# Patient Record
Sex: MALE | Race: BLACK OR AFRICAN AMERICAN | Employment: OTHER | URBAN - NONMETROPOLITAN AREA
[De-identification: names, ages, dates, MRNs, and addresses within clinical notes are randomized per-mention and may not be internally consistent; named-entity substitution may affect disease eponyms.]

---

## 2017-01-10 ENCOUNTER — NURSE ONLY (OUTPATIENT)
Dept: UROLOGY | Age: 47
End: 2017-01-10

## 2017-01-10 ENCOUNTER — OFFICE VISIT (OUTPATIENT)
Dept: OTHER | Age: 47
End: 2017-01-10

## 2017-01-10 VITALS — BODY MASS INDEX: 29.79 KG/M2 | WEIGHT: 232.13 LBS

## 2017-01-10 DIAGNOSIS — R39.15 URGENCY OF URINATION: Primary | ICD-10-CM

## 2017-01-10 DIAGNOSIS — E10.8 TYPE 1 DIABETES MELLITUS WITH COMPLICATION (HCC): Primary | ICD-10-CM

## 2017-01-10 PROCEDURE — 99211 OFF/OP EST MAY X REQ PHY/QHP: CPT | Performed by: REGISTERED NURSE

## 2017-01-10 PROCEDURE — 64566 NEUROELTRD STIM POST TIBIAL: CPT | Performed by: UROLOGY

## 2017-02-07 ENCOUNTER — NURSE ONLY (OUTPATIENT)
Dept: UROLOGY | Age: 47
End: 2017-02-07

## 2017-02-07 DIAGNOSIS — N32.81 OAB (OVERACTIVE BLADDER): Primary | ICD-10-CM

## 2017-02-07 PROCEDURE — 64566 NEUROELTRD STIM POST TIBIAL: CPT | Performed by: UROLOGY

## 2017-02-07 PROCEDURE — 99999 PR OFFICE/OUTPT VISIT,PROCEDURE ONLY: CPT | Performed by: UROLOGY

## 2017-02-14 ENCOUNTER — OFFICE VISIT (OUTPATIENT)
Dept: UROLOGY | Age: 47
End: 2017-02-14

## 2017-02-14 VITALS
DIASTOLIC BLOOD PRESSURE: 70 MMHG | HEIGHT: 74 IN | BODY MASS INDEX: 30.8 KG/M2 | SYSTOLIC BLOOD PRESSURE: 122 MMHG | WEIGHT: 240 LBS

## 2017-02-14 DIAGNOSIS — N32.81 OAB (OVERACTIVE BLADDER): Primary | ICD-10-CM

## 2017-02-14 PROCEDURE — 99212 OFFICE O/P EST SF 10 MIN: CPT | Performed by: UROLOGY

## 2017-02-14 PROCEDURE — 64566 NEUROELTRD STIM POST TIBIAL: CPT | Performed by: UROLOGY

## 2017-05-08 ENCOUNTER — TELEPHONE (OUTPATIENT)
Dept: OTHER | Age: 47
End: 2017-05-08

## 2017-05-08 DIAGNOSIS — E10.8 TYPE 1 DIABETES MELLITUS WITH COMPLICATION (HCC): Primary | ICD-10-CM

## 2017-06-09 ENCOUNTER — PROCEDURE VISIT (OUTPATIENT)
Dept: PHYSICAL MEDICINE AND REHAB | Age: 47
End: 2017-06-09

## 2017-06-09 DIAGNOSIS — G62.9 NEUROPATHY: Primary | ICD-10-CM

## 2017-06-09 DIAGNOSIS — R20.0 BILATERAL HAND NUMBNESS: ICD-10-CM

## 2017-06-09 PROCEDURE — 95911 NRV CNDJ TEST 9-10 STUDIES: CPT | Performed by: PSYCHIATRY & NEUROLOGY

## 2017-06-09 PROCEDURE — 95886 MUSC TEST DONE W/N TEST COMP: CPT | Performed by: PSYCHIATRY & NEUROLOGY

## 2017-07-06 ENCOUNTER — OFFICE VISIT (OUTPATIENT)
Dept: OTHER | Age: 47
End: 2017-07-06

## 2017-07-06 ENCOUNTER — TELEPHONE (OUTPATIENT)
Dept: OTHER | Age: 47
End: 2017-07-06

## 2017-07-06 VITALS — WEIGHT: 251 LBS | BODY MASS INDEX: 32.23 KG/M2

## 2017-07-06 DIAGNOSIS — E10.8 TYPE 1 DIABETES MELLITUS WITH COMPLICATION (HCC): Primary | ICD-10-CM

## 2017-07-21 ENCOUNTER — HOSPITAL ENCOUNTER (OUTPATIENT)
Dept: MRI IMAGING | Age: 47
Discharge: HOME | End: 2017-07-21
Payer: COMMERCIAL

## 2017-07-21 DIAGNOSIS — M54.12 CERVICAL SPONDYLITIS WITH RADICULITIS (HCC): ICD-10-CM

## 2017-07-21 DIAGNOSIS — M46.92 CERVICAL SPONDYLITIS WITH RADICULITIS (HCC): ICD-10-CM

## 2017-07-21 PROCEDURE — 72141 MRI NECK SPINE W/O DYE: CPT

## 2017-09-05 ENCOUNTER — HOSPITAL ENCOUNTER (OUTPATIENT)
Dept: PREADMISSION TESTING | Age: 47
Discharge: HOME | End: 2017-09-05
Payer: COMMERCIAL

## 2017-09-06 ENCOUNTER — HOSPITAL ENCOUNTER (OUTPATIENT)
Dept: PREADMISSION TESTING | Age: 47
Discharge: HOME | End: 2017-09-06

## 2017-09-08 ENCOUNTER — HOSPITAL ENCOUNTER (OUTPATIENT)
Age: 47
Discharge: HOME | End: 2017-09-08
Payer: COMMERCIAL

## 2017-09-08 ENCOUNTER — HOSPITAL ENCOUNTER (OUTPATIENT)
Dept: GENERAL RADIOLOGY | Age: 47
Discharge: HOME | End: 2017-09-08
Payer: COMMERCIAL

## 2017-09-08 DIAGNOSIS — E13.8 OTHER SPECIFIED DIABETES MELLITUS WITH UNSPECIFIED COMPLICATIONS (HCC): ICD-10-CM

## 2017-09-08 DIAGNOSIS — M50.323 OTHER CERVICAL DISC DEGENERATION AT C6-C7 LEVEL: ICD-10-CM

## 2017-09-08 DIAGNOSIS — Z01.811 PRE-OP CHEST EXAM: ICD-10-CM

## 2017-09-08 LAB
ALBUMIN SERPL-MCNC: 4.3 G/DL (ref 3.5–5.1)
ANION GAP SERPL CALCULATED.3IONS-SCNC: 11 MEQ/L (ref 8–16)
AVERAGE GLUCOSE: 168 MG/DL (ref 70–126)
BASOPHILS # BLD: 0.5 %
BASOPHILS ABSOLUTE: 0 THOU/MM3 (ref 0–0.1)
BUN BLDV-MCNC: 12 MG/DL (ref 7–22)
CALCIUM SERPL-MCNC: 9.1 MG/DL (ref 8.5–10.5)
CHLORIDE BLD-SCNC: 100 MEQ/L (ref 98–111)
CO2: 25 MEQ/L (ref 23–33)
CREAT SERPL-MCNC: 1 MG/DL (ref 0.4–1.2)
EOSINOPHIL # BLD: 2 %
EOSINOPHILS ABSOLUTE: 0.1 THOU/MM3 (ref 0–0.4)
GFR SERPL CREATININE-BSD FRML MDRD: > 90 ML/MIN/1.73M2
GLUCOSE BLD-MCNC: 163 MG/DL (ref 70–108)
HBA1C MFR BLD: 7.6 % (ref 4.4–6.4)
HCT VFR BLD CALC: 40.8 % (ref 42–52)
HEMOGLOBIN: 13.9 GM/DL (ref 14–18)
LYMPHOCYTES # BLD: 45 %
LYMPHOCYTES ABSOLUTE: 2.3 THOU/MM3 (ref 1–4.8)
MCH RBC QN AUTO: 28.5 PG (ref 27–31)
MCHC RBC AUTO-ENTMCNC: 34 GM/DL (ref 33–37)
MCV RBC AUTO: 83.8 FL (ref 80–94)
MONOCYTES # BLD: 9.4 %
MONOCYTES ABSOLUTE: 0.5 THOU/MM3 (ref 0.4–1.3)
NUCLEATED RED BLOOD CELLS: 0 /100 WBC
PDW BLD-RTO: 14.2 % (ref 11.5–14.5)
PLATELET # BLD: 221 THOU/MM3 (ref 130–400)
PMV BLD AUTO: 8 MCM (ref 7.4–10.4)
POTASSIUM SERPL-SCNC: 4.4 MEQ/L (ref 3.5–5.2)
PREALBUMIN: 27.5 MG/DL (ref 20–40)
RBC # BLD: 4.88 MILL/MM3 (ref 4.7–6.1)
RBC # BLD: NORMAL 10*6/UL
SEG NEUTROPHILS: 43.1 %
SEGMENTED NEUTROPHILS ABSOLUTE COUNT: 2.2 THOU/MM3 (ref 1.8–7.7)
SODIUM BLD-SCNC: 136 MEQ/L (ref 135–145)
WBC # BLD: 5.1 THOU/MM3 (ref 4.8–10.8)

## 2017-09-08 PROCEDURE — 36415 COLL VENOUS BLD VENIPUNCTURE: CPT

## 2017-09-08 PROCEDURE — 87184 SC STD DISK METHOD PER PLATE: CPT

## 2017-09-08 PROCEDURE — 84134 ASSAY OF PREALBUMIN: CPT

## 2017-09-08 PROCEDURE — 87147 CULTURE TYPE IMMUNOLOGIC: CPT

## 2017-09-08 PROCEDURE — 87081 CULTURE SCREEN ONLY: CPT

## 2017-09-08 PROCEDURE — 82040 ASSAY OF SERUM ALBUMIN: CPT

## 2017-09-08 PROCEDURE — 93005 ELECTROCARDIOGRAM TRACING: CPT

## 2017-09-08 PROCEDURE — 71020 XR CHEST STANDARD TWO VW: CPT

## 2017-09-08 PROCEDURE — 85025 COMPLETE CBC W/AUTO DIFF WBC: CPT

## 2017-09-08 PROCEDURE — 83036 HEMOGLOBIN GLYCOSYLATED A1C: CPT

## 2017-09-08 PROCEDURE — 80048 BASIC METABOLIC PNL TOTAL CA: CPT

## 2017-09-09 LAB — MRSA SCREEN: NORMAL

## 2017-09-10 LAB
EKG ATRIAL RATE: 68 BPM
EKG P AXIS: 67 DEGREES
EKG P-R INTERVAL: 158 MS
EKG Q-T INTERVAL: 396 MS
EKG QRS DURATION: 84 MS
EKG QTC CALCULATION (BAZETT): 421 MS
EKG R AXIS: 66 DEGREES
EKG T AXIS: 35 DEGREES
EKG VENTRICULAR RATE: 68 BPM

## 2017-09-12 LAB — MRSA SCREEN: NORMAL

## 2017-09-27 ENCOUNTER — HOSPITAL ENCOUNTER (OUTPATIENT)
Age: 47
Discharge: HOME | End: 2017-09-29
Attending: ORTHOPAEDIC SURGERY | Admitting: ORTHOPAEDIC SURGERY
Payer: COMMERCIAL

## 2017-09-27 ENCOUNTER — ANESTHESIA EVENT (OUTPATIENT)
Dept: OPERATING ROOM | Age: 47
End: 2017-09-27
Payer: COMMERCIAL

## 2017-09-27 ENCOUNTER — ANESTHESIA (OUTPATIENT)
Dept: OPERATING ROOM | Age: 47
End: 2017-09-27
Payer: COMMERCIAL

## 2017-09-27 ENCOUNTER — APPOINTMENT (OUTPATIENT)
Dept: GENERAL RADIOLOGY | Age: 47
End: 2017-09-27
Attending: ORTHOPAEDIC SURGERY
Payer: COMMERCIAL

## 2017-09-27 VITALS — TEMPERATURE: 97 F | DIASTOLIC BLOOD PRESSURE: 77 MMHG | OXYGEN SATURATION: 99 % | SYSTOLIC BLOOD PRESSURE: 97 MMHG

## 2017-09-27 PROBLEM — M50.10 HERNIATION OF CERVICAL INTERVERTEBRAL DISC WITH RADICULOPATHY: Status: ACTIVE | Noted: 2017-09-27

## 2017-09-27 LAB
GLUCOSE BLD-MCNC: 122 MG/DL (ref 70–108)
GLUCOSE BLD-MCNC: 168 MG/DL (ref 70–108)
GLUCOSE BLD-MCNC: 172 MG/DL (ref 70–108)
GLUCOSE BLD-MCNC: 276 MG/DL (ref 70–108)

## 2017-09-27 PROCEDURE — 6370000000 HC RX 637 (ALT 250 FOR IP): Performed by: ORTHOPAEDIC SURGERY

## 2017-09-27 PROCEDURE — 96365 THER/PROPH/DIAG IV INF INIT: CPT

## 2017-09-27 PROCEDURE — 7100000000 HC PACU RECOVERY - FIRST 15 MIN: Performed by: ORTHOPAEDIC SURGERY

## 2017-09-27 PROCEDURE — 82948 REAGENT STRIP/BLOOD GLUCOSE: CPT

## 2017-09-27 PROCEDURE — 94640 AIRWAY INHALATION TREATMENT: CPT

## 2017-09-27 PROCEDURE — 2720000010 HC SURG SUPPLY STERILE: Performed by: ORTHOPAEDIC SURGERY

## 2017-09-27 PROCEDURE — 95940 IONM IN OPERATNG ROOM 15 MIN: CPT | Performed by: ORTHOPAEDIC SURGERY

## 2017-09-27 PROCEDURE — 3700000000 HC ANESTHESIA ATTENDED CARE: Performed by: ORTHOPAEDIC SURGERY

## 2017-09-27 PROCEDURE — 2580000003 HC RX 258: Performed by: ORTHOPAEDIC SURGERY

## 2017-09-27 PROCEDURE — 7100000001 HC PACU RECOVERY - ADDTL 15 MIN: Performed by: ORTHOPAEDIC SURGERY

## 2017-09-27 PROCEDURE — 6360000002 HC RX W HCPCS: Performed by: ANESTHESIOLOGY

## 2017-09-27 PROCEDURE — C1713 ANCHOR/SCREW BN/BN,TIS/BN: HCPCS | Performed by: ORTHOPAEDIC SURGERY

## 2017-09-27 PROCEDURE — 2580000003 HC RX 258: Performed by: ANESTHESIOLOGY

## 2017-09-27 PROCEDURE — 6360000002 HC RX W HCPCS: Performed by: ORTHOPAEDIC SURGERY

## 2017-09-27 PROCEDURE — A6258 TRANSPARENT FILM >16<=48 IN: HCPCS | Performed by: ORTHOPAEDIC SURGERY

## 2017-09-27 PROCEDURE — L0120 CERV FLEX N/ADJ FOAM PRE OTS: HCPCS | Performed by: ORTHOPAEDIC SURGERY

## 2017-09-27 PROCEDURE — 3700000001 HC ADD 15 MINUTES (ANESTHESIA): Performed by: ORTHOPAEDIC SURGERY

## 2017-09-27 PROCEDURE — 72020 X-RAY EXAM OF SPINE 1 VIEW: CPT

## 2017-09-27 PROCEDURE — 96361 HYDRATE IV INFUSION ADD-ON: CPT

## 2017-09-27 PROCEDURE — 3600000005 HC SURGERY LEVEL 5 BASE: Performed by: ORTHOPAEDIC SURGERY

## 2017-09-27 PROCEDURE — 96360 HYDRATION IV INFUSION INIT: CPT

## 2017-09-27 PROCEDURE — C9359 IMPLNT,BON VOID FILLER-PUTTY: HCPCS | Performed by: ORTHOPAEDIC SURGERY

## 2017-09-27 PROCEDURE — 3600000015 HC SURGERY LEVEL 5 ADDTL 15MIN: Performed by: ORTHOPAEDIC SURGERY

## 2017-09-27 PROCEDURE — 2500000003 HC RX 250 WO HCPCS: Performed by: ORTHOPAEDIC SURGERY

## 2017-09-27 PROCEDURE — 2500000003 HC RX 250 WO HCPCS: Performed by: ANESTHESIOLOGY

## 2017-09-27 DEVICE — SCREW 3120515 4.0 X 15 SELF DRILL VAR
Type: IMPLANTABLE DEVICE | Site: NECK | Status: FUNCTIONAL
Brand: ATLANTIS® ANTERIOR CERVICAL PLATE SYSTEM

## 2017-09-27 DEVICE — GRAFT BNE SUB W11XH8XL14MM CORT INTBDY FUS FRZ DRY BLK SPCR: Type: IMPLANTABLE DEVICE | Site: NECK | Status: FUNCTIONAL

## 2017-09-27 DEVICE — DBM 005001 PROGENIX DBM 1CC SRVC FEE
Type: IMPLANTABLE DEVICE | Site: NECK | Status: FUNCTIONAL
Brand: PROGENIX® PUTTY AND PROGENIX® PLUS

## 2017-09-27 RX ORDER — FENTANYL CITRATE 50 UG/ML
50 INJECTION, SOLUTION INTRAMUSCULAR; INTRAVENOUS EVERY 30 MIN PRN
Status: DISCONTINUED | OUTPATIENT
Start: 2017-09-27 | End: 2017-09-29 | Stop reason: HOSPADM

## 2017-09-27 RX ORDER — LABETALOL HYDROCHLORIDE 5 MG/ML
5 INJECTION, SOLUTION INTRAVENOUS EVERY 10 MIN PRN
Status: DISCONTINUED | OUTPATIENT
Start: 2017-09-27 | End: 2017-09-27 | Stop reason: HOSPADM

## 2017-09-27 RX ORDER — PANTOPRAZOLE SODIUM 40 MG/1
40 TABLET, DELAYED RELEASE ORAL
Status: DISCONTINUED | OUTPATIENT
Start: 2017-09-28 | End: 2017-09-29 | Stop reason: HOSPADM

## 2017-09-27 RX ORDER — DEXTROSE MONOHYDRATE 50 MG/ML
100 INJECTION, SOLUTION INTRAVENOUS PRN
Status: DISCONTINUED | OUTPATIENT
Start: 2017-09-27 | End: 2017-09-29 | Stop reason: HOSPADM

## 2017-09-27 RX ORDER — ALBUTEROL SULFATE 90 UG/1
2 AEROSOL, METERED RESPIRATORY (INHALATION) EVERY 4 HOURS PRN
Status: DISCONTINUED | OUTPATIENT
Start: 2017-09-27 | End: 2017-09-29 | Stop reason: HOSPADM

## 2017-09-27 RX ORDER — FLUOXETINE HYDROCHLORIDE 20 MG/1
40 CAPSULE ORAL DAILY
Status: DISCONTINUED | OUTPATIENT
Start: 2017-09-27 | End: 2017-09-29 | Stop reason: HOSPADM

## 2017-09-27 RX ORDER — GUANFACINE 1 MG/1
1 TABLET, EXTENDED RELEASE ORAL NIGHTLY
Status: DISCONTINUED | OUTPATIENT
Start: 2017-09-27 | End: 2017-09-28

## 2017-09-27 RX ORDER — SODIUM CHLORIDE 9 MG/ML
INJECTION, SOLUTION INTRAVENOUS CONTINUOUS PRN
Status: DISCONTINUED | OUTPATIENT
Start: 2017-09-27 | End: 2017-09-27 | Stop reason: SDUPTHER

## 2017-09-27 RX ORDER — ACETAMINOPHEN 650 MG/1
650 SUPPOSITORY RECTAL EVERY 4 HOURS PRN
Status: DISCONTINUED | OUTPATIENT
Start: 2017-09-27 | End: 2017-09-29 | Stop reason: HOSPADM

## 2017-09-27 RX ORDER — FENTANYL CITRATE 50 UG/ML
50 INJECTION, SOLUTION INTRAMUSCULAR; INTRAVENOUS EVERY 5 MIN PRN
Status: DISCONTINUED | OUTPATIENT
Start: 2017-09-27 | End: 2017-09-27 | Stop reason: HOSPADM

## 2017-09-27 RX ORDER — SODIUM CHLORIDE 0.9 % (FLUSH) 0.9 %
10 SYRINGE (ML) INJECTION PRN
Status: DISCONTINUED | OUTPATIENT
Start: 2017-09-27 | End: 2017-09-29 | Stop reason: HOSPADM

## 2017-09-27 RX ORDER — LORATADINE 10 MG/1
10 TABLET ORAL DAILY
Status: DISCONTINUED | OUTPATIENT
Start: 2017-09-27 | End: 2017-09-27 | Stop reason: CLARIF

## 2017-09-27 RX ORDER — TAMSULOSIN HYDROCHLORIDE 0.4 MG/1
0.4 CAPSULE ORAL DAILY
Status: DISCONTINUED | OUTPATIENT
Start: 2017-09-27 | End: 2017-09-29 | Stop reason: HOSPADM

## 2017-09-27 RX ORDER — DEXAMETHASONE SODIUM PHOSPHATE 4 MG/ML
INJECTION, SOLUTION INTRA-ARTICULAR; INTRALESIONAL; INTRAMUSCULAR; INTRAVENOUS; SOFT TISSUE PRN
Status: DISCONTINUED | OUTPATIENT
Start: 2017-09-27 | End: 2017-09-27 | Stop reason: SDUPTHER

## 2017-09-27 RX ORDER — SODIUM CHLORIDE 0.9 % (FLUSH) 0.9 %
10 SYRINGE (ML) INJECTION EVERY 12 HOURS SCHEDULED
Status: DISCONTINUED | OUTPATIENT
Start: 2017-09-27 | End: 2017-09-29 | Stop reason: HOSPADM

## 2017-09-27 RX ORDER — ONDANSETRON 2 MG/ML
INJECTION INTRAMUSCULAR; INTRAVENOUS PRN
Status: DISCONTINUED | OUTPATIENT
Start: 2017-09-27 | End: 2017-09-27 | Stop reason: SDUPTHER

## 2017-09-27 RX ORDER — HYDROMORPHONE HCL 110MG/55ML
PATIENT CONTROLLED ANALGESIA SYRINGE INTRAVENOUS PRN
Status: DISCONTINUED | OUTPATIENT
Start: 2017-09-27 | End: 2017-09-27 | Stop reason: SDUPTHER

## 2017-09-27 RX ORDER — FENTANYL CITRATE 50 UG/ML
INJECTION, SOLUTION INTRAMUSCULAR; INTRAVENOUS PRN
Status: DISCONTINUED | OUTPATIENT
Start: 2017-09-27 | End: 2017-09-27 | Stop reason: SDUPTHER

## 2017-09-27 RX ORDER — LOSARTAN POTASSIUM 100 MG/1
100 TABLET ORAL DAILY
Status: DISCONTINUED | OUTPATIENT
Start: 2017-09-28 | End: 2017-09-29 | Stop reason: HOSPADM

## 2017-09-27 RX ORDER — POLYETHYLENE GLYCOL 3350 17 G/17G
17 POWDER, FOR SOLUTION ORAL DAILY
Status: DISCONTINUED | OUTPATIENT
Start: 2017-09-27 | End: 2017-09-29 | Stop reason: HOSPADM

## 2017-09-27 RX ORDER — SODIUM CHLORIDE 9 MG/ML
INJECTION, SOLUTION INTRAVENOUS CONTINUOUS
Status: DISCONTINUED | OUTPATIENT
Start: 2017-09-27 | End: 2017-09-29 | Stop reason: HOSPADM

## 2017-09-27 RX ORDER — PROMETHAZINE HYDROCHLORIDE 25 MG/ML
12.5 INJECTION, SOLUTION INTRAMUSCULAR; INTRAVENOUS
Status: DISCONTINUED | OUTPATIENT
Start: 2017-09-27 | End: 2017-09-27 | Stop reason: HOSPADM

## 2017-09-27 RX ORDER — FENTANYL CITRATE 50 UG/ML
25 INJECTION, SOLUTION INTRAMUSCULAR; INTRAVENOUS EVERY 30 MIN PRN
Status: DISCONTINUED | OUTPATIENT
Start: 2017-09-27 | End: 2017-09-29 | Stop reason: HOSPADM

## 2017-09-27 RX ORDER — MULTIVITAMIN WITH FOLIC ACID 400 MCG
1 TABLET ORAL DAILY
Status: DISCONTINUED | OUTPATIENT
Start: 2017-09-27 | End: 2017-09-29 | Stop reason: HOSPADM

## 2017-09-27 RX ORDER — LIDOCAINE HYDROCHLORIDE AND EPINEPHRINE 10; 10 MG/ML; UG/ML
INJECTION, SOLUTION INFILTRATION; PERINEURAL PRN
Status: DISCONTINUED | OUTPATIENT
Start: 2017-09-27 | End: 2017-09-27 | Stop reason: HOSPADM

## 2017-09-27 RX ORDER — CYCLOBENZAPRINE HCL 10 MG
10 TABLET ORAL 3 TIMES DAILY PRN
Status: DISCONTINUED | OUTPATIENT
Start: 2017-09-27 | End: 2017-09-29 | Stop reason: HOSPADM

## 2017-09-27 RX ORDER — FENTANYL CITRATE 50 UG/ML
25 INJECTION, SOLUTION INTRAMUSCULAR; INTRAVENOUS EVERY 5 MIN PRN
Status: DISCONTINUED | OUTPATIENT
Start: 2017-09-27 | End: 2017-09-27 | Stop reason: HOSPADM

## 2017-09-27 RX ORDER — DOCUSATE SODIUM 100 MG/1
100 CAPSULE, LIQUID FILLED ORAL 2 TIMES DAILY
Status: DISCONTINUED | OUTPATIENT
Start: 2017-09-27 | End: 2017-09-29 | Stop reason: HOSPADM

## 2017-09-27 RX ORDER — SUCCINYLCHOLINE CHLORIDE 20 MG/ML
INJECTION INTRAMUSCULAR; INTRAVENOUS PRN
Status: DISCONTINUED | OUTPATIENT
Start: 2017-09-27 | End: 2017-09-27 | Stop reason: SDUPTHER

## 2017-09-27 RX ORDER — AMLODIPINE BESYLATE 5 MG/1
5 TABLET ORAL DAILY
Status: DISCONTINUED | OUTPATIENT
Start: 2017-09-28 | End: 2017-09-29 | Stop reason: HOSPADM

## 2017-09-27 RX ORDER — OXYCODONE HYDROCHLORIDE AND ACETAMINOPHEN 5; 325 MG/1; MG/1
2 TABLET ORAL EVERY 4 HOURS PRN
Status: DISCONTINUED | OUTPATIENT
Start: 2017-09-27 | End: 2017-09-29 | Stop reason: HOSPADM

## 2017-09-27 RX ORDER — ATORVASTATIN CALCIUM 20 MG/1
20 TABLET, FILM COATED ORAL DAILY
Status: DISCONTINUED | OUTPATIENT
Start: 2017-09-27 | End: 2017-09-29 | Stop reason: HOSPADM

## 2017-09-27 RX ORDER — MEPERIDINE HYDROCHLORIDE 25 MG/ML
25 INJECTION INTRAMUSCULAR; INTRAVENOUS; SUBCUTANEOUS
Status: DISCONTINUED | OUTPATIENT
Start: 2017-09-27 | End: 2017-09-27 | Stop reason: HOSPADM

## 2017-09-27 RX ORDER — NITROGLYCERIN 0.4 MG/1
0.4 TABLET SUBLINGUAL EVERY 5 MIN PRN
Status: DISCONTINUED | OUTPATIENT
Start: 2017-09-27 | End: 2017-09-29 | Stop reason: HOSPADM

## 2017-09-27 RX ORDER — CETIRIZINE HYDROCHLORIDE 10 MG/1
10 TABLET ORAL DAILY
Status: DISCONTINUED | OUTPATIENT
Start: 2017-09-27 | End: 2017-09-29 | Stop reason: HOSPADM

## 2017-09-27 RX ORDER — ACETAMINOPHEN 325 MG/1
650 TABLET ORAL EVERY 4 HOURS PRN
Status: DISCONTINUED | OUTPATIENT
Start: 2017-09-27 | End: 2017-09-29 | Stop reason: HOSPADM

## 2017-09-27 RX ORDER — OXCARBAZEPINE 300 MG/1
900 TABLET, FILM COATED ORAL NIGHTLY
COMMUNITY
End: 2018-06-12 | Stop reason: SDUPTHER

## 2017-09-27 RX ORDER — FLUOXETINE HYDROCHLORIDE 20 MG/1
20 CAPSULE ORAL DAILY
Status: DISCONTINUED | OUTPATIENT
Start: 2017-09-27 | End: 2017-09-29 | Stop reason: HOSPADM

## 2017-09-27 RX ORDER — DIPHENHYDRAMINE HYDROCHLORIDE 50 MG/ML
12.5 INJECTION INTRAMUSCULAR; INTRAVENOUS
Status: DISCONTINUED | OUTPATIENT
Start: 2017-09-27 | End: 2017-09-27 | Stop reason: HOSPADM

## 2017-09-27 RX ORDER — FLUOXETINE HYDROCHLORIDE 40 MG/1
40 CAPSULE ORAL DAILY
Status: ON HOLD | COMMUNITY
End: 2019-12-30 | Stop reason: HOSPADM

## 2017-09-27 RX ORDER — GLYCOPYRROLATE 0.2 MG/ML
INJECTION INTRAMUSCULAR; INTRAVENOUS PRN
Status: DISCONTINUED | OUTPATIENT
Start: 2017-09-27 | End: 2017-09-27 | Stop reason: SDUPTHER

## 2017-09-27 RX ORDER — BUDESONIDE AND FORMOTEROL FUMARATE DIHYDRATE 160; 4.5 UG/1; UG/1
2 AEROSOL RESPIRATORY (INHALATION) 2 TIMES DAILY
Status: DISCONTINUED | OUTPATIENT
Start: 2017-09-27 | End: 2017-09-27 | Stop reason: CLARIF

## 2017-09-27 RX ORDER — SODIUM CHLORIDE 9 MG/ML
INJECTION, SOLUTION INTRAVENOUS CONTINUOUS
Status: DISCONTINUED | OUTPATIENT
Start: 2017-09-27 | End: 2017-09-27

## 2017-09-27 RX ORDER — NEOSTIGMINE METHYLSULFATE 1 MG/ML
INJECTION, SOLUTION INTRAVENOUS PRN
Status: DISCONTINUED | OUTPATIENT
Start: 2017-09-27 | End: 2017-09-27 | Stop reason: SDUPTHER

## 2017-09-27 RX ORDER — MIDAZOLAM HYDROCHLORIDE 1 MG/ML
INJECTION INTRAMUSCULAR; INTRAVENOUS PRN
Status: DISCONTINUED | OUTPATIENT
Start: 2017-09-27 | End: 2017-09-27 | Stop reason: SDUPTHER

## 2017-09-27 RX ORDER — OXCARBAZEPINE 300 MG/1
900 TABLET, FILM COATED ORAL NIGHTLY
Status: DISCONTINUED | OUTPATIENT
Start: 2017-09-27 | End: 2017-09-29 | Stop reason: HOSPADM

## 2017-09-27 RX ORDER — PROPOFOL 10 MG/ML
INJECTION, EMULSION INTRAVENOUS PRN
Status: DISCONTINUED | OUTPATIENT
Start: 2017-09-27 | End: 2017-09-27 | Stop reason: SDUPTHER

## 2017-09-27 RX ORDER — FLUOXETINE HYDROCHLORIDE 20 MG/1
20 CAPSULE ORAL DAILY
COMMUNITY
End: 2018-06-12

## 2017-09-27 RX ORDER — OXYCODONE HYDROCHLORIDE AND ACETAMINOPHEN 5; 325 MG/1; MG/1
1 TABLET ORAL EVERY 4 HOURS PRN
Status: DISCONTINUED | OUTPATIENT
Start: 2017-09-27 | End: 2017-09-29 | Stop reason: HOSPADM

## 2017-09-27 RX ORDER — ROCURONIUM BROMIDE 10 MG/ML
INJECTION, SOLUTION INTRAVENOUS PRN
Status: DISCONTINUED | OUTPATIENT
Start: 2017-09-27 | End: 2017-09-27 | Stop reason: SDUPTHER

## 2017-09-27 RX ORDER — METOPROLOL SUCCINATE 50 MG/1
50 TABLET, EXTENDED RELEASE ORAL DAILY
Status: DISCONTINUED | OUTPATIENT
Start: 2017-09-28 | End: 2017-09-29 | Stop reason: HOSPADM

## 2017-09-27 RX ORDER — DEXTROSE MONOHYDRATE 25 G/50ML
12.5 INJECTION, SOLUTION INTRAVENOUS PRN
Status: DISCONTINUED | OUTPATIENT
Start: 2017-09-27 | End: 2017-09-29 | Stop reason: HOSPADM

## 2017-09-27 RX ORDER — ALPRAZOLAM 0.5 MG/1
1 TABLET ORAL 3 TIMES DAILY PRN
Status: DISCONTINUED | OUTPATIENT
Start: 2017-09-27 | End: 2017-09-29 | Stop reason: HOSPADM

## 2017-09-27 RX ORDER — LIDOCAINE HYDROCHLORIDE 20 MG/ML
INJECTION, SOLUTION EPIDURAL; INFILTRATION; INTRACAUDAL; PERINEURAL PRN
Status: DISCONTINUED | OUTPATIENT
Start: 2017-09-27 | End: 2017-09-27 | Stop reason: SDUPTHER

## 2017-09-27 RX ORDER — NICOTINE POLACRILEX 4 MG
15 LOZENGE BUCCAL PRN
Status: DISCONTINUED | OUTPATIENT
Start: 2017-09-27 | End: 2017-09-29 | Stop reason: HOSPADM

## 2017-09-27 RX ORDER — OXCARBAZEPINE 300 MG/1
600 TABLET, FILM COATED ORAL DAILY
Status: DISCONTINUED | OUTPATIENT
Start: 2017-09-28 | End: 2017-09-29 | Stop reason: HOSPADM

## 2017-09-27 RX ORDER — TRAZODONE HYDROCHLORIDE 150 MG/1
300 TABLET ORAL NIGHTLY
Status: ON HOLD | COMMUNITY
End: 2019-12-30 | Stop reason: HOSPADM

## 2017-09-27 RX ORDER — ONDANSETRON 2 MG/ML
4 INJECTION INTRAMUSCULAR; INTRAVENOUS EVERY 6 HOURS PRN
Status: DISCONTINUED | OUTPATIENT
Start: 2017-09-27 | End: 2017-09-29 | Stop reason: HOSPADM

## 2017-09-27 RX ORDER — ZONISAMIDE 100 MG/1
100 CAPSULE ORAL DAILY
Status: DISCONTINUED | OUTPATIENT
Start: 2017-09-28 | End: 2017-09-29 | Stop reason: HOSPADM

## 2017-09-27 RX ORDER — ASPIRIN 81 MG/1
81 TABLET ORAL DAILY
Status: DISCONTINUED | OUTPATIENT
Start: 2017-09-27 | End: 2017-09-29 | Stop reason: HOSPADM

## 2017-09-27 RX ORDER — TRAZODONE HYDROCHLORIDE 100 MG/1
300 TABLET ORAL NIGHTLY
Status: DISCONTINUED | OUTPATIENT
Start: 2017-09-27 | End: 2017-09-29 | Stop reason: HOSPADM

## 2017-09-27 RX ADMIN — FENTANYL CITRATE 100 MCG: 50 INJECTION, SOLUTION INTRAMUSCULAR; INTRAVENOUS at 09:55

## 2017-09-27 RX ADMIN — TAMSULOSIN HYDROCHLORIDE 0.4 MG: 0.4 CAPSULE ORAL at 16:08

## 2017-09-27 RX ADMIN — THERA TABS 1 TABLET: TAB at 16:08

## 2017-09-27 RX ADMIN — SODIUM CHLORIDE: 9 INJECTION, SOLUTION INTRAVENOUS at 14:47

## 2017-09-27 RX ADMIN — PROPOFOL 200 MG: 10 INJECTION, EMULSION INTRAVENOUS at 09:55

## 2017-09-27 RX ADMIN — SODIUM CHLORIDE: 9 INJECTION, SOLUTION INTRAVENOUS at 09:55

## 2017-09-27 RX ADMIN — OXYCODONE HYDROCHLORIDE AND ACETAMINOPHEN 1 TABLET: 5; 325 TABLET ORAL at 15:53

## 2017-09-27 RX ADMIN — SODIUM CHLORIDE: 9 INJECTION, SOLUTION INTRAVENOUS at 08:22

## 2017-09-27 RX ADMIN — INSULIN LISPRO 1 UNITS: 100 INJECTION, SOLUTION INTRAVENOUS; SUBCUTANEOUS at 21:05

## 2017-09-27 RX ADMIN — FLUOXETINE 20 MG: 20 CAPSULE ORAL at 16:09

## 2017-09-27 RX ADMIN — HYDROMORPHONE HYDROCHLORIDE 0.5 MG: 1 INJECTION, SOLUTION INTRAMUSCULAR; INTRAVENOUS; SUBCUTANEOUS at 12:37

## 2017-09-27 RX ADMIN — HYDROMORPHONE HYDROCHLORIDE 1 MG: 2 INJECTION INTRAMUSCULAR; INTRAVENOUS; SUBCUTANEOUS at 10:15

## 2017-09-27 RX ADMIN — GLYCOPYRROLATE 0.4 MG: 0.2 INJECTION, SOLUTION INTRAMUSCULAR; INTRAVENOUS at 12:00

## 2017-09-27 RX ADMIN — FENTANYL CITRATE 50 MCG: 50 INJECTION, SOLUTION INTRAMUSCULAR; INTRAVENOUS at 11:35

## 2017-09-27 RX ADMIN — Medication 6 UNITS: at 18:16

## 2017-09-27 RX ADMIN — FLUOXETINE 40 MG: 20 CAPSULE ORAL at 16:09

## 2017-09-27 RX ADMIN — Medication 2 PUFF: at 20:40

## 2017-09-27 RX ADMIN — DOCUSATE SODIUM 100 MG: 100 CAPSULE ORAL at 21:09

## 2017-09-27 RX ADMIN — POLYETHYLENE GLYCOL 3350 17 G: 17 POWDER, FOR SOLUTION ORAL at 16:08

## 2017-09-27 RX ADMIN — FENTANYL CITRATE 50 MCG: 50 INJECTION INTRAMUSCULAR; INTRAVENOUS at 12:42

## 2017-09-27 RX ADMIN — ONDANSETRON 4 MG: 2 INJECTION INTRAMUSCULAR; INTRAVENOUS at 12:00

## 2017-09-27 RX ADMIN — CEFAZOLIN SODIUM 2 G: 2 SOLUTION INTRAVENOUS at 10:20

## 2017-09-27 RX ADMIN — CYCLOBENZAPRINE HYDROCHLORIDE 10 MG: 10 TABLET, FILM COATED ORAL at 21:09

## 2017-09-27 RX ADMIN — TRAZODONE HYDROCHLORIDE 100 MG: 100 TABLET ORAL at 23:24

## 2017-09-27 RX ADMIN — SUCCINYLCHOLINE CHLORIDE 160 MG: 20 INJECTION, SOLUTION INTRAMUSCULAR; INTRAVENOUS at 09:55

## 2017-09-27 RX ADMIN — CETIRIZINE HYDROCHLORIDE 10 MG: 10 TABLET ORAL at 16:09

## 2017-09-27 RX ADMIN — LIDOCAINE HYDROCHLORIDE 100 MG: 20 INJECTION, SOLUTION EPIDURAL; INFILTRATION; INTRACAUDAL; PERINEURAL at 09:55

## 2017-09-27 RX ADMIN — ASPIRIN 81 MG: 81 TABLET ORAL at 16:09

## 2017-09-27 RX ADMIN — MIDAZOLAM 2 MG: 1 INJECTION INTRAMUSCULAR; INTRAVENOUS at 09:50

## 2017-09-27 RX ADMIN — DEXAMETHASONE SODIUM PHOSPHATE 10 MG: 4 INJECTION, SOLUTION INTRAMUSCULAR; INTRAVENOUS at 10:15

## 2017-09-27 RX ADMIN — HYDROMORPHONE HYDROCHLORIDE 1 MG: 2 INJECTION INTRAMUSCULAR; INTRAVENOUS; SUBCUTANEOUS at 09:55

## 2017-09-27 RX ADMIN — ATORVASTATIN CALCIUM 20 MG: 20 TABLET, FILM COATED ORAL at 21:09

## 2017-09-27 RX ADMIN — CEFAZOLIN SODIUM 2 G: 2 SOLUTION INTRAVENOUS at 18:15

## 2017-09-27 RX ADMIN — NEOSTIGMINE METHYLSULFATE 4 MG: 1 INJECTION, SOLUTION INTRAVENOUS at 12:00

## 2017-09-27 RX ADMIN — GLYCOPYRROLATE 0.2 MG: 0.2 INJECTION, SOLUTION INTRAMUSCULAR; INTRAVENOUS at 09:50

## 2017-09-27 RX ADMIN — ROCURONIUM BROMIDE 30 MG: 10 INJECTION INTRAVENOUS at 10:15

## 2017-09-27 RX ADMIN — OXYCODONE HYDROCHLORIDE AND ACETAMINOPHEN 2 TABLET: 5; 325 TABLET ORAL at 23:25

## 2017-09-27 RX ADMIN — OXYCODONE HYDROCHLORIDE AND ACETAMINOPHEN 1 TABLET: 5; 325 TABLET ORAL at 16:58

## 2017-09-27 RX ADMIN — FENTANYL CITRATE 50 MCG: 50 INJECTION, SOLUTION INTRAMUSCULAR; INTRAVENOUS at 10:15

## 2017-09-27 RX ADMIN — GUANFACINE 1 MG: 1 TABLET, EXTENDED RELEASE ORAL at 22:42

## 2017-09-27 RX ADMIN — OXCARBAZEPINE 900 MG: 300 TABLET ORAL at 21:12

## 2017-09-27 RX ADMIN — FENTANYL CITRATE 50 MCG: 50 INJECTION, SOLUTION INTRAMUSCULAR; INTRAVENOUS at 10:10

## 2017-09-27 ASSESSMENT — PAIN SCALES - GENERAL
PAINLEVEL_OUTOF10: 6
PAINLEVEL_OUTOF10: 6
PAINLEVEL_OUTOF10: 7
PAINLEVEL_OUTOF10: 8
PAINLEVEL_OUTOF10: 8
PAINLEVEL_OUTOF10: 7
PAINLEVEL_OUTOF10: 7
PAINLEVEL_OUTOF10: 8
PAINLEVEL_OUTOF10: 0

## 2017-09-27 ASSESSMENT — PULMONARY FUNCTION TESTS
PIF_VALUE: 23
PIF_VALUE: 22
PIF_VALUE: 23
PIF_VALUE: 23
PIF_VALUE: 22
PIF_VALUE: 23
PIF_VALUE: 22
PIF_VALUE: 23
PIF_VALUE: 23
PIF_VALUE: 32
PIF_VALUE: 24
PIF_VALUE: 23
PIF_VALUE: 18
PIF_VALUE: 22
PIF_VALUE: 24
PIF_VALUE: 23
PIF_VALUE: 23
PIF_VALUE: 22
PIF_VALUE: 23
PIF_VALUE: 22
PIF_VALUE: 22
PIF_VALUE: 23
PIF_VALUE: 24
PIF_VALUE: 23
PIF_VALUE: 22
PIF_VALUE: 23
PIF_VALUE: 22
PIF_VALUE: 23
PIF_VALUE: 22
PIF_VALUE: 23
PIF_VALUE: 23
PIF_VALUE: 22
PIF_VALUE: 22
PIF_VALUE: 23
PIF_VALUE: 22
PIF_VALUE: 1
PIF_VALUE: 23
PIF_VALUE: 22
PIF_VALUE: 23
PIF_VALUE: 23
PIF_VALUE: 22
PIF_VALUE: 24
PIF_VALUE: 22
PIF_VALUE: 23
PIF_VALUE: 22
PIF_VALUE: 23
PIF_VALUE: 23
PIF_VALUE: 22
PIF_VALUE: 23
PIF_VALUE: 22
PIF_VALUE: 8
PIF_VALUE: 12
PIF_VALUE: 23
PIF_VALUE: 1
PIF_VALUE: 23
PIF_VALUE: 23
PIF_VALUE: 22
PIF_VALUE: 27
PIF_VALUE: 23
PIF_VALUE: 23
PIF_VALUE: 2
PIF_VALUE: 24
PIF_VALUE: 22
PIF_VALUE: 23
PIF_VALUE: 18
PIF_VALUE: 23
PIF_VALUE: 23
PIF_VALUE: 1
PIF_VALUE: 4
PIF_VALUE: 22
PIF_VALUE: 15
PIF_VALUE: 37
PIF_VALUE: 22
PIF_VALUE: 23
PIF_VALUE: 22
PIF_VALUE: 34
PIF_VALUE: 23
PIF_VALUE: 23
PIF_VALUE: 24
PIF_VALUE: 23
PIF_VALUE: 22
PIF_VALUE: 1
PIF_VALUE: 22
PIF_VALUE: 17
PIF_VALUE: 0
PIF_VALUE: 23
PIF_VALUE: 23
PIF_VALUE: 3
PIF_VALUE: 23
PIF_VALUE: 23
PIF_VALUE: 0
PIF_VALUE: 1
PIF_VALUE: 22
PIF_VALUE: 23
PIF_VALUE: 23
PIF_VALUE: 2
PIF_VALUE: 27
PIF_VALUE: 20
PIF_VALUE: 23
PIF_VALUE: 23
PIF_VALUE: 22
PIF_VALUE: 29
PIF_VALUE: 6
PIF_VALUE: 24
PIF_VALUE: 27
PIF_VALUE: 23
PIF_VALUE: 22
PIF_VALUE: 0
PIF_VALUE: 22
PIF_VALUE: 23
PIF_VALUE: 24
PIF_VALUE: 24
PIF_VALUE: 23
PIF_VALUE: 23
PIF_VALUE: 22
PIF_VALUE: 23
PIF_VALUE: 23
PIF_VALUE: 1
PIF_VALUE: 23
PIF_VALUE: 22
PIF_VALUE: 22

## 2017-09-27 ASSESSMENT — PAIN DESCRIPTION - ORIENTATION: ORIENTATION: ANTERIOR

## 2017-09-27 ASSESSMENT — PAIN DESCRIPTION - PAIN TYPE
TYPE: SURGICAL PAIN

## 2017-09-27 ASSESSMENT — PAIN DESCRIPTION - LOCATION
LOCATION: NECK

## 2017-09-27 ASSESSMENT — PAIN DESCRIPTION - FREQUENCY: FREQUENCY: CONTINUOUS

## 2017-09-27 ASSESSMENT — PAIN DESCRIPTION - DESCRIPTORS
DESCRIPTORS: ACHING;SHARP
DESCRIPTORS: ACHING
DESCRIPTORS: ACHING;SHARP
DESCRIPTORS: ACHING;SHARP
DESCRIPTORS: ACHING

## 2017-09-27 ASSESSMENT — PAIN DESCRIPTION - ONSET: ONSET: ON-GOING

## 2017-09-27 NOTE — IP AVS SNAPSHOT
ALPRAZolam 1 MG tablet   Commonly known as:  XANAX   take 1 tablet by mouth twice a day                  09/30/17                          amLODIPine 5 MG tablet   Commonly known as:  NORVASC   take 1 tablet by mouth once daily                5 mg on 9/29/2017  8:20 AM     9/30/17                          aspirin EC 81 MG EC tablet   Take 1 tablet by mouth daily                81 mg on 9/29/2017  8:20 AM     9/30/17                          atorvastatin 20 MG tablet   Commonly known as:  LIPITOR   Take 1 tablet by mouth daily                20 mg on 9/28/2017  9:41 PM     9/29/17                          bisacodyl 5 MG EC tablet   Commonly known as:  DULCOLAX   See Prep Instructions                  AS NEEDED                       budesonide-formoterol 160-4.5 MCG/ACT Aero   Commonly known as:  SYMBICORT   Inhale 2 puffs into the lungs 2 times daily                  9/29/17                          FLUoxetine 40 MG capsule   Commonly known as:  PROZAC   Take 40 mg by mouth daily                60 mg on 9/29/2017  8:20 AM     9/30/17                          FLUoxetine 20 MG capsule   Commonly known as:  PROZAC   Take 20 mg by mouth daily                60 mg on 9/29/2017  8:20 AM     9/30/17                          insulin aspart 100 UNIT/ML injection pen   Commonly known as:  NOVOLOG FLEXPEN   Use per sliding scale, up to 50 units daily. Please dispense 5 pens at a time.                   9/29/17                          INTUNIV 1 MG Tb24 extended release tablet   Generic drug:  guanFACINE   Take 4 mg by mouth nightly                4 mg on 9/28/2017  9:39 PM     9/29/17                          loratadine 10 MG tablet   Commonly known as:  CLARITIN   Take 1 tablet by mouth daily                  9/30/17                          losartan 100 MG tablet   Commonly known as:  COZAAR   take 1 tablet by mouth once daily                100 mg on 9/29/2017  8:20 AM     9/30/17 metoprolol succinate 50 MG extended release tablet   Commonly known as:  TOPROL XL   take 2 tablets by mouth once daily                50 mg on 9/29/2017  8:20 AM     9/30/17                          multivitamin tablet   take 1 tablet by mouth once daily                1 tablet on 9/29/2017  8:20 AM     9/30/17                          nitroGLYCERIN 0.4 MG SL tablet   Commonly known as:  NITROSTAT   Place 1 tablet under the tongue every 5 minutes as needed for Chest pain                  AS NEEDED                       pantoprazole 40 MG tablet   Commonly known as:  PROTONIX   Take 1 tablet by mouth every morning (before breakfast)                40 mg on 9/29/2017  5:01 AM     9/30/17                          polyethylene glycol powder   Commonly known as:  GLYCOLAX   Colonoscopy Prep Dispense 255 Gram Bottle.   Use as Directed                  AS NEEDED                       tamsulosin 0.4 MG capsule   Commonly known as:  FLOMAX   take 2 capsules by mouth once daily                0.4 mg on 9/29/2017  8:20 AM     9/30/17                          tiotropium 2.5 MCG/ACT Aers inhaler   Commonly known as:  SPIRIVA RESPIMAT   Inhale 2 puffs into the lungs every morning (before breakfast)                  9/30/17                          traZODone 150 MG tablet   Commonly known as:  DESYREL   Take 300 mg by mouth nightly                100 mg on 9/28/2017  9:40 PM     9/29/17                          TRESIBA FLEXTOUCH SC   Inject 32 Units into the skin every morning                32 Units on 9/29/2017  9:08 AM     9/30/17                          TRILEPTAL 300 MG tablet   Generic drug:  OXcarbazepine   Take 600 mg by mouth daily                600 mg on 9/29/2017  8:30 AM     9/30/17                          OXcarbazepine 300 MG tablet   Commonly known as:  TRILEPTAL   Take 900 mg by mouth nightly                600 mg on 9/29/2017  8:30 AM     9/29/17                          zonisamide 100 MG capsule You may receive a survey regarding the care you received during your stay. Your input is valuable to us. We encourage you to complete and return your survey in the envelope provided. We hope you will choose us in the future for your healthcare needs. Patient Information     Patient Name MELA Melendez 1970      Care Provided at:     Name Address Phone       [address] [phone]            Your Visit    Here you will find information about your visit, including the reason for your visit. Please take this sheet with you when you visit your doctor or other health care provider in the future. It will help determine the best possible medical care for you at that time. If you have any questions once you leave the hospital, please call the department phone number listed below. Why you were here     Your primary diagnosis was:  Herniation Of Cervical Intervertebral Disc With Radiculopathy      Visit Information     Date & Time Provider Department Dept. Phone    2017 Travis Ariza MD [facility] [phone]       Follow-up Appointments    Below is a list of your follow-up and future appointments. This may not be a complete list as you may have made appointments directly with providers that we are not aware of or your providers may have made some for you. Please call your providers to confirm appointments. It is important to keep your appointments. Please bring your current insurance card, photo ID, co-pay, and all medication bottles to your appointment. If self-pay, payment is expected at the time of service. Follow-up Information     Follow up with Nicky Gracia MD On 10/9/2017. Specialty:  Family Medicine    Why:  at 2:45 for follow up    Contact information:    [address] [facility] [address]  [phone]          Follow up with Travis Ariza MD On 10/9/2017. Specialty:  Orthopedic Surgery    Why:  at 1:20    Contact information:    [address] [place] [address] [address] [place] [address]  [phone]        Future Appointments     10/10/2017 3:00 PM     Appointment with Olivia Parr RN at [facility] Medication Management ([phone])   21 [phone] [facility]  [address]  [address] [address]         Preventive Care        Date Due    HIV screening is recommended for all people regardless of risk factors  aged 15-65 years at least once (lifetime) who have never been HIV tested. 3/23/1986    Tetanus Combination Vaccine (1 - Tdap) 3/23/1990    Pneumococcal Vaccine - Pneumovax for adults aged 19-64 years with: chronic heart disease, chronic lung disease, diabetes mellitus, alcoholism, chronic liver disease, or cigarette smoking. (1 of 1 - PPSV23) 3/23/1990    Urine Check For Kidney Problems 8/6/2015    Eye Exam By An Eye Doctor 6/15/2016    Cholesterol Screening 8/6/2016    Diabetic Foot Exam 5/31/2017    Hemoglobin A1C (Test For Long-Term Glucose Control) 9/8/2018                 Care Plan Once You Return Home    This section includes instructions you will need to follow once you leave the hospital.  Your care team will discuss these with you, so you and those caring for you know how to best care for your health needs at home. This section may also include educational information about certain health topics that may be of help to you. Discharge Instructions       ACDF- Anterior cervical discectomy and fusion    Activity    No lifting, pushing, or pulling  Up as tolerated at least 3-4 times per day. Up and moving helps to prevent blood clots.    Wear александр hose as directed per your physician   No driving for two weeks or as directed per physician     Collar    If collar is present, wear cervical collar at all times until seen at your follow up visit   May remove for dressing or bathing  Keep collar clean    Incision care Apply clean dry dressing to incision once a day or as needed  You may shower when your incision is not draining/or as directed per your physician     Diet    Increase Fluid/Water intake, eat foods high in fiber, fruits and vegetables to help to prevent constipation  Examples of foods high in fiber    Vegetables      All vegetables, especially asparagus, bean sprouts, broccoli, [place]  sprouts, cabbage, carrots, cauliflower, celery, corn, greens, green beans,  green pepper, onions, peas, potatoes (with skin), snow peas, spinach,  squash, sweet potatoes, tomatoes, zucchini    For maximum fiber intake, eat the peels of fruits and vegetables just be sure  to wash them well first.     Fruits    All fruits, especially apples, berries, grapefruits, mangoes, nectarines,  oranges, peaches, pears, dried fruits (figs, dates, prunes, raisins)    Choose raw fruits and vegetables over juice, cooked, or canned raw fruit  has more fiber. Dried fruit is also a good source of fiber. Some of the common symptoms of a surgical site infection are:    Symptoms in the area where the surgery took place:   Redness   Drainage   Pus   Pain   Swelling   Bad smell     Prevention of surgical site infection:    Make sure that your healthcare providers clean their hands before examining you - either with soap and water or an alcohol-based hand rubs. Family and friends who visit you should not touch the surgical wound or dressings. Family and friends should clean their hands with soap and water or an alcohol-based hand rub before and after visiting you. If you do not see them clean their hands, ask them to clean their hands. Wash hands frequently    If you are taking medications, follow these general guidelines:      Take your medication as directed. Do not change the amount or the schedule. Do not stop taking them without talking to your doctor. Do not share them. protect my health information. A complete copy of the After Visit Summary has been given to me, the patient and/or responsible adult. Patient Signature/Responsible Adult:____________________    Clinician Signature:_____________________    Date:_____________________    Time:_____________________        More Information           Cervical Spinal Fusion: What to Expect at Home  Your Recovery    After surgery, you can expect your neck to feel stiff and sore. This should improve in the weeks after surgery. You may have trouble sitting or standing in one position for very long and may need pain medicine in the weeks after your surgery. You may need to wear a neck brace for a while. It may take 4 to 6 weeks to get back to your usual activities, but it may depend on what kind of surgery you had. Your doctor may advise you to work with a physical therapist to strengthen the muscles around your neck and back. This care sheet gives you a general idea about how long it will take for you to recover. But each person recovers at a different pace. Follow the steps below to get better as quickly as possible. How can you care for yourself at home? Activity  · Rest when you feel tired. Getting enough sleep will help you recover. · Try to walk each day. Start by walking a little more than you did the day before. Bit by bit, increase the amount you walk. Walking boosts blood flow and helps prevent pneumonia and constipation. Walking may also decrease your muscle soreness after surgery. · Follow your doctor's directions about not lifting anything that would strain your neck and back. This may include a child, heavy grocery bags and milk containers, a heavy briefcase or backpack, cat litter or dog food bags, or a vacuum . · Avoid strenuous activities, such as bicycle riding, jogging, weightlifting, or aerobic exercise, until your doctor says it is okay. · Do not drive for 2 to 4 weeks after your surgery or until your doctor says it is okay. · Avoid taking long car trips for 2 to 4 weeks after surgery. Your neck may become tired and painful from sitting too long in one position. · You will probably need to take 4 to 6 weeks off from work. It depends on the type of work you do and how you feel. · You may have sex as soon as you feel able, but avoid positions that put stress on your neck or cause pain. Diet  · You can eat your normal diet. If your stomach is upset, try bland, low-fat foods like plain rice, broiled chicken, toast, and yogurt. · Drink plenty of fluids. If you have kidney, heart, or liver disease and have to limit fluids, talk with your doctor before you increase the amount of fluids you drink. · You may notice that your bowel movements are not regular right after your surgery. This is common. Try to avoid constipation and straining with bowel movements. You may want to take a fiber supplement every day. If you have not had a bowel movement after a couple of days, ask your doctor about taking a mild laxative. Medicines  · Your doctor will tell you if and when you can restart your medicines. He or she will also give you instructions about taking any new medicines. · If you take blood thinners, such as warfarin (Coumadin), clopidogrel (Plavix), or aspirin, be sure to talk to your doctor. He or she will tell you if and when to start taking those medicines again. Make sure that you understand exactly what your doctor wants you to do. · Be safe with medicines. Take pain medicines exactly as directed. ¨ If the doctor gave you a prescription medicine for pain, take it as prescribed. ¨ If you are not taking a prescription pain medicine, ask your doctor if you can take an over-the-counter medicine. · If your doctor prescribed antibiotics, take them as directed. Do not stop taking them just because you feel better.  You need to take the full course of antibiotics. · If you think your pain pill is making you sick to your stomach:  ¨ Take your pills after meals (unless your doctor has told you not to). ¨ Ask your doctor for a different pain pill. Incision care  · You will be given specific instructions about how to care for the cut (incision) the doctor made. The instructions will depend on the type of materials used to close the cut. Exercise  · Do exercises as instructed by your doctor or physical therapist to improve your strength and flexibility. Other instructions  · To reduce stiffness and help sore muscles, use a warm water bottle, a heating pad set on low, or a warm cloth on your neck. Do not put heat right over the incision. Do not go to sleep with a heating pad on your skin. Follow-up care is a key part of your treatment and safety. Be sure to make and go to all appointments, and call your doctor if you are having problems. It's also a good idea to know your test results and keep a list of the medicines you take. When should you call for help? Call 911 anytime you think you may need emergency care. For example, call if:  · You passed out (lost consciousness). · You have sudden chest pain and shortness of breath, or you cough up blood. · You cannot swallow. · You have severe pain in your neck or back. · You are unable to move an arm or a leg at all. Call your doctor now or seek immediate medical care if:  · You have pain that does not get better after you take pain pills. · You have signs of infection, such as:  ¨ Increased pain, swelling, warmth, or redness. ¨ Red streaks leading from the site. ¨ Pus draining from the site. ¨ A fever. · You have new or worse symptoms in your arms, legs, chest, belly, or buttocks. Symptoms may include:  ¨ Numbness or tingling. ¨ Weakness. ¨ Pain. · You lose bladder or bowel control. · You have loose stitches, or your incision comes open.

## 2017-09-27 NOTE — CARE COORDINATION
IVPB  2 g Intravenous Q8H    insulin lispro  0-12 Units Subcutaneous TID WC    insulin lispro  0-6 Units Subcutaneous Nightly     Continuous Infusions:   sodium chloride      dextrose        Pertinent Info/Orders/Treatment Plan:  Cervical collar. N/V checks. PT/OT. Pain management. Monitor wound drain output. IV fluids. IV antibiotics. Diabetic management. Diet: DIET CARB CONTROL;   DVT Prophylaxis: SCD's ordered and on  Smoking status:  reports that he has never smoked. He has never used smokeless tobacco.   Influenza Vaccination Screening Completed: n/a  Pneumonia Vaccination Screening Completed: yes  Core measures: vte  PCP: Misti Sanches MD  Readmission:   no  Risk Score: 11.5     Discharge Planning  Current Residence:  Private Residence  Living Arrangements:  Alone   Support Systems:  Spouse/Significant Other  Current Services PTA:   no  Potential Assistance Needed:  N/A  Potential Assistance Purchasing Medications:  No  Does patient want to participate in local refill/ meds to beds program?  No  Type of Home Care Services:     Patient expects to be discharged to:  Home  Expected Discharge date:  09/28/17  Follow Up Appointment: Best Day/ Time: Wednesday PM    Discharge Plan: Estephania Craig said that he plans to go home at discharge. Friends and family to assist as needed. Will follow post-op progress and assess for any discharge needs.       Evaluation: no

## 2017-09-27 NOTE — H&P
[address] [place] [address]                      PREOPERATIVE HISTORY AND PHYSICAL    PATIENT NAME: Rivas Rangel                     :             1970  MED REC NO:   [id number]                            ROOM:  ACCOUNT NO:   [de-identified]                            ADMISSION DATE:  2017  PROVIDER:     Moe De La Paz M.D. CORRECTED:  17 EDB    SURGERY DATE:  2017    CHIEF COMPLAINT:  Neck and left arm pain. HISTORY OF PRESENT ILLNESS:  The patient presents with having symptoms  of neck and left arm pain, but it has been progressing with left arm  weakness. His symptoms began on 2016 having sustained a seizure  with falling down the steps. At that time, he also sustained  transverse process fractures of levels of L2, L3, and L4. He now has  symptoms in the neck and left arm pain of a sharp stabbing quality,  which remained significant with inability to determine specific  dermatomes of involvement, but his left arm is weak and this is  significantly different compared to the right hand side, which is not  weak. MEDICATIONS:  Includes Asmanex Twisthaler inhaler, aspirin, which has  been stopped, Cialis, Claritin, Cozaar, Diflucan, Flomax, Lexapro,  Lipitor, metoprolol, multivitamin, Nitrostat, Norvasc, NovoLog,  ProAir, Prozac, Spiriva, Tresiba FlexTouch insulin pen, Trileptal,  Xanax, and Xanogen. DRUG ALLERGIES:  Include MORPHINE, FLAGYL, VIIBRYD, VILAZODONE. PAST MEDICAL HISTORY:  Includes asthma, seizure history, diabetes  insulin dependent, hypertension, depression, mental illness, anxiety,  sleep apnea, need for CPAP machine. REVIEW OF SYSTEMS:  Negative for abdominal pain, chest pain, or  significant breathing dysfunction. There is left arm radiculopathy  with weakness _____ _____ involved. There is no lower extremity  involvement of numbness or tingling.   There has been no loss

## 2017-09-27 NOTE — BRIEF OP NOTE
Brief Postoperative Note  ______________________________________________________________    Patient: Mary Cooley  YOB: 1970  MRN: [id number]  Date of Procedure: 9/27/2017    Pre-Op Diagnosis: OTHER CERVICAL DISC DEGENERATION AT C6-C7 LEVEL    Post-Op Diagnosis: Same       Procedure(s):  ACDF C6-7 W/ATLANTIS CORNERSTONE    Anesthesia: General    Surgeon(s):  Triny Menendez MD     First assist [facility]    Staff:  Scrub Person First: Colonel Cramer  Scrub Person Second: Hany Sweet     Estimated Blood Loss: 20 (units unknown)    Complications: None    Specimens:   * No specimens in log *    Implants:    Implant Name Type Inv.  Item Serial No.  Lot No. LRB No. Used   CORRINE-PUTTY PROGENIX DBM 1CC Bone/Graft/Tissue CORRINE-PUTTY PROGENIX DBM 1CC  [employer] [facility] [id number] N/A 1   PLATE RAJIV ELITE 23PC Spine PLATE RAJIV ELITE 72YI  [employer] [employer]  N/A 1   SCREW 4X15 SELFDRILL NITO ANGL Spine SCREW 4X15 SELFDRILL NITO ANGL   [employer] [employer]   N/A 4         Drains:   NG/OG Tube Orogastric 16 fr Right mouth (Active)           Findings: same    Triny Menendez MD  Date: 9/27/2017  Time: 12:03 PM

## 2017-09-27 NOTE — PROGRESS NOTES
Patient received to room 7K18 per bed from PACU. Patient is awake and alert. O2 on at 3 liters per nasal canulla. Anterior neck dressing clean, dry and intact. Hard collar on and aligned. Hand grasps and pedal pushes and pulls equal and moderate. Patient denies any numbness or tingling. Bilateral knee high teds and scd's continued. IV continued per right hand of . 9NS infusing at 100ml per hour

## 2017-09-27 NOTE — PROGRESS NOTES
1218- Pt to PACU, pt hooked up to monitor, VSS, pt breathing deeply, NPT to right nare, pt starting to wake up. Pt has dressing to neck dry clean and intact, cervical collar to neck, FIDELINA drain off to right side. 1220- NPT removed. Dr Rivka Castleman at bedside. 1231- Pt strong hand grasps bilateral, denies numbness and tingling. 1237- Pt medicated with 0.5 mg of dilaudid pt rates pain 8, pt repositioned for comfort. 1240- Pt rolled to his side. 1242- Pt medicated with 50 mcg of fentanyl for pain,  1244- Pt reminded to take deep breaths. Sat 90%. 1250- Pt states pain easing up.   1256- Pt denies nausea given some ice chips. 1257- Report called to [facility] Arian Sun RN called back for report. 1318- Pt meets discharge criteria. Dressing dry clean and intact, FIDELINA drain intact, assessment uncharged, pain tolerable. Pt rests easily sleeping off and on, tolerating ice chips.    1322- Pt transported to floor via transport team and myself, family in room on arrival

## 2017-09-28 ENCOUNTER — APPOINTMENT (OUTPATIENT)
Dept: CT IMAGING | Age: 47
End: 2017-09-28
Attending: ORTHOPAEDIC SURGERY
Payer: COMMERCIAL

## 2017-09-28 LAB
ANION GAP SERPL CALCULATED.3IONS-SCNC: 13 MEQ/L (ref 8–16)
BASOPHILS # BLD: 0.2 %
BASOPHILS ABSOLUTE: 0 THOU/MM3 (ref 0–0.1)
BUN BLDV-MCNC: 12 MG/DL (ref 7–22)
CALCIUM SERPL-MCNC: 8.4 MG/DL (ref 8.5–10.5)
CHLORIDE BLD-SCNC: 101 MEQ/L (ref 98–111)
CO2: 20 MEQ/L (ref 23–33)
CREAT SERPL-MCNC: 0.9 MG/DL (ref 0.4–1.2)
EOSINOPHIL # BLD: 0.1 %
EOSINOPHILS ABSOLUTE: 0 THOU/MM3 (ref 0–0.4)
GFR SERPL CREATININE-BSD FRML MDRD: > 90 ML/MIN/1.73M2
GLUCOSE BLD-MCNC: 121 MG/DL (ref 70–108)
GLUCOSE BLD-MCNC: 236 MG/DL (ref 70–108)
GLUCOSE BLD-MCNC: 278 MG/DL (ref 70–108)
GLUCOSE BLD-MCNC: 286 MG/DL (ref 70–108)
GLUCOSE BLD-MCNC: 292 MG/DL (ref 70–108)
GLUCOSE BLD-MCNC: 314 MG/DL (ref 70–108)
HCT VFR BLD CALC: 38.6 % (ref 42–52)
HEMOGLOBIN: 12.9 GM/DL (ref 14–18)
LYMPHOCYTES # BLD: 25 %
LYMPHOCYTES ABSOLUTE: 2.7 THOU/MM3 (ref 1–4.8)
MCH RBC QN AUTO: 29 PG (ref 27–31)
MCHC RBC AUTO-ENTMCNC: 33.4 GM/DL (ref 33–37)
MCV RBC AUTO: 86.9 FL (ref 80–94)
MONOCYTES # BLD: 7.6 %
MONOCYTES ABSOLUTE: 0.8 THOU/MM3 (ref 0.4–1.3)
NUCLEATED RED BLOOD CELLS: 0 /100 WBC
PDW BLD-RTO: 14.3 % (ref 11.5–14.5)
PLATELET # BLD: 165 THOU/MM3 (ref 130–400)
PMV BLD AUTO: 9 MCM (ref 7.4–10.4)
POTASSIUM SERPL-SCNC: 4.3 MEQ/L (ref 3.5–5.2)
RBC # BLD: 4.44 MILL/MM3 (ref 4.7–6.1)
RBC # BLD: NORMAL 10*6/UL
SEG NEUTROPHILS: 67.1 %
SEGMENTED NEUTROPHILS ABSOLUTE COUNT: 7.2 THOU/MM3 (ref 1.8–7.7)
SODIUM BLD-SCNC: 134 MEQ/L (ref 135–145)
WBC # BLD: 10.7 THOU/MM3 (ref 4.8–10.8)

## 2017-09-28 PROCEDURE — 6360000002 HC RX W HCPCS: Performed by: ORTHOPAEDIC SURGERY

## 2017-09-28 PROCEDURE — 6370000000 HC RX 637 (ALT 250 FOR IP): Performed by: ORTHOPAEDIC SURGERY

## 2017-09-28 PROCEDURE — 97161 PT EVAL LOW COMPLEX 20 MIN: CPT

## 2017-09-28 PROCEDURE — 72125 CT NECK SPINE W/O DYE: CPT

## 2017-09-28 PROCEDURE — 36415 COLL VENOUS BLD VENIPUNCTURE: CPT

## 2017-09-28 PROCEDURE — 96361 HYDRATE IV INFUSION ADD-ON: CPT

## 2017-09-28 PROCEDURE — 85025 COMPLETE CBC W/AUTO DIFF WBC: CPT

## 2017-09-28 PROCEDURE — 94640 AIRWAY INHALATION TREATMENT: CPT

## 2017-09-28 PROCEDURE — 97165 OT EVAL LOW COMPLEX 30 MIN: CPT

## 2017-09-28 PROCEDURE — G8979 MOBILITY GOAL STATUS: HCPCS

## 2017-09-28 PROCEDURE — 80048 BASIC METABOLIC PNL TOTAL CA: CPT

## 2017-09-28 PROCEDURE — 2580000003 HC RX 258: Performed by: ORTHOPAEDIC SURGERY

## 2017-09-28 PROCEDURE — 97110 THERAPEUTIC EXERCISES: CPT

## 2017-09-28 PROCEDURE — G8987 SELF CARE CURRENT STATUS: HCPCS

## 2017-09-28 PROCEDURE — G8978 MOBILITY CURRENT STATUS: HCPCS

## 2017-09-28 PROCEDURE — G8988 SELF CARE GOAL STATUS: HCPCS

## 2017-09-28 PROCEDURE — G8989 SELF CARE D/C STATUS: HCPCS

## 2017-09-28 PROCEDURE — 82948 REAGENT STRIP/BLOOD GLUCOSE: CPT

## 2017-09-28 PROCEDURE — G8980 MOBILITY D/C STATUS: HCPCS

## 2017-09-28 RX ORDER — GUANFACINE 1 MG/1
4 TABLET, EXTENDED RELEASE ORAL NIGHTLY
Status: DISCONTINUED | OUTPATIENT
Start: 2017-09-28 | End: 2017-09-29 | Stop reason: HOSPADM

## 2017-09-28 RX ORDER — OXYCODONE HYDROCHLORIDE AND ACETAMINOPHEN 5; 325 MG/1; MG/1
1 TABLET ORAL EVERY 4 HOURS PRN
Qty: 50 TABLET | Refills: 0 | Status: SHIPPED | OUTPATIENT
Start: 2017-09-28 | End: 2017-10-05

## 2017-09-28 RX ADMIN — TAMSULOSIN HYDROCHLORIDE 0.4 MG: 0.4 CAPSULE ORAL at 09:13

## 2017-09-28 RX ADMIN — DOCUSATE SODIUM 100 MG: 100 CAPSULE ORAL at 09:13

## 2017-09-28 RX ADMIN — DOCUSATE SODIUM 100 MG: 100 CAPSULE ORAL at 21:41

## 2017-09-28 RX ADMIN — TIOTROPIUM BROMIDE 18 MCG: 18 CAPSULE ORAL; RESPIRATORY (INHALATION) at 09:43

## 2017-09-28 RX ADMIN — INSULIN HUMAN 4 UNITS: 100 INJECTION, SOLUTION PARENTERAL at 18:04

## 2017-09-28 RX ADMIN — ATORVASTATIN CALCIUM 20 MG: 20 TABLET, FILM COATED ORAL at 21:41

## 2017-09-28 RX ADMIN — OXYCODONE HYDROCHLORIDE AND ACETAMINOPHEN 2 TABLET: 5; 325 TABLET ORAL at 10:14

## 2017-09-28 RX ADMIN — OXYCODONE HYDROCHLORIDE AND ACETAMINOPHEN 2 TABLET: 5; 325 TABLET ORAL at 04:51

## 2017-09-28 RX ADMIN — SODIUM CHLORIDE: 9 INJECTION, SOLUTION INTRAVENOUS at 02:25

## 2017-09-28 RX ADMIN — OXYCODONE HYDROCHLORIDE AND ACETAMINOPHEN 2 TABLET: 5; 325 TABLET ORAL at 15:06

## 2017-09-28 RX ADMIN — INSULIN LISPRO 4 UNITS: 100 INJECTION, SOLUTION INTRAVENOUS; SUBCUTANEOUS at 21:19

## 2017-09-28 RX ADMIN — OXYCODONE HYDROCHLORIDE AND ACETAMINOPHEN 2 TABLET: 5; 325 TABLET ORAL at 19:28

## 2017-09-28 RX ADMIN — FLUOXETINE 40 MG: 20 CAPSULE ORAL at 09:13

## 2017-09-28 RX ADMIN — PANTOPRAZOLE SODIUM 40 MG: 40 TABLET, DELAYED RELEASE ORAL at 06:22

## 2017-09-28 RX ADMIN — METOPROLOL SUCCINATE 50 MG: 50 TABLET, FILM COATED, EXTENDED RELEASE ORAL at 09:13

## 2017-09-28 RX ADMIN — OXYCODONE HYDROCHLORIDE AND ACETAMINOPHEN 1 TABLET: 5; 325 TABLET ORAL at 23:48

## 2017-09-28 RX ADMIN — ASPIRIN 81 MG: 81 TABLET ORAL at 09:13

## 2017-09-28 RX ADMIN — GUANFACINE 4 MG: 1 TABLET, EXTENDED RELEASE ORAL at 21:39

## 2017-09-28 RX ADMIN — THERA TABS 1 TABLET: TAB at 09:13

## 2017-09-28 RX ADMIN — INSULIN HUMAN 5 UNITS: 100 INJECTION, SOLUTION PARENTERAL at 13:08

## 2017-09-28 RX ADMIN — CYCLOBENZAPRINE HYDROCHLORIDE 10 MG: 10 TABLET, FILM COATED ORAL at 10:13

## 2017-09-28 RX ADMIN — CYCLOBENZAPRINE HYDROCHLORIDE 10 MG: 10 TABLET, FILM COATED ORAL at 21:41

## 2017-09-28 RX ADMIN — Medication 2 PUFF: at 22:14

## 2017-09-28 RX ADMIN — FLUOXETINE 20 MG: 20 CAPSULE ORAL at 09:14

## 2017-09-28 RX ADMIN — INSULIN LISPRO 5 UNITS: 100 INJECTION, SOLUTION INTRAVENOUS; SUBCUTANEOUS at 13:05

## 2017-09-28 RX ADMIN — Medication 10 ML: at 21:43

## 2017-09-28 RX ADMIN — OXCARBAZEPINE 900 MG: 300 TABLET ORAL at 21:41

## 2017-09-28 RX ADMIN — Medication 2 PUFF: at 09:44

## 2017-09-28 RX ADMIN — TRAZODONE HYDROCHLORIDE 100 MG: 100 TABLET ORAL at 21:40

## 2017-09-28 RX ADMIN — LOSARTAN POTASSIUM 100 MG: 100 TABLET, FILM COATED ORAL at 09:13

## 2017-09-28 RX ADMIN — CETIRIZINE HYDROCHLORIDE 10 MG: 10 TABLET ORAL at 09:13

## 2017-09-28 RX ADMIN — ZONISAMIDE 100 MG: 100 CAPSULE ORAL at 09:13

## 2017-09-28 RX ADMIN — AMLODIPINE BESYLATE 5 MG: 5 TABLET ORAL at 09:13

## 2017-09-28 RX ADMIN — CEFAZOLIN SODIUM 2 G: 2 SOLUTION INTRAVENOUS at 02:23

## 2017-09-28 RX ADMIN — OXCARBAZEPINE 600 MG: 300 TABLET ORAL at 09:13

## 2017-09-28 ASSESSMENT — PAIN SCALES - GENERAL
PAINLEVEL_OUTOF10: 7
PAINLEVEL_OUTOF10: 7
PAINLEVEL_OUTOF10: 6
PAINLEVEL_OUTOF10: 7

## 2017-09-28 ASSESSMENT — PAIN DESCRIPTION - FREQUENCY
FREQUENCY: CONTINUOUS

## 2017-09-28 ASSESSMENT — PAIN DESCRIPTION - LOCATION
LOCATION: NECK

## 2017-09-28 ASSESSMENT — PAIN DESCRIPTION - ONSET
ONSET: ON-GOING

## 2017-09-28 ASSESSMENT — PAIN DESCRIPTION - ORIENTATION
ORIENTATION: ANTERIOR

## 2017-09-28 ASSESSMENT — PAIN DESCRIPTION - DESCRIPTORS
DESCRIPTORS: ACHING

## 2017-09-28 ASSESSMENT — PAIN DESCRIPTION - PAIN TYPE
TYPE: SURGICAL PAIN

## 2017-09-28 NOTE — H&P
[address] [place] [address]                      PREOPERATIVE HISTORY AND PHYSICAL    PATIENT NAME: Marc Zamarripa                     :             1970  MED REC NO:   [id number]                            ROOM:            0018  ACCOUNT NO:   [de-identified]                            ADMISSION DATE:  2017  PROVIDER:     Faye Euceda M.D. CORRECTED:  17 EDB    SURGERY DATE:  2017    CHIEF COMPLAINT:  Neck and left arm pain. HISTORY OF PRESENT ILLNESS:  The patient presents with having symptoms  of neck and left arm pain, but it has been progressing with left arm  weakness. His symptoms began on 2016 having sustained a seizure  with falling down the steps. At that time, he also sustained  transverse process fractures of levels of L2, L3, and L4. He now has  symptoms in the neck and left arm pain of a sharp stabbing quality,  which remained significant with inability to determine specific  dermatomes of involvement, but his left arm is weak and this is  significantly different compared to the right hand side, which is not  weak. MEDICATIONS:  Includes Asmanex Twisthaler inhaler, aspirin, which has  been stopped, Cialis, Claritin, Cozaar, Diflucan, Flomax, Lexapro,  Lipitor, metoprolol, multivitamin, Nitrostat, Norvasc, NovoLog,  ProAir, Prozac, Spiriva, Tresiba FlexTouch insulin pen, Trileptal,  Xanax, and Xanogen. DRUG ALLERGIES:  Include MORPHINE, FLAGYL, VIIBRYD, VILAZODONE. PAST MEDICAL HISTORY:  Includes asthma, seizure history, diabetes  insulin dependent, hypertension, depression, mental illness, anxiety,  sleep apnea, need for CPAP machine. REVIEW OF SYSTEMS:  Negative for abdominal pain, chest pain, or  significant breathing dysfunction. There is left arm radiculopathy  with weakness  involved. There is no lower extremity  involvement of numbness or tingling.   There has been no loss

## 2017-09-28 NOTE — PROGRESS NOTES
Martha Us 61  INPATIENT OCCUPATIONAL THERAPY  [facility] ORTHOPEDICS 7K  EVALUATION    Time:  Time In:   Time Out: 1430  Timed Code Treatment Minutes: 0 Minutes  Minutes: 14          Date: 2017  Patient Name: Dotty Michele,   Gender: male      MRN: [id number]  : 1970  (56 y.o.)  Referring Practitioner: Dr. Gume Foley  Diagnosis: other cervical disc degeneration on C6-C7  Diagnosis: There were no encounter diagnoses. Additional Pertinent Hx: ACDF C6-7 W/ATLANTIS CORNERSTONE on     Restrictions/Precautions:  Restrictions/Precautions: General Precautions                 Required Braces or Orthoses  Cervical: c-collar    Dotty Michele  has a past medical history of ADHD (attention deficit hyperactivity disorder); Anxiety; Arthritis; Asthma; Bladder dysfunction; Depression; Diabetes mellitus ([address] Fatigue; GERD (gastroesophageal reflux disease); Headache; Hyperlipidemia; Hypertension; MDRO (multiple drug resistant organisms) resistance; OAB (overactive bladder); Seizures ([address] Sleep apnea; and Type II or unspecified type diabetes mellitus without mention of complication, not stated as uncontrolled. Dotty Michele  has a past surgical history that includes Eye surgery ( [address]); Cataract removal (); Foot surgery (Bilateral, ); Foot surgery (); other surgical history (11/4/15); eye surgery (Right, 92,96); Elbow surgery (Right); Upper gastrointestinal endoscopy (); and cervical fusion (N/A, 2017). Subjective  Patient assessed for rehabilitation services?: Yes    Subjective: Rn lorri session.  Pt walking around in room on own, agreeable to work with OT    General:  Overall Orientation Status: Within Normal Limits    Vision: Within Functional Limits    Hearing: Within functional limits         Pain:  Pain Assessment  Patient Currently in Pain: Denies       Social/Functional:  Lives With: Alone  Type of Home: Apartment  Home Layout: One level  Home Access: Level entry  Home Equipment:  (None)     Bathroom Shower/Tub: Tub/Shower unit  Bathroom Equipment: Grab bars in shower       ADL Assistance: [address]: Independent       Ambulation Assistance: Independent  Transfer Assistance: Independent    Active : No  Additional Comments: Pt states he amb without AD, does not drive due to h/o seizures. Pt has a girlfriend that is able to check in daily    Objective        Overall Cognitive Status: WNL                                     LUE AROM (degrees)  LUE AROM : WFL          RUE AROM (degrees)  RUE AROM : WFL       LUE Strength  Gross LUE Strength:  (not formally tested d/t cervical precautions)                                      RUE Tone: Normotonic  LUE Tone: Normotonic                    ADL  LE Dressing: Supervision (to reach sock)          Transfers  Sit to stand: Modified independent (from EOB)  Stand to sit: Modified independent (to EOB)    Balance  Sitting Balance: Modified independent   Standing Balance: Modified independent      Time: 8 minutes  Activity: talking with thearpist     Functional Mobility  Functional - Mobility Device: No device  Activity: Other  Assist Level: Modified independent   Functional Mobility Comments: in room             Activity Tolerance:  Activity Tolerance: Patient Tolerated treatment well    Treatment Initiated:  OT eval completed, see above for more details. Assessment:  Assessment: Pt currently functioning at baseline for ADLs and mobility at this time. Pt reports no further questions or concerns for discharge and reports having assist at home if needed. Will discharge pt from OT services at this time.    Discharge Recommendations: Home with assist PRN    Clinical Decision Making: Clinical Decision making was of Low Complexity as the result of analysis of data from a problem focused assessment, a consideration of a limited number of treatment options, no significant comorbidities affecting the plan of care and no modification or assistance required to complete the evaluation. Patient Education:  Patient Education: OT POC, anjana ott and Salvador Merritt, pt reports interest in forrester but reports will get himself after being discharged. Equipment Recommendations:  Equipment Needed: No    Safety:  Safety Devices in place: Yes  Type of devices: All fall risk precautions in place    Plan:  Times per week: n/a    Goals:       Short term goals  Time Frame for Short term goals: no acute OT goals identified     Evaluation Complexity: Based on the findings of patient history, examination, clinical presentation, and decision making during this evaluation, this patient is of low complexity. OT G-codes  Functional Assessment Tool Used: Professsional Judgement  Functional Limitation: Self care  Self Care Current Status (): At least 1 percent but less than 20 percent impaired, limited or restricted  Self Care Goal Status (): At least 1 percent but less than 20 percent impaired, limited or restricted  Self Care Discharge Status ():  At least 1 percent but less than 20 percent impaired, limited or restricted

## 2017-09-28 NOTE — OP NOTE
[address] [place] [address]                               OPERATIVE REPORT    PATIENT NAME: Cr Harrison                     :             1970  MED REC NO:   [id number]                            ROOM:            0018  ACCOUNT NO:   [de-identified]                            ADMISSION DATE:  2017  PROVIDER:     Zheng Echeverria M.D.    Daphene Booze:  2017    PREOPERATIVE DIAGNOSES:  1. Cervical disk herniation, level of C6-C7, left side. 2.  Left upper extremity arm radiculopathy. POSTOPERATIVE DIAGNOSES:  1. Cervical disk herniation, level of C6-C7, left side. 2.  Left upper extremity arm radiculopathy. OPERATIONS PERFORMED:  1. Anterior cervical diskectomy of level of C6-C7 with complete uncus      decompression and clearance of canal across level of C6-C7.  2.  Anterior cervical interbody fusion of level of C6-C7. 3.  Use of tricortical and structural bone graft, Allograft Medtronic      Cornerstone for use  of this interbody fusion measuring 8 x 14 x 11 mm      in size, filled with the Progenix  DBM bone graft putty. 4.  Plating of anterior cervical spine level of C6-C7 with use of a      23-mm  Medtronic Atlantis Elite plate attachment with a total of 4      screws each measuring 15 mm in  length. SURGEON:  Zheng Echeverria M.D.    ASSISTANT:  Angelique Petty PA-C. ANESTHESIA:  General.    BLOOD LOSS:  20 mL. DRAIN:  Mikel-Regan. COMPLICATIONS:  Zero. INDICATION:  The patient, 56years of age, who has been experiencing   symptoms of neck and left arm pain that are radicular, coming from a  left hand side C6-C7  cervical disk herniation. He has failed to  improve despite conservative management, now wishing  to pursue  definitive surgical treatment, which I have recommended he consider.     In preparation for this operation, his family physician Dr. Sarah Ren  has seen him as  well for preoperative testing and clearance. I have  discussed with him the  risks and benefits of this operation that  include but are not limited to infection,  anesthesia, nerve injury  including paralysis, dysphasia, postop neck  discomfort, swallowing  discomfort, change of voice, need for postop brace wear, and  postop  imaging. In preparation for this, he has been cleared. We discussed   this in the office before prior to proceeding and today's date, which  he is agreeable to  proceed. DESCRIPTION:  We brought the patient to the operating room and upon   entrance, time-out was observed. Anesthetic was delivered. Airway   secured. Morgan catheter was not placed. He would be positioned  supine on general operative table with gentle extension of the head  and neck area as we then  prepped and draped his anterior neck with  use of a soap scrub solution, sterile  towel, and ChloraPrep solution  as well. Once in the appropriately padded,  positioned, and draped  out, we marked the skin over the anterior neck above the  manubrium  sterni in a leftward and curvilinear to set a venice taken from midline  toward the left side of  the neck measuring 2 inch length of incision  where I injected 2 mL of 1% lidocaine  with epinephrine. He had  received 2 g of IV Ancef to completion. Skin was  incised. Hemostasis maintained and the neck would be approached by dividing   the skin and platysmal layer. As we approached, the hemostasis was  maintained  and I identified the omohyoid muscle. The inferior and  caudal portion of the stomach muscle was  reflected in the lateral and  leftward direction as we retracted this with  the sternocleidomastoid  muscle and carotid sheath. Taken to the rightward direction was the  esophagus and trachea retracting soft tissues which exposed levels of  C5-C6 and C6-C7.   Because of the girth of the neck and the breadth of  the shoulders, I placed the marked needle at level of C5-C6, which was  visible with x-ray. The skin was then removed and we knew we would  move one level down to level of C6-C7 at the appropriate level of  operation. We began by placing [place] pin distraction at the level of  C6 and C7 while the second assistant also held an appendiceal  Pedersen retractor with manual retraction exposing the disk space of  level C6-C7. We just brought the microscope in and proceeded to  perform a complete anterior cervical diskectomy working anterior to  posterior removing disk and cartilage of the endplate and working to  the posterior aspect of the disk space where the end plates were  present, we took down the PLL as well as the posterior uncus and disk  herniation at level of C6-C7, which was neurocompressive at the  central canal and left hand side C6-C7 foramen. Once cleared and  bilaterally decompressed, the ball-tipped probe was then passed well  to both of the opening foramina of levels of C6-C7 completing and  confirming a decompression which was very thoroughly performed at  level C6-C7. This interbody surface was also well decorticated to  accept an 8 x 14 x 11 mm tricortical bone graft and [employer]  Cornerstone bone, which had also been filled with the Pro[employer]ix  DBM  bone graft putty. We relaxed the distraction pins upon the graft to  fit very well and there was no motion with manipulation. We placed  wax in to fit and the bone distraction pins placed at levels of C6 and  C7 and trimmed the anterior osteophytes at level C6-C7. With  retractor set with an implanter plate 23 mm in length, the Medtronic  Atlantis Elite plate using 2 screws to the plate in the C6, 2 screws  to the plate in the C7 which engaged the plate very securely. Each  locking mechanism was also engaged. This fit very well and we then  irrigated very thoroughly.   We brought a Mikel-Regan drain out  through a separate stab incision and closed in layers with use of a #3  undyed Vicryl suture for the

## 2017-09-28 NOTE — PROGRESS NOTES
[address] [address]  INPATIENT PHYSICAL THERAPY  EVALUATION  [facility] ORTHOPEDICS 7K    Time In: 3344  Time Out: 7146  Timed Code Treatment Minutes: 8 Minutes  Minutes: 23        Date: 2017  Patient Name: Reyna Elliott,  Gender:  male        MRN: [id number]  : 1970  (56 y.o.)      Referring Practitioner: Vaughn Ritter MD  Diagnosis: Other cervical disc degeneration at C6-C7 level  Additional Pertinent Hx: Pt is a 56 yr old male admitted  s/p ACDF C6-7 W/ATLANTIS CORNERSTONE. Past Medical History:   Diagnosis Date    ADHD (attention deficit hyperactivity disorder)     Anxiety     Arthritis     Asthma     Bladder dysfunction     Depression     Diabetes mellitus (HCC)     Fatigue     GERD (gastroesophageal reflux disease)     Headache     migraines    Hyperlipidemia     Hypertension     MDRO (multiple drug resistant organisms) resistance     OAB (overactive bladder) 10/27/2015    Seizures ([address]     Sleep apnea     wears cpap    Type II or unspecified type diabetes mellitus without mention of complication, not stated as uncontrolled      Past Surgical History:   Procedure Laterality Date    CATARACT REMOVAL      bilateral    CERVICAL FUSION N/A 2017    ACDF C6-7 W/ATLANTIS CORNERSTONE performed by Beatriz Brown MD at [address] [place] [address] Dr Right    [address] 92,96    FOOT SURGERY Bilateral     hammer toes    FOOT SURGERY      removal of piece of glass    OTHER SURGICAL HISTORY  11/4/15    Excision of 3 cm lipoma LUQ abdominal wall Eliza    UPPER GASTROINTESTINAL ENDOSCOPY         Restrictions/Precautions:  Restrictions/Precautions: General Precautions  Required Braces or Orthoses  Cervical: c-collar    Subjective:  Chart Reviewed: Yes  Patient assessed for rehabilitation services?: Yes  Family / Caregiver Present: No  Subjective: RN approved session, pt is pleasant and agreeable.     General:  Overall Orientation Status: Within Functional Limits  Vision: Within Functional Limits  Hearing: Within functional limits     Pain:   . Pre Treatment Pain Screening  Pain at present: 5  Scale Used: Numeric Score  Intervention List: Patient able to continue with treatment    Social/Functional History:    Lives With: Alone  Type of Home: Apartment  Home Layout: One level  Home Access: Level entry  Home Equipment:  (None)   Ambulation Assistance: Independent  Transfer Assistance: Independent  Active : No  Additional Comments: Pt states he amb without AD, does not drive due to h/o seizures. Pt has a girlfriend that is able to check in daily    Objective:  RLE PROM: WFL     LLE PROM: WFL    B LE's 4/5     RLE Tone: Normotonic  LLE Tone: Normotonic     Balance  Sitting - Static: Good  Sitting - Dynamic: Good  Standing - Static: Good  Standing - Dynamic: Good    Supine to Sit: Supervision    Transfers  Sit to Stand: Supervision  Stand to sit: Supervision     Ambulation 1  Surface: level tile  Device: No Device  Assistance: Stand by assistance  Quality of Gait: Pt amb with good oscar and stride length, steady without LOB. Distance: 300 feet    Exercises:  Comments: Pt performs B LE AROM while seated EOB x 10 reps to increase strength for improved gait and balance. Activity Tolerance:  Activity Tolerance: Patient Tolerated treatment well    Treatment Initiated: See above exercises. Assessment: Body structures, Functions, Activity limitations: Decreased endurance, Decreased strength  Assessment: Pt admitted s/p cervical surgery. Pt tolerates session well, amb without AD SBA. No further PT services warranted at this time, safe to return home. Prognosis: Excellent    Clinical Presentation: Low - Stable and Uncomplicated: Pt admitted s/p cervical surgery. Pt tolerates session well, amb without AD SBA. No further PT services warranted at this time, safe to return home.     Decision Making: High Complexitybased on

## 2017-09-28 NOTE — PLAN OF CARE
Problem: Impaired respiratory status  Goal: Clear lung sounds  Clear lung sounds   Outcome: Met This Shift  Spiriva and Dulera to maintain clear breath sounds.

## 2017-09-28 NOTE — PLAN OF CARE
Problem: Pain:  Goal: Pain level will decrease  Pain level will decrease   Outcome: Ongoing  Patient states pain ranging from 7-8 on scale to neck. Patient's pain managed with percocet and flexeril. Patient pain goal 3 on scale. Patient declines ice. Goal: Control of acute pain  Control of acute pain   Outcome: Ongoing  Patient states pain ranging from 7-8 on scale. Patient's pain managed with percocet and flexeril. Patient able to sleep through evening. Goal: Control of chronic pain  Control of chronic pain   Outcome: Ongoing  Patient denies chronic pain. Problem: Pain Control  Goal: Maintain pain level at or below patients acceptable level (or 5 if patient is unable to determine acceptable level)  Outcome: Ongoing  Patient states pain ranging from 7-8 on scale to neck. Patient's pain managed with percocet and flexeril. Patient able to sleep through evening. Problem: Neurological  Goal: Maximum potential motor/sensory/cognitive function  Outcome: Ongoing  Patient denies numbness or tingling. Patient oriented x4. Hard collar on. Problem: Respiratory  Goal: No pulmonary complications  Outcome: Ongoing  Lung sounds clear throughout. Patient encouraged to cough and deep breathe. Patient encouraged to work on incentive spirometer. Goal: O2 Sat > 90%  Outcome: Ongoing  Patient sating above 92% on room air. Goal: Supplemental O2 requirements decreased  Outcome: Ongoing  Patient sating above 92% on room air. Goal: Pneumonia vaccine at discharge as needed  Outcome: Ongoing  Patient educated on pneumonia vaccine. Problem: Skin Integrity/Risk  Goal: No skin breakdown during hospitalization  Outcome: Ongoing  Surgical incision to anterior neck. Dressing clean, dry, and intact, no drainage noted. FIDELINA drain in place. Hard collar on. Patient repositions himself in the bed as needed. Goal: Wound healing  Outcome: Ongoing  Surgical incision to anterior neck.  Dressing clean, dry, and intact, no drainage noted. FIDELINA drain in place and draining sanguinous drainage. Problem: Musculor/Skeletal Functional Status  Goal: Highest potential functional level  Outcome: Ongoing  Patient has moderate hand  and pedal push and pull bilaterally. Patient up with stand-by assist.   Goal: Absence of falls  Outcome: Ongoing  Patient absent of falls this shift. Bed in lowest position, side rails up 2/4, call light within reach, non-skid socks on. Comments:   Care plan reviewed with patient. Patient verbalize understanding of the plan of care and contribute to goal setting.

## 2017-09-29 VITALS
HEIGHT: 74 IN | WEIGHT: 251.4 LBS | DIASTOLIC BLOOD PRESSURE: 75 MMHG | OXYGEN SATURATION: 94 % | SYSTOLIC BLOOD PRESSURE: 134 MMHG | TEMPERATURE: 98.4 F | BODY MASS INDEX: 32.26 KG/M2 | RESPIRATION RATE: 18 BRPM | HEART RATE: 82 BPM

## 2017-09-29 LAB
ANION GAP SERPL CALCULATED.3IONS-SCNC: 12 MEQ/L (ref 8–16)
BASOPHILS # BLD: 0.4 %
BASOPHILS ABSOLUTE: 0 THOU/MM3 (ref 0–0.1)
BUN BLDV-MCNC: 14 MG/DL (ref 7–22)
CALCIUM SERPL-MCNC: 8.6 MG/DL (ref 8.5–10.5)
CHLORIDE BLD-SCNC: 99 MEQ/L (ref 98–111)
CO2: 26 MEQ/L (ref 23–33)
CREAT SERPL-MCNC: 0.9 MG/DL (ref 0.4–1.2)
EOSINOPHIL # BLD: 0.6 %
EOSINOPHILS ABSOLUTE: 0 THOU/MM3 (ref 0–0.4)
GFR SERPL CREATININE-BSD FRML MDRD: > 90 ML/MIN/1.73M2
GLUCOSE BLD-MCNC: 252 MG/DL (ref 70–108)
GLUCOSE BLD-MCNC: 262 MG/DL (ref 70–108)
GLUCOSE BLD-MCNC: 264 MG/DL (ref 70–108)
HCT VFR BLD CALC: 37.3 % (ref 42–52)
HEMOGLOBIN: 12.6 GM/DL (ref 14–18)
LYMPHOCYTES # BLD: 36.2 %
LYMPHOCYTES ABSOLUTE: 2.7 THOU/MM3 (ref 1–4.8)
MCH RBC QN AUTO: 29.1 PG (ref 27–31)
MCHC RBC AUTO-ENTMCNC: 33.8 GM/DL (ref 33–37)
MCV RBC AUTO: 86.2 FL (ref 80–94)
MONOCYTES # BLD: 7.4 %
MONOCYTES ABSOLUTE: 0.5 THOU/MM3 (ref 0.4–1.3)
NUCLEATED RED BLOOD CELLS: 0 /100 WBC
PDW BLD-RTO: 14.3 % (ref 11.5–14.5)
PLATELET # BLD: 161 THOU/MM3 (ref 130–400)
PMV BLD AUTO: 8.3 MCM (ref 7.4–10.4)
POTASSIUM SERPL-SCNC: 4.3 MEQ/L (ref 3.5–5.2)
RBC # BLD: 4.33 MILL/MM3 (ref 4.7–6.1)
RBC # BLD: NORMAL 10*6/UL
SEG NEUTROPHILS: 55.4 %
SEGMENTED NEUTROPHILS ABSOLUTE COUNT: 4.1 THOU/MM3 (ref 1.8–7.7)
SODIUM BLD-SCNC: 137 MEQ/L (ref 135–145)
WBC # BLD: 7.4 THOU/MM3 (ref 4.8–10.8)

## 2017-09-29 PROCEDURE — 80048 BASIC METABOLIC PNL TOTAL CA: CPT

## 2017-09-29 PROCEDURE — 6370000000 HC RX 637 (ALT 250 FOR IP): Performed by: ORTHOPAEDIC SURGERY

## 2017-09-29 PROCEDURE — 82948 REAGENT STRIP/BLOOD GLUCOSE: CPT

## 2017-09-29 PROCEDURE — 2580000003 HC RX 258: Performed by: ORTHOPAEDIC SURGERY

## 2017-09-29 PROCEDURE — 85025 COMPLETE CBC W/AUTO DIFF WBC: CPT

## 2017-09-29 PROCEDURE — 36415 COLL VENOUS BLD VENIPUNCTURE: CPT

## 2017-09-29 PROCEDURE — 94640 AIRWAY INHALATION TREATMENT: CPT

## 2017-09-29 RX ADMIN — OXCARBAZEPINE 600 MG: 300 TABLET ORAL at 08:30

## 2017-09-29 RX ADMIN — Medication 10 ML: at 08:20

## 2017-09-29 RX ADMIN — OXYCODONE HYDROCHLORIDE AND ACETAMINOPHEN 1 TABLET: 5; 325 TABLET ORAL at 05:02

## 2017-09-29 RX ADMIN — ZONISAMIDE 100 MG: 100 CAPSULE ORAL at 08:30

## 2017-09-29 RX ADMIN — METOPROLOL SUCCINATE 50 MG: 50 TABLET, FILM COATED, EXTENDED RELEASE ORAL at 08:20

## 2017-09-29 RX ADMIN — INSULIN HUMAN 5 UNITS: 100 INJECTION, SOLUTION PARENTERAL at 11:47

## 2017-09-29 RX ADMIN — INSULIN LISPRO 5 UNITS: 100 INJECTION, SOLUTION INTRAVENOUS; SUBCUTANEOUS at 11:39

## 2017-09-29 RX ADMIN — PANTOPRAZOLE SODIUM 40 MG: 40 TABLET, DELAYED RELEASE ORAL at 05:01

## 2017-09-29 RX ADMIN — POLYETHYLENE GLYCOL 3350 17 G: 17 POWDER, FOR SOLUTION ORAL at 08:20

## 2017-09-29 RX ADMIN — DOCUSATE SODIUM 100 MG: 100 CAPSULE ORAL at 08:20

## 2017-09-29 RX ADMIN — Medication 2 PUFF: at 07:59

## 2017-09-29 RX ADMIN — CETIRIZINE HYDROCHLORIDE 10 MG: 10 TABLET ORAL at 08:20

## 2017-09-29 RX ADMIN — INSULIN HUMAN 5 UNITS: 100 INJECTION, SOLUTION PARENTERAL at 09:08

## 2017-09-29 RX ADMIN — THERA TABS 1 TABLET: TAB at 08:20

## 2017-09-29 RX ADMIN — OXYCODONE HYDROCHLORIDE AND ACETAMINOPHEN 2 TABLET: 5; 325 TABLET ORAL at 10:18

## 2017-09-29 RX ADMIN — FLUOXETINE 40 MG: 20 CAPSULE ORAL at 08:20

## 2017-09-29 RX ADMIN — LOSARTAN POTASSIUM 100 MG: 100 TABLET, FILM COATED ORAL at 08:20

## 2017-09-29 RX ADMIN — INSULIN LISPRO 5 UNITS: 100 INJECTION, SOLUTION INTRAVENOUS; SUBCUTANEOUS at 09:07

## 2017-09-29 RX ADMIN — ASPIRIN 81 MG: 81 TABLET ORAL at 08:20

## 2017-09-29 RX ADMIN — FLUOXETINE 20 MG: 20 CAPSULE ORAL at 08:20

## 2017-09-29 RX ADMIN — AMLODIPINE BESYLATE 5 MG: 5 TABLET ORAL at 08:20

## 2017-09-29 RX ADMIN — TAMSULOSIN HYDROCHLORIDE 0.4 MG: 0.4 CAPSULE ORAL at 08:20

## 2017-09-29 RX ADMIN — TIOTROPIUM BROMIDE 18 MCG: 18 CAPSULE ORAL; RESPIRATORY (INHALATION) at 07:58

## 2017-09-29 ASSESSMENT — PAIN DESCRIPTION - ONSET
ONSET: ON-GOING
ONSET: ON-GOING

## 2017-09-29 ASSESSMENT — PAIN DESCRIPTION - PROGRESSION: CLINICAL_PROGRESSION: NOT CHANGED

## 2017-09-29 ASSESSMENT — PAIN DESCRIPTION - LOCATION
LOCATION: NECK
LOCATION: NECK

## 2017-09-29 ASSESSMENT — PAIN SCALES - GENERAL
PAINLEVEL_OUTOF10: 6
PAINLEVEL_OUTOF10: 7

## 2017-09-29 ASSESSMENT — PAIN DESCRIPTION - FREQUENCY
FREQUENCY: CONTINUOUS
FREQUENCY: CONTINUOUS

## 2017-09-29 ASSESSMENT — PAIN DESCRIPTION - ORIENTATION
ORIENTATION: ANTERIOR
ORIENTATION: ANTERIOR

## 2017-09-29 ASSESSMENT — PAIN DESCRIPTION - PAIN TYPE
TYPE: ACUTE PAIN;SURGICAL PAIN
TYPE: SURGICAL PAIN

## 2017-09-29 ASSESSMENT — PAIN DESCRIPTION - DESCRIPTORS
DESCRIPTORS: ACHING
DESCRIPTORS: ACHING

## 2017-09-29 NOTE — PLAN OF CARE
Problem: Impaired respiratory status  Goal: Clear lung sounds  Clear lung sounds   Outcome: Met This Shift  Breath sounds are clear

## 2017-09-29 NOTE — PLAN OF CARE
Functional Status  Goal: Highest potential functional level  Outcome: Ongoing  Patient has strong hand  and pedal push and pull bilaterally. Patient steady on feet and up ad eulalio. Goal: Absence of falls  Outcome: Ongoing  Patient has strong hand  and pedal push and pull bilaterally. Patient steady on feet and up ad eulalio. Problem: Discharge Planning:  Goal: Patients continuum of care needs are met  Patients continuum of care needs are met   Outcome: Ongoing  Patient plans on discharge to home possibly tomorrow. Comments:   Care plan reviewed with patient. Patient verbalize understanding of the plan of care and contribute to goal setting.

## 2017-09-29 NOTE — PROGRESS NOTES
Discharge instructions given to patient and family at this time. All questions answered. All belongings packed and sent with patient. Script for percocet sent. Dressing change supplies sent. Transport called to assist to private vehicle via wheelchair.

## 2017-09-29 NOTE — PLAN OF CARE
Problem: Impaired respiratory status  Goal: Clear lung sounds  Clear lung sounds   Outcome: Met This Shift

## 2017-09-29 NOTE — DISCHARGE INSTR - DIET

## 2017-10-03 ENCOUNTER — TELEPHONE (OUTPATIENT)
Dept: PHARMACY | Age: 47
End: 2017-10-03

## 2017-10-03 NOTE — TELEPHONE ENCOUNTER
CLINICAL PHARMACY CONSULTATION: Transition of Care ([facility])  -----------------------------------------------------------------------------------------------  Ned Delgado is a 56 y.o. male  who was discharged from [address] [place] [address] on 9/29/17 with a diagnosis of cervical disc degeneration. Attempt made to contact pt. Left msg on home/[phone] TAD requesting call back at [phone]. Will continue to attempt to contact pt as appropriate.     Zeynep Cai, PharmD  10/3/2017 1:00 PM

## 2017-10-04 RX ORDER — METOPROLOL SUCCINATE 50 MG/1
50 TABLET, EXTENDED RELEASE ORAL DAILY
COMMUNITY

## 2017-10-04 RX ORDER — TAMSULOSIN HYDROCHLORIDE 0.4 MG/1
0.4 CAPSULE ORAL DAILY
COMMUNITY
End: 2019-02-07

## 2017-10-31 ENCOUNTER — HOSPITAL ENCOUNTER (OUTPATIENT)
Dept: OCCUPATIONAL THERAPY | Age: 47
Setting detail: THERAPIES SERIES
Discharge: HOME | End: 2017-10-31
Payer: COMMERCIAL

## 2017-10-31 PROCEDURE — 97110 THERAPEUTIC EXERCISES: CPT

## 2017-10-31 PROCEDURE — 97165 OT EVAL LOW COMPLEX 30 MIN: CPT

## 2017-10-31 ASSESSMENT — PAIN DESCRIPTION - LOCATION: LOCATION: WRIST;OTHER (COMMENT)

## 2017-10-31 ASSESSMENT — PAIN DESCRIPTION - ORIENTATION: ORIENTATION: RIGHT

## 2017-10-31 ASSESSMENT — PAIN SCALES - GENERAL: PAINLEVEL_OUTOF10: 3

## 2017-10-31 ASSESSMENT — PAIN DESCRIPTION - PAIN TYPE: TYPE: ACUTE PAIN

## 2017-11-02 ENCOUNTER — HOSPITAL ENCOUNTER (OUTPATIENT)
Dept: OCCUPATIONAL THERAPY | Age: 47
Setting detail: THERAPIES SERIES
Discharge: HOME | End: 2017-11-02
Payer: COMMERCIAL

## 2017-11-07 ENCOUNTER — HOSPITAL ENCOUNTER (OUTPATIENT)
Dept: OCCUPATIONAL THERAPY | Age: 47
Setting detail: THERAPIES SERIES
Discharge: HOME | End: 2017-11-07
Payer: COMMERCIAL

## 2017-11-07 PROCEDURE — 97018 PARAFFIN BATH THERAPY: CPT

## 2017-11-07 PROCEDURE — 97110 THERAPEUTIC EXERCISES: CPT

## 2017-11-07 NOTE — PROGRESS NOTES
[address] [address]  OUTPATIENT OCCUPATIONAL THERAPY  Daily Note  [address]    Time In: 1300  Time Out: 2292  Minutes: 45  Timed Code Treatment Minutes: 30 Minutes     Date: 2017  Patient Name: Brett Beach        CSN: [id number]   : 1970  (56 y.o.)  Gender: male   Referring Practitioner: Dr. Stanley Moran  Diagnosis: Primary OA of right [facility] joint          General:  OT Visit Information  Onset Date: 10/31/17  OT Insurance Information: [employer] - allowed 30 visits OT per calendar year, no hot/cold packs  Total # of Visits Approved: 30  Total # of Visits to Date: 2  Certification Period Expiration Date: 17  Progress Note Counter: 2/10  Comments: Script for ROM right thumb to full with progressive strength; Pt. does go back to see Dr. Stephanie Merritt but unsure of date. Restrictions/Precautions:  Restrictions/Precautions: General Precautions              Subjective:  Subjective: Patient reports he tolerated evaluation well, no questions regarding home exercise program. Continues to report pain in [facility] joint of R thumb following having a seizure a few weeks ago. Pain:  Patient Currently in Pain: No (No pain at rest, just stiffness. )       Objective:     Upper Extremity Function  UE AROM: Full fist x10, hook fist x10, wrist flexion/extension x10 reps each with digits straight and flexed, UD/RD x10 reps with tightness noted in UD, thumb opposition to all 4 digits x10 reps. UE PROM: PROM to R wrist all motions with digits straight and flexed, PROM to R digits individual and composite joints with prolonged hold. UE Strengthing: Squeezing/manipulating red theraputty x2 minutes, rolling and pinching each digits x10 reps. Paraffin  Number Minutes Paraffin: x15 minutes to R hand and wrist with MHP over. Paraffin Location: Right, Hand, Wrist     Activity Tolerance: Additional Comments: Patient tolerated treatment well.      Assessment:  Assessment: Patient is progressing towards goals. Patient feels that he has swelling in thumb [facility] joint since having the seizure, plans to call the doctor regarding this swelling and increased pain with movement. Patient Education:  Patient Education: No changes to HEP. Plan:  Plan Comment: Continue per established POC.         NISHANT [employer] ELIZABETH/L #[id number]

## 2017-11-09 ENCOUNTER — HOSPITAL ENCOUNTER (OUTPATIENT)
Dept: OCCUPATIONAL THERAPY | Age: 47
Setting detail: THERAPIES SERIES
Discharge: HOME | End: 2017-11-09
Payer: COMMERCIAL

## 2017-11-09 PROCEDURE — 97018 PARAFFIN BATH THERAPY: CPT

## 2017-11-09 PROCEDURE — 97110 THERAPEUTIC EXERCISES: CPT

## 2017-11-09 NOTE — PROGRESS NOTES
[address] [address]  OUTPATIENT OCCUPATIONAL THERAPY  Daily Note  [address]    Time In: 8869  Time Out: 1423  Minutes: 44  Timed Code Treatment Minutes: 29 Minutes     Date: 2017  Patient Name: Charlene Hewitt        CSN: [id number]   : 1970  (56 y.o.)  Gender: male   Referring Practitioner: Dr. Malorie Lovell  Diagnosis: Primary OA of right [facility] joint          General:  OT Visit Information  OT Insurance Information: [employer] - allowed 30 visits OT per calendar year, no hot/cold packs  Total # of Visits to Date: 3  Progress Note Counter: 3/10       Restrictions/Precautions:                 Subjective:  Subjective: Patient reports right hand and wrist xrays were completed yesterday at [facility] . \"There is no fracture. \"          Pain:          Objective:     Upper Extremity Function  UE AROM: \" Heat helps. \" Supination /pronation completed \"Supination pulls. \"Thumb radial abduction adduction,circles CW CCW ,opposition to R 5 to PIP join with min difficiulty due to stiffness,Wrist flexion extension UD RD ,UD stiff with pain 1/10 10 reps each  . Full fist x10, hook fist x10, x10 reps each with digits straight and flexed, UD/RD x10 reps with tightness noted in UD, thumb opposition to all 4 digits x10 reps. UE PROM: PROM to R wrist all motions with digits straight and flexed, PROM to R thumb,digits individual and composite joints with prolonged hold. UE Stretching: Massage to [facility] scar to decrease scar sensitivity and flattening . UE Strengthing: Squeezing/manipulating yellow theraputty x1 minute 23 seconds c/o tired , rolling and pinching each digits x10 reps. Paraffin  Number Minutes Paraffin: x15 minutes to R hand and wrist with MHP over. Activity Tolerance: Additional Comments: Patient tolerated treatment well.  Scar was sensitive     Assessment:  Assessment: Patient is progressing towards goals    Patient Education:   No new education             Plan:  Times per week: 2x  Plan weeks: 6 weeks  Plan Comment: Continue per established POC.                        Sameer ELIZABETH/CASSIDY Colby

## 2017-11-14 ENCOUNTER — APPOINTMENT (OUTPATIENT)
Dept: OCCUPATIONAL THERAPY | Age: 47
End: 2017-11-14
Payer: COMMERCIAL

## 2017-11-16 ENCOUNTER — HOSPITAL ENCOUNTER (OUTPATIENT)
Dept: OCCUPATIONAL THERAPY | Age: 47
Setting detail: THERAPIES SERIES
Discharge: HOME | End: 2017-11-16
Payer: COMMERCIAL

## 2017-11-16 PROCEDURE — 97110 THERAPEUTIC EXERCISES: CPT

## 2017-11-16 PROCEDURE — 97018 PARAFFIN BATH THERAPY: CPT

## 2017-11-16 NOTE — PROGRESS NOTES
[address] [address]  OUTPATIENT OCCUPATIONAL THERAPY  Daily Note  [address]    Time In: 1300  Time Out: 1340  Minutes: 40  Timed Code Treatment Minutes: 25 Minutes     Date: 2017  Patient Name: Rohan Kern        CSN: [id number]   : 1970  (56 y.o.)  Gender: male   Referring Practitioner: Dr. Melba Kumari  Diagnosis: Primary OA of right [facility] joint          General:  OT Visit Information  Onset Date: 10/31/17  OT Insurance Information: [employer] - allowed 30 visits OT per calendar year, no hot/cold packs  Total # of Visits Approved: 30  Total # of Visits to Date: 4  Certification Period Expiration Date: 17  Progress Note Counter: 4/10       Restrictions/Precautions:  Restrictions/Precautions: General Precautions       Subjective:  Subjective: Patient reports that he has a lot on his mind, reports that he lost a friend a few weeks ago. Provided handout for counseling for spiritual care. Pain:  Patient Currently in Pain: No     Objective:     Upper Extremity Function  UE AROM: thumb flexion and extension 10 reps; opposition of thumb to each digit 10 reps each; touching thumb to the tip of the 5th digit and sliding down to the base of the 5th digit; wrist flexion and extension 10 reps; supination and pronation 10 reps each; wrist UD/RD 10 reps each  UE PROM: PROM to R wrist all motions with digits straight and flexed, PROM to R thumb, digits individual and composite joints with prolonged hold. UE Stretching: Massage to [facility] scar to decrease scar sensitivity and flattening - good mobility of the scar noted  UE Strengthing: Squeezing/manipulating yellow theraputty x2 minutes; yellow putty rolling and pinching each digits x10 reps; wrist PRE's 2# flexion, extension and UD/RD 2x10 reps each; ergo gripper 2 green, 1 blue and 1 red x10 reps. Paraffin  Number Minutes Paraffin: x15 minutes to R hand and wrist with MHP over.    Paraffin Location:

## 2017-11-20 ENCOUNTER — HOSPITAL ENCOUNTER (OUTPATIENT)
Dept: OCCUPATIONAL THERAPY | Age: 47
Setting detail: THERAPIES SERIES
Discharge: HOME | End: 2017-11-20
Payer: COMMERCIAL

## 2017-11-21 ENCOUNTER — APPOINTMENT (OUTPATIENT)
Dept: OCCUPATIONAL THERAPY | Age: 47
End: 2017-11-21
Payer: COMMERCIAL

## 2017-11-28 ENCOUNTER — HOSPITAL ENCOUNTER (OUTPATIENT)
Dept: OCCUPATIONAL THERAPY | Age: 47
Setting detail: THERAPIES SERIES
End: 2017-11-28
Payer: COMMERCIAL

## 2017-11-29 ENCOUNTER — HOSPITAL ENCOUNTER (OUTPATIENT)
Dept: PHARMACY | Age: 47
Setting detail: THERAPIES SERIES
Discharge: HOME | End: 2017-11-29
Payer: COMMERCIAL

## 2017-11-29 VITALS — WEIGHT: 242.25 LBS | BODY MASS INDEX: 31.1 KG/M2

## 2017-11-29 PROCEDURE — G0108 DIAB MANAGE TRN  PER INDIV: HCPCS | Performed by: REGISTERED NURSE

## 2017-11-29 NOTE — PROGRESS NOTES
finishing with some of his medical problems that needed addresses. Still has OT to finish and an oral surgery. No infection. Has been metering 4-6times per day and even kept some logs. BS much better controlled, except for area of hyperglycemia at fbs. Unsure if this is due to Maverick's fear of nighttime lows that he is overeating or not bolusing efficiently. Takes tresiba in the am and is still having much less lows on this basal insulin. Having some difficulty with appetite, hoping that resolves soon. Options given for complex snack or meal replacement if not able to finish meal.  Encouraged him to make sure he is storing insulin, injecting, and timing insulin appropriately. Exercise unscheduled. Is aware he needs to schedule eye exams. Agreeable to plan for DSME. DSME PLAN:   Discussed general issues about diabetes pathophysiology and management. Counseling at today's visit: focused on the need for regular aerobic exercise, focused on the need to adhere to the prescribed ADA diet, reminded to check sugars regularly and to bring readings in at the time of the next visit, discussed sick day management and discussed management of hypoglycemic episodes. Meter download, medications, PMH and nursing assessment reviewed. Mary Cooley states He is willing to participate in this plan of care and verbalized understanding of all instructions provided. Teach back used to verify comprehension. Total time involved in direct patient education: 60 minutes. 1.  Get eye and foot exam scheduled. 2.  Make sure insulin is not stored in kitchen where temp could be too hot. 3.  Take tresiba and novolog on different sides of the abdomen. 4.  Keep doing your logs. 5.  For now, may need to add an extra unit or 2 for am blood sugar reading. 6.  Take tresiba, everyday no matter what the blood sugar is (last 42hrs in the body)  7. Keep checking your blood sugar frequently.   8.  Take novolog BEFORE the meal

## 2017-11-30 ENCOUNTER — HOSPITAL ENCOUNTER (OUTPATIENT)
Dept: OCCUPATIONAL THERAPY | Age: 47
Setting detail: THERAPIES SERIES
Discharge: HOME | End: 2017-11-30
Payer: COMMERCIAL

## 2017-11-30 PROCEDURE — 97110 THERAPEUTIC EXERCISES: CPT

## 2017-11-30 PROCEDURE — 97018 PARAFFIN BATH THERAPY: CPT

## 2017-11-30 NOTE — PROGRESS NOTES
[facility]  OUTPATIENT OCCUPATIONAL THERAPY  Daily Note  [address]    Time In: 0593  Time Out: [address]  Minutes: 43  Timed Code Treatment Minutes: 43 Minutes     Date: 2017  Patient Name: Mary Cooley        CSN: [id number]   : 1970  (56 y.o.)  Gender: male   Referring Practitioner: Dr. Rajendra Carlos  Diagnosis: Primary OA of right [facility] joint  Treatment Diagnosis: Primary OA right [facility]        General:  OT Visit Information  OT Insurance Information: [employer] - allowed 30 visits OT per calendar year, no hot/cold packs  Total # of Visits Approved: 30  Total # of Visits to Date: 5  Certification Period Expiration Date: 17  Progress Note Counter: 5/10  Comments: Script for ROM right thumb to full with progressive strength; Pt. does go back to see Dr. Mela Irizarry but unsure of date. Restrictions/Precautions:  Restrictions/Precautions: General Precautions              Subjective:  Subjective: Pt. states his right hand is a little achy today due to helping his brother move furniture. Pain:  Patient Currently in Pain:  (reports mild aching in right hand)       Objective:     Upper Extremity Function  UE AROM: right wrist AROM all direction x10, thumb cross palm add/abduction x10, thumb circumduction x10 each direction   UE PROM: PROM to R wrist all motions, PROM to R thumb individual and composite joints with prolonged hold. UE Strengthing: Squeezing/manipulating warmed red theraputty x2 minutes; red putty rolling and pinching each digits x2 minutes; wrist PRE's 2# flexion, extension and UD/RD 2x10 reps each                                     Paraffin  Number Minutes Paraffin: x15 minutes to R hand and wrist with MHP over. Paraffin Location: Right, Hand, Wrist          Activity Tolerance: Additional Comments: Patient tolerated treatment well.   No reports of pain during exercises    Assessment:  Performance deficits / Impairments: Decreased ADL status, Decreased high-level IADLs, Decreased ROM, Decreased strength  Assessment: Patient is progressing towards goals. Prognosis: Good    Patient Education:  Patient Education: no new education today            Plan:  Plan Comment: Continue per established POC.                        Shantel Cherry, OTR/L [id number]

## 2017-12-05 ENCOUNTER — HOSPITAL ENCOUNTER (OUTPATIENT)
Dept: OCCUPATIONAL THERAPY | Age: 47
Setting detail: THERAPIES SERIES
Discharge: HOME | End: 2017-12-05
Payer: COMMERCIAL

## 2017-12-05 PROCEDURE — 97018 PARAFFIN BATH THERAPY: CPT

## 2017-12-05 PROCEDURE — 97110 THERAPEUTIC EXERCISES: CPT

## 2017-12-05 ASSESSMENT — PAIN DESCRIPTION - ORIENTATION: ORIENTATION: RIGHT

## 2017-12-05 ASSESSMENT — PAIN SCALES - GENERAL: PAINLEVEL_OUTOF10: 4

## 2017-12-05 NOTE — PROGRESS NOTES
thumb is getting better since starting therapy    Assessment:  Performance deficits / Impairments: Decreased ADL status, Decreased high-level IADLs, Decreased ROM, Decreased strength  Assessment: Patient is progressing towards goals. Prognosis: Good    Patient Education:  Patient Education: no new education today            Plan:  Plan Comment: Continue per established POC.                        Garth uGzman, OTR/L [id number]

## 2017-12-07 ENCOUNTER — HOSPITAL ENCOUNTER (OUTPATIENT)
Dept: OCCUPATIONAL THERAPY | Age: 47
Setting detail: THERAPIES SERIES
Discharge: HOME | End: 2017-12-07
Payer: COMMERCIAL

## 2017-12-07 NOTE — PROGRESS NOTES
[address]  OCCUPATIONAL THERAPY MISSED TREATMENT NOTE  [facility] OCCUPATIONAL THERAPY        Date: 2017  Patient Name: Dotty Michele        CSN: [id number]   : 1970  (56 y.o.)  Gender: male                REASON FOR MISSED TREATMENT:  Cancelled due to illness. Pt. Called and left voicemail and cancelled today's appointment which was his last scheduled appointment, but then stated he would see therapist on Tuesday. Therapist called pt. And left message to make him aware that he has no more appointments scheduled.      Garth Guzman, OTR/L 0109

## 2017-12-12 ENCOUNTER — HOSPITAL ENCOUNTER (OUTPATIENT)
Dept: OCCUPATIONAL THERAPY | Age: 47
Setting detail: THERAPIES SERIES
Discharge: HOME | End: 2017-12-12
Payer: COMMERCIAL

## 2017-12-12 PROCEDURE — 97018 PARAFFIN BATH THERAPY: CPT

## 2017-12-12 PROCEDURE — 97110 THERAPEUTIC EXERCISES: CPT

## 2017-12-12 NOTE — PROGRESS NOTES
I certify that I have examined the patient below and determined that Physical Medicine and Rehabilitation service is necessary; that the secondary diagnosis for the provision of rehabilitation services is consistent with identified needs; that service will be furnished on an outpatient basis while the patient is in my care; that I approve the above plan of care for up to 90 days or as specifically noted above and will review it within that time frame or more often if the patients condition requires. Attestation, signature or co-signature of physician indicates approval of certification requirements.    ________________________ ____________ __________  Physician Signature   Date   Time    [facility] THERAPY  Progress Note  [address]    Time In: 1300  Time Out: 2461  Minutes: 45  Timed Code Treatment Minutes: 45 Minutes     Date: 2017  Patient Name: Karime Fung        CSN: [id number]   : 1970  (56 y.o.)  Gender: male   Referring Practitioner: Dr. Trevor Freeman  Diagnosis: Primary OA of right [facility] joint          General:  OT Visit Information  Onset Date: 10/31/17  OT Insurance Information: [employer] - allowed 30 visits OT per calendar year, no hot/cold packs  Total # of Visits Approved: 30  Total # of Visits to Date: 7  Certification Period Expiration Date: 17  Progress Note Counter: PN completed 2017  Comments: No follow up with Dr. Roseline Ty unless problems arise. Restrictions/Precautions:  Restrictions/Precautions: General Precautions              Subjective:  Subjective: Patient reports he would like to continue with therapy, his goal is to bring his thumb to base of 5th digit which he is not able to do yet. Pain:  Patient Currently in Pain: No (No pain at rest. )       Objective:     Upper Extremity Function  UE AROM: Completed goal assessment for progress note.  All active motions wrist and thumb for

## 2017-12-15 ENCOUNTER — HOSPITAL ENCOUNTER (OUTPATIENT)
Dept: OCCUPATIONAL THERAPY | Age: 47
Setting detail: THERAPIES SERIES
End: 2017-12-15
Payer: COMMERCIAL

## 2017-12-19 ENCOUNTER — HOSPITAL ENCOUNTER (OUTPATIENT)
Dept: OCCUPATIONAL THERAPY | Age: 47
Setting detail: THERAPIES SERIES
End: 2017-12-19
Payer: COMMERCIAL

## 2017-12-20 ENCOUNTER — HOSPITAL ENCOUNTER (OUTPATIENT)
Dept: OCCUPATIONAL THERAPY | Age: 47
Setting detail: THERAPIES SERIES
Discharge: HOME | End: 2017-12-20
Payer: COMMERCIAL

## 2017-12-20 PROCEDURE — 97018 PARAFFIN BATH THERAPY: CPT

## 2017-12-20 PROCEDURE — 97110 THERAPEUTIC EXERCISES: CPT

## 2017-12-20 NOTE — PROGRESS NOTES
Comments: Patient tolerated treatment well. Assessment:  Assessment: Patient is progressing toward goals. Able to touch thumb tip to the 5th digit this date and slide thumb to the base of the 5th digit. Patient Education: continue with HEP, prayer stretch           Plan:  Current Treatment Recommendations: Strengthening, Modalities (comment), ROM, Manual Therapy:  STM  Plan Comment: Continue 2x a week for 2 additional weeks until 12/26/2017.   Specific instructions for Next Treatment: start with paraffin followed by ROm and stretching right thumb and wrist, strengthening            [facility], AKUA, OTR/L 5214

## 2017-12-21 ENCOUNTER — APPOINTMENT (OUTPATIENT)
Dept: OCCUPATIONAL THERAPY | Age: 47
End: 2017-12-21
Payer: COMMERCIAL

## 2017-12-22 ENCOUNTER — HOSPITAL ENCOUNTER (OUTPATIENT)
Dept: OCCUPATIONAL THERAPY | Age: 47
Setting detail: THERAPIES SERIES
Discharge: HOME | End: 2017-12-22
Payer: COMMERCIAL

## 2017-12-22 NOTE — PROGRESS NOTES
[address]    Date: 2017  Patient Name: Gael Lackey        CSN: [id number]   : 1970  (56 y.o.)  Gender: male           Patient is discharged from Occupational Therapy services at this time. See last note for details related to results of therapy and goal achievement. Reason for discharge:  Patient cancelled last scheduled appointment stating \"something came up. \" Therapist called patient and reviewed home exercise program, discharge scheduled for today prior to patient cancelling. Patient in agreement for discharge at this time.       NISHANT Botello [facility] ELIZABETH/L #[id number]

## 2017-12-26 ENCOUNTER — APPOINTMENT (OUTPATIENT)
Dept: OCCUPATIONAL THERAPY | Age: 47
End: 2017-12-26
Payer: COMMERCIAL

## 2018-01-12 ENCOUNTER — APPOINTMENT (OUTPATIENT)
Dept: GENERAL RADIOLOGY | Age: 48
End: 2018-01-12
Payer: COMMERCIAL

## 2018-01-12 ENCOUNTER — HOSPITAL ENCOUNTER (EMERGENCY)
Age: 48
Discharge: HOME | End: 2018-01-12
Payer: COMMERCIAL

## 2018-01-12 VITALS
RESPIRATION RATE: 18 BRPM | TEMPERATURE: 97.7 F | HEART RATE: 84 BPM | WEIGHT: 256 LBS | SYSTOLIC BLOOD PRESSURE: 179 MMHG | HEIGHT: 74 IN | BODY MASS INDEX: 32.85 KG/M2 | DIASTOLIC BLOOD PRESSURE: 102 MMHG | OXYGEN SATURATION: 96 %

## 2018-01-12 DIAGNOSIS — J06.9 VIRAL URI WITH COUGH: Primary | ICD-10-CM

## 2018-01-12 DIAGNOSIS — I10 ESSENTIAL HYPERTENSION: ICD-10-CM

## 2018-01-12 LAB
FLU A ANTIGEN: NEGATIVE
FLU B ANTIGEN: NEGATIVE

## 2018-01-12 PROCEDURE — 71046 X-RAY EXAM CHEST 2 VIEWS: CPT

## 2018-01-12 PROCEDURE — 6370000000 HC RX 637 (ALT 250 FOR IP): Performed by: STUDENT IN AN ORGANIZED HEALTH CARE EDUCATION/TRAINING PROGRAM

## 2018-01-12 PROCEDURE — 87804 INFLUENZA ASSAY W/OPTIC: CPT

## 2018-01-12 PROCEDURE — 99284 EMERGENCY DEPT VISIT MOD MDM: CPT

## 2018-01-12 RX ORDER — BENZONATATE 100 MG/1
100 CAPSULE ORAL 3 TIMES DAILY PRN
Qty: 30 CAPSULE | Refills: 0 | Status: SHIPPED | OUTPATIENT
Start: 2018-01-12 | End: 2018-01-19

## 2018-01-12 RX ORDER — ACETAMINOPHEN 325 MG/1
650 TABLET ORAL ONCE
Status: COMPLETED | OUTPATIENT
Start: 2018-01-12 | End: 2018-01-12

## 2018-01-12 RX ORDER — BENZONATATE 100 MG/1
100 CAPSULE ORAL ONCE
Status: COMPLETED | OUTPATIENT
Start: 2018-01-12 | End: 2018-01-12

## 2018-01-12 RX ADMIN — ACETAMINOPHEN 650 MG: 325 TABLET ORAL at 14:26

## 2018-01-12 RX ADMIN — BENZONATATE 100 MG: 100 CAPSULE ORAL at 14:26

## 2018-01-12 ASSESSMENT — ENCOUNTER SYMPTOMS
NAUSEA: 1
ABDOMINAL PAIN: 0
RHINORRHEA: 0
COUGH: 1
SORE THROAT: 0
VOMITING: 1
EYE DISCHARGE: 0
BACK PAIN: 0
EYE REDNESS: 0
SHORTNESS OF BREATH: 1
DIARRHEA: 0
WHEEZING: 0

## 2018-01-12 ASSESSMENT — PAIN SCALES - GENERAL: PAINLEVEL_OUTOF10: 4

## 2018-01-12 NOTE — ED PROVIDER NOTES
[facility]  eMERGENCY dEPARTMENT eNCOUnter          [address] [facility]       Chief Complaint   Patient presents with    Cough    Emesis    Generalized Body Aches       Nurses Notes reviewed and I agree except as noted in the HPI. HISTORY OF PRESENT ILLNESS    Chelita Rodriguez is a 56 y.o. male who presents to the Emergency Department for the evaluation of cough onset 4 days ago. He reports associated headache, shortness of breath, fatigue, nausea, post-tussive vomiting, chest pain and loss of appetite. He denies fever, chills, abdominal pain, vision disturbance, sore throat or body aches. He denies sickly contacts. He states he drinks about 32 ounces of water a day. The patient has a history asthma, hypertension and diabetes. He denies taking any medication at home. Patient has an inhaler which he uses 3-4 times a day. Patient states he had his influenza vaccine. Patient states he did not take his blood pressure medication today. Location/Symptom: cough  Timing/Onset: 4 days ago  Context/Setting: history of asthma, diabetes and hypertension   Quality: none  Duration: constant   Modifying Factors: none  Severity: moderate     REVIEW OF SYSTEMS     Review of Systems   Constitutional: Positive for appetite change (loss) and fatigue. Negative for chills and fever. HENT: Negative for congestion, ear pain, rhinorrhea and sore throat. Eyes: Negative for discharge, redness and visual disturbance. Respiratory: Positive for cough and shortness of breath. Negative for wheezing. Cardiovascular: Positive for chest pain. Negative for palpitations and leg swelling. Gastrointestinal: Positive for nausea and vomiting (post-tussive). Negative for abdominal pain and diarrhea. Genitourinary: Negative for decreased urine volume, difficulty urinating and dysuria. Musculoskeletal: Negative for arthralgias, back pain, joint swelling and neck pain. Skin: Negative for pallor and rash. Allergic/Immunologic: Negative for environmental allergies. Neurological: Positive for headaches. Negative for dizziness, syncope, weakness and light-headedness. Hematological: Negative for adenopathy. Psychiatric/Behavioral: Negative for agitation, confusion, dysphoric mood and suicidal ideas. The patient is not nervous/anxious. PAST MEDICAL HISTORY    has a past medical history of ADHD (attention deficit hyperactivity disorder); Anxiety; Arthritis; Asthma; Bladder dysfunction; Depression; Diabetes mellitus ([address] Fatigue; GERD (gastroesophageal reflux disease); Headache; Hyperlipidemia; Hypertension; MDRO (multiple drug resistant organisms) resistance; OAB (overactive bladder); Seizures ([address] Sleep apnea; and Type II or unspecified type diabetes mellitus without mention of complication, not stated as uncontrolled. SURGICAL HISTORY      has a past surgical history that includes Eye surgery ([address]); Cataract removal (2010); Foot surgery (Bilateral, 2006); Foot surgery (1996); other surgical history (11/4/15); eye surgery (Right, 92,96); Elbow surgery (Right); Upper gastrointestinal endoscopy (2017); and cervical fusion (N/A, 9/27/2017). CURRENT MEDICATIONS       Discharge Medication List as of 1/12/2018  2:39 PM      CONTINUE these medications which have NOT CHANGED    Details   insulin aspart (NOVOLOG FLEXPEN) 100 UNIT/ML injection pen Inject into the skin 3 times daily (before meals) Up to 50 units dailyHistorical Med      metoprolol succinate (TOPROL XL) 50 MG extended release tablet Take 50 mg by mouth dailyHistorical Med      tamsulosin (FLOMAX) 0.4 MG capsule Take 0.4 mg by mouth dailyHistorical Med      !! FLUoxetine (PROZAC) 40 MG capsule Take 40 mg by mouth dailyHistorical Med      !!  FLUoxetine (PROZAC) 20 MG capsule Take 20 mg by mouth dailyHistorical Med      !! OXcarbazepine (TRILEPTAL) 300 MG tablet Take 900 mg by mouth nightly Historical Med      traZODone (DESYREL) 150 MG duplicate medications found. Please discuss with provider. ALLERGIES     is allergic to morphine; vilazodone hcl; fish-derived products; and flagyl [metronidazole]. FAMILY HISTORY     indicated that his mother is alive. He indicated that his father is alive. He indicated that the status of his sister is unknown. He indicated that the status of his brother is unknown. He indicated that the status of his paternal grandmother is unknown. He indicated that the status of his paternal aunt is unknown. He indicated that the status of his neg hx is unknown.    family history includes Dementia in his mother; Diabetes in his paternal aunt and paternal grandmother; High Blood Pressure in his brother, father, and sister; Hypertension in his father; Other in his father. SOCIAL HISTORY      reports that he has never smoked. He has never used smokeless tobacco. He reports that he drinks about 2.4 oz of alcohol per week . He reports that he does not use drugs. PHYSICAL EXAM     INITIAL VITALS:  height is 6' 2\" (1.88 m) and weight is 256 lb (116.1 kg). His oral temperature is 97.7 °F (36.5 °C). His blood pressure is 179/102 (abnormal) and his pulse is 84. His respiration is 18 and oxygen saturation is 96%. Physical Exam   Constitutional: He is oriented to person, place, and time. He appears well-developed and well-nourished. HENT:   Head: Normocephalic and atraumatic. Right Ear: External ear normal.   Left Ear: External ear normal.   Eyes: Conjunctivae are normal. Right eye exhibits no discharge. Left eye exhibits no discharge. No scleral icterus. Neck: Normal range of motion. Neck supple. No JVD present. Cardiovascular: Normal rate, regular rhythm and normal heart sounds. Exam reveals no gallop and no friction rub. No murmur heard. Pulmonary/Chest: Effort normal. No respiratory distress. He has decreased breath sounds (but are clear). He has no wheezes. He has no rhonchi. He has no rales. Abdominal: Soft. He exhibits no distension. There is no tenderness. There is no rebound and no guarding. Musculoskeletal: Normal range of motion. He exhibits no edema. Lymphadenopathy:   Tonsillar lymph nodes are swollen. Neurological: He is alert and oriented to person, place, and time. He exhibits normal muscle tone. He displays no seizure activity. GCS eye subscore is 4. GCS verbal subscore is 5. GCS motor subscore is 6. Skin: Skin is warm and dry. No rash noted. He is not diaphoretic. Psychiatric: He has a normal mood and affect. His behavior is normal. Thought content normal.       DIFFERENTIAL DIAGNOSIS:   Influenza, rhino sinusitis, viral URI, asthma exacerbation   DIAGNOSTIC RESULTS     EKG: All EKG's are interpreted by the Emergency Department Physician who either signs or Co-signs this chart in the absence of a cardiologist.    None    RADIOLOGY: non-plain film images(s) such as CT, Ultrasound and MRI are read by the radiologist.    XR CHEST STANDARD (2 VW)   Final Result   No acute disease no significant change            **This report has been created using voice recognition software. It may contain minor errors which are inherent in voice recognition technology. **      Final report electronically signed by Dr. Naya Garces on 1/12/2018 2:26 PM          LABS:   Labs Reviewed   RAPID INFLUENZA A/B ANTIGENS       EMERGENCY DEPARTMENT COURSE:   Vitals:    Vitals:    01/12/18 1353   BP: (!) 188/113   Pulse: 84   Resp: 18   Temp: 97.7 °F (36.5 °C)   TempSrc: Oral   SpO2: 96%   Weight: 256 lb (116.1 kg)   Height: 6' 2\" (1.88 m)     Patient was seen history physical exam was performed. See disposition below    MDM:    The patient was seen and evaluated within the ED today following cough. Within the department, I observed the patient's vital signs to be within acceptable range, with the exception of hypertension. On exam, I appreciated decreased breath sounds and tonsillar lymph nodes are swollen. was a Mid-Level Provider and was in agreement with being seen independently by myself. Scribe:  Oxana Velarde 12/23/16 10:31 AM Scribing for and in the presence of Zay Li PA-C.     Signed by: Diego Baron, 01/12/18 8:59 PM    Provider:  I personally performed the services described in the documentation, reviewed and edited the documentation which was dictated to the scribe in my presence, and it accurately records my words and actions.     Zay Li PA-C 1/12/18 8:59 PM    Dianna Briscoe PA-C  01/12/18 [id number]

## 2018-01-12 NOTE — ED TRIAGE NOTES
Pt to er. Pt c/o cough , emesis and body aches. States it started Tuesday. Unsure if he has had fevers.

## 2018-02-23 ENCOUNTER — TELEPHONE (OUTPATIENT)
Dept: INTERNAL MEDICINE CLINIC | Age: 48
End: 2018-02-23

## 2018-02-26 ENCOUNTER — ANESTHESIA (OUTPATIENT)
Dept: ENDOSCOPY | Age: 48
End: 2018-02-26
Payer: COMMERCIAL

## 2018-02-26 ENCOUNTER — ANESTHESIA EVENT (OUTPATIENT)
Dept: ENDOSCOPY | Age: 48
End: 2018-02-26
Payer: COMMERCIAL

## 2018-02-26 ENCOUNTER — HOSPITAL ENCOUNTER (OUTPATIENT)
Age: 48
Setting detail: OUTPATIENT SURGERY
Discharge: HOME | End: 2018-02-26
Attending: INTERNAL MEDICINE | Admitting: INTERNAL MEDICINE
Payer: COMMERCIAL

## 2018-02-26 VITALS
OXYGEN SATURATION: 98 % | BODY MASS INDEX: 31.34 KG/M2 | HEART RATE: 74 BPM | RESPIRATION RATE: 18 BRPM | TEMPERATURE: 97 F | SYSTOLIC BLOOD PRESSURE: 162 MMHG | DIASTOLIC BLOOD PRESSURE: 100 MMHG | WEIGHT: 244.2 LBS | HEIGHT: 74 IN

## 2018-02-26 VITALS — OXYGEN SATURATION: 98 % | DIASTOLIC BLOOD PRESSURE: 109 MMHG | SYSTOLIC BLOOD PRESSURE: 209 MMHG

## 2018-02-26 LAB — GLUCOSE BLD-MCNC: 133 MG/DL (ref 70–108)

## 2018-02-26 PROCEDURE — 88305 TISSUE EXAM BY PATHOLOGIST: CPT

## 2018-02-26 PROCEDURE — 3700000001 HC ADD 15 MINUTES (ANESTHESIA): Performed by: INTERNAL MEDICINE

## 2018-02-26 PROCEDURE — 3700000000 HC ANESTHESIA ATTENDED CARE: Performed by: INTERNAL MEDICINE

## 2018-02-26 PROCEDURE — 82948 REAGENT STRIP/BLOOD GLUCOSE: CPT

## 2018-02-26 PROCEDURE — 2580000003 HC RX 258: Performed by: INTERNAL MEDICINE

## 2018-02-26 PROCEDURE — 6360000002 HC RX W HCPCS: Performed by: REGISTERED NURSE

## 2018-02-26 PROCEDURE — 7100000001 HC PACU RECOVERY - ADDTL 15 MIN: Performed by: INTERNAL MEDICINE

## 2018-02-26 PROCEDURE — 2500000003 HC RX 250 WO HCPCS: Performed by: REGISTERED NURSE

## 2018-02-26 PROCEDURE — 7100000000 HC PACU RECOVERY - FIRST 15 MIN: Performed by: INTERNAL MEDICINE

## 2018-02-26 PROCEDURE — 3609010400 HC COLONOSCOPY POLYPECTOMY HOT BIOPSY: Performed by: INTERNAL MEDICINE

## 2018-02-26 RX ORDER — KETAMINE HYDROCHLORIDE 50 MG/ML
INJECTION, SOLUTION, CONCENTRATE INTRAMUSCULAR; INTRAVENOUS PRN
Status: DISCONTINUED | OUTPATIENT
Start: 2018-02-26 | End: 2018-02-26 | Stop reason: SDUPTHER

## 2018-02-26 RX ORDER — SODIUM CHLORIDE 450 MG/100ML
INJECTION, SOLUTION INTRAVENOUS CONTINUOUS
Status: DISCONTINUED | OUTPATIENT
Start: 2018-02-26 | End: 2018-02-26 | Stop reason: HOSPADM

## 2018-02-26 RX ORDER — LIDOCAINE HYDROCHLORIDE 10 MG/ML
INJECTION, SOLUTION EPIDURAL; INFILTRATION; INTRACAUDAL; PERINEURAL PRN
Status: DISCONTINUED | OUTPATIENT
Start: 2018-02-26 | End: 2018-02-26 | Stop reason: SDUPTHER

## 2018-02-26 RX ORDER — PROPOFOL 10 MG/ML
INJECTION, EMULSION INTRAVENOUS PRN
Status: DISCONTINUED | OUTPATIENT
Start: 2018-02-26 | End: 2018-02-26 | Stop reason: SDUPTHER

## 2018-02-26 RX ORDER — FENTANYL CITRATE 50 UG/ML
INJECTION, SOLUTION INTRAMUSCULAR; INTRAVENOUS PRN
Status: DISCONTINUED | OUTPATIENT
Start: 2018-02-26 | End: 2018-02-26 | Stop reason: SDUPTHER

## 2018-02-26 RX ADMIN — PROPOFOL 50 MG: 10 INJECTION, EMULSION INTRAVENOUS at 08:20

## 2018-02-26 RX ADMIN — SODIUM CHLORIDE: 4.5 INJECTION, SOLUTION INTRAVENOUS at 07:48

## 2018-02-26 RX ADMIN — PROPOFOL 50 MG: 10 INJECTION, EMULSION INTRAVENOUS at 08:15

## 2018-02-26 RX ADMIN — KETAMINE HYDROCHLORIDE 25 MG: 50 INJECTION, SOLUTION INTRAMUSCULAR; INTRAVENOUS at 08:13

## 2018-02-26 RX ADMIN — PROPOFOL 50 MG: 10 INJECTION, EMULSION INTRAVENOUS at 08:17

## 2018-02-26 RX ADMIN — PROPOFOL 50 MG: 10 INJECTION, EMULSION INTRAVENOUS at 08:13

## 2018-02-26 RX ADMIN — FENTANYL CITRATE 100 MCG: 50 INJECTION INTRAMUSCULAR; INTRAVENOUS at 08:13

## 2018-02-26 RX ADMIN — LIDOCAINE HYDROCHLORIDE 30 MG: 10 INJECTION, SOLUTION EPIDURAL; INFILTRATION; INTRACAUDAL; PERINEURAL at 08:13

## 2018-02-26 ASSESSMENT — PAIN - FUNCTIONAL ASSESSMENT: PAIN_FUNCTIONAL_ASSESSMENT: 0-10

## 2018-02-26 ASSESSMENT — PAIN SCALES - GENERAL
PAINLEVEL_OUTOF10: 0
PAINLEVEL_OUTOF10: 0

## 2018-02-26 NOTE — ANESTHESIA PRE PROCEDURE
Department of Anesthesiology  Preprocedure Note       Name:  Raleigh Atkins   Age:  56 y.o.  :  1970                                          MRN:  [id number]         Date:  2018      Surgeon: Lula Barcenas):  Kathy Zambrano MD    Procedure: Procedure(s):  COLONOSCOPY WITH OR WITHOUT BIOPSY    Medications prior to admission:   Prior to Admission medications    Medication Sig Start Date End Date Taking?  Authorizing Provider   insulin aspart (NOVOLOG FLEXPEN) 100 UNIT/ML injection pen Inject into the skin 3 times daily (before meals) Up to 50 units daily   Yes Historical Provider, MD   metoprolol succinate (TOPROL XL) 50 MG extended release tablet Take 50 mg by mouth daily   Yes Historical Provider, MD   tamsulosin (FLOMAX) 0.4 MG capsule Take 0.4 mg by mouth daily   Yes Historical Provider, MD   FLUoxetine (PROZAC) 40 MG capsule Take 40 mg by mouth daily   Yes Historical Provider, MD   FLUoxetine (PROZAC) 20 MG capsule Take 20 mg by mouth daily   Yes Historical Provider, MD   OXcarbazepine (TRILEPTAL) 300 MG tablet Take 900 mg by mouth nightly    Yes Historical Provider, MD   traZODone (DESYREL) 150 MG tablet Take 300 mg by mouth nightly   Yes Historical Provider, MD   losartan (COZAAR) 100 MG tablet take 1 tablet by mouth once daily 16  Yes Alma Ochoa MD   tiotropium (SPIRIVA RESPIMAT) 2.5 MCG/ACT AERS inhaler Inhale 2 puffs into the lungs every morning (before breakfast) 16  Yes Alma Ochoa MD   budesonide-formoterol [facility]) 160-4.5 MCG/ACT AERO Inhale 2 puffs into the lungs 2 times daily 16  Yes Alma Ochoa MD   amLODIPine (NORVASC) 5 MG tablet take 1 tablet by mouth once daily 10/31/16  Yes Alma Ochoa MD   Insulin Degludec (TRESIBA FLEXTOUCH SC) Inject 32 Units into the skin every morning    Yes Historical Provider, MD   nitroGLYCERIN (NITROSTAT) 0.4 MG SL tablet Place 1 tablet under the tongue every 5 minutes as needed  Benign prostatic hyperplasia N40.0    Depression F32.9    Hypertensive urgency I16.0    Head ache R51    Asthma J45.909    Excessive sleepiness G47.10    Seizures (HCC) R56.9    Fatigue R53.83    Snoring R06.83    ADHD (attention deficit hyperactivity disorder) F90.9    HTN (hypertension) I10    Obstructive sleep apnea on CPAP G47.33, Z99.89    Weight gain R63.5    Obesity (BMI 30.0-34. 9) E66.9    Type 1 diabetes mellitus (HCC) E10.9    Allergic rhinitis J30.9    Type 1 diabetes mellitus (HCC) E10.9    Acute suppurative OM H66.009    OE (otitis externa) H60.90    Hearing loss H91.90    Cerumen impaction H61.20    Dizziness R42    ETD (eustachian tube dysfunction) H69.80    Chest pain syndrome R07.9    Lipoma of skin and subcutaneous tissue (excluding face) D17.30    OAB (overactive bladder) N32.81    Chest pain syndrome R07.9    Foot callus L84    Herniation of cervical intervertebral disc with radiculopathy M50.10       Past Medical History:        Diagnosis Date    ADHD (attention deficit hyperactivity disorder)     Anxiety     Arthritis     Asthma     Bladder dysfunction     Depression     Diabetes mellitus (HCC)     Fatigue     GERD (gastroesophageal reflux disease)     Headache     migraines    Hyperlipidemia     Hypertension     MDRO (multiple drug resistant organisms) resistance     OAB (overactive bladder) 10/27/2015    Seizures ([address]     Sleep apnea     wears cpap    Type II or unspecified type diabetes mellitus without mention of complication, not stated as uncontrolled        Past Surgical History:        Procedure Laterality Date    CATARACT REMOVAL  2010    bilateral    CERVICAL FUSION N/A 9/27/2017    ACDF C6-7 W/ATLANTIS CORNERSTONE performed by Deana Perez MD at [address] [place] [address] Dr Theodore    [address] Right 92,96    FOOT SURGERY Bilateral 2006    hammer toes    FOOT SURGERY  1996    removal of piece of glass

## 2018-02-26 NOTE — H&P
Pro Op History & Physical    Patient: Christiano Miller : 1970  [facility] Rec#: [id number] Acc#: [id number]   Provider Performing Procedure: James Munoz  Primary Care Physician: Yandy Kirkpatrick MD    PRE-PROCEDURE   Full CODE [x]Yes  DNR-CCA/DNR-CC []Yes   Brief History/Pre-Procedure Diagnosis:change bowel habit, abdominal pain           MEDICAL HISTORY    []Additional information:       has a past medical history of ADHD (attention deficit hyperactivity disorder); Anxiety; Arthritis; Asthma; Bladder dysfunction; Depression; Diabetes mellitus ([facility] [address] Fatigue; GERD (gastroesophageal reflux disease); Headache; Hyperlipidemia; Hypertension; MDRO (multiple drug resistant organisms) resistance; OAB (overactive bladder); Seizures ([facility] Sleep apnea; and Type II or unspecified type diabetes mellitus without mention of complication, not stated as uncontrolled. SURGICAL HISTORY   has a past surgical history that includes Eye surgery ([address] [place] [address]); Cataract removal (); Foot surgery (Bilateral, ); Foot surgery (); other surgical history (11/4/15); eye surgery (Right, 92,96); Elbow surgery (Right); Upper gastrointestinal endoscopy (); cervical fusion (N/A, 2017); and Hand surgery (Right, 2017).   Additional information:       ALLERGIES   Allergies as of 2017 - Review Complete 2017   Allergen Reaction Noted    Morphine Itching 10/06/2011    Vilazodone hcl  2012    Fish-derived products Nausea And Vomiting 10/18/2011    Flagyl [metronidazole] Itching and Rash 2012     Additional information:       MEDICATIONS   Coumadin Use Last 7 Days [x]No []Yes  Antiplatelet drug therapy use last 7 days  [x]No []Yes  Other anticoagulant use last 7 days  [x]No []Yes    Current Facility-Administered Medications:     0.45 % sodium chloride infusion, , Intravenous, Continuous, James Munoz MD, Last Rate: 75 mL/hr at 18 0748  Prior to Admission medications    Medication Sig

## 2018-02-27 NOTE — PROCEDURES
[address] [place] [address]                                  PROCEDURE NOTE    PATIENT NAME: Rosalind Isbell                    :        1970  MED REC NO:   [id number]                           ROOM:  ACCOUNT NO:   [de-identified]                           ADMIT DATE: 2018  PROVIDER:     Jennyfer Watson M.D.    Gala Fuse:  2018    INDICATION:  The patient has change in bowel habits, abdominal discomfort. Plan today for colonoscopy to evaluate . ASA CLASSIFICATION:  III. DESCRIPTION OF PROCEDURE:  The patient was brought to GI lab. Consent was  obtained. The risks involved with the procedure were explained to the  patient. Informed consent was obtained. The patient was monitored during  the procedure with pulse oximetry, blood pressure monitoring, and oxygen by  nasal cannula. Sedation by incremental doses of IV propofol, fentanyl,  given in incremental dosages by the anesthesia service to achieve total  anesthesia. For ASA classification and medications given during the procedure, please  see Anesthesia note. PROCEDURE PERFORMED:  Colonoscopy with polypectomy with hot biopsy forceps:  Digital examination revealed normal rectum. The standard colonoscope was  advanced under direct vision from the rectum up to the cecum. Prep was  good and the patient tolerated the procedure well. Cecum intubation  confirmed by appendical orifice. Scope withdrawn. Rare diverticulosis  seen. Small polyp seen in the descending measuring 0.2 x _____ cm excised  with hot biopsy forceps, tissue retrieved. Scope was withdrawn with no  immediate complication. IMPRESSION:  1. Mild diverticulosis. 2.  Descending polyp excised with hot biopsy forceps. 3.  High fiber diet. 4.  Follow up with biopsy results at the GI Clinic for evaluation. 5.  Recall colonoscopy in five years.         Jacki Fofana M.D.    D:

## 2018-03-15 ENCOUNTER — TELEPHONE (OUTPATIENT)
Dept: INTERNAL MEDICINE CLINIC | Age: 48
End: 2018-03-15

## 2018-03-19 ENCOUNTER — HOSPITAL ENCOUNTER (OUTPATIENT)
Age: 48
Discharge: HOME | End: 2018-03-19
Payer: COMMERCIAL

## 2018-03-19 LAB
ALBUMIN SERPL-MCNC: 4.4 G/DL (ref 3.5–5.1)
ALP BLD-CCNC: 121 U/L (ref 38–126)
ALT SERPL-CCNC: 26 U/L (ref 11–66)
ANION GAP SERPL CALCULATED.3IONS-SCNC: 13 MEQ/L (ref 8–16)
ANISOCYTOSIS: SLIGHT
AST SERPL-CCNC: 16 U/L (ref 5–40)
ATYPICAL LYMPHOCYTES: ABNORMAL %
BASOPHILS # BLD: 1 %
BASOPHILS ABSOLUTE: 0.1 THOU/MM3 (ref 0–0.1)
BILIRUB SERPL-MCNC: 0.3 MG/DL (ref 0.3–1.2)
BUN BLDV-MCNC: 14 MG/DL (ref 7–22)
CALCIUM SERPL-MCNC: 9.5 MG/DL (ref 8.5–10.5)
CHLORIDE BLD-SCNC: 99 MEQ/L (ref 98–111)
CO2: 23 MEQ/L (ref 23–33)
CREAT SERPL-MCNC: 1 MG/DL (ref 0.4–1.2)
EOSINOPHIL # BLD: 0 %
EOSINOPHILS ABSOLUTE: 0 THOU/MM3 (ref 0–0.4)
GFR SERPL CREATININE-BSD FRML MDRD: > 90 ML/MIN/1.73M2
GLUCOSE BLD-MCNC: 237 MG/DL (ref 70–108)
HCT VFR BLD CALC: 41.1 % (ref 42–52)
HEMOGLOBIN: 13.7 GM/DL (ref 14–18)
LYMPHOCYTES # BLD: 34 %
LYMPHOCYTES ABSOLUTE: 2.4 THOU/MM3 (ref 1–4.8)
MCH RBC QN AUTO: 28.3 PG (ref 27–31)
MCHC RBC AUTO-ENTMCNC: 33.4 GM/DL (ref 33–37)
MCV RBC AUTO: 84.7 FL (ref 80–94)
MONOCYTES # BLD: 6 %
MONOCYTES ABSOLUTE: 0.4 THOU/MM3 (ref 0.4–1.3)
NUCLEATED RED BLOOD CELLS: 0 /100 WBC
PDW BLD-RTO: 14 % (ref 11.5–14.5)
PLATELET # BLD: 174 THOU/MM3 (ref 130–400)
PLATELET ESTIMATE: ADEQUATE
PMV BLD AUTO: 8.8 FL (ref 7.4–10.4)
POIKILOCYTES: SLIGHT
POTASSIUM SERPL-SCNC: 4 MEQ/L (ref 3.5–5.2)
RBC # BLD: 4.85 MILL/MM3 (ref 4.7–6.1)
SCAN OF BLOOD SMEAR: NORMAL
SEG NEUTROPHILS: 59 %
SEGMENTED NEUTROPHILS ABSOLUTE COUNT: 4.2 THOU/MM3 (ref 1.8–7.7)
SODIUM BLD-SCNC: 135 MEQ/L (ref 135–145)
TOTAL PROTEIN: 7 G/DL (ref 6.1–8)
WBC # BLD: 7.1 THOU/MM3 (ref 4.8–10.8)

## 2018-03-19 PROCEDURE — 80203 DRUG SCREEN QUANT ZONISAMIDE: CPT

## 2018-03-19 PROCEDURE — 80053 COMPREHEN METABOLIC PANEL: CPT

## 2018-03-19 PROCEDURE — 85025 COMPLETE CBC W/AUTO DIFF WBC: CPT

## 2018-03-19 PROCEDURE — 80183 DRUG SCRN QUANT OXCARBAZEPIN: CPT

## 2018-03-19 PROCEDURE — 36415 COLL VENOUS BLD VENIPUNCTURE: CPT

## 2018-03-22 LAB
OXCARBAZEPINE: 35 UG/ML (ref 3–35)
ZONISAMIDE LEVEL: 4 UG/ML (ref 10–40)

## 2018-03-28 ENCOUNTER — HOSPITAL ENCOUNTER (OUTPATIENT)
Dept: MRI IMAGING | Age: 48
Discharge: HOME | End: 2018-03-28
Payer: COMMERCIAL

## 2018-03-28 DIAGNOSIS — G40.209 COMPLEX PARTIAL SEIZURES WITH CONSCIOUSNESS IMPAIRED (HCC): ICD-10-CM

## 2018-03-28 PROCEDURE — 6360000004 HC RX CONTRAST MEDICATION: Performed by: PSYCHIATRY & NEUROLOGY

## 2018-03-28 PROCEDURE — 70553 MRI BRAIN STEM W/O & W/DYE: CPT

## 2018-03-28 PROCEDURE — A9579 GAD-BASE MR CONTRAST NOS,1ML: HCPCS | Performed by: PSYCHIATRY & NEUROLOGY

## 2018-03-28 RX ADMIN — GADOTERIDOL 20 ML: 279.3 INJECTION, SOLUTION INTRAVENOUS at 14:48

## 2018-04-05 ENCOUNTER — TELEPHONE (OUTPATIENT)
Dept: INTERNAL MEDICINE CLINIC | Age: 48
End: 2018-04-05

## 2018-05-10 ENCOUNTER — OFFICE VISIT (OUTPATIENT)
Dept: INTERNAL MEDICINE CLINIC | Age: 48
End: 2018-05-10
Payer: COMMERCIAL

## 2018-05-10 VITALS — WEIGHT: 253.13 LBS | BODY MASS INDEX: 32.5 KG/M2

## 2018-05-10 DIAGNOSIS — E10.8 TYPE 1 DIABETES MELLITUS WITH COMPLICATION (HCC): ICD-10-CM

## 2018-05-10 PROCEDURE — G0108 DIAB MANAGE TRN  PER INDIV: HCPCS | Performed by: NURSE PRACTITIONER

## 2018-05-10 PROCEDURE — 99999 PR OFFICE/OUTPT VISIT,PROCEDURE ONLY: CPT | Performed by: NURSE PRACTITIONER

## 2018-06-12 ENCOUNTER — OFFICE VISIT (OUTPATIENT)
Dept: UROLOGY | Age: 48
End: 2018-06-12
Payer: COMMERCIAL

## 2018-06-12 VITALS
BODY MASS INDEX: 31.32 KG/M2 | WEIGHT: 244 LBS | DIASTOLIC BLOOD PRESSURE: 62 MMHG | SYSTOLIC BLOOD PRESSURE: 92 MMHG | HEIGHT: 74 IN

## 2018-06-12 DIAGNOSIS — Z12.5 PROSTATE CANCER SCREENING: ICD-10-CM

## 2018-06-12 DIAGNOSIS — N32.81 OAB (OVERACTIVE BLADDER): Primary | ICD-10-CM

## 2018-06-12 DIAGNOSIS — R35.89 POLYURIA: ICD-10-CM

## 2018-06-12 LAB
BILIRUBIN URINE: NEGATIVE
BLOOD URINE, POC: NEGATIVE
CHARACTER, URINE: CLEAR
COLOR, URINE: YELLOW
GLUCOSE URINE: NEGATIVE MG/DL
KETONES, URINE: NEGATIVE
LEUKOCYTE CLUMPS, URINE: NEGATIVE
NITRITE, URINE: NEGATIVE
PH, URINE: 6.5
POST VOID RESIDUAL (PVR): 84 ML
PROTEIN, URINE: NEGATIVE MG/DL
SPECIFIC GRAVITY, URINE: 1.01 (ref 1–1.03)
UROBILINOGEN, URINE: 0.2 EU/DL

## 2018-06-12 PROCEDURE — 99214 OFFICE O/P EST MOD 30 MIN: CPT | Performed by: NURSE PRACTITIONER

## 2018-06-12 PROCEDURE — 1036F TOBACCO NON-USER: CPT | Performed by: NURSE PRACTITIONER

## 2018-06-12 PROCEDURE — G8417 CALC BMI ABV UP PARAM F/U: HCPCS | Performed by: NURSE PRACTITIONER

## 2018-06-12 PROCEDURE — 81003 URINALYSIS AUTO W/O SCOPE: CPT | Performed by: NURSE PRACTITIONER

## 2018-06-12 PROCEDURE — G8427 DOCREV CUR MEDS BY ELIG CLIN: HCPCS | Performed by: NURSE PRACTITIONER

## 2018-06-12 PROCEDURE — 51798 US URINE CAPACITY MEASURE: CPT | Performed by: NURSE PRACTITIONER

## 2018-06-18 ENCOUNTER — TELEPHONE (OUTPATIENT)
Dept: UROLOGY | Age: 48
End: 2018-06-18

## 2018-07-08 ENCOUNTER — HOSPITAL ENCOUNTER (EMERGENCY)
Age: 48
Discharge: HOME | End: 2018-07-08
Attending: FAMILY MEDICINE
Payer: COMMERCIAL

## 2018-07-08 VITALS
SYSTOLIC BLOOD PRESSURE: 134 MMHG | HEART RATE: 68 BPM | DIASTOLIC BLOOD PRESSURE: 75 MMHG | OXYGEN SATURATION: 99 % | RESPIRATION RATE: 17 BRPM | TEMPERATURE: 98.4 F

## 2018-07-08 DIAGNOSIS — G43.001 MIGRAINE WITHOUT AURA AND WITH STATUS MIGRAINOSUS, NOT INTRACTABLE: Primary | ICD-10-CM

## 2018-07-08 LAB
ANION GAP SERPL CALCULATED.3IONS-SCNC: 11 MEQ/L (ref 8–16)
BASOPHILS # BLD: 0.5 %
BASOPHILS ABSOLUTE: 0 THOU/MM3 (ref 0–0.1)
BUN BLDV-MCNC: 12 MG/DL (ref 7–22)
CALCIUM SERPL-MCNC: 8.8 MG/DL (ref 8.5–10.5)
CHLORIDE BLD-SCNC: 104 MEQ/L (ref 98–111)
CO2: 25 MEQ/L (ref 23–33)
CREAT SERPL-MCNC: 0.9 MG/DL (ref 0.4–1.2)
EOSINOPHIL # BLD: 1.7 %
EOSINOPHILS ABSOLUTE: 0.1 THOU/MM3 (ref 0–0.4)
ERYTHROCYTE [DISTWIDTH] IN BLOOD BY AUTOMATED COUNT: 13 % (ref 11.5–14.5)
ERYTHROCYTE [DISTWIDTH] IN BLOOD BY AUTOMATED COUNT: 41.8 FL (ref 35–45)
GFR SERPL CREATININE-BSD FRML MDRD: > 90 ML/MIN/1.73M2
GLUCOSE BLD-MCNC: 150 MG/DL (ref 70–108)
HCT VFR BLD CALC: 39.3 % (ref 42–52)
HEMOGLOBIN: 13 GM/DL (ref 14–18)
IMMATURE GRANS (ABS): 0.02 THOU/MM3 (ref 0–0.07)
IMMATURE GRANULOCYTES: 0.3 %
LYMPHOCYTES # BLD: 36.4 %
LYMPHOCYTES ABSOLUTE: 2.3 THOU/MM3 (ref 1–4.8)
MCH RBC QN AUTO: 29 PG (ref 26–33)
MCHC RBC AUTO-ENTMCNC: 33.1 GM/DL (ref 32.2–35.5)
MCV RBC AUTO: 87.5 FL (ref 80–94)
MONOCYTES # BLD: 6.9 %
MONOCYTES ABSOLUTE: 0.4 THOU/MM3 (ref 0.4–1.3)
NUCLEATED RED BLOOD CELLS: 0 /100 WBC
OSMOLALITY CALCULATION: 282 MOSMOL/KG (ref 275–300)
PLATELET # BLD: 191 THOU/MM3 (ref 130–400)
PMV BLD AUTO: 10.2 FL (ref 9.4–12.4)
POTASSIUM REFLEX MAGNESIUM: 3.8 MEQ/L (ref 3.5–5.2)
RBC # BLD: 4.49 MILL/MM3 (ref 4.7–6.1)
SEG NEUTROPHILS: 54.2 %
SEGMENTED NEUTROPHILS ABSOLUTE COUNT: 3.5 THOU/MM3 (ref 1.8–7.7)
SODIUM BLD-SCNC: 140 MEQ/L (ref 135–145)
WBC # BLD: 6.4 THOU/MM3 (ref 4.8–10.8)

## 2018-07-08 PROCEDURE — 96374 THER/PROPH/DIAG INJ IV PUSH: CPT

## 2018-07-08 PROCEDURE — 80048 BASIC METABOLIC PNL TOTAL CA: CPT

## 2018-07-08 PROCEDURE — 6360000002 HC RX W HCPCS: Performed by: FAMILY MEDICINE

## 2018-07-08 PROCEDURE — 99283 EMERGENCY DEPT VISIT LOW MDM: CPT

## 2018-07-08 PROCEDURE — 36415 COLL VENOUS BLD VENIPUNCTURE: CPT

## 2018-07-08 PROCEDURE — 96375 TX/PRO/DX INJ NEW DRUG ADDON: CPT

## 2018-07-08 PROCEDURE — 2580000003 HC RX 258: Performed by: FAMILY MEDICINE

## 2018-07-08 PROCEDURE — 85025 COMPLETE CBC W/AUTO DIFF WBC: CPT

## 2018-07-08 RX ORDER — FENTANYL CITRATE 50 UG/ML
100 INJECTION, SOLUTION INTRAMUSCULAR; INTRAVENOUS
Status: DISCONTINUED | OUTPATIENT
Start: 2018-07-08 | End: 2018-07-09 | Stop reason: HOSPADM

## 2018-07-08 RX ORDER — ONDANSETRON 2 MG/ML
4 INJECTION INTRAMUSCULAR; INTRAVENOUS EVERY 30 MIN PRN
Status: DISCONTINUED | OUTPATIENT
Start: 2018-07-08 | End: 2018-07-09 | Stop reason: HOSPADM

## 2018-07-08 RX ORDER — KETOROLAC TROMETHAMINE 30 MG/ML
30 INJECTION, SOLUTION INTRAMUSCULAR; INTRAVENOUS ONCE
Status: COMPLETED | OUTPATIENT
Start: 2018-07-08 | End: 2018-07-08

## 2018-07-08 RX ORDER — BUTALBITAL, ACETAMINOPHEN AND CAFFEINE 50; 325; 40 MG/1; MG/1; MG/1
1 TABLET ORAL EVERY 4 HOURS PRN
Qty: 60 TABLET | Refills: 3 | Status: ON HOLD | OUTPATIENT
Start: 2018-07-08 | End: 2018-10-16 | Stop reason: ALTCHOICE

## 2018-07-08 RX ORDER — 0.9 % SODIUM CHLORIDE 0.9 %
1000 INTRAVENOUS SOLUTION INTRAVENOUS ONCE
Status: COMPLETED | OUTPATIENT
Start: 2018-07-08 | End: 2018-07-08

## 2018-07-08 RX ADMIN — ONDANSETRON 4 MG: 2 INJECTION INTRAMUSCULAR; INTRAVENOUS at 20:20

## 2018-07-08 RX ADMIN — FENTANYL CITRATE 100 MCG: 50 INJECTION, SOLUTION INTRAMUSCULAR; INTRAVENOUS at 20:21

## 2018-07-08 RX ADMIN — SODIUM CHLORIDE 1000 ML: 9 INJECTION, SOLUTION INTRAVENOUS at 20:20

## 2018-07-08 RX ADMIN — KETOROLAC TROMETHAMINE 30 MG: 30 INJECTION, SOLUTION INTRAMUSCULAR at 20:20

## 2018-07-08 ASSESSMENT — PAIN SCALES - GENERAL
PAINLEVEL_OUTOF10: 4
PAINLEVEL_OUTOF10: 8
PAINLEVEL_OUTOF10: 8

## 2018-07-08 ASSESSMENT — PAIN DESCRIPTION - FREQUENCY: FREQUENCY: CONTINUOUS

## 2018-07-08 ASSESSMENT — ENCOUNTER SYMPTOMS
NAUSEA: 1
EYE PAIN: 0
CHEST TIGHTNESS: 0
ABDOMINAL PAIN: 0
PHOTOPHOBIA: 0
VOICE CHANGE: 0
TROUBLE SWALLOWING: 0
VOMITING: 0
COUGH: 0

## 2018-07-08 ASSESSMENT — PAIN DESCRIPTION - ORIENTATION: ORIENTATION: LEFT

## 2018-07-08 ASSESSMENT — PAIN DESCRIPTION - PAIN TYPE: TYPE: ACUTE PAIN

## 2018-07-08 ASSESSMENT — PAIN DESCRIPTION - LOCATION: LOCATION: HEAD

## 2018-07-09 NOTE — ED NOTES
Pt resting on cot with eyes closed. Respires easy and unlabored. Lights dimmed and door closed to decreased stimulation. Will monitor.       Evi Umaña RN  07/08/18 4417

## 2018-07-09 NOTE — ED PROVIDER NOTES
[facility] EMERGENCY DEPT      CHIEF COMPLAINT       Chief Complaint   Patient presents with    Migraine       Nurses Notes reviewed and I agree except as noted in the HPI. HISTORY OF PRESENT ILLNESS    Nafisa Pillai is a 53 y.o. male who presents To the emergency Department complaints of global headache. Patient said that he has history of migraine headaches and he gets migraine headaches 5-6 times a month. He sees a neurologist in [place] and 2 months ago he underwent an MRI daily , he  doesn't know the results of MRI but he is following up on Tuesday to get the results and reevaluation. Patient has other comorbidities including major depression and anxiety seizure disorder hyperlipidemia and others. He presented to the emergency department today because for the past 5 he is his head a global headache he calls migraine. He  stated he has  nausea but no vomiting he has no visual disturbance and no photophobia or phonophobia. He has no neck pain and no meningism. He has no back pain. He has no chest symptoms no abdominal or genitourinary tract symptoms he has no focal neurological symptoms. He has no dermatological or muscular skeletal symptoms. Patient said that this is normal headaches for him he  Is here in  the emergency department because it's been prolonged for the last 5 days. He says he lost his mother a  week ago and this could have something to do with the grieving and the stress of grieving. Patient has no fever and  No constitutional symptoms ,  clinically looks well in no acute distress. REVIEW OF SYSTEMS     Review of Systems   Constitutional: Negative for chills and fever. HENT: Negative for congestion, ear pain, trouble swallowing and voice change. Eyes: Negative for photophobia, pain and visual disturbance. Respiratory: Negative for cough and chest tightness. Cardiovascular: Negative for chest pain and leg swelling. Gastrointestinal: Positive for nausea. Negative for abdominal pain and vomiting. Genitourinary: Negative. Musculoskeletal: Negative. Negative for neck pain and neck stiffness. Neurological: Positive for headaches. Negative for dizziness, facial asymmetry, weakness and light-headedness. PAST MEDICAL HISTORY    has a past medical history of ADHD (attention deficit hyperactivity disorder); Anxiety; Arthritis; Asthma; Bladder dysfunction; Depression; Diabetes mellitus ([facility] [address] Fatigue; GERD (gastroesophageal reflux disease); Headache; Hyperlipidemia; Hypertension; MDRO (multiple drug resistant organisms) resistance; OAB (overactive bladder); Seizures ([facility] [address] Sleep apnea; and Type II or unspecified type diabetes mellitus without mention of complication, not stated as uncontrolled. SURGICAL HISTORY      has a past surgical history that includes Eye surgery ([address] [facility]); Cataract removal (2010); Foot surgery (Bilateral, 2006); Foot surgery (1996); other surgical history (11/4/15); eye surgery (Right, 92,96); Elbow surgery (Right); Upper gastrointestinal endoscopy (2017); cervical fusion (N/A, 9/27/2017); Hand surgery (Right, 07/2017); and Colonoscopy (N/A, 2/26/2018).     CURRENT MEDICATIONS       Previous Medications    ALBUTEROL SULFATE HFA (PROAIR HFA) 108 (90 BASE) MCG/ACT INHALER    Inhale 2 puffs into the lungs every 4 hours as needed for Wheezing Dx: 493.90    ALPRAZOLAM (XANAX) 1 MG TABLET    take 1 tablet by mouth twice a day    AMLODIPINE (NORVASC) 5 MG TABLET    take 1 tablet by mouth once daily    ASPIRIN EC 81 MG EC TABLET    Take 1 tablet by mouth daily    ATORVASTATIN (LIPITOR) 20 MG TABLET    Take 1 tablet by mouth daily    BUDESONIDE-FORMOTEROL (SYMBICORT) 160-4.5 MCG/ACT AERO    Inhale 2 puffs into the lungs 2 times daily    FLUOXETINE (PROZAC) 40 MG CAPSULE    Take 40 mg by mouth daily    GUANFACINE ER (INTUNIV) 1 MG TB24 TABLET    Take 4 mg by mouth nightly     INSULIN ASPART (NOVOLOG FLEXPEN) 100 UNIT/ML INJECTION PEN    Inject mother; Diabetes in his paternal aunt and paternal grandmother; High Blood Pressure in his brother, father, and sister; Hypertension in his father; Other in his father. SOCIAL HISTORY      reports that he has never smoked. He has never used smokeless tobacco. He reports that he drinks about 2.4 oz of alcohol per week . He reports that he does not use drugs. PHYSICAL EXAM     INITIAL VITALS:  oral temperature is 98.4 °F (36.9 °C). His blood pressure is 134/75 and his pulse is 68. His respiration is 17 and oxygen saturation is 99%. Physical Exam   Constitutional: He is oriented to person, place, and time. He appears well-developed and well-nourished. No distress. HENT:   Head: Normocephalic and atraumatic. Right Ear: External ear normal.   Left Ear: External ear normal.   Nose: Nose normal.   Eyes: Conjunctivae and EOM are normal. Pupils are equal, round, and reactive to light. Right eye exhibits no discharge. Left eye exhibits no discharge. No scleral icterus. Neck: Normal range of motion. Neck supple. No JVD present. No tracheal deviation present. No thyromegaly present. Cardiovascular: Normal rate, regular rhythm, normal heart sounds and intact distal pulses. No murmur heard. Pulmonary/Chest: Effort normal and breath sounds normal. No respiratory distress. He has no wheezes. He has no rales. He exhibits no tenderness. Abdominal: Soft. Bowel sounds are normal. He exhibits no distension and no mass. There is no tenderness. There is no rebound and no guarding. Musculoskeletal: Normal range of motion. He exhibits no edema or tenderness. Lymphadenopathy:     He has no cervical adenopathy. Neurological: He is alert and oriented to person, place, and time. No cranial nerve deficit. Coordination normal.   Skin: Skin is warm. No rash noted. He is not diaphoretic. No erythema. No pallor.              DIFFERENTIAL DIAGNOSIS:     Recurrent migraine headache    DIAGNOSTIC RESULTS     EKG: All EKG's are interpreted by the Emergency Department Physician who either signs or Co-signs this chart in the absence of a cardiologist.      RADIOLOGY: non-plain film images(s) such as CT, Ultrasound and MRI are read by the radiologist.  Plain radiographic images are visualized and preliminarily interpreted by the emergency physician unless otherwise stated below. No orders to display       LABS:   Labs Reviewed   CBC WITH AUTO DIFFERENTIAL - Abnormal; Notable for the following:        Result Value    RBC 4.49 (*)     Hemoglobin 13.0 (*)     Hematocrit 39.3 (*)     All other components within normal limits   BASIC METABOLIC PANEL W/ REFLEX TO MG FOR LOW K  - Abnormal; Notable for the following:     Glucose 150 (*)     All other components within normal limits   ANION GAP   GLOMERULAR FILTRATION RATE, ESTIMATED   OSMOLALITY       EMERGENCY DEPARTMENT COURSE:   Vitals:    Vitals:    07/08/18 1940 07/08/18 2112   BP: (!) 143/89 134/75   Pulse: 72 68   Resp: 18 17   Temp: 98.4 °F (36.9 °C)    TempSrc: Oral    SpO2: 98% 99%     MDM    Patient presents to the emergency department with a global headache. He had an MRI 2 months ago. I do not have the results but I am persuaded if it was abnormal his neurologist who have  immediately called him. Most likely it's a normal MRI. He is treated for migraine headaches and discharged on Fioricet and asked to return to the emergency room at any time if the symptoms recur otherwise follow-up as scheduled. Patient now feels well his headache is resolved. FINAL IMPRESSION      1. Migraine without aura and with status migrainosus, not intractable          DISPOSITION/PLAN     DISPOSITION Decision To DischargePatient is discharged. PATIENT REFERRED TO:  No follow-up provider specified.     DISCHARGE MEDICATIONS:  New Prescriptions    BUTALBITAL-ACETAMINOPHEN-CAFFEINE (FIORICET, ESGIC) -40 MG PER TABLET    Take 1 tablet by mouth every 4 hours as needed for Headaches (Please note that portions of this note were completed with a voice recognition program.  Efforts were made to edit the dictations but occasionally words are mis-transcribed.)    MD Cecelia Chavarria MD  07/08/18 4623

## 2018-08-03 ENCOUNTER — HOSPITAL ENCOUNTER (OUTPATIENT)
Age: 48
Discharge: HOME | End: 2018-08-03
Payer: COMMERCIAL

## 2018-08-03 DIAGNOSIS — Z12.5 PROSTATE CANCER SCREENING: ICD-10-CM

## 2018-08-03 LAB
ALBUMIN SERPL-MCNC: 4.1 G/DL (ref 3.5–5.1)
ALP BLD-CCNC: 116 U/L (ref 38–126)
ALT SERPL-CCNC: 25 U/L (ref 11–66)
ANION GAP SERPL CALCULATED.3IONS-SCNC: 15 MEQ/L (ref 8–16)
AST SERPL-CCNC: 20 U/L (ref 5–40)
BILIRUB SERPL-MCNC: 0.5 MG/DL (ref 0.3–1.2)
BUN BLDV-MCNC: 10 MG/DL (ref 7–22)
CALCIUM SERPL-MCNC: 8.8 MG/DL (ref 8.5–10.5)
CHLORIDE BLD-SCNC: 99 MEQ/L (ref 98–111)
CO2: 22 MEQ/L (ref 23–33)
CREAT SERPL-MCNC: 0.9 MG/DL (ref 0.4–1.2)
GFR SERPL CREATININE-BSD FRML MDRD: > 90 ML/MIN/1.73M2
GLUCOSE BLD-MCNC: 204 MG/DL (ref 70–108)
POTASSIUM SERPL-SCNC: 3.9 MEQ/L (ref 3.5–5.2)
PROSTATE SPECIFIC ANTIGEN: 0.48 NG/ML (ref 0–1)
SODIUM BLD-SCNC: 136 MEQ/L (ref 135–145)
T4 FREE: 1.01 NG/DL (ref 0.93–1.76)
TOTAL PROTEIN: 7 G/DL (ref 6.1–8)

## 2018-08-03 PROCEDURE — 36415 COLL VENOUS BLD VENIPUNCTURE: CPT

## 2018-08-03 PROCEDURE — 84439 ASSAY OF FREE THYROXINE: CPT

## 2018-08-03 PROCEDURE — 80053 COMPREHEN METABOLIC PANEL: CPT

## 2018-08-03 PROCEDURE — 84153 ASSAY OF PSA TOTAL: CPT

## 2018-08-06 ENCOUNTER — PROCEDURE VISIT (OUTPATIENT)
Dept: UROLOGY | Age: 48
End: 2018-08-06
Payer: COMMERCIAL

## 2018-08-06 VITALS
BODY MASS INDEX: 31.32 KG/M2 | SYSTOLIC BLOOD PRESSURE: 110 MMHG | DIASTOLIC BLOOD PRESSURE: 62 MMHG | WEIGHT: 244 LBS | HEIGHT: 74 IN

## 2018-08-06 DIAGNOSIS — N40.0 BENIGN PROSTATIC HYPERPLASIA, UNSPECIFIED WHETHER LOWER URINARY TRACT SYMPTOMS PRESENT: ICD-10-CM

## 2018-08-06 DIAGNOSIS — R39.15 URGENCY OF URINATION: Primary | ICD-10-CM

## 2018-08-06 DIAGNOSIS — N32.81 OAB (OVERACTIVE BLADDER): ICD-10-CM

## 2018-08-06 LAB
BILIRUBIN URINE: NEGATIVE
BLOOD URINE, POC: NORMAL
CHARACTER, URINE: CLEAR
COLOR, URINE: YELLOW
GLUCOSE URINE: NEGATIVE MG/DL
KETONES, URINE: NEGATIVE
LEUKOCYTE CLUMPS, URINE: NEGATIVE
NITRITE, URINE: NEGATIVE
PH, URINE: 7
POST VOID RESIDUAL (PVR): 43 ML
PROTEIN, URINE: NEGATIVE MG/DL
SPECIFIC GRAVITY, URINE: 1.01 (ref 1–1.03)
UROBILINOGEN, URINE: 0.2 EU/DL

## 2018-08-06 PROCEDURE — 51741 ELECTRO-UROFLOWMETRY FIRST: CPT | Performed by: UROLOGY

## 2018-08-06 PROCEDURE — 52000 CYSTOURETHROSCOPY: CPT | Performed by: UROLOGY

## 2018-08-06 PROCEDURE — 51798 US URINE CAPACITY MEASURE: CPT | Performed by: UROLOGY

## 2018-08-06 PROCEDURE — 99999 PR OFFICE/OUTPT VISIT,PROCEDURE ONLY: CPT | Performed by: UROLOGY

## 2018-08-06 PROCEDURE — 81003 URINALYSIS AUTO W/O SCOPE: CPT | Performed by: UROLOGY

## 2018-08-06 RX ORDER — NAPROXEN 500 MG/1
500 TABLET ORAL 2 TIMES DAILY WITH MEALS
COMMUNITY
End: 2018-09-05 | Stop reason: ALTCHOICE

## 2018-08-06 NOTE — PROGRESS NOTES
lurasidone (LATUDA) 40 MG TABS tablet Take 40 mg by mouth daily      insulin aspart (NOVOLOG FLEXPEN) 100 UNIT/ML injection pen Inject into the skin 3 times daily (before meals) Up to 50 units daily      metoprolol succinate (TOPROL XL) 50 MG extended release tablet Take 50 mg by mouth daily      tamsulosin (FLOMAX) 0.4 MG capsule Take 0.4 mg by mouth daily      FLUoxetine (PROZAC) 40 MG capsule Take 40 mg by mouth daily      traZODone (DESYREL) 150 MG tablet Take 300 mg by mouth nightly      polyethylene glycol (GLYCOLAX) powder Colonoscopy Prep Dispense 255 Gram Bottle.   Use as Directed 255 g 0    ALPRAZolam (XANAX) 1 MG tablet take 1 tablet by mouth twice a day 60 tablet 2    losartan (COZAAR) 100 MG tablet take 1 tablet by mouth once daily 30 tablet 5    aspirin EC 81 MG EC tablet Take 1 tablet by mouth daily 30 tablet 11    tiotropium (SPIRIVA RESPIMAT) 2.5 MCG/ACT AERS inhaler Inhale 2 puffs into the lungs every morning (before breakfast) 1 Inhaler 5    budesonide-formoterol (SYMBICORT) 160-4.5 MCG/ACT AERO Inhale 2 puffs into the lungs 2 times daily 1 Inhaler 5    amLODIPine (NORVASC) 5 MG tablet take 1 tablet by mouth once daily 30 tablet 5    Insulin Degludec (TRESIBA FLEXTOUCH SC) Inject 32 Units into the skin every morning       nitroGLYCERIN (NITROSTAT) 0.4 MG SL tablet Place 1 tablet under the tongue every 5 minutes as needed for Chest pain 25 tablet 3    Multiple Vitamin (MULTIVITAMIN) tablet take 1 tablet by mouth once daily 30 tablet 11    albuterol sulfate HFA (PROAIR HFA) 108 (90 BASE) MCG/ACT inhaler Inhale 2 puffs into the lungs every 4 hours as needed for Wheezing Dx: 493.90 1 Inhaler 5    zonisamide (ZONEGRAN) 100 MG capsule Take 1 capsule by mouth daily 30 capsule 5    atorvastatin (LIPITOR) 20 MG tablet Take 1 tablet by mouth daily 30 tablet 5    GuanFACINE ER (INTUNIV) 1 MG TB24 tablet Take 4 mg by mouth nightly       loratadine (CLARITIN) 10 MG tablet Take 1 tablet by mouth daily 30 tablet 11    OXcarbazepine (TRILEPTAL) 300 MG tablet Take 600 mg by mouth daily       pantoprazole (PROTONIX) 20 MG tablet Take 20 mg by mouth daily      pantoprazole (PROTONIX) 40 MG tablet Take 1 tablet by mouth every morning (before breakfast) 30 tablet 6     No current facility-administered medications on file prior to visit. Allergies   Allergen Reactions    Morphine Itching    Vilazodone Hcl      Nausea and Vomiting    Fish-Derived Products Nausea And Vomiting     seafood    Flagyl [Metronidazole] Itching and Rash       Family History   Problem Relation Age of Onset    Hypertension Father     Other Father         COPD    High Blood Pressure Father     Dementia Mother     High Blood Pressure Sister     High Blood Pressure Brother     Diabetes Paternal Aunt     Diabetes Paternal Grandmother     Cancer Neg Hx     High Cholesterol Neg Hx     Kidney Disease Neg Hx     Stroke Neg Hx     Colon Cancer Neg Hx     Breast Cancer Neg Hx     Colon Polyps Neg Hx        Social History     Social History    Marital status: Single     Spouse name: N/A    Number of children: N/A    Years of education: N/A     Occupational History    Unemployed      Social History Main Topics    Smoking status: Never Smoker    Smokeless tobacco: Never Used    Alcohol use 2.4 oz/week     4 Cans of beer per week      Comment: Occasionally--[employer]    Drug use: No    Sexual activity: Yes     Other Topics Concern    Not on file     Social History Narrative    No narrative on file       Review of Systems  No problems with ears, nose or throat. No problems with eyes. No chest pain, shortness of breath, abdominal pain, extremity pain or weakness, and no neurological deficits. No rashes. No swollen glands or lymph nodes.  symptoms per HPI. The remainder of the review of symptoms is negative. Exam  General: alert and oriented. Cooperative. HENT: Normocephalic, Atraumatic.   Eyes: No scleral icterus, mucous membranes moist.  Respiratory: Effort normal.   Skin: No rashes or obvious lesions. Labs    Results for POC orders placed in visit on 08/06/18   POCT Urinalysis No Micro (Auto)   Result Value Ref Range    Glucose, Ur Negative NEGATIVE mg/dl    Bilirubin Urine Negative     Ketones, Urine Negative NEGATIVE    Specific Gravity, Urine 1.015 1.002 - 1.03    Blood, UA POC Trace-intact NEGATIVE    pH, Urine 7.00 5.0 - 9.0    Protein, Urine Negative NEGATIVE mg/dl    Urobilinogen, Urine 0.20 0.0 - 1.0 eu/dl    Nitrite, Urine Negative NEGATIVE    Leukocyte Clumps, Urine Negative NEGATIVE    Color, Urine Yellow YELLOW-STR    Character, Urine Clear CLR-SL.CHELA   poct post void residual   Result Value Ref Range    post void residual 43 ml       Lab Results   Component Value Date    CREATININE 0.9 08/03/2018    BUN 10 08/03/2018     08/03/2018    K 3.9 08/03/2018    CL 99 08/03/2018    CO2 22 (L) 08/03/2018       Lab Results   Component Value Date    PSA 0.48 08/03/2018    PSA 0.46 10/19/2015    PSA 0.52 12/19/2011       Uroflow study carried out today with spontaneous voiding shows the following:  Average flow rate:  12.9 ml/s  Max flow:  25.3 ml/s  Voided volume:  309.6 cc  Voiding Intervals, 2  PVR on bladder scan 43 cc  Conclusion (Siroky nomogram) normal / normal        Cystoscopy  After obtaining informed consent and prepping the urethral meatus, a 16-[place] flexible cystoscope was passed per urethra into the bladder. The urethra was evaluated on the way in and then again on the way out and was found to be normal.  The prostate was mildly obstructing. The bladder was evaluated in its endoscopic entirety and found to be normal.  There were no tumors, stones, ulcers or foreign bodies. There were no mucosal abnormalities. The ureteral orifices were seen and were normal.  The scope was removed. The patient tolerated the procedure and there were no complications.  A dose of 500 mg

## 2018-08-09 ENCOUNTER — ANESTHESIA EVENT (OUTPATIENT)
Dept: ENDOSCOPY | Age: 48
End: 2018-08-09
Payer: COMMERCIAL

## 2018-08-09 ENCOUNTER — ANESTHESIA (OUTPATIENT)
Dept: ENDOSCOPY | Age: 48
End: 2018-08-09
Payer: COMMERCIAL

## 2018-08-09 ENCOUNTER — HOSPITAL ENCOUNTER (OUTPATIENT)
Age: 48
Setting detail: OUTPATIENT SURGERY
Discharge: HOME | End: 2018-08-09
Attending: INTERNAL MEDICINE | Admitting: INTERNAL MEDICINE
Payer: COMMERCIAL

## 2018-08-09 VITALS
OXYGEN SATURATION: 98 % | BODY MASS INDEX: 31.32 KG/M2 | RESPIRATION RATE: 16 BRPM | DIASTOLIC BLOOD PRESSURE: 90 MMHG | HEIGHT: 74 IN | HEART RATE: 65 BPM | SYSTOLIC BLOOD PRESSURE: 146 MMHG | WEIGHT: 244 LBS | TEMPERATURE: 97.2 F

## 2018-08-09 VITALS
SYSTOLIC BLOOD PRESSURE: 168 MMHG | DIASTOLIC BLOOD PRESSURE: 84 MMHG | OXYGEN SATURATION: 98 % | RESPIRATION RATE: 19 BRPM

## 2018-08-09 PROCEDURE — 2709999900 HC NON-CHARGEABLE SUPPLY: Performed by: INTERNAL MEDICINE

## 2018-08-09 PROCEDURE — 88305 TISSUE EXAM BY PATHOLOGIST: CPT

## 2018-08-09 PROCEDURE — 3700000001 HC ADD 15 MINUTES (ANESTHESIA): Performed by: INTERNAL MEDICINE

## 2018-08-09 PROCEDURE — 3609012400 HC EGD TRANSORAL BIOPSY SINGLE/MULTIPLE: Performed by: INTERNAL MEDICINE

## 2018-08-09 PROCEDURE — 7100000000 HC PACU RECOVERY - FIRST 15 MIN: Performed by: INTERNAL MEDICINE

## 2018-08-09 PROCEDURE — 2500000003 HC RX 250 WO HCPCS: Performed by: NURSE ANESTHETIST, CERTIFIED REGISTERED

## 2018-08-09 PROCEDURE — 6360000002 HC RX W HCPCS: Performed by: NURSE ANESTHETIST, CERTIFIED REGISTERED

## 2018-08-09 PROCEDURE — 2580000003 HC RX 258: Performed by: INTERNAL MEDICINE

## 2018-08-09 PROCEDURE — 3700000000 HC ANESTHESIA ATTENDED CARE: Performed by: INTERNAL MEDICINE

## 2018-08-09 RX ORDER — PANTOPRAZOLE SODIUM 20 MG/1
20 TABLET, DELAYED RELEASE ORAL DAILY
COMMUNITY
End: 2018-09-05 | Stop reason: SDUPTHER

## 2018-08-09 RX ORDER — PROPOFOL 10 MG/ML
INJECTION, EMULSION INTRAVENOUS PRN
Status: DISCONTINUED | OUTPATIENT
Start: 2018-08-09 | End: 2018-08-09 | Stop reason: SDUPTHER

## 2018-08-09 RX ORDER — LIDOCAINE HYDROCHLORIDE 20 MG/ML
INJECTION, SOLUTION EPIDURAL; INFILTRATION; INTRACAUDAL; PERINEURAL PRN
Status: DISCONTINUED | OUTPATIENT
Start: 2018-08-09 | End: 2018-08-09 | Stop reason: SDUPTHER

## 2018-08-09 RX ORDER — SODIUM CHLORIDE 450 MG/100ML
INJECTION, SOLUTION INTRAVENOUS CONTINUOUS
Status: DISCONTINUED | OUTPATIENT
Start: 2018-08-09 | End: 2018-08-09 | Stop reason: HOSPADM

## 2018-08-09 RX ADMIN — PROPOFOL 50 MG: 10 INJECTION, EMULSION INTRAVENOUS at 09:25

## 2018-08-09 RX ADMIN — SODIUM CHLORIDE: 4.5 INJECTION, SOLUTION INTRAVENOUS at 07:41

## 2018-08-09 RX ADMIN — PROPOFOL 50 MG: 10 INJECTION, EMULSION INTRAVENOUS at 09:21

## 2018-08-09 RX ADMIN — PROPOFOL 50 MG: 10 INJECTION, EMULSION INTRAVENOUS at 09:23

## 2018-08-09 RX ADMIN — LIDOCAINE HYDROCHLORIDE 100 MG: 20 INJECTION, SOLUTION EPIDURAL; INFILTRATION; INTRACAUDAL; PERINEURAL at 09:21

## 2018-08-09 RX ADMIN — PROPOFOL 50 MG: 10 INJECTION, EMULSION INTRAVENOUS at 09:22

## 2018-08-09 ASSESSMENT — PAIN SCALES - GENERAL
PAINLEVEL_OUTOF10: 0
PAINLEVEL_OUTOF10: 0

## 2018-08-09 ASSESSMENT — PAIN - FUNCTIONAL ASSESSMENT: PAIN_FUNCTIONAL_ASSESSMENT: 0-10

## 2018-08-09 NOTE — ANESTHESIA PRE PROCEDURE
Seizures ([address]     Sleep apnea     wears cpap    Type II or unspecified type diabetes mellitus without mention of complication, not stated as uncontrolled        Past Surgical History:        Procedure Laterality Date    CATARACT REMOVAL  2010    bilateral    CERVICAL FUSION N/A 9/27/2017    ACDF C6-7 W/ATLANTIS CORNERSTONE performed by Laura Olivares MD at [facility] 2/26/2018    COLONOSCOPY POLYPECTOMY HOT BIOPSY performed by Hilton Guo MD at [address] Right    [address] Right 92,96    FOOT SURGERY Bilateral 2006    hammer toes    FOOT SURGERY  1996    removal of piece of glass    HAND SURGERY Right 07/2017    OTHER SURGICAL HISTORY  11/4/15    Excision of 3 cm lipoma LUQ abdominal wall Wisser    UPPER GASTROINTESTINAL ENDOSCOPY  2017       Social History:    Social History   Substance Use Topics    Smoking status: Never Smoker    Smokeless tobacco: Never Used    Alcohol use 2.4 oz/week     4 Cans of beer per week      Comment: Occasionally--[employer]                                Counseling given: Not Answered      Vital Signs (Current):   Vitals:    08/09/18 0729   BP: (!) 155/88   Pulse: 73   Resp: 16   Temp: 36.3 °C (97.3 °F)   TempSrc: Temporal   SpO2: 96%   Weight: 244 lb (110.7 kg)   Height: 6' 2\" (1.88 m)                                              BP Readings from Last 3 Encounters:   08/09/18 (!) 155/88   08/06/18 110/62   07/08/18 134/75       NPO Status: Time of last liquid consumption: 2130                        Time of last solid consumption: 2130                        Date of last liquid consumption: 08/08/18                        Date of last solid food consumption: 08/09/18    BMI:   Wt Readings from Last 3 Encounters:   08/09/18 244 lb (110.7 kg)   08/06/18 244 lb (110.7 kg)   06/12/18 244 lb (110.7 kg)     Body mass index is 31.33 kg/m².     CBC:   Lab Results   Component Value Date    WBC 6.4 07/08/2018

## 2018-08-09 NOTE — H&P
Sedation/Analgesia History & Physical    Patient: Ginna Samayoa : 1970  Med Rec#: [id number] Acc#: [id number]   Provider Performing Procedure: Sangeetha Hall  Primary Care Physician: La Brown MD    PRE-PROCEDURE   Full CODE [x]Yes  DNR-CCA/DNR-CC []Yes   Brief History/Pre-Procedure Diagnosis:GI bleeding, hematemesis  And GERD          MEDICAL HISTORY    []Additional information:       has a past medical history of ADHD (attention deficit hyperactivity disorder); Anxiety; Arthritis; Asthma; Bladder dysfunction; Depression; Diabetes mellitus ([facility] Fatigue; GERD (gastroesophageal reflux disease); Headache; Hyperlipidemia; Hypertension; MDRO (multiple drug resistant organisms) resistance; OAB (overactive bladder); Seizures ([facility] Sleep apnea; and Type II or unspecified type diabetes mellitus without mention of complication, not stated as uncontrolled. SOCIAL HISTORY  Social History     Tobacco History     Smoking Status  Never Smoker    Smokeless Tobacco Use  Never Used          Alcohol History     Alcohol Use Status  Yes Drinks/Week  4 Cans of beer per week Amount  2.4 oz alcohol/wk Comment  Occasionally--Budweiser          Drug Use     Drug Use Status  No          Sexual Activity     Sexually Active  Yes                FAMILY HISTORY     Family History   Problem Relation Age of Onset    Hypertension Father     Other Father         COPD    High Blood Pressure Father     Dementia Mother     High Blood Pressure Sister     High Blood Pressure Brother     Diabetes Paternal Aunt     Diabetes Paternal Grandmother     Cancer Neg Hx     High Cholesterol Neg Hx     Kidney Disease Neg Hx     Stroke Neg Hx     Colon Cancer Neg Hx     Breast Cancer Neg Hx     Colon Polyps Neg Hx        SURGICAL HISTORY   has a past surgical history that includes Eye surgery ([address] [place] [address]); Cataract removal (); Foot surgery (Bilateral, );  Foot surgery (); other surgical history (11/4/15); eye Provider, MD   tamsulosin (FLOMAX) 0.4 MG capsule Take 0.4 mg by mouth daily   Yes Historical Provider, MD   FLUoxetine (PROZAC) 40 MG capsule Take 40 mg by mouth daily   Yes Historical Provider, MD   traZODone (DESYREL) 150 MG tablet Take 300 mg by mouth nightly   Yes Historical Provider, MD   polyethylene glycol (GLYCOLAX) powder Colonoscopy Prep Dispense 255 Gram Bottle.   Use as Directed 7/17/17  Yes YEHUDA Vasquez CNP   ALPRAZolam [employer] [facility]) 1 MG tablet take 1 tablet by mouth twice a day 11/22/16  Yes Stephan Cool MD   losartan (COZAAR) 100 MG tablet take 1 tablet by mouth once daily 11/22/16  Yes Stephan Cool MD   aspirin EC 81 MG EC tablet Take 1 tablet by mouth daily 11/22/16  Yes Stephan Cool MD   tiotropium (SPIRIVA RESPIMAT) 2.5 MCG/ACT AERS inhaler Inhale 2 puffs into the lungs every morning (before breakfast) 11/22/16  Yes Stephan Cool MD   budesonide-formoterol [facility]) 160-4.5 MCG/ACT AERO Inhale 2 puffs into the lungs 2 times daily 11/22/16  Yes Stephan Cool MD   amLODIPine (NORVASC) 5 MG tablet take 1 tablet by mouth once daily 10/31/16  Yes Stephan Cool MD   Insulin Degludec (TRESIBA FLEXTOUCH SC) Inject 32 Units into the skin every morning    Yes Historical Provider, MD   nitroGLYCERIN (NITROSTAT) 0.4 MG SL tablet Place 1 tablet under the tongue every 5 minutes as needed for Chest pain 9/21/16  Yes Stephan Cool MD   Multiple Vitamin (MULTIVITAMIN) tablet take 1 tablet by mouth once daily 9/1/16  Yes Collette Dingwall, APRN - CNP   albuterol sulfate HFA (PROAIR HFA) 108 (90 BASE) MCG/ACT inhaler Inhale 2 puffs into the lungs every 4 hours as needed for Wheezing Dx: 493.90 9/1/16  Yes Collette Dingwall, APRN - CNP   zonisamide (ZONEGRAN) 100 MG capsule Take 1 capsule by mouth daily 5/31/16  Yes Stephan Cool MD   atorvastatin (LIPITOR) 20 MG tablet Take 1 tablet by mouth daily 5/31/16  Yes Gustavo Resendiz MD   GuanFACINE ER (INTUNIV) 1 MG TB24 tablet Take 4 mg by mouth nightly    Yes Historical Provider, MD   loratadine (CLARITIN) 10 MG tablet Take 1 tablet by mouth daily 8/10/15  Yes Gustavo Resendiz MD   OXcarbazepine (TRILEPTAL) 300 MG tablet Take 600 mg by mouth daily    Yes Historical Provider, MD   pantoprazole (PROTONIX) 40 MG tablet Take 1 tablet by mouth every morning (before breakfast) 6/5/17 9/27/17  Steve , APRN - CNP     Additional information:       PHYSICAL:   Heart:  [x]Regular rate and rhythm  []Other:    Lungs:  [x]Clear    []Other:    Abdomen: [x]Soft    []Other:    Mental Status: [x]Alert & Oriented  []Other:        PLANNED PROCEDURE   [x]EGD  []Colonoscopy []Flex Sigmoid     Consent: I have discussed with the patient and/or the patient representative the indication, alternatives, and the possible risks and/or complications of the planned procedure and the anesthesia methods. The patient and/or patient representative appear to understand and agree to proceed. SEDATION  Please see anesthesia note. The medication Planned :  Planned agent:[x]Midazolam []Meperidine [x]Sublimaze []Morphine  []Diazepam  [x]Propofol     Airway Assessment:   See anesthesia no please     Monitoring and Safety: The patient will be placed on a cardiac monitor and vital signs, pulse oximetry and level of consciousness will be continuously evaluated throughout the procedure. The patient will be closely monitored until recovery from the medications is complete and the patient has returned to baseline status. Respiratory therapy will be on standby during the procedure. [x]Pre-procedure diagnostic studies complete and results available. Comment:    [x]Previous sedation/anesthesia experiences assessed. Comment:    [x]The patient is an appropriate candidate to undergo the planned procedure sedation and anesthesia.  (Refer to nursing sedation/analgesia

## 2018-08-09 NOTE — OP NOTE
significant. PLAN:  1. The patient should be on PPI. 2.  Followup with biopsy results at the GI clinic for evaluation. 3.  Avoid NSAIDs. 4.  Continue PPI. gAa Suh M.D.  D: 08/09/2018 9:45:56       T: 08/09/2018 13:17:10     [email or web address]  Job#: [id number]     Doc#: [id number]  CC:  Laura Schroeder.  Anup Bai M.D.

## 2018-08-09 NOTE — PROGRESS NOTES
Pt arrived to recovery room, no family present.   He is calling girlfriend who works on [employer] to come get him & take him to breakfast.

## 2018-08-09 NOTE — ANESTHESIA POSTPROCEDURE EVALUATION
Department of Anesthesiology  Postprocedure Note    Patient: Rachelle Manjarrez  MRN: [id number]  YOB: 1970  Date of evaluation: 8/9/2018  Time:  9:31 AM     Procedure Summary     Date:  08/09/18 Room / Location:  [address] [facility] / [facility]    Anesthesia Start:  8883 Anesthesia Stop:  1216    Procedure:  EGD BIOPSY (N/A ) Diagnosis:  (INTERMITTENT VOMITING HX OF HEMATEMESIS, GASTROESOPHAGEAL REFLUX DISEASE, INTERMITTENT LEFT UPPER QUADRANT ABDOMINAL PAIN)    Surgeon:  Finn Brewster MD Responsible Provider:  Rajesh Diamond DO    Anesthesia Type:  MAC ASA Status:  3          Anesthesia Type: MAC    Kervin Phase I: Kervin Score: 10    Kervin Phase II:      Last vitals: Reviewed and per EMR flowsheets.        Anesthesia Post Evaluation    Patient location during evaluation: bedside  Patient participation: complete - patient participated  Level of consciousness: awake and alert  Airway patency: patent  Nausea & Vomiting: no nausea and no vomiting  Complications: no  Cardiovascular status: hemodynamically stable  Respiratory status: acceptable, room air and spontaneous ventilation  Hydration status: euvolemic

## 2018-08-14 ENCOUNTER — OFFICE VISIT (OUTPATIENT)
Dept: INTERNAL MEDICINE CLINIC | Age: 48
End: 2018-08-14
Payer: COMMERCIAL

## 2018-08-14 VITALS — BODY MASS INDEX: 31.63 KG/M2 | WEIGHT: 246.38 LBS

## 2018-08-14 DIAGNOSIS — E10.8 TYPE 1 DIABETES MELLITUS WITH COMPLICATION (HCC): ICD-10-CM

## 2018-08-14 PROCEDURE — 99999 PR OFFICE/OUTPT VISIT,PROCEDURE ONLY: CPT | Performed by: NURSE PRACTITIONER

## 2018-08-14 PROCEDURE — G0108 DIAB MANAGE TRN  PER INDIV: HCPCS | Performed by: NURSE PRACTITIONER

## 2018-08-14 NOTE — PROGRESS NOTES
[facility]  [address] [address] [place], [address]  [phone] (phone)  [phone] (fax)    Patient ID: Marcio Asencio 1970  Referring Provider: Dr. Jayda Garcia     Patient's name and  were verified. Subjective:    He presents for His follow-up diabetic visit. He has type 2 diabetes mellitus. Home regimen includes: insulin He is noncompliant some of the time with his documentation and injections due to short term memory loss. Assessment:     Lab Results   Component Value Date    LABA1C 7.6 2017    BUN 10 2018    CREATININE 0.9 2018     Vitals:    18 1316   Weight: 246 lb 6 oz (111.8 kg)     Wt Readings from Last 3 Encounters:   18 246 lb 6 oz (111.8 kg)   18 244 lb (110.7 kg)   18 244 lb (110.7 kg)     Ht Readings from Last 3 Encounters:   18 6' 2\" (1.88 m)   18 6' 2\" (1.88 m)   18 6' 2\" (1.88 m)       Glucose at 2 hrs PPD today resulted at 235mg/dl  Current monitoring regimen: home blood tests - 3 times daily  Home blood sugar trends: meter upload scanned  Any episodes of hypoglycemia? yes - occasionally after a correction  Previous visit with dietician: yes -   Current diet: on average, 3 meals per day on a late schedule  Current exercise: none  Eye exam current (within one year): no  Any history of foot problems? no  Last foot exam: deferred  Immunizations up to date: na  Taking ASA:  Yes - If not why? Appropriate for use of MyChart Glucose Grid:  Yes    Focus:     Titi Gilbert shared that he is having brain surgery in the near future to address his seizures and memory issues. He is currently using his freestyle meter and bolusing from memory ac meals. He is not keeping records so it is difficult to assess a consistent pattern in his before and after meal BS. It appears he may be doing more correcting after and this si causing turbulence in his BS control and a few low BS.   He is aware of this and attributes he memory lapses

## 2018-08-27 ENCOUNTER — PROCEDURE VISIT (OUTPATIENT)
Dept: UROLOGY | Age: 48
End: 2018-08-27
Payer: COMMERCIAL

## 2018-08-27 VITALS
HEIGHT: 74 IN | WEIGHT: 242.8 LBS | BODY MASS INDEX: 31.16 KG/M2 | SYSTOLIC BLOOD PRESSURE: 122 MMHG | DIASTOLIC BLOOD PRESSURE: 64 MMHG

## 2018-08-27 DIAGNOSIS — N32.81 OAB (OVERACTIVE BLADDER): Primary | ICD-10-CM

## 2018-08-27 LAB
BILIRUBIN URINE: NEGATIVE
BLOOD URINE, POC: NEGATIVE
CHARACTER, URINE: CLEAR
COLOR, URINE: YELLOW
GLUCOSE URINE: 500 MG/DL
KETONES, URINE: ABNORMAL
LEUKOCYTE CLUMPS, URINE: NEGATIVE
NITRITE, URINE: NEGATIVE
PH, URINE: 6.5
PROTEIN, URINE: NEGATIVE MG/DL
SPECIFIC GRAVITY, URINE: 1.02 (ref 1–1.03)
UROBILINOGEN, URINE: 1 EU/DL

## 2018-08-27 PROCEDURE — 81003 URINALYSIS AUTO W/O SCOPE: CPT | Performed by: UROLOGY

## 2018-08-27 PROCEDURE — 99999 PR OFFICE/OUTPT VISIT,PROCEDURE ONLY: CPT | Performed by: UROLOGY

## 2018-08-27 PROCEDURE — 64561 IMPLANT NEUROELECTRODES: CPT | Performed by: UROLOGY

## 2018-08-28 NOTE — PROGRESS NOTES
Jessie Richard was seen in follow up for InterStim trial in the office. Review of Systems  No problems with ears, nose or throat. No problems with eyes. No chest pain, shortness of breath, abdominal pain, extremity pain or weakness, and no neurological deficits. No rashes. No swollen glands or lymph nodes.  symptoms per HPI. The remainder of the review of symptoms is negative. Exam  General: alert and oriented. Cooperative. HENT: Normocephalic, Atraumatic. Eyes: No scleral icterus, mucous membranes moist.  Respiratory: Effort normal.   Skin: No rashes or obvious lesions. Procedure note   After explaining what we would be doing, Jessie Richard was placed in prone position on the procedure table. His lower back and upper gluteal region was prepped and she was draped in the standard fashion for surgery. Using sterile technique the position of needle placement was marked out and then the area where the needles would be advanced was instilled with local anesthetic. The location of needle placement was determined by using the standard length needle as a guide and then going 2 cm lateral to the midline on either side. Once we instilled local anesthetic I began advancing the standard needles on a 60° angle toward the S3 foramen. As the needle was initially advanced I bumped into bone and the needle was carefully pulled back and reinserted several times making very small adjustments in order to probe to find the S3 foramen. Once I was able to get into the S3 foramen on one side I then used this angulation as a guide in order to advance the needle on the opposite side. Once we were into the S3 foramen testing was performed and a good S3 response found. This was repeated on both sides with both needles and once we were comfortable that both needles were in the S3 foramen in good position we turned our attention to advancement of the leads.     The leads were removed from their sheaths and one at a time were passed through the needle after removing the needle obturator. After advancing to the venice on the lead the lead was held in place as the needle was carefully removed leaving the lead in position through the S3 foramen. The lead obturator was then removed and the needle completely removed. This was repeated on both sides leaving the leads in good position. Sterile dressings were then applied as per 's direction and the temporary device attached and instructions given. The temporary device will be tested over the next few days working with the representative from the company to make sure that we have adequate response before decision is made on implantation of the permanent device. Maverick tolerated the procedure well there were no complications. Plan: We will test the InterStim device over the next few days. Permanent placement will be decided upon based on how He does with this testing.     I will see Jessie Landing back in a few days for temporary lead removal.

## 2018-08-30 ENCOUNTER — NURSE ONLY (OUTPATIENT)
Dept: UROLOGY | Age: 48
End: 2018-08-30

## 2018-08-30 DIAGNOSIS — N32.81 OAB (OVERACTIVE BLADDER): Primary | ICD-10-CM

## 2018-08-30 PROCEDURE — 99999 PR OFFICE/OUTPT VISIT,PROCEDURE ONLY: CPT | Performed by: UROLOGY

## 2018-09-10 ENCOUNTER — TELEPHONE (OUTPATIENT)
Dept: UROLOGY | Age: 48
End: 2018-09-10

## 2018-09-10 DIAGNOSIS — Z01.818 PREOPERATIVE TESTING: ICD-10-CM

## 2018-09-10 DIAGNOSIS — R39.15 URGENCY OF URINATION: ICD-10-CM

## 2018-09-10 DIAGNOSIS — N32.81 OAB (OVERACTIVE BLADDER): Primary | ICD-10-CM

## 2018-09-25 ENCOUNTER — HOSPITAL ENCOUNTER (EMERGENCY)
Age: 48
Discharge: HOME | End: 2018-09-26
Attending: FAMILY MEDICINE
Payer: COMMERCIAL

## 2018-09-25 ENCOUNTER — APPOINTMENT (OUTPATIENT)
Dept: GENERAL RADIOLOGY | Age: 48
End: 2018-09-25
Payer: COMMERCIAL

## 2018-09-25 ENCOUNTER — APPOINTMENT (OUTPATIENT)
Dept: CT IMAGING | Age: 48
End: 2018-09-25
Payer: COMMERCIAL

## 2018-09-25 DIAGNOSIS — R41.82 ACUTE ALTERATION IN MENTAL STATUS: ICD-10-CM

## 2018-09-25 DIAGNOSIS — R73.9 HYPERGLYCEMIA: Primary | ICD-10-CM

## 2018-09-25 LAB
EKG ATRIAL RATE: 79 BPM
EKG P AXIS: 53 DEGREES
EKG P-R INTERVAL: 156 MS
EKG Q-T INTERVAL: 376 MS
EKG QRS DURATION: 90 MS
EKG QTC CALCULATION (BAZETT): 431 MS
EKG R AXIS: 12 DEGREES
EKG T AXIS: 45 DEGREES
EKG VENTRICULAR RATE: 79 BPM
GLUCOSE BLD-MCNC: 247 MG/DL (ref 70–108)

## 2018-09-25 PROCEDURE — 83880 ASSAY OF NATRIURETIC PEPTIDE: CPT

## 2018-09-25 PROCEDURE — 71046 X-RAY EXAM CHEST 2 VIEWS: CPT

## 2018-09-25 PROCEDURE — 85025 COMPLETE CBC W/AUTO DIFF WBC: CPT

## 2018-09-25 PROCEDURE — 83605 ASSAY OF LACTIC ACID: CPT

## 2018-09-25 PROCEDURE — 93005 ELECTROCARDIOGRAM TRACING: CPT | Performed by: FAMILY MEDICINE

## 2018-09-25 PROCEDURE — 99285 EMERGENCY DEPT VISIT HI MDM: CPT

## 2018-09-25 PROCEDURE — 36415 COLL VENOUS BLD VENIPUNCTURE: CPT

## 2018-09-25 PROCEDURE — 80185 ASSAY OF PHENYTOIN TOTAL: CPT

## 2018-09-25 PROCEDURE — 84443 ASSAY THYROID STIM HORMONE: CPT

## 2018-09-25 PROCEDURE — 84484 ASSAY OF TROPONIN QUANT: CPT

## 2018-09-25 PROCEDURE — 72125 CT NECK SPINE W/O DYE: CPT

## 2018-09-25 PROCEDURE — 85379 FIBRIN DEGRADATION QUANT: CPT

## 2018-09-25 PROCEDURE — 83690 ASSAY OF LIPASE: CPT

## 2018-09-25 PROCEDURE — 82948 REAGENT STRIP/BLOOD GLUCOSE: CPT

## 2018-09-25 PROCEDURE — G0480 DRUG TEST DEF 1-7 CLASSES: HCPCS

## 2018-09-25 PROCEDURE — 70450 CT HEAD/BRAIN W/O DYE: CPT

## 2018-09-25 PROCEDURE — 80053 COMPREHEN METABOLIC PANEL: CPT

## 2018-09-25 PROCEDURE — 85730 THROMBOPLASTIN TIME PARTIAL: CPT

## 2018-09-25 RX ORDER — 0.9 % SODIUM CHLORIDE 0.9 %
1000 INTRAVENOUS SOLUTION INTRAVENOUS ONCE
Status: COMPLETED | OUTPATIENT
Start: 2018-09-25 | End: 2018-09-26

## 2018-09-25 RX ORDER — 0.9 % SODIUM CHLORIDE 0.9 %
500 INTRAVENOUS SOLUTION INTRAVENOUS ONCE
Status: DISCONTINUED | OUTPATIENT
Start: 2018-09-25 | End: 2018-09-26 | Stop reason: HOSPADM

## 2018-09-25 ASSESSMENT — ENCOUNTER SYMPTOMS
VOMITING: 0
GASTROINTESTINAL NEGATIVE: 1
VOICE CHANGE: 0
ABDOMINAL PAIN: 0
TROUBLE SWALLOWING: 0
BACK PAIN: 0
RESPIRATORY NEGATIVE: 1
NAUSEA: 0

## 2018-09-26 VITALS
HEART RATE: 83 BPM | SYSTOLIC BLOOD PRESSURE: 130 MMHG | OXYGEN SATURATION: 98 % | TEMPERATURE: 98.6 F | RESPIRATION RATE: 20 BRPM | DIASTOLIC BLOOD PRESSURE: 87 MMHG | BODY MASS INDEX: 31.44 KG/M2 | WEIGHT: 245 LBS | HEIGHT: 74 IN

## 2018-09-26 LAB
ALBUMIN SERPL-MCNC: 4.6 G/DL (ref 3.5–5.1)
ALP BLD-CCNC: 127 U/L (ref 38–126)
ALT SERPL-CCNC: 35 U/L (ref 11–66)
AMPHETAMINE+METHAMPHETAMINE URINE SCREEN: NEGATIVE
ANION GAP SERPL CALCULATED.3IONS-SCNC: 16 MEQ/L (ref 8–16)
APTT: 29.6 SECONDS (ref 22–38)
AST SERPL-CCNC: 28 U/L (ref 5–40)
BARBITURATE QUANTITATIVE URINE: NEGATIVE
BASOPHILS # BLD: 0.7 %
BASOPHILS ABSOLUTE: 0.1 THOU/MM3 (ref 0–0.1)
BENZODIAZEPINE QUANTITATIVE URINE: NEGATIVE
BILIRUB SERPL-MCNC: 0.3 MG/DL (ref 0.3–1.2)
BUN BLDV-MCNC: 8 MG/DL (ref 7–22)
CALCIUM SERPL-MCNC: 9 MG/DL (ref 8.5–10.5)
CANNABINOID QUANTITATIVE URINE: POSITIVE
CHLORIDE BLD-SCNC: 97 MEQ/L (ref 98–111)
CO2: 20 MEQ/L (ref 23–33)
COCAINE METABOLITE QUANTITATIVE URINE: NEGATIVE
CREAT SERPL-MCNC: 0.9 MG/DL (ref 0.4–1.2)
D-DIMER QUANTITATIVE: 215 NG/ML FEU (ref 0–500)
EOSINOPHIL # BLD: 1.5 %
EOSINOPHILS ABSOLUTE: 0.1 THOU/MM3 (ref 0–0.4)
ERYTHROCYTE [DISTWIDTH] IN BLOOD BY AUTOMATED COUNT: 13 % (ref 11.5–14.5)
ERYTHROCYTE [DISTWIDTH] IN BLOOD BY AUTOMATED COUNT: 41.1 FL (ref 35–45)
ETHYL ALCOHOL, SERUM: 0.09 %
GFR SERPL CREATININE-BSD FRML MDRD: > 90 ML/MIN/1.73M2
GLUCOSE BLD-MCNC: 115 MG/DL (ref 70–108)
GLUCOSE BLD-MCNC: 233 MG/DL (ref 70–108)
GLUCOSE BLD-MCNC: 304 MG/DL (ref 70–108)
HCT VFR BLD CALC: 45.9 % (ref 42–52)
HEMOGLOBIN: 15.1 GM/DL (ref 14–18)
IMMATURE GRANS (ABS): 0.01 THOU/MM3 (ref 0–0.07)
IMMATURE GRANULOCYTES: 0.1 %
LACTIC ACID: 3 MMOL/L (ref 0.5–2.2)
LIPASE: 21.8 U/L (ref 5.6–51.3)
LYMPHOCYTES # BLD: 24 %
LYMPHOCYTES ABSOLUTE: 1.8 THOU/MM3 (ref 1–4.8)
MCH RBC QN AUTO: 28.7 PG (ref 26–33)
MCHC RBC AUTO-ENTMCNC: 32.9 GM/DL (ref 32.2–35.5)
MCV RBC AUTO: 87.3 FL (ref 80–94)
MONOCYTES # BLD: 6.9 %
MONOCYTES ABSOLUTE: 0.5 THOU/MM3 (ref 0.4–1.3)
NUCLEATED RED BLOOD CELLS: 0 /100 WBC
OPIATES, URINE: NEGATIVE
OSMOLALITY CALCULATION: 276.1 MOSMOL/KG (ref 275–300)
OXYCODONE: NEGATIVE
PHENCYCLIDINE QUANTITATIVE URINE: NEGATIVE
PHENYTOIN LEVEL: < 0.8 UG/ML (ref 6–18)
PLATELET # BLD: 244 THOU/MM3 (ref 130–400)
PMV BLD AUTO: 9.6 FL (ref 9.4–12.4)
POTASSIUM REFLEX MAGNESIUM: 3.8 MEQ/L (ref 3.5–5.2)
PRO-BNP: 26.6 PG/ML (ref 0–450)
RBC # BLD: 5.26 MILL/MM3 (ref 4.7–6.1)
SEG NEUTROPHILS: 66.8 %
SEGMENTED NEUTROPHILS ABSOLUTE COUNT: 4.9 THOU/MM3 (ref 1.8–7.7)
SODIUM BLD-SCNC: 133 MEQ/L (ref 135–145)
TOTAL PROTEIN: 7.5 G/DL (ref 6.1–8)
TROPONIN T: < 0.01 NG/ML
TSH SERPL DL<=0.05 MIU/L-ACNC: 2.5 UIU/ML (ref 0.4–4.2)
WBC # BLD: 7.4 THOU/MM3 (ref 4.8–10.8)

## 2018-09-26 PROCEDURE — 2580000003 HC RX 258: Performed by: FAMILY MEDICINE

## 2018-09-26 PROCEDURE — 93010 ELECTROCARDIOGRAM REPORT: CPT | Performed by: INTERNAL MEDICINE

## 2018-09-26 PROCEDURE — 80307 DRUG TEST PRSMV CHEM ANLYZR: CPT

## 2018-09-26 PROCEDURE — 96374 THER/PROPH/DIAG INJ IV PUSH: CPT

## 2018-09-26 PROCEDURE — 82948 REAGENT STRIP/BLOOD GLUCOSE: CPT

## 2018-09-26 PROCEDURE — 6370000000 HC RX 637 (ALT 250 FOR IP): Performed by: FAMILY MEDICINE

## 2018-09-26 RX ADMIN — Medication 5 UNITS: at 01:11

## 2018-09-26 RX ADMIN — SODIUM CHLORIDE 1000 ML: 9 INJECTION, SOLUTION INTRAVENOUS at 00:27

## 2018-09-26 NOTE — ED PROVIDER NOTES
unknown. He indicated that the status of his brother is unknown. He indicated that the status of his paternal grandmother is unknown. He indicated that the status of his paternal aunt is unknown. He indicated that the status of his neg hx is unknown.    family history includes Dementia in his mother; Diabetes in his paternal aunt and paternal grandmother; High Blood Pressure in his brother, father, and sister; Hypertension in his father; Other in his father. SOCIAL HISTORY      reports that he has never smoked. He has never used smokeless tobacco. He reports that he drinks about 2.4 oz of alcohol per week . He reports that he does not use drugs. PHYSICAL EXAM     INITIAL VITALS:  height is 6' 2\" (1.88 m) and weight is 245 lb (111.1 kg). His oral temperature is 98.6 °F (37 °C). His blood pressure is 130/87 and his pulse is 83. His respiration is 20 and oxygen saturation is 98%. Physical Exam   Constitutional: He is oriented to person, place, and time. He appears well-developed and well-nourished. No distress. HENT:   Head: Normocephalic and atraumatic. Right Ear: External ear normal.   Left Ear: External ear normal.   Nose: Nose normal.   Eyes: Pupils are equal, round, and reactive to light. Conjunctivae and EOM are normal. Right eye exhibits no discharge. Left eye exhibits no discharge. No scleral icterus. Neck: Normal range of motion. Neck supple. No JVD present. No tracheal deviation present. No thyromegaly present. Cardiovascular: Normal rate, regular rhythm, normal heart sounds and intact distal pulses. No murmur heard. Pulmonary/Chest: Effort normal and breath sounds normal. No respiratory distress. He has no wheezes. He has no rales. He exhibits no tenderness. Abdominal: Soft. Bowel sounds are normal. He exhibits no distension and no mass. There is no tenderness. There is no rebound and no guarding. Musculoskeletal: Normal range of motion. He exhibits no edema or tenderness. Lymphadenopathy:     He has no cervical adenopathy. Neurological: He is alert and oriented to person, place, and time. He has normal strength. No cranial nerve deficit or sensory deficit. Coordination normal. GCS eye subscore is 4. GCS verbal subscore is 5. GCS motor subscore is 6. Skin: Skin is warm. No rash noted. He is not diaphoretic. No erythema. No pallor. DIFFERENTIAL DIAGNOSIS:     Most likely hyperglycemia with syncope or seizure. Now completely resolved. DIAGNOSTIC RESULTS     EKG: All EKG's are interpreted by the Emergency Department Physician who either signs or Co-signs this chart in the absence of a cardiologist.    RADIOLOGY: non-plain film images(s) such as CT, Ultrasound and MRI are read by the radiologist.  Plain radiographic images are visualized and preliminarily interpreted by the emergency physician unless otherwise stated below. CT Cervical Spine WO Contrast   Final Result   Stable postsurgical changes of the cervical spine. 2. No active pathology present on current exam.         3. Mild degenerative changes noted. **This report has been created using voice recognition software. It may contain minor errors which are inherent in voice recognition technology. **      Final report electronically signed by Dr. Ronald Alvarez on 9/26/2018 12:19 AM      CT Head WO Contrast   Final Result    No evidence of an acute process. **This report has been created using voice recognition software. It may contain minor errors which are inherent in voice recognition technology. **      Final report electronically signed by Dr. Ronald Alvarez on 9/26/2018 12:17 AM      XR CHEST STANDARD (2 VW)   Final Result   Stable radiographic appearance of the chest. No evidence of an acute process. **This report has been created using voice recognition software. It may contain minor errors which are inherent in voice recognition technology. **      Final report electronically signed by Dr. Daniel Cohen on 9/26/2018 12:14 AM          LABS:   Labs Reviewed   COMPREHENSIVE METABOLIC PANEL W/ REFLEX TO MG FOR LOW K - Abnormal; Notable for the following:        Result Value    Glucose 304 (*)     Sodium 133 (*)     Chloride 97 (*)     CO2 20 (*)     Alkaline Phosphatase 127 (*)     All other components within normal limits   LACTIC ACID, PLASMA - Abnormal; Notable for the following:     Lactic Acid 3.0 (*)     All other components within normal limits   POCT GLUCOSE - Abnormal; Notable for the following:     POC Glucose 247 (*)     All other components within normal limits   POCT GLUCOSE - Abnormal; Notable for the following:     POC Glucose 233 (*)     All other components within normal limits   POCT GLUCOSE - Abnormal; Notable for the following:     POC Glucose 115 (*)     All other components within normal limits   CBC WITH AUTO DIFFERENTIAL   APTT   TROPONIN   BRAIN NATRIURETIC PEPTIDE   LIPASE   D-DIMER, QUANTITATIVE   TSH WITHOUT REFLEX   ETHANOL   URINE DRUG SCREEN   ANION GAP   GLOMERULAR FILTRATION RATE, ESTIMATED   OSMOLALITY   PHENYTOIN LEVEL, TOTAL       EMERGENCY DEPARTMENT COURSE:   Vitals:    Vitals:    09/25/18 2247 09/25/18 2339 09/26/18 0112 09/26/18 0155   BP: (!) 172/106 (!) 147/92 (!) 148/100 130/87   Pulse: 80 78 76 83   Resp: 17 20     Temp: 98.6 °F (37 °C)      TempSrc: Oral      SpO2: 98% 98% 97% 98%   Weight: 245 lb (111.1 kg)      Height: 6' 2\" (1.88 m)        MDM    Patient presented to the emergency department with hyperglycemia that is now corrected. Patient is back to normal and he would like to go home. He is offered admission but declined preferring to go home . He can be discharged.   He has history of seizures and he may have had a seizure or syncope or something that made him altered but he is fully recovered and back to normal.  He has  capacity to make decisions and can be discharged per his request.      FINAL IMPRESSION      1.

## 2018-09-26 NOTE — ED NOTES
Pt resting on cot and reading papers. Pt denies any pain and states he is feeling much better. Dr. Víctor Kruger at bedside giving update.       Darin Pereira RN  09/26/18 1615

## 2018-09-26 NOTE — ED NOTES
Pt is resting on cot an denies pain at this time. Georgette Mitchell RN is trying to get an IV line.       Kiana Leon RN  09/25/18 8335

## 2018-10-02 ENCOUNTER — TELEPHONE (OUTPATIENT)
Dept: UROLOGY | Age: 48
End: 2018-10-02

## 2018-10-03 ENCOUNTER — HOSPITAL ENCOUNTER (OUTPATIENT)
Age: 48
Discharge: HOME | End: 2018-10-03
Payer: COMMERCIAL

## 2018-10-03 DIAGNOSIS — Z01.818 PREOPERATIVE TESTING: ICD-10-CM

## 2018-10-03 DIAGNOSIS — N32.81 OAB (OVERACTIVE BLADDER): ICD-10-CM

## 2018-10-03 DIAGNOSIS — R39.15 URGENCY OF URINATION: ICD-10-CM

## 2018-10-03 LAB
BACTERIA: ABNORMAL /HPF
BASOPHILS # BLD: 0.5 %
BASOPHILS ABSOLUTE: 0 THOU/MM3 (ref 0–0.1)
BILIRUBIN URINE: NEGATIVE
BLOOD, URINE: NEGATIVE
CASTS 2: ABNORMAL /LPF
CASTS UA: ABNORMAL /LPF
CHARACTER, URINE: CLEAR
COLOR: YELLOW
CRYSTALS, UA: ABNORMAL
EKG ATRIAL RATE: 70 BPM
EKG P AXIS: 58 DEGREES
EKG P-R INTERVAL: 146 MS
EKG Q-T INTERVAL: 388 MS
EKG QRS DURATION: 86 MS
EKG QTC CALCULATION (BAZETT): 419 MS
EKG R AXIS: 33 DEGREES
EKG T AXIS: 29 DEGREES
EKG VENTRICULAR RATE: 70 BPM
EOSINOPHIL # BLD: 1.7 %
EOSINOPHILS ABSOLUTE: 0.1 THOU/MM3 (ref 0–0.4)
EPITHELIAL CELLS, UA: ABNORMAL /HPF
ERYTHROCYTE [DISTWIDTH] IN BLOOD BY AUTOMATED COUNT: 12.6 % (ref 11.5–14.5)
ERYTHROCYTE [DISTWIDTH] IN BLOOD BY AUTOMATED COUNT: 38.6 FL (ref 35–45)
GLUCOSE URINE: 100 MG/DL
HCT VFR BLD CALC: 43.3 % (ref 42–52)
HEMOGLOBIN: 14.6 GM/DL (ref 14–18)
IMMATURE GRANS (ABS): 0.02 THOU/MM3 (ref 0–0.07)
IMMATURE GRANULOCYTES: 0.3 %
KETONES, URINE: NEGATIVE
LEUKOCYTE ESTERASE, URINE: NEGATIVE
LYMPHOCYTES # BLD: 35.7 %
LYMPHOCYTES ABSOLUTE: 2.7 THOU/MM3 (ref 1–4.8)
MCH RBC QN AUTO: 28.7 PG (ref 26–33)
MCHC RBC AUTO-ENTMCNC: 33.7 GM/DL (ref 32.2–35.5)
MCV RBC AUTO: 85.1 FL (ref 80–94)
MISCELLANEOUS 2: ABNORMAL
MONOCYTES # BLD: 5.9 %
MONOCYTES ABSOLUTE: 0.4 THOU/MM3 (ref 0.4–1.3)
NITRITE, URINE: NEGATIVE
NUCLEATED RED BLOOD CELLS: 0 /100 WBC
PH UA: 6
PLATELET # BLD: 200 THOU/MM3 (ref 130–400)
PMV BLD AUTO: 10.2 FL (ref 9.4–12.4)
PROTEIN UA: NEGATIVE
RBC # BLD: 5.09 MILL/MM3 (ref 4.7–6.1)
RBC URINE: ABNORMAL /HPF
RENAL EPITHELIAL, UA: ABNORMAL
SEG NEUTROPHILS: 55.9 %
SEGMENTED NEUTROPHILS ABSOLUTE COUNT: 4.2 THOU/MM3 (ref 1.8–7.7)
SPECIFIC GRAVITY, URINE: 1.02 (ref 1–1.03)
UROBILINOGEN, URINE: 1 EU/DL
WBC # BLD: 7.6 THOU/MM3 (ref 4.8–10.8)
WBC UA: ABNORMAL /HPF
YEAST: ABNORMAL

## 2018-10-03 PROCEDURE — 85025 COMPLETE CBC W/AUTO DIFF WBC: CPT

## 2018-10-03 PROCEDURE — 81001 URINALYSIS AUTO W/SCOPE: CPT

## 2018-10-03 PROCEDURE — 36415 COLL VENOUS BLD VENIPUNCTURE: CPT

## 2018-10-03 PROCEDURE — 93005 ELECTROCARDIOGRAM TRACING: CPT

## 2018-10-09 ENCOUNTER — OFFICE VISIT (OUTPATIENT)
Dept: INTERNAL MEDICINE CLINIC | Age: 48
End: 2018-10-09
Payer: COMMERCIAL

## 2018-10-09 VITALS — BODY MASS INDEX: 30.33 KG/M2 | WEIGHT: 236.25 LBS

## 2018-10-09 DIAGNOSIS — E10.8 TYPE 1 DIABETES MELLITUS WITH COMPLICATION (HCC): ICD-10-CM

## 2018-10-09 PROCEDURE — 99999 PR OFFICE/OUTPT VISIT,PROCEDURE ONLY: CPT | Performed by: INTERNAL MEDICINE

## 2018-10-09 PROCEDURE — G0108 DIAB MANAGE TRN  PER INDIV: HCPCS | Performed by: INTERNAL MEDICINE

## 2018-10-09 NOTE — PROGRESS NOTES
[facility]  [address] [place], [address]  [phone] (phone)  [phone] (fax)    Patient ID: Elisha Patient 1970  Referring Provider: Dr. Leisa Kamara     Patient's name and  were verified. Subjective:    He presents for His follow-up diabetic visit. He has type 2 diabetes mellitus. Home regimen includes: insulin He is noncompliant some of the time. Assessment:     Lab Results   Component Value Date    LABA1C 7.6 2017    BUN 8 2018    CREATININE 0.9 2018     Vitals:    10/09/18 1306   Weight: 236 lb 4 oz (107.2 kg)     Wt Readings from Last 3 Encounters:   10/09/18 236 lb 4 oz (107.2 kg)   18 245 lb (111.1 kg)   18 240 lb (108.9 kg)     Ht Readings from Last 3 Encounters:   18 6' 2\" (1.88 m)   18 6' 2\" (1.88 m)   18 6' 2\" (1.88 m)       Glucose at 2 hrs PPD today resulted at 102mg/dl  Current monitoring regimen: home blood tests - 6 times daily  Home blood sugar trends: Upload scanned  Any episodes of hypoglycemia? yes - attributes to delays in eating  Previous visit with dietician: yes - can count carbs  Current diet: on average, 3 meals per day  Current exercise: none  Eye exam current (within one year): no  Any history of foot problems? Yes. R scratch on top of foot- healing. Last foot exam: 10/9/18  Immunizations up to date: 10/10/18 at PCP visit  Taking ASA:  Yes - If not why? Appropriate for use of MyChart Glucose Grid:  Yes    Focus:     Tosin Hong did not bring logs to evaluate his food intake and bolus. He shared that he is struggling with his multiple health concerns and depression. He BS have been running low the last week and Chanda Owen attributes to not eating. If he reads low, he does treat with pop, but may go back to sleep. He is usually up later in the day and has his meals. Reviewed current directive for insulin and pre op plans. Chanda Owen is in agreement. Log book provided to keep track of bolus/sgm.  Has not had an eye

## 2018-10-10 ENCOUNTER — TELEPHONE (OUTPATIENT)
Dept: UROLOGY | Age: 48
End: 2018-10-10

## 2018-10-14 NOTE — H&P
obstructing. The bladder was evaluated in its endoscopic entirety and found to be normal.  There were no tumors, stones, ulcers or foreign bodies. There were no mucosal abnormalities. The ureteral orifices were seen and were normal.  The scope was removed. The patient tolerated the procedure and there were no complications. A dose of 500 mg ciprofloxacin was given for the procedure.     Impression: moderate BPH, tight bladder neck     He has tried Myrbetriq and Flomax.      Plan:     Schedule Interstim.

## 2018-10-16 ENCOUNTER — APPOINTMENT (OUTPATIENT)
Dept: GENERAL RADIOLOGY | Age: 48
End: 2018-10-16
Attending: UROLOGY
Payer: COMMERCIAL

## 2018-10-16 ENCOUNTER — HOSPITAL ENCOUNTER (OUTPATIENT)
Age: 48
Setting detail: OUTPATIENT SURGERY
Discharge: HOME | End: 2018-10-16
Attending: UROLOGY | Admitting: UROLOGY
Payer: COMMERCIAL

## 2018-10-16 ENCOUNTER — ANESTHESIA EVENT (OUTPATIENT)
Dept: OPERATING ROOM | Age: 48
End: 2018-10-16
Payer: COMMERCIAL

## 2018-10-16 ENCOUNTER — ANESTHESIA (OUTPATIENT)
Dept: OPERATING ROOM | Age: 48
End: 2018-10-16
Payer: COMMERCIAL

## 2018-10-16 VITALS
RESPIRATION RATE: 1 BRPM | DIASTOLIC BLOOD PRESSURE: 58 MMHG | SYSTOLIC BLOOD PRESSURE: 100 MMHG | OXYGEN SATURATION: 100 %

## 2018-10-16 VITALS
RESPIRATION RATE: 16 BRPM | SYSTOLIC BLOOD PRESSURE: 143 MMHG | OXYGEN SATURATION: 97 % | HEIGHT: 74 IN | DIASTOLIC BLOOD PRESSURE: 71 MMHG | TEMPERATURE: 96.7 F | HEART RATE: 63 BPM | WEIGHT: 241 LBS | BODY MASS INDEX: 30.93 KG/M2

## 2018-10-16 LAB
GLUCOSE BLD-MCNC: 202 MG/DL (ref 70–108)
GLUCOSE BLD-MCNC: 210 MG/DL (ref 70–108)

## 2018-10-16 PROCEDURE — 82948 REAGENT STRIP/BLOOD GLUCOSE: CPT

## 2018-10-16 PROCEDURE — C1883 ADAPT/EXT, PACING/NEURO LEAD: HCPCS | Performed by: UROLOGY

## 2018-10-16 PROCEDURE — 3209999900 FLUORO FOR SURGICAL PROCEDURES

## 2018-10-16 PROCEDURE — 7100000001 HC PACU RECOVERY - ADDTL 15 MIN: Performed by: UROLOGY

## 2018-10-16 PROCEDURE — 64590 INS/RPL PRPH SAC/GSTR NPG/R: CPT | Performed by: UROLOGY

## 2018-10-16 PROCEDURE — 2580000003 HC RX 258

## 2018-10-16 PROCEDURE — C1787 PATIENT PROGR, NEUROSTIM: HCPCS | Performed by: UROLOGY

## 2018-10-16 PROCEDURE — 76000 FLUOROSCOPY <1 HR PHYS/QHP: CPT | Performed by: UROLOGY

## 2018-10-16 PROCEDURE — 6360000002 HC RX W HCPCS: Performed by: NURSE ANESTHETIST, CERTIFIED REGISTERED

## 2018-10-16 PROCEDURE — L8689 EXTERNAL RECHARG SYS INTERN: HCPCS | Performed by: UROLOGY

## 2018-10-16 PROCEDURE — 3600000013 HC SURGERY LEVEL 3 ADDTL 15MIN: Performed by: UROLOGY

## 2018-10-16 PROCEDURE — 2580000003 HC RX 258: Performed by: NURSE ANESTHETIST, CERTIFIED REGISTERED

## 2018-10-16 PROCEDURE — 7100000010 HC PHASE II RECOVERY - FIRST 15 MIN: Performed by: UROLOGY

## 2018-10-16 PROCEDURE — 2500000003 HC RX 250 WO HCPCS: Performed by: NURSE ANESTHETIST, CERTIFIED REGISTERED

## 2018-10-16 PROCEDURE — 95972 ALYS CPLX SP/PN NPGT W/PRGRM: CPT | Performed by: UROLOGY

## 2018-10-16 PROCEDURE — 6360000002 HC RX W HCPCS

## 2018-10-16 PROCEDURE — C1767 GENERATOR, NEURO NON-RECHARG: HCPCS | Performed by: UROLOGY

## 2018-10-16 PROCEDURE — C1894 INTRO/SHEATH, NON-LASER: HCPCS | Performed by: UROLOGY

## 2018-10-16 PROCEDURE — 2500000003 HC RX 250 WO HCPCS: Performed by: UROLOGY

## 2018-10-16 PROCEDURE — 3700000000 HC ANESTHESIA ATTENDED CARE: Performed by: UROLOGY

## 2018-10-16 PROCEDURE — 72220 X-RAY EXAM SACRUM TAILBONE: CPT

## 2018-10-16 PROCEDURE — 2709999900 HC NON-CHARGEABLE SUPPLY: Performed by: UROLOGY

## 2018-10-16 PROCEDURE — 7100000000 HC PACU RECOVERY - FIRST 15 MIN: Performed by: UROLOGY

## 2018-10-16 PROCEDURE — 3600000003 HC SURGERY LEVEL 3 BASE: Performed by: UROLOGY

## 2018-10-16 PROCEDURE — 64581 OPN IMPLTJ NEA SACRAL NERVE: CPT | Performed by: UROLOGY

## 2018-10-16 PROCEDURE — 3700000001 HC ADD 15 MINUTES (ANESTHESIA): Performed by: UROLOGY

## 2018-10-16 PROCEDURE — 7100000011 HC PHASE II RECOVERY - ADDTL 15 MIN: Performed by: UROLOGY

## 2018-10-16 DEVICE — Z DUP USE 2628873 GENERATOR NEUROSTIMULATOR H1.7XL2IN THK3IN TORQ WRNCH PROD: Type: IMPLANTABLE DEVICE | Status: FUNCTIONAL

## 2018-10-16 DEVICE — KIT NEUROSTIMULATOR LD L28CM DIA1.27MM ELECTRD SPC 1.5MM: Type: IMPLANTABLE DEVICE | Status: FUNCTIONAL

## 2018-10-16 RX ORDER — SODIUM CHLORIDE 9 MG/ML
INJECTION, SOLUTION INTRAVENOUS CONTINUOUS
Status: DISCONTINUED | OUTPATIENT
Start: 2018-10-16 | End: 2018-10-16 | Stop reason: HOSPADM

## 2018-10-16 RX ORDER — ONDANSETRON 4 MG/1
4 TABLET, ORALLY DISINTEGRATING ORAL EVERY 8 HOURS PRN
Status: DISCONTINUED | OUTPATIENT
Start: 2018-10-16 | End: 2018-10-16 | Stop reason: HOSPADM

## 2018-10-16 RX ORDER — ONDANSETRON 2 MG/ML
INJECTION INTRAMUSCULAR; INTRAVENOUS PRN
Status: DISCONTINUED | OUTPATIENT
Start: 2018-10-16 | End: 2018-10-16 | Stop reason: SDUPTHER

## 2018-10-16 RX ORDER — DEXAMETHASONE SODIUM PHOSPHATE 4 MG/ML
INJECTION, SOLUTION INTRA-ARTICULAR; INTRALESIONAL; INTRAMUSCULAR; INTRAVENOUS; SOFT TISSUE PRN
Status: DISCONTINUED | OUTPATIENT
Start: 2018-10-16 | End: 2018-10-16 | Stop reason: SDUPTHER

## 2018-10-16 RX ORDER — GLYCOPYRROLATE 1 MG/5 ML
SYRINGE (ML) INTRAVENOUS PRN
Status: DISCONTINUED | OUTPATIENT
Start: 2018-10-16 | End: 2018-10-16 | Stop reason: SDUPTHER

## 2018-10-16 RX ORDER — FENTANYL CITRATE 50 UG/ML
25 INJECTION, SOLUTION INTRAMUSCULAR; INTRAVENOUS
Status: DISCONTINUED | OUTPATIENT
Start: 2018-10-16 | End: 2018-10-16 | Stop reason: HOSPADM

## 2018-10-16 RX ORDER — FENTANYL CITRATE 50 UG/ML
50 INJECTION, SOLUTION INTRAMUSCULAR; INTRAVENOUS EVERY 5 MIN PRN
Status: DISCONTINUED | OUTPATIENT
Start: 2018-10-16 | End: 2018-10-16 | Stop reason: HOSPADM

## 2018-10-16 RX ORDER — NEOSTIGMINE METHYLSULFATE 5 MG/5 ML
SYRINGE (ML) INTRAVENOUS PRN
Status: DISCONTINUED | OUTPATIENT
Start: 2018-10-16 | End: 2018-10-16 | Stop reason: SDUPTHER

## 2018-10-16 RX ORDER — SODIUM CHLORIDE 9 MG/ML
INJECTION, SOLUTION INTRAVENOUS CONTINUOUS PRN
Status: DISCONTINUED | OUTPATIENT
Start: 2018-10-16 | End: 2018-10-16 | Stop reason: SDUPTHER

## 2018-10-16 RX ORDER — BUPIVACAINE HYDROCHLORIDE 5 MG/ML
INJECTION, SOLUTION EPIDURAL; INTRACAUDAL PRN
Status: DISCONTINUED | OUTPATIENT
Start: 2018-10-16 | End: 2018-10-16 | Stop reason: HOSPADM

## 2018-10-16 RX ORDER — MIDAZOLAM HYDROCHLORIDE 1 MG/ML
INJECTION INTRAMUSCULAR; INTRAVENOUS PRN
Status: DISCONTINUED | OUTPATIENT
Start: 2018-10-16 | End: 2018-10-16 | Stop reason: SDUPTHER

## 2018-10-16 RX ORDER — FENTANYL CITRATE 50 UG/ML
INJECTION, SOLUTION INTRAMUSCULAR; INTRAVENOUS PRN
Status: DISCONTINUED | OUTPATIENT
Start: 2018-10-16 | End: 2018-10-16 | Stop reason: SDUPTHER

## 2018-10-16 RX ORDER — KETOROLAC TROMETHAMINE 10 MG/1
10 TABLET, FILM COATED ORAL EVERY 6 HOURS PRN
Qty: 20 TABLET | Refills: 0 | Status: SHIPPED | OUTPATIENT
Start: 2018-10-16 | End: 2019-05-06

## 2018-10-16 RX ORDER — ACETAMINOPHEN 325 MG/1
650 TABLET ORAL EVERY 4 HOURS PRN
Status: DISCONTINUED | OUTPATIENT
Start: 2018-10-16 | End: 2018-10-16 | Stop reason: HOSPADM

## 2018-10-16 RX ORDER — KETOROLAC TROMETHAMINE 30 MG/ML
30 INJECTION, SOLUTION INTRAMUSCULAR; INTRAVENOUS EVERY 6 HOURS PRN
Status: DISCONTINUED | OUTPATIENT
Start: 2018-10-16 | End: 2018-10-16 | Stop reason: HOSPADM

## 2018-10-16 RX ORDER — HYDRALAZINE HYDROCHLORIDE 20 MG/ML
5 INJECTION INTRAMUSCULAR; INTRAVENOUS EVERY 10 MIN PRN
Status: DISCONTINUED | OUTPATIENT
Start: 2018-10-16 | End: 2018-10-16 | Stop reason: HOSPADM

## 2018-10-16 RX ORDER — CEFAZOLIN SODIUM 1 G/50ML
1 INJECTION, SOLUTION INTRAVENOUS
Status: COMPLETED | OUTPATIENT
Start: 2018-10-16 | End: 2018-10-16

## 2018-10-16 RX ORDER — ROCURONIUM BROMIDE 10 MG/ML
INJECTION, SOLUTION INTRAVENOUS PRN
Status: DISCONTINUED | OUTPATIENT
Start: 2018-10-16 | End: 2018-10-16 | Stop reason: SDUPTHER

## 2018-10-16 RX ORDER — LIDOCAINE HYDROCHLORIDE 20 MG/ML
INJECTION, SOLUTION INFILTRATION; PERINEURAL PRN
Status: DISCONTINUED | OUTPATIENT
Start: 2018-10-16 | End: 2018-10-16 | Stop reason: SDUPTHER

## 2018-10-16 RX ORDER — PROPOFOL 10 MG/ML
INJECTION, EMULSION INTRAVENOUS PRN
Status: DISCONTINUED | OUTPATIENT
Start: 2018-10-16 | End: 2018-10-16 | Stop reason: SDUPTHER

## 2018-10-16 RX ORDER — CEPHALEXIN 500 MG/1
500 CAPSULE ORAL 2 TIMES DAILY
Qty: 6 CAPSULE | Refills: 0 | Status: SHIPPED | OUTPATIENT
Start: 2018-10-16 | End: 2018-10-19

## 2018-10-16 RX ADMIN — Medication 4 MG: at 11:44

## 2018-10-16 RX ADMIN — ONDANSETRON HYDROCHLORIDE 4 MG: 4 INJECTION, SOLUTION INTRAMUSCULAR; INTRAVENOUS at 11:50

## 2018-10-16 RX ADMIN — SODIUM CHLORIDE: 9 INJECTION, SOLUTION INTRAVENOUS at 12:42

## 2018-10-16 RX ADMIN — Medication 0.2 MG: at 11:58

## 2018-10-16 RX ADMIN — DEXAMETHASONE SODIUM PHOSPHATE 4 MG: 4 INJECTION, SOLUTION INTRAMUSCULAR; INTRAVENOUS at 11:27

## 2018-10-16 RX ADMIN — ROCURONIUM BROMIDE 50 MG: 10 INJECTION INTRAVENOUS at 11:11

## 2018-10-16 RX ADMIN — PHENYLEPHRINE HYDROCHLORIDE 100 MCG: 10 INJECTION INTRAVENOUS at 11:54

## 2018-10-16 RX ADMIN — PROPOFOL 200 MG: 10 INJECTION, EMULSION INTRAVENOUS at 11:11

## 2018-10-16 RX ADMIN — MIDAZOLAM HYDROCHLORIDE 2 MG: 1 INJECTION, SOLUTION INTRAMUSCULAR; INTRAVENOUS at 11:04

## 2018-10-16 RX ADMIN — Medication 0.8 MG: at 11:44

## 2018-10-16 RX ADMIN — SODIUM CHLORIDE: 9 INJECTION, SOLUTION INTRAVENOUS at 11:03

## 2018-10-16 RX ADMIN — CEFAZOLIN SODIUM 1 G: 1 INJECTION, SOLUTION INTRAVENOUS at 11:26

## 2018-10-16 RX ADMIN — FENTANYL CITRATE 100 MCG: 50 INJECTION INTRAMUSCULAR; INTRAVENOUS at 11:08

## 2018-10-16 RX ADMIN — LIDOCAINE HYDROCHLORIDE 80 MG: 20 INJECTION, SOLUTION INFILTRATION; PERINEURAL at 11:10

## 2018-10-16 RX ADMIN — SODIUM CHLORIDE: 9 INJECTION, SOLUTION INTRAVENOUS at 10:23

## 2018-10-16 ASSESSMENT — PULMONARY FUNCTION TESTS
PIF_VALUE: 17
PIF_VALUE: 22
PIF_VALUE: 23
PIF_VALUE: 4
PIF_VALUE: 23
PIF_VALUE: 23
PIF_VALUE: 21
PIF_VALUE: 0
PIF_VALUE: 8
PIF_VALUE: 24
PIF_VALUE: 1
PIF_VALUE: 19
PIF_VALUE: 24
PIF_VALUE: 2
PIF_VALUE: 23
PIF_VALUE: 24
PIF_VALUE: 25
PIF_VALUE: 17
PIF_VALUE: 0
PIF_VALUE: 21
PIF_VALUE: 24
PIF_VALUE: 22
PIF_VALUE: 4
PIF_VALUE: 23
PIF_VALUE: 24
PIF_VALUE: 22
PIF_VALUE: 24
PIF_VALUE: 23
PIF_VALUE: 23
PIF_VALUE: 24
PIF_VALUE: 23
PIF_VALUE: 22
PIF_VALUE: 24
PIF_VALUE: 24
PIF_VALUE: 1
PIF_VALUE: 20
PIF_VALUE: 17
PIF_VALUE: 16
PIF_VALUE: 12
PIF_VALUE: 24
PIF_VALUE: 24
PIF_VALUE: 19
PIF_VALUE: 24
PIF_VALUE: 24
PIF_VALUE: 2
PIF_VALUE: 22
PIF_VALUE: 22
PIF_VALUE: 24
PIF_VALUE: 25
PIF_VALUE: 24
PIF_VALUE: 1
PIF_VALUE: 22
PIF_VALUE: 25
PIF_VALUE: 24
PIF_VALUE: 25
PIF_VALUE: 1
PIF_VALUE: 1
PIF_VALUE: 24
PIF_VALUE: 23
PIF_VALUE: 24
PIF_VALUE: 1
PIF_VALUE: 25
PIF_VALUE: 23
PIF_VALUE: 25
PIF_VALUE: 24
PIF_VALUE: 25
PIF_VALUE: 21
PIF_VALUE: 25
PIF_VALUE: 24
PIF_VALUE: 25
PIF_VALUE: 21

## 2018-10-16 ASSESSMENT — PAIN - FUNCTIONAL ASSESSMENT: PAIN_FUNCTIONAL_ASSESSMENT: 0-10

## 2018-10-16 ASSESSMENT — PAIN SCALES - GENERAL
PAINLEVEL_OUTOF10: 0

## 2018-10-30 ENCOUNTER — OFFICE VISIT (OUTPATIENT)
Dept: UROLOGY | Age: 48
End: 2018-10-30
Payer: COMMERCIAL

## 2018-10-30 VITALS
BODY MASS INDEX: 32.08 KG/M2 | WEIGHT: 250 LBS | HEIGHT: 74 IN | DIASTOLIC BLOOD PRESSURE: 78 MMHG | SYSTOLIC BLOOD PRESSURE: 130 MMHG

## 2018-10-30 DIAGNOSIS — N32.81 OVERACTIVE BLADDER: Primary | ICD-10-CM

## 2018-10-30 LAB
BILIRUBIN URINE: NEGATIVE
BLOOD URINE, POC: NEGATIVE
CHARACTER, URINE: CLEAR
COLOR, URINE: YELLOW
GLUCOSE URINE: NEGATIVE MG/DL
KETONES, URINE: ABNORMAL
LEUKOCYTE CLUMPS, URINE: NEGATIVE
NITRITE, URINE: NEGATIVE
PH, URINE: 6
PROTEIN, URINE: NEGATIVE MG/DL
SPECIFIC GRAVITY, URINE: 1.02 (ref 1–1.03)
UROBILINOGEN, URINE: 2 EU/DL

## 2018-10-30 PROCEDURE — 99024 POSTOP FOLLOW-UP VISIT: CPT | Performed by: NURSE PRACTITIONER

## 2018-10-30 PROCEDURE — 81003 URINALYSIS AUTO W/O SCOPE: CPT | Performed by: NURSE PRACTITIONER

## 2018-10-30 RX ORDER — POLYETHYLENE GLYCOL 3350 17 G/17G
17 POWDER, FOR SOLUTION ORAL DAILY PRN
Qty: 1 BOTTLE | Refills: 1 | COMMUNITY
Start: 2018-10-30 | End: 2018-11-29

## 2018-10-30 RX ORDER — DOCUSATE SODIUM 100 MG/1
100 CAPSULE, LIQUID FILLED ORAL 2 TIMES DAILY PRN
Refills: 1 | COMMUNITY
Start: 2018-10-30 | End: 2018-12-06 | Stop reason: ALTCHOICE

## 2018-10-30 NOTE — PROGRESS NOTES
not stated as uncontrolled. Past Surgical History  The patient  has a past surgical history that includes Eye surgery ([address]); Cataract removal (2010); Foot surgery (Bilateral, 2006); Foot surgery (1996); other surgical history (11/4/15); eye surgery (Right, 92,96); Elbow surgery (Right); Upper gastrointestinal endoscopy (2017); cervical fusion (N/A, 9/27/2017); Hand surgery (Right, 07/2017); Colonoscopy (N/A, 2/26/2018); Upper gastrointestinal endoscopy (N/A, 8/9/2018); and pr office/outpt visit,procedure only (N/A, 10/16/2018). Family History  This patient's family history includes Dementia in his mother; Diabetes in his paternal aunt and paternal grandmother; High Blood Pressure in his brother, father, and sister; Hypertension in his father; Other in his father. Social History  Pato Crawford  reports that he has never smoked. He has never used smokeless tobacco. He reports that he drinks about 2.4 oz of alcohol per week . He reports that he uses drugs, including Marijuana. Review of Systems  No problems with ears, nose or throat. No problems with eyes. No chest pain, shortness of breath, abdominal pain, extremity pain or weakness, and no neurological deficits. No rashes. No swollen glands or lymph nodes.  symptoms per HPI. The remainder of the review of symptoms is negative. Exam  There were no vitals taken for this visit. Nursing note and vitals reviewed. Constitutional: Alert and oriented times 3, no acute distress and cooperative to examination with appropriate mood and affect. HENT:   Head:        Normocephalic and atraumatic. Mouth/Throat:         Mucous membranes are normal.   Eyes:         EOM are normal. No scleral icterus. PERRLA. Neck:        Supple, symmetrical, trachea midline, no adenopathy, thyroid symmetric, not enlarged and no tenderness. Cardiovascular:        Normal rate, regular rhythm, S1 S2 heart sounds. No murmurs, rub, or gallops.    Pulses:       Radial

## 2018-11-27 ENCOUNTER — HOSPITAL ENCOUNTER (OUTPATIENT)
Age: 48
Discharge: HOME | End: 2018-11-27
Payer: COMMERCIAL

## 2018-11-27 PROCEDURE — 87338 HPYLORI STOOL AG IA: CPT

## 2018-11-28 DIAGNOSIS — K29.70 HELICOBACTER PYLORI GASTRITIS: ICD-10-CM

## 2018-11-28 DIAGNOSIS — B96.81 HELICOBACTER PYLORI GASTRITIS: ICD-10-CM

## 2018-11-30 LAB — HELICOBACTER PYLORI ANTIGEN, STOOL: NORMAL

## 2018-12-07 ENCOUNTER — APPOINTMENT (OUTPATIENT)
Dept: CT IMAGING | Age: 48
End: 2018-12-07
Payer: COMMERCIAL

## 2018-12-07 ENCOUNTER — APPOINTMENT (OUTPATIENT)
Dept: GENERAL RADIOLOGY | Age: 48
End: 2018-12-07
Payer: COMMERCIAL

## 2018-12-07 ENCOUNTER — HOSPITAL ENCOUNTER (EMERGENCY)
Age: 48
Discharge: HOME | End: 2018-12-07
Attending: EMERGENCY MEDICINE
Payer: COMMERCIAL

## 2018-12-07 VITALS
TEMPERATURE: 98.1 F | DIASTOLIC BLOOD PRESSURE: 94 MMHG | HEART RATE: 75 BPM | OXYGEN SATURATION: 94 % | SYSTOLIC BLOOD PRESSURE: 158 MMHG | RESPIRATION RATE: 15 BRPM

## 2018-12-07 DIAGNOSIS — T07.XXXA CONTUSION, MULTIPLE SITES: ICD-10-CM
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
## 2018-12-07 DIAGNOSIS — R56.9 SEIZURE (HCC): Primary | ICD-10-CM

## 2018-12-07 DIAGNOSIS — R03.0 ELEVATED BLOOD PRESSURE READING: ICD-10-CM

## 2018-12-07 LAB
ALBUMIN SERPL-MCNC: 4.3 G/DL (ref 3.5–5.1)
ALP BLD-CCNC: 122 U/L (ref 38–126)
ALT SERPL-CCNC: 30 U/L (ref 11–66)
ANION GAP SERPL CALCULATED.3IONS-SCNC: 11 MEQ/L (ref 8–16)
AST SERPL-CCNC: 29 U/L (ref 5–40)
ATYPICAL LYMPHOCYTES: ABNORMAL %
BASOPHILS # BLD: 0.3 %
BASOPHILS ABSOLUTE: 0 THOU/MM3 (ref 0–0.1)
BILIRUB SERPL-MCNC: 0.3 MG/DL (ref 0.3–1.2)
BUN BLDV-MCNC: 14 MG/DL (ref 7–22)
CALCIUM SERPL-MCNC: 8.5 MG/DL (ref 8.5–10.5)
CHLORIDE BLD-SCNC: 99 MEQ/L (ref 98–111)
CO2: 24 MEQ/L (ref 23–33)
CREAT SERPL-MCNC: 0.8 MG/DL (ref 0.4–1.2)
EKG ATRIAL RATE: 61 BPM
EKG P AXIS: 65 DEGREES
EKG P-R INTERVAL: 164 MS
EKG Q-T INTERVAL: 402 MS
EKG QRS DURATION: 88 MS
EKG QTC CALCULATION (BAZETT): 404 MS
EKG R AXIS: 56 DEGREES
EKG T AXIS: 41 DEGREES
EKG VENTRICULAR RATE: 61 BPM
EOSINOPHIL # BLD: 3 %
EOSINOPHILS ABSOLUTE: 0.2 THOU/MM3 (ref 0–0.4)
ERYTHROCYTE [DISTWIDTH] IN BLOOD BY AUTOMATED COUNT: 12.7 % (ref 11.5–14.5)
ERYTHROCYTE [DISTWIDTH] IN BLOOD BY AUTOMATED COUNT: 39.2 FL (ref 35–45)
GFR SERPL CREATININE-BSD FRML MDRD: > 90 ML/MIN/1.73M2
GLUCOSE BLD-MCNC: 187 MG/DL (ref 70–108)
HCT VFR BLD CALC: 42.9 % (ref 42–52)
HEMOGLOBIN: 14.3 GM/DL (ref 14–18)
IMMATURE GRANS (ABS): 0.01 THOU/MM3 (ref 0–0.07)
IMMATURE GRANULOCYTES: 0.2 %
LYMPHOCYTES # BLD: 49.4 %
LYMPHOCYTES ABSOLUTE: 3.1 THOU/MM3 (ref 1–4.8)
MCH RBC QN AUTO: 28.1 PG (ref 26–33)
MCHC RBC AUTO-ENTMCNC: 33.3 GM/DL (ref 32.2–35.5)
MCV RBC AUTO: 84.4 FL (ref 80–94)
MONOCYTES # BLD: 5 %
MONOCYTES ABSOLUTE: 0.3 THOU/MM3 (ref 0.4–1.3)
NUCLEATED RED BLOOD CELLS: 0 /100 WBC
OSMOLALITY CALCULATION: 273.6 MOSMOL/KG (ref 275–300)
PLATELET # BLD: 106 THOU/MM3 (ref 130–400)
PLATELET ESTIMATE: ABNORMAL
PMV BLD AUTO: 11 FL (ref 9.4–12.4)
POTASSIUM SERPL-SCNC: 3.9 MEQ/L (ref 3.5–5.2)
RBC # BLD: 5.08 MILL/MM3 (ref 4.7–6.1)
SCAN OF BLOOD SMEAR: NORMAL
SEG NEUTROPHILS: 42.1 %
SEGMENTED NEUTROPHILS ABSOLUTE COUNT: 2.7 THOU/MM3 (ref 1.8–7.7)
SODIUM BLD-SCNC: 134 MEQ/L (ref 135–145)
TOTAL PROTEIN: 6.2 G/DL (ref 6.1–8)
TROPONIN T: < 0.01 NG/ML
WBC # BLD: 6.3 THOU/MM3 (ref 4.8–10.8)

## 2018-12-07 PROCEDURE — 72100 X-RAY EXAM L-S SPINE 2/3 VWS: CPT

## 2018-12-07 PROCEDURE — 80053 COMPREHEN METABOLIC PANEL: CPT

## 2018-12-07 PROCEDURE — 71101 X-RAY EXAM UNILAT RIBS/CHEST: CPT

## 2018-12-07 PROCEDURE — 70450 CT HEAD/BRAIN W/O DYE: CPT

## 2018-12-07 PROCEDURE — 93005 ELECTROCARDIOGRAM TRACING: CPT | Performed by: EMERGENCY MEDICINE

## 2018-12-07 PROCEDURE — 85025 COMPLETE CBC W/AUTO DIFF WBC: CPT

## 2018-12-07 PROCEDURE — 36415 COLL VENOUS BLD VENIPUNCTURE: CPT

## 2018-12-07 PROCEDURE — 99284 EMERGENCY DEPT VISIT MOD MDM: CPT

## 2018-12-07 PROCEDURE — 84484 ASSAY OF TROPONIN QUANT: CPT

## 2018-12-07 PROCEDURE — 72125 CT NECK SPINE W/O DYE: CPT

## 2018-12-07 RX ORDER — ACETAMINOPHEN 500 MG
1000 TABLET ORAL ONCE
Status: DISCONTINUED | OUTPATIENT
Start: 2018-12-07 | End: 2018-12-07 | Stop reason: HOSPADM

## 2018-12-07 ASSESSMENT — ENCOUNTER SYMPTOMS
ABDOMINAL DISTENTION: 0
EYE ITCHING: 0
RHINORRHEA: 0
ABDOMINAL PAIN: 0
COUGH: 0
NAUSEA: 0
DIARRHEA: 0
BACK PAIN: 1
EYE DISCHARGE: 0
VOMITING: 0
SHORTNESS OF BREATH: 0
WHEEZING: 0

## 2018-12-07 ASSESSMENT — PAIN DESCRIPTION - LOCATION: LOCATION: BACK

## 2018-12-07 ASSESSMENT — PAIN SCALES - GENERAL: PAINLEVEL_OUTOF10: 7

## 2018-12-07 ASSESSMENT — PAIN DESCRIPTION - PAIN TYPE: TYPE: ACUTE PAIN

## 2018-12-07 NOTE — ED PROVIDER NOTES
[metronidazole]. FAMILY HISTORY     indicated that his mother is alive. He indicated that his father is alive. He indicated that the status of his sister is unknown. He indicated that the status of his brother is unknown. He indicated that the status of his paternal grandmother is unknown. He indicated that the status of his paternal aunt is unknown. He indicated that the status of his neg hx is unknown.    family history includes Dementia in his mother; Diabetes in his paternal aunt and paternal grandmother; High Blood Pressure in his brother, father, and sister; Hypertension in his father; Other in his father. SOCIAL HISTORY      reports that he has never smoked. He has never used smokeless tobacco. He reports that he drinks about 2.4 oz of alcohol per week . He reports that he uses drugs, including Marijuana. PHYSICAL EXAM     INITIAL VITALS:  oral temperature is 98.1 °F (36.7 °C). His blood pressure is 158/94 (abnormal) and his pulse is 75. His respiration is 15 and oxygen saturation is 94%. Physical Exam   Constitutional: He is oriented to person, place, and time. He appears well-developed and well-nourished. HENT:   Head: Normocephalic and atraumatic. Eyes: Pupils are equal, round, and reactive to light. Right eye exhibits no discharge. Left eye exhibits no discharge. No scleral icterus. Neck: Normal range of motion. Neck supple. No tracheal deviation present. Cardiovascular: Normal rate, regular rhythm and normal heart sounds. Exam reveals no gallop and no friction rub. No murmur heard. Pulmonary/Chest: Effort normal. No stridor. No respiratory distress. He has no wheezes. Abdominal: Soft. There is no tenderness. There is no rebound and no guarding. Musculoskeletal: He exhibits no edema or tenderness. Tenderness to right chest wall, right flank, left shoulder ( with normal range of motion) and lower back. No spine step off.     Neurological: He is alert and oriented to person, (Please note that portions of this note were completed with a voice recognition program.  Efforts were made to edit the dictations but occasionally words aremis-transcribed.)    MD Lauren Guzmán MD  12/07/18 0131

## 2018-12-07 NOTE — ED TRIAGE NOTES
Patient presents after falling in shower 3 days ago. States he isnt sure if he had a seizure or passed out. States he is still sore.

## 2018-12-08 PROCEDURE — 93010 ELECTROCARDIOGRAM REPORT: CPT | Performed by: NUCLEAR MEDICINE

## 2019-01-03 ENCOUNTER — OFFICE VISIT (OUTPATIENT)
Dept: INTERNAL MEDICINE CLINIC | Age: 49
End: 2019-01-03
Payer: COMMERCIAL

## 2019-01-03 VITALS — BODY MASS INDEX: 32.27 KG/M2 | WEIGHT: 251.38 LBS

## 2019-01-03 DIAGNOSIS — E10.8 TYPE 1 DIABETES MELLITUS WITH COMPLICATION (HCC): Primary | ICD-10-CM

## 2019-01-03 LAB — HBA1C MFR BLD: 7.9 % (ref 4.3–5.7)

## 2019-01-03 PROCEDURE — 83036 HEMOGLOBIN GLYCOSYLATED A1C: CPT | Performed by: INTERNAL MEDICINE

## 2019-01-03 PROCEDURE — G0108 DIAB MANAGE TRN  PER INDIV: HCPCS | Performed by: INTERNAL MEDICINE

## 2019-01-10 ENCOUNTER — TELEPHONE (OUTPATIENT)
Dept: UROLOGY | Age: 49
End: 2019-01-10

## 2019-02-07 ENCOUNTER — OFFICE VISIT (OUTPATIENT)
Dept: UROLOGY | Age: 49
End: 2019-02-07
Payer: COMMERCIAL

## 2019-02-07 VITALS
WEIGHT: 244 LBS | BODY MASS INDEX: 31.32 KG/M2 | HEIGHT: 74 IN | DIASTOLIC BLOOD PRESSURE: 62 MMHG | SYSTOLIC BLOOD PRESSURE: 110 MMHG

## 2019-02-07 DIAGNOSIS — N32.81 OVERACTIVE BLADDER: Primary | ICD-10-CM

## 2019-02-07 LAB
BILIRUBIN URINE: ABNORMAL
BLOOD URINE, POC: NEGATIVE
CHARACTER, URINE: CLEAR
COLOR, URINE: YELLOW
GLUCOSE URINE: NEGATIVE MG/DL
KETONES, URINE: ABNORMAL
LEUKOCYTE CLUMPS, URINE: NEGATIVE
NITRITE, URINE: NEGATIVE
PH, URINE: 6
POST VOID RESIDUAL (PVR): 26 ML
PROTEIN, URINE: 30 MG/DL
SPECIFIC GRAVITY, URINE: 1.02 (ref 1–1.03)
UROBILINOGEN, URINE: 1 EU/DL

## 2019-02-07 PROCEDURE — G8484 FLU IMMUNIZE NO ADMIN: HCPCS | Performed by: NURSE PRACTITIONER

## 2019-02-07 PROCEDURE — G8417 CALC BMI ABV UP PARAM F/U: HCPCS | Performed by: NURSE PRACTITIONER

## 2019-02-07 PROCEDURE — 1036F TOBACCO NON-USER: CPT | Performed by: NURSE PRACTITIONER

## 2019-02-07 PROCEDURE — 99213 OFFICE O/P EST LOW 20 MIN: CPT | Performed by: NURSE PRACTITIONER

## 2019-02-07 PROCEDURE — G8427 DOCREV CUR MEDS BY ELIG CLIN: HCPCS | Performed by: NURSE PRACTITIONER

## 2019-02-07 PROCEDURE — 81003 URINALYSIS AUTO W/O SCOPE: CPT | Performed by: NURSE PRACTITIONER

## 2019-02-07 PROCEDURE — 51798 US URINE CAPACITY MEASURE: CPT | Performed by: NURSE PRACTITIONER

## 2019-02-07 RX ORDER — OXYBUTYNIN CHLORIDE 5 MG/1
5 TABLET, EXTENDED RELEASE ORAL DAILY
Qty: 30 TABLET | Refills: 3 | Status: SHIPPED | OUTPATIENT
Start: 2019-02-07 | End: 2019-05-29

## 2019-03-27 ENCOUNTER — OFFICE VISIT (OUTPATIENT)
Dept: UROLOGY | Age: 49
End: 2019-03-27
Payer: COMMERCIAL

## 2019-03-27 VITALS
HEIGHT: 74 IN | DIASTOLIC BLOOD PRESSURE: 76 MMHG | WEIGHT: 244.1 LBS | SYSTOLIC BLOOD PRESSURE: 116 MMHG | BODY MASS INDEX: 31.33 KG/M2

## 2019-03-27 DIAGNOSIS — N32.81 OAB (OVERACTIVE BLADDER): Primary | ICD-10-CM

## 2019-03-27 LAB
BILIRUBIN URINE: NEGATIVE
BLOOD URINE, POC: NEGATIVE
CHARACTER, URINE: CLEAR
COLOR, URINE: YELLOW
GLUCOSE URINE: NEGATIVE MG/DL
KETONES, URINE: NEGATIVE
LEUKOCYTE CLUMPS, URINE: NEGATIVE
NITRITE, URINE: NEGATIVE
PH, URINE: 6.5 (ref 5–9)
PROTEIN, URINE: NEGATIVE MG/DL
SPECIFIC GRAVITY, URINE: 1.02 (ref 1–1.03)
UROBILINOGEN, URINE: 1 EU/DL (ref 0–1)

## 2019-03-27 PROCEDURE — G8484 FLU IMMUNIZE NO ADMIN: HCPCS | Performed by: NURSE PRACTITIONER

## 2019-03-27 PROCEDURE — 1036F TOBACCO NON-USER: CPT | Performed by: NURSE PRACTITIONER

## 2019-03-27 PROCEDURE — G8417 CALC BMI ABV UP PARAM F/U: HCPCS | Performed by: NURSE PRACTITIONER

## 2019-03-27 PROCEDURE — 81003 URINALYSIS AUTO W/O SCOPE: CPT | Performed by: NURSE PRACTITIONER

## 2019-03-27 PROCEDURE — G8427 DOCREV CUR MEDS BY ELIG CLIN: HCPCS | Performed by: NURSE PRACTITIONER

## 2019-03-27 PROCEDURE — 99213 OFFICE O/P EST LOW 20 MIN: CPT | Performed by: NURSE PRACTITIONER

## 2019-03-27 RX ORDER — SODIUM CHLORIDE 450 MG/100ML
INJECTION, SOLUTION INTRAVENOUS CONTINUOUS
Status: CANCELLED | OUTPATIENT
Start: 2019-03-27

## 2019-03-28 ENCOUNTER — NURSE ONLY (OUTPATIENT)
Dept: UROLOGY | Age: 49
End: 2019-03-28

## 2019-03-28 VITALS
WEIGHT: 246 LBS | DIASTOLIC BLOOD PRESSURE: 82 MMHG | SYSTOLIC BLOOD PRESSURE: 124 MMHG | BODY MASS INDEX: 31.57 KG/M2 | HEIGHT: 74 IN

## 2019-03-28 DIAGNOSIS — N32.81 OAB (OVERACTIVE BLADDER): Primary | ICD-10-CM

## 2019-03-28 PROCEDURE — 99999 PR OFFICE/OUTPT VISIT,PROCEDURE ONLY: CPT | Performed by: NURSE PRACTITIONER

## 2019-04-03 ENCOUNTER — OFFICE VISIT (OUTPATIENT)
Dept: INTERNAL MEDICINE CLINIC | Age: 49
End: 2019-04-03
Payer: COMMERCIAL

## 2019-04-03 VITALS
BODY MASS INDEX: 30.34 KG/M2 | DIASTOLIC BLOOD PRESSURE: 90 MMHG | HEART RATE: 93 BPM | WEIGHT: 236.31 LBS | SYSTOLIC BLOOD PRESSURE: 146 MMHG

## 2019-04-03 DIAGNOSIS — E10.8 TYPE 1 DIABETES MELLITUS WITH COMPLICATION (HCC): ICD-10-CM

## 2019-04-03 PROCEDURE — G0108 DIAB MANAGE TRN  PER INDIV: HCPCS | Performed by: INTERNAL MEDICINE

## 2019-04-03 NOTE — PROGRESS NOTES
general issues about diabetes pathophysiology and management. Counseling at today's visit: focused on the need to adhere to the prescribed ADA diet, reminded to check sugars regularly and to bring readings in at the time of the next visit, discussed sick day management, discussed management of hypoglycemic episodes and safety. Meter download, medications, PMH and nursing assessment reviewed. Joel Melendez states He is willing to participate in this plan of care and verbalized understanding of all instructions provided. Teach back used to verify comprehension. Total time involved in direct patient education: 30 minutes. 1.  If you are not eating, BS spikes at night and you need to correct. Correct to 150mg instead of 130mg. 2.  Rotate injection sites. 3.  You know for safe BS control, you need to try and not skip meals. 4.  Try to keep records before visit to allow better evaluation.   5.  Schedule eye exam  RN 2 months

## 2019-04-03 NOTE — PATIENT INSTRUCTIONS
1.  If you are not eating, BS spikes at night and you need to correct. Correct to 150mg instead of 130mg. 2.  Rotate injection sites. 3.  You know for safe BS control, you need to try and not skip meals. 4.  Try to keep records before visit to allow better evaluation.   5.  Schedule eye exam  RN 2 months

## 2019-04-04 RX ORDER — SODIUM CHLORIDE 450 MG/100ML
INJECTION, SOLUTION INTRAVENOUS CONTINUOUS
Status: CANCELLED | OUTPATIENT
Start: 2019-04-04

## 2019-04-05 ENCOUNTER — HOSPITAL ENCOUNTER (OUTPATIENT)
Dept: CT IMAGING | Age: 49
Discharge: HOME | End: 2019-04-05
Payer: COMMERCIAL

## 2019-04-05 ENCOUNTER — HOSPITAL ENCOUNTER (OUTPATIENT)
Dept: GENERAL RADIOLOGY | Age: 49
Discharge: HOME | End: 2019-04-05
Payer: COMMERCIAL

## 2019-04-05 VITALS
DIASTOLIC BLOOD PRESSURE: 61 MMHG | HEART RATE: 77 BPM | TEMPERATURE: 98.2 F | SYSTOLIC BLOOD PRESSURE: 141 MMHG | RESPIRATION RATE: 16 BRPM | OXYGEN SATURATION: 100 % | WEIGHT: 245 LBS | BODY MASS INDEX: 31.46 KG/M2

## 2019-04-05 PROCEDURE — 72132 CT LUMBAR SPINE W/DYE: CPT

## 2019-04-05 PROCEDURE — 2709999900 HC NON-CHARGEABLE SUPPLY

## 2019-04-05 PROCEDURE — 2580000003 HC RX 258: Performed by: RADIOLOGY

## 2019-04-05 PROCEDURE — 6360000004 HC RX CONTRAST MEDICATION: Performed by: ORTHOPAEDIC SURGERY

## 2019-04-05 PROCEDURE — 72265 MYELOGRAPHY L-S SPINE: CPT

## 2019-04-05 RX ORDER — SODIUM CHLORIDE 450 MG/100ML
INJECTION, SOLUTION INTRAVENOUS CONTINUOUS
Status: DISCONTINUED | OUTPATIENT
Start: 2019-04-05 | End: 2019-04-06 | Stop reason: HOSPADM

## 2019-04-05 RX ADMIN — IOHEXOL 20 ML: 180 INJECTION INTRAVENOUS at 10:30

## 2019-04-05 RX ADMIN — SODIUM CHLORIDE: 4.5 INJECTION, SOLUTION INTRAVENOUS at 09:49

## 2019-04-05 ASSESSMENT — PAIN SCALES - GENERAL
PAINLEVEL_OUTOF10: 2
PAINLEVEL_OUTOF10: 3

## 2019-04-05 ASSESSMENT — PAIN - FUNCTIONAL ASSESSMENT: PAIN_FUNCTIONAL_ASSESSMENT: 0-10

## 2019-04-05 NOTE — PROGRESS NOTES
0930 pt arrives to [facility] for Myelogram. All medications thouroughly reviewed. All medications held appropriate time. All questions and concerns addressed with procedure. RN to call father when procedure complete. 0935 PATIENT RIGHTS AND RESPONSIBILITIES SHEET OFFERED TO PT TO READ.  0940 up to restroom, gait steady  0955 Fluro notified of pt availability  1000 to fluro via cart  1100 pt returns to OPN post procedure. Hand grasp, pedal push and pull equal and strong. Pain 4/10. Lunch given, side railup x1. Mid lower back bandaid clean and dry  1115 __m__ Safety:       (Environmental)   [place] to environment   Ensure ID band is correct and in place/ allergy band as needed   Assess for fall risk   Initiate fall precautions as applicable (fall band, side rails, etc.)   Call light within reach   Bed in low position/ wheels locked    __m__ Pain:        Assess pain level and characteristics   Administer analgesics as ordered   Assess effectiveness of pain management and report to MD as needed    __m_ Knowledge Deficit:   Assess baseline knowledge   Provide teaching at level of understanding   Provide teaching via preferred learning method   Evaluate teaching effectiveness    __m Hemodynamic/Respiratory Status:       (Pre and Post Procedure Monitoring)   Assess/Monitor vital signs and LOC   Assess Baseline SpO2 prior to any sedation   Obtain weight/height   Assess vital signs/ LOC until patient meets discharge criteria   Monitor procedure site and notify MD of any issues       1130 resting, denies needs. Call light in reach  1200 denies needs.  Call light in reach, assessment unchanged  1230 bedside report to [place] [address]

## 2019-04-05 NOTE — PROCEDURES
Lumbar Myelogram (with Radiologist)     Technique:     The prodecure and risks were explained to the patient. Informed consent was obtained. The patient was placed in a prone position on the fluoroscopy table. A lumbar intervertebral disc space was localized under fluoroscopy. Under sterile conditions, a lumbar puncture was performed using a 20-gauge spinal needle after the administration of local anesthetic with 2% lidocaine. The subarachnoid space was accessed without difficulty and showed immediate clear cerebrospinal fluid. Approximately 20 mL of omnipaque-180 was then injected under fluoroscopy. The thecal sac was well opacified. The needle was removed and a bandage was placed over the puncture site. There were no immediate complications and blood loss was minimal. The patient was transferred to CT in satisfactory condition. Impression: Unremarkable contrast injection with CT scan to follow.

## 2019-04-22 ENCOUNTER — TELEPHONE (OUTPATIENT)
Dept: UROLOGY | Age: 49
End: 2019-04-22

## 2019-04-22 NOTE — TELEPHONE ENCOUNTER
Patient called and cancelled his appointment for today as his blood sugar's were low and he was not feeling well. He rescheduled for 05- at 11:00am.  The next time Foster Edmond will be here.

## 2019-05-06 ENCOUNTER — TELEPHONE (OUTPATIENT)
Dept: UROLOGY | Age: 49
End: 2019-05-06

## 2019-05-06 ENCOUNTER — OFFICE VISIT (OUTPATIENT)
Dept: UROLOGY | Age: 49
End: 2019-05-06
Payer: COMMERCIAL

## 2019-05-06 ENCOUNTER — HOSPITAL ENCOUNTER (OUTPATIENT)
Age: 49
Discharge: HOME | End: 2019-05-06
Payer: COMMERCIAL

## 2019-05-06 VITALS
SYSTOLIC BLOOD PRESSURE: 128 MMHG | BODY MASS INDEX: 30.29 KG/M2 | DIASTOLIC BLOOD PRESSURE: 80 MMHG | HEIGHT: 74 IN | WEIGHT: 236 LBS

## 2019-05-06 DIAGNOSIS — Z01.818 PREOPERATIVE TESTING: ICD-10-CM

## 2019-05-06 DIAGNOSIS — N32.81 OVERACTIVE BLADDER: Primary | ICD-10-CM

## 2019-05-06 DIAGNOSIS — N32.81 OVERACTIVE BLADDER: ICD-10-CM

## 2019-05-06 LAB
ANION GAP SERPL CALCULATED.3IONS-SCNC: 15 MEQ/L (ref 8–16)
BASOPHILS # BLD: 1.1 %
BASOPHILS ABSOLUTE: 0.1 THOU/MM3 (ref 0–0.1)
BILIRUBIN URINE: NEGATIVE
BLOOD URINE, POC: NEGATIVE
BUN BLDV-MCNC: 9 MG/DL (ref 7–22)
CALCIUM SERPL-MCNC: 9.3 MG/DL (ref 8.5–10.5)
CHARACTER, URINE: CLEAR
CHLORIDE BLD-SCNC: 103 MEQ/L (ref 98–111)
CO2: 23 MEQ/L (ref 23–33)
COLOR, URINE: YELLOW
CREAT SERPL-MCNC: 0.9 MG/DL (ref 0.4–1.2)
EOSINOPHIL # BLD: 1.5 %
EOSINOPHILS ABSOLUTE: 0.1 THOU/MM3 (ref 0–0.4)
ERYTHROCYTE [DISTWIDTH] IN BLOOD BY AUTOMATED COUNT: 12.9 % (ref 11.5–14.5)
ERYTHROCYTE [DISTWIDTH] IN BLOOD BY AUTOMATED COUNT: 39.7 FL (ref 35–45)
GFR SERPL CREATININE-BSD FRML MDRD: > 90 ML/MIN/1.73M2
GLUCOSE BLD-MCNC: 125 MG/DL (ref 70–108)
GLUCOSE URINE: NEGATIVE MG/DL
HCT VFR BLD CALC: 42.1 % (ref 42–52)
HEMOGLOBIN: 14.3 GM/DL (ref 14–18)
IMMATURE GRANS (ABS): 0.02 THOU/MM3 (ref 0–0.07)
IMMATURE GRANULOCYTES: 0.4 %
KETONES, URINE: NEGATIVE
LEUKOCYTE CLUMPS, URINE: NEGATIVE
LYMPHOCYTES # BLD: 38 %
LYMPHOCYTES ABSOLUTE: 1.8 THOU/MM3 (ref 1–4.8)
MCH RBC QN AUTO: 28.9 PG (ref 26–33)
MCHC RBC AUTO-ENTMCNC: 34 GM/DL (ref 32.2–35.5)
MCV RBC AUTO: 85.2 FL (ref 80–94)
MONOCYTES # BLD: 8.4 %
MONOCYTES ABSOLUTE: 0.4 THOU/MM3 (ref 0.4–1.3)
NITRITE, URINE: NEGATIVE
NUCLEATED RED BLOOD CELLS: 0 /100 WBC
PH, URINE: 6 (ref 5–9)
PLATELET # BLD: 225 THOU/MM3 (ref 130–400)
PMV BLD AUTO: 9.5 FL (ref 9.4–12.4)
POTASSIUM SERPL-SCNC: 4.4 MEQ/L (ref 3.5–5.2)
PROTEIN, URINE: NEGATIVE MG/DL
RBC # BLD: 4.94 MILL/MM3 (ref 4.7–6.1)
SEG NEUTROPHILS: 50.6 %
SEGMENTED NEUTROPHILS ABSOLUTE COUNT: 2.4 THOU/MM3 (ref 1.8–7.7)
SODIUM BLD-SCNC: 141 MEQ/L (ref 135–145)
SPECIFIC GRAVITY, URINE: 1.01 (ref 1–1.03)
UROBILINOGEN, URINE: 0.2 EU/DL (ref 0–1)
WBC # BLD: 4.7 THOU/MM3 (ref 4.8–10.8)

## 2019-05-06 PROCEDURE — 81003 URINALYSIS AUTO W/O SCOPE: CPT | Performed by: UROLOGY

## 2019-05-06 PROCEDURE — 85025 COMPLETE CBC W/AUTO DIFF WBC: CPT

## 2019-05-06 PROCEDURE — 36415 COLL VENOUS BLD VENIPUNCTURE: CPT

## 2019-05-06 PROCEDURE — G8427 DOCREV CUR MEDS BY ELIG CLIN: HCPCS | Performed by: UROLOGY

## 2019-05-06 PROCEDURE — 80048 BASIC METABOLIC PNL TOTAL CA: CPT

## 2019-05-06 PROCEDURE — 1036F TOBACCO NON-USER: CPT | Performed by: UROLOGY

## 2019-05-06 PROCEDURE — G8417 CALC BMI ABV UP PARAM F/U: HCPCS | Performed by: UROLOGY

## 2019-05-06 PROCEDURE — 99214 OFFICE O/P EST MOD 30 MIN: CPT | Performed by: UROLOGY

## 2019-05-06 RX ORDER — RISPERIDONE 0.5 MG/1
0.5 TABLET, FILM COATED ORAL 2 TIMES DAILY
Status: ON HOLD | COMMUNITY
End: 2019-12-30 | Stop reason: HOSPADM

## 2019-05-06 ASSESSMENT — ENCOUNTER SYMPTOMS
NAUSEA: 0
CHEST TIGHTNESS: 0
COLOR CHANGE: 0
EYE PAIN: 0
BACK PAIN: 1
SHORTNESS OF BREATH: 0
DIARRHEA: 0
EYE REDNESS: 0

## 2019-05-06 NOTE — TELEPHONE ENCOUNTER
Patient is scheduled for surgery on 05-. Preop orders and instructions given. Surgery consent signed.

## 2019-05-06 NOTE — TELEPHONE ENCOUNTER
DO NOT TAKE ASPIRIN, MULTIVITAMINS, VITAMIN A, VITAMIN E, VITAMIN B, OR MOTRIN-LIKE DRUGS 5 DAYS PRIOR TO SURGERY AND 3 DAYS FOLLOWING  [facility]Y ACUTE PSYCH UNIT [facility] SURGERY     Susan Rogel 1970 Diagnosis: Overactive bladder    Surgical Physician: Dr. Alden Hurtado have been scheduled for the procedure marked below:      Surgery: Removal of InterStim device           Date: 05-     Anesthesia: Anesthesiologist (General/Spinal)     Place of Service: [address] [place] [address]           SURGERY ARRIVAL TIME WILL BE DETERMINED BY DR. Arnulfo Warren AT [address] [facility]. YOU WILL BE CONTACTED WITH THIS INFORMATION. INSTRUCTIONS AS MARKED BELOW:    1. DO NOT eat or drink anything after midnight before surgery. 2. We prefer you shower or bathe with an antibacterial soap (Dial) the morning of surgery. 3.  Please ensure to have a  with you to transport you home. 4.  Please bring a current medication list, photo ID and insurance card(s) with you  5. Okay to take Tylenol  6. If you take Glucophage or Metformin, hold 48-hours prior to surgery  7. Take blood pressure medication as directed, if taken in the morning take with a small sip of water  8. PLEASE BRING THIS LETTER WITH YOU AND SHOW IT TO THE  AT [facility] 34. 9.  Please send a copy to the Family Dr: Dat Buitrago MD  10.   Your follow up appointment is scheduled for   05-   At   12:30pm   With  [facility]    Date: 5/6/2019

## 2019-05-06 NOTE — TELEPHONE ENCOUNTER
SURGERY 826  [address] [place] [address] [address] [place] [address] [address] [facility], [address]      Phone *[phone] *[phone]   Surgical Scheduling Direct Line Phone *[phone] Fax *[phone]      Salo Timoteo 1970 male    [address]  Marital Status: Single         Home Phone: [phone]      Cell Phone:    Telephone Information:   Mobile [phone]          Surgeon: Dr. Tory Montaño  Surgery Date: 05-   Time:     Procedure: Removal InterStim Device    Diagnosis: Overactive bladder     Important Medical History: In [facility]    Special Inst/Equip: Regular Room    CPT Codes:    15649  Latex Allergy:  No     Cardiac Device:  No     Anesthesia:  Anesthesiologist (General/Spinal)          Admission Type:  Same Day                             Admit Prior to Day of Surgery: No    Case Location:  Main OR           Preadmission Testing:Phone Call              PAT Date and Time:______________________________________________________    PAT Confirmation #: ______________________________________________________    Post Op Visit: ___________________________________________________________    Need Preop Cardiac Clearance: No    Does Patient have Cardiologist/physician?      None    Surgery Confirmation #: __________________________________________________    : ________________________   Date: __________________________     [employer] Name: Brock Cummins

## 2019-05-06 NOTE — PROGRESS NOTES
Subjective:      Patient ID: Isabelle Dueñas 51 y.o. male 1970    Chief Complaint   Patient presents with    Follow-up     Discuss removal of Interstim. Other   This is a new (Uncomfortable InterStim device, not effective) problem. The current episode started more than 1 month ago. The problem occurs constantly. The problem has been unchanged. Pertinent negatives include no chest pain, chills, fever, headaches, joint swelling, nausea or rash. Nothing aggravates the symptoms. He has tried nothing for the symptoms. The treatment provided no relief. Past Medical History:   Diagnosis Date    ADHD (attention deficit hyperactivity disorder)     Anxiety     Arthritis     Asthma     Bladder dysfunction     Depression     Diabetes mellitus (HCC)     Fatigue     GERD (gastroesophageal reflux disease)     Headache     migraines    Hyperlipidemia     Hypertension     MDRO (multiple drug resistant organisms) resistance     yrs ago    OAB (overactive bladder) 10/27/2015    Seizures ([facility] [address]     Sleep apnea     wears cpap    Type II or unspecified type diabetes mellitus without mention of complication, not stated as uncontrolled        Social History     Socioeconomic History    Marital status: Single     Spouse name: Not on file    Number of children: Not on file    Years of education: Not on file    Highest education level: Not on file   Occupational History    Occupation: Unemployed   Social Needs    Financial resource strain: Not on file    Food insecurity:     Worry: Not on file     Inability: Not on file    Transportation needs:     Medical: Not on file     Non-medical: Not on file   Tobacco Use    Smoking status: Never Smoker    Smokeless tobacco: Never Used   Substance and Sexual Activity    Alcohol use:  Yes     Alcohol/week: 2.4 oz     Types: 4 Cans of beer per week     Comment: Occasionally--Budweiser    Drug use: Never    Sexual activity: Yes   Lifestyle    Physical activity:     Days per week: Not on file     Minutes per session: Not on file    Stress: Not on file   Relationships    Social connections:     Talks on phone: Not on file     Gets together: Not on file     Attends Anabaptism service: Not on file     Active member of club or organization: Not on file     Attends meetings of clubs or organizations: Not on file     Relationship status: Not on file    Intimate partner violence:     Fear of current or ex partner: Not on file     Emotionally abused: Not on file     Physically abused: Not on file     Forced sexual activity: Not on file   Other Topics Concern    Not on file   Social History Narrative    Not on file       Family History   Problem Relation Age of Onset    Hypertension Father     Other Father         COPD    High Blood Pressure Father     Dementia Mother     High Blood Pressure Sister     High Blood Pressure Brother     Diabetes Paternal Aunt     Diabetes Paternal Grandmother     Cancer Neg Hx     High Cholesterol Neg Hx     Kidney Disease Neg Hx     Stroke Neg Hx     Colon Cancer Neg Hx     Breast Cancer Neg Hx     Colon Polyps Neg Hx        Past Surgical History:   Procedure Laterality Date    CATARACT REMOVAL  2010    bilateral    CERVICAL FUSION N/A 9/27/2017    ACDF C6-7 W/ATLANTIS CORNERSTONE performed by Mulu Hoffman MD at [facility] 2/26/2018    COLONOSCOPY POLYPECTOMY HOT BIOPSY performed by Elzbieta Bello MD at [address] Right    [address]    FOOT SURGERY Bilateral 2006    hammer toes   [address]    removal of piece of glass    HAND SURGERY Right 07/2017    OTHER SURGICAL HISTORY  11/4/15    Excision of 3 cm lipoma LUQ abdominal wall Wisser    OH OFFICE/OUTPT VISIT,PROCEDURE ONLY N/A 10/16/2018    PLACEMENT OF INTERSTIM DEVICE performed by Lupillo Baldwin MD at [address] N/A 5/16/2019 REMOVAL INTERSSTIM DEVICE performed by Kelly Ware MD at [address] [facility]  2017    UPPER GASTROINTESTINAL ENDOSCOPY N/A 8/9/2018    EGD BIOPSY performed by Magnolia Dance, MD at [facility]       Allergies   Allergen Reactions   [place] [Lurasidone Hcl] Other (See Comments)     Dizziness      Morphine Itching    Tetracyclines & Related Swelling     Lips and face    Vilazodone Hcl      Nausea and Vomiting    Fish-Derived Products Nausea And Vomiting     seafood    Flagyl [Metronidazole] Itching and Rash         Current Outpatient Medications:     risperiDONE (RISPERDAL) 0.5 MG tablet, Take 0.5 mg by mouth 2 times daily, Disp: , Rfl:     MAGNESIUM OXIDE PO, Take by mouth daily, Disp: , Rfl:     insulin aspart (NOVOLOG FLEXPEN) 100 UNIT/ML injection pen, Inject into the skin 3 times daily (before meals) Up to 50 units daily, Disp: , Rfl:     metoprolol succinate (TOPROL XL) 50 MG extended release tablet, Take 50 mg by mouth daily, Disp: , Rfl:     FLUoxetine (PROZAC) 40 MG capsule, Take 40 mg by mouth daily, Disp: , Rfl:     traZODone (DESYREL) 150 MG tablet, Take 300 mg by mouth nightly, Disp: , Rfl:     pantoprazole (PROTONIX) 40 MG tablet, Take 1 tablet by mouth every morning (before breakfast), Disp: 30 tablet, Rfl: 6    losartan (COZAAR) 100 MG tablet, take 1 tablet by mouth once daily, Disp: 30 tablet, Rfl: 5    aspirin EC 81 MG EC tablet, Take 1 tablet by mouth daily, Disp: 30 tablet, Rfl: 11    tiotropium (SPIRIVA RESPIMAT) 2.5 MCG/ACT AERS inhaler, Inhale 2 puffs into the lungs every morning (before breakfast), Disp: 1 Inhaler, Rfl: 5    budesonide-formoterol (SYMBICORT) 160-4.5 MCG/ACT AERO, Inhale 2 puffs into the lungs 2 times daily, Disp: 1 Inhaler, Rfl: 5    amLODIPine (NORVASC) 5 MG tablet, take 1 tablet by mouth once daily, Disp: 30 tablet, Rfl: 5    Multiple Vitamin (MULTIVITAMIN) tablet, take 1 tablet by mouth once daily, Disp: 30 tablet, Rfl: 11    zonisamide (ZONEGRAN) 100 MG capsule, Take 1 capsule by mouth daily, Disp: 30 capsule, Rfl: 5    loratadine (CLARITIN) 10 MG tablet, Take 1 tablet by mouth daily, Disp: 30 tablet, Rfl: 11    OXcarbazepine (TRILEPTAL) 300 MG tablet, Take 600 mg by mouth daily , Disp: , Rfl:     busPIRone (BUSPAR) 30 MG tablet, , Disp: , Rfl: 0    REXULTI 1 MG TABS tablet, , Disp: , Rfl:     Erenumab-aooe 70 MG/ML SOAJ, Inject 70 mg into the skin, Disp: , Rfl:     FREESTYLE LITE strip, , Disp: , Rfl: 1    ondansetron (ZOFRAN-ODT) 4 MG disintegrating tablet, ondansetron 4 mg disintegrating tablet  as needed, Disp: , Rfl:     SUMAtriptan (IMITREX) 50 MG tablet, , Disp: , Rfl: 0    albuterol sulfate HFA (PROVENTIL HFA) 108 (90 Base) MCG/ACT inhaler, Inhale 2 puffs into the lungs, Disp: , Rfl:     atorvastatin (LIPITOR) 10 MG tablet, , Disp: , Rfl: 0    guanFACINE HCl ER 4 MG TB24, , Disp: , Rfl:     oxybutynin (DITROPAN-XL) 10 MG extended release tablet, Take 1 tablet by mouth daily, Disp: 30 tablet, Rfl: 3    NAPROXEN PO, Take by mouth, Disp: , Rfl:     nitroGLYCERIN (NITROSTAT) 0.4 MG SL tablet, Place 1 tablet under the tongue every 5 minutes as needed for Chest pain, Disp: 25 tablet, Rfl: 3    albuterol sulfate HFA (PROAIR HFA) 108 (90 BASE) MCG/ACT inhaler, Inhale 2 puffs into the lungs every 4 hours as needed for Wheezing Dx: 493.90, Disp: 1 Inhaler, Rfl: 5    Review of Systems   Constitutional: Negative for chills and fever. Eyes: Negative for pain and redness. Respiratory: Negative for chest tightness and shortness of breath. Cardiovascular: Negative for chest pain and leg swelling. Gastrointestinal: Negative for diarrhea and nausea. Endocrine: Negative for cold intolerance and heat intolerance. Genitourinary: Positive for frequency. Negative for difficulty urinating and hematuria. Musculoskeletal: Positive for back pain (mid to lower back). Negative for joint swelling.    Skin: Negative for color change and rash. Allergic/Immunologic: Positive for environmental allergies. Immunocompromised state: seafood. Neurological: Negative for dizziness and headaches. Hematological: Bruises/bleeds easily. /80   Ht 6' 2\" (1.88 m)   Wt 236 lb (107 kg)   BMI 30.30 kg/m²     Objective:   Physical Exam   Constitutional: He is oriented to person, place, and time. Vital signs are normal. He appears well-developed and well-nourished. He is cooperative. No distress. HENT:   Head: Normocephalic and atraumatic. Mouth/Throat: Oropharynx is clear and moist and mucous membranes are normal. No oropharyngeal exudate. Eyes: Pupils are equal, round, and reactive to light. EOM are normal. Right eye exhibits no discharge. Left eye exhibits no discharge. No scleral icterus. Neck: Trachea normal. No JVD present. No tracheal deviation present. Pulmonary/Chest: Effort normal. No respiratory distress. He has no wheezes. Abdominal: Soft. He exhibits no distension. There is no tenderness. There is no rebound and no CVA tenderness. Musculoskeletal: He exhibits no edema or tenderness. Lymphadenopathy:        Right: No supraclavicular adenopathy present. Left: No supraclavicular adenopathy present. Neurological: He is alert and oriented to person, place, and time. No cranial nerve deficit. Skin: Skin is warm and dry. He is not diaphoretic. Psychiatric: He has a normal mood and affect. His behavior is normal.   Nursing note and vitals reviewed.     Labs    Results for POC orders placed in visit on 05/06/19   POCT Urinalysis No Micro (Auto)   Result Value Ref Range    Glucose, Ur Negative NEGATIVE mg/dl    Bilirubin Urine Negative     Ketones, Urine Negative NEGATIVE    Specific Gravity, Urine 1.010 1.002 - 1.03    Blood, UA POC Negative NEGATIVE    pH, Urine 6.00 5.0 - 9.0    Protein, Urine Negative NEGATIVE mg/dl    Urobilinogen, Urine 0.20 0.0 - 1.0 eu/dl    Nitrite, Urine Negative NEGATIVE Leukocyte Clumps, Urine Negative NEGATIVE    Color, Urine Yellow YELLOW-STR    Character, Urine Clear CLR-SL.CHELA       Lab Results   Component Value Date    CREATININE 0.9 05/06/2019    BUN 9 05/06/2019     05/06/2019    K 4.4 05/06/2019     05/06/2019    CO2 23 05/06/2019       Lab Results   Component Value Date    PSA 0.48 08/03/2018    PSA 0.46 10/19/2015    PSA 0.52 12/19/2011       Assessment:       Diagnosis Orders   1. Overactive bladder  POCT Urinalysis No Micro (Auto)    Basic Metabolic Panel    CBC Auto Differential   2. Preoperative testing  Basic Metabolic Panel    CBC Auto Differential       Mr. Carlos Alberto Beard presents today in follow-up for Overactive bladder [N32.81]. He has an InterStim in place. He reports this is not helping him much. He is been turned off and is not making much of a difference. Although it did prior to placement he is not happy with it and he would like to have it removed. With is not helping and I do agree with removal.  We will schedule this sometime in the near future. I have reviewed all notes sent along with this referral including notes from a recent visit to his primary care provider. These records demonstrated the following past medical history:  Past Medical History:   Diagnosis Date    ADHD (attention deficit hyperactivity disorder)     Anxiety     Arthritis     Asthma     Bladder dysfunction     Depression     Diabetes mellitus ([address]     Fatigue     GERD (gastroesophageal reflux disease)     Headache     migraines    Hyperlipidemia     Hypertension     MDRO (multiple drug resistant organisms) resistance     yrs ago    OAB (overactive bladder) 10/27/2015    Seizures ([address]     Sleep apnea     wears cpap    Type II or unspecified type diabetes mellitus without mention of complication, not stated as uncontrolled           Plan: We will schedule removal of InterStim device.     I described the procedure in detail and also described the associated risks and benefits at length. We discussed possible alternative therapies. We discussed the risks and benefits of not undergoing therapy. Nicole Johnson understands these risks and benefits and desires to proceed. He will be scheduled for the procedure in the very near future.      He will follow-up in a week or 2 after the procedure for incision check with an NP.

## 2019-05-09 NOTE — PROGRESS NOTES
LEFT PHONE MSG WITH PREOP INSTRUCTIONS    NPO after midnight  [employer] and drivers license  Wear comfortable clean clothing  Do not bring jewelry  Shower night before and morning of surgery with a liquid antibacterial soap  Bring medications   Follow all instructions given by your physician   needed at discharge  Call [phone] for any questions

## 2019-05-10 NOTE — H&P
History and Physical performed by Jerrod Leon CNP    Farzana Licea is a 51 y.o. male was seen today to discuss interstim removal.      Pt has a hx of polyuria and OAB spanning back to 2011. He has tried and failed vesicare, ditropan, and myrbetriq. He is also on Flomax. Prior to interstim placement he was having nocturia of 17-20 x per night and frequency of every 30-60 minutes during the day.       He underwent office cystoscopy with Dr. Carla Junior 8/6/18 and was noted to have moderate BPH and a tight bladder neck. Dr. Carla Junior recommended interstim placement and pt underwent trial in the office on 8/27/18 and placement of interstim sacral nerve stimulator, stage 1 and 2 by Dr. Carla Junior on 10/16/18.       Following placement pt reports his symptoms are improved 70-75% however he would like to have the device removed now because he states he wants to be able to sleep on his right side. He reports when he sleeps on his right side he has aching in the location of the interstim device.   He reports to me he would rather leak and use medications than have the device remain in place.       Bindu Martin denies dysuria, hesitancy, weak stream, urgency, frequency, gross hematuria, flank pain, suprapubic pain, and feeling of incomplete emptying.         Current Facility-Administered Medications          Current Outpatient Medications   Medication Sig Dispense Refill    oxybutynin (DITROPAN-XL) 5 MG extended release tablet Take 1 tablet by mouth daily 30 tablet 3    MAGNESIUM OXIDE PO Take by mouth daily        promethazine (PHENERGAN) 12.5 MG tablet Take 12.5 mg by mouth every 6 hours as needed for Nausea        NAPROXEN PO Take by mouth        ketorolac (TORADOL) 10 MG tablet Take 1 tablet by mouth every 6 hours as needed for Pain 20 tablet 0    Mirabegron ER (MYRBETRIQ) 50 MG TB24 Take 50 mg by mouth daily 28 tablet 0    insulin aspart (NOVOLOG FLEXPEN) 100 UNIT/ML injection pen Inject into the skin 3 times daily (before meals) Up to 50 units daily        metoprolol succinate (TOPROL XL) 50 MG extended release tablet Take 50 mg by mouth daily        FLUoxetine (PROZAC) 40 MG capsule Take 40 mg by mouth daily        traZODone (DESYREL) 150 MG tablet Take 300 mg by mouth nightly        pantoprazole (PROTONIX) 40 MG tablet Take 1 tablet by mouth every morning (before breakfast) 30 tablet 6    ALPRAZolam (XANAX) 1 MG tablet take 1 tablet by mouth twice a day 60 tablet 2    losartan (COZAAR) 100 MG tablet take 1 tablet by mouth once daily 30 tablet 5    aspirin EC 81 MG EC tablet Take 1 tablet by mouth daily 30 tablet 11    tiotropium (SPIRIVA RESPIMAT) 2.5 MCG/ACT AERS inhaler Inhale 2 puffs into the lungs every morning (before breakfast) 1 Inhaler 5    budesonide-formoterol (SYMBICORT) 160-4.5 MCG/ACT AERO Inhale 2 puffs into the lungs 2 times daily 1 Inhaler 5    amLODIPine (NORVASC) 5 MG tablet take 1 tablet by mouth once daily 30 tablet 5    Insulin Degludec (TRESIBA FLEXTOUCH SC) Inject 32 Units into the skin every morning Took 16 units morning of surgery        nitroGLYCERIN (NITROSTAT) 0.4 MG SL tablet Place 1 tablet under the tongue every 5 minutes as needed for Chest pain 25 tablet 3    Multiple Vitamin (MULTIVITAMIN) tablet take 1 tablet by mouth once daily 30 tablet 11    albuterol sulfate HFA (PROAIR HFA) 108 (90 BASE) MCG/ACT inhaler Inhale 2 puffs into the lungs every 4 hours as needed for Wheezing Dx: 493.90 1 Inhaler 5    zonisamide (ZONEGRAN) 100 MG capsule Take 1 capsule by mouth daily 30 capsule 5    atorvastatin (LIPITOR) 20 MG tablet Take 1 tablet by mouth daily 30 tablet 5    GuanFACINE ER (INTUNIV) 1 MG TB24 tablet Take 4 mg by mouth nightly         loratadine (CLARITIN) 10 MG tablet Take 1 tablet by mouth daily 30 tablet 11    OXcarbazepine (TRILEPTAL) 300 MG tablet Take 600 mg by mouth daily           No current facility-administered medications for this visit. times 3, no acute distress and cooperative to examination with appropriate mood and affect. HENT:   Head:        Normocephalic and atraumatic. Mouth/Throat:         Mucous membranes are normal.   Eyes:         EOM are normal. No scleral icterus. PERRLA. Neck:        Supple, symmetrical, trachea midline, no adenopathy, thyroid symmetric, not enlarged and no tenderness. Cardiovascular:        Normal rate, regular rhythm, S1 S2 heart sounds. No murmurs, rub, or gallops. Pulses:       Radial pulses are 2+/4 bilateral and equal. Posterior tibialis 2+/4 bilateral and equal  Pulmonary/Chest:      Chest symmetric with normal A/P diameter,  CTA with no wheezes, rales, or rhonchi noted. Normal respiratory rate and rhthym. No use of accessory muscles. Abdominal:         Soft. No tenderness. No rebound, no guarding and no CVA tenderness. Bowel sounds present. Musculoskeletal:         Normal range of motion. No edema or tenderness of lower extremities. Lymphadenopathy:        No cervical adenopathy. Bilateral supraclavicular adenopathy absent. Extremities: No cyanosis, clubbing, or edema present. Neurological:        Alert and oriented. No cranial nerve deficit. There are no focalizing motor or sensory deficits. CN II-XII are grossly intact. Psychiatric:        Normal mood and affect.     Labs   Urine dip demonstrates   No results found for this visit on 03/27/19.     Patients recent PSA values are as follows        Lab Results   Component Value Date     PSA 0.48 08/03/2018     PSA 0.46 10/19/2015     PSA 0.52 12/19/2011         Recent BUN/Creatinine:        Lab Results   Component Value Date     BUN 14 12/07/2018     CREATININE 0.8 12/07/2018            Assessment & Plan  Severe urinary incontinence, frequency, urgency, nocturia  BPH     Pt is very adamant about having device removed. His device was turned off and he does not feel that the benefits of the Interstim exceed his discomfort sleeping. He is scheduled for Removal of InterStim device in the near future. He was informed of the technical aspects of the procedure and the possible surgical risks. Patient verbalizes an understanding and wishes to proceed.

## 2019-05-13 ENCOUNTER — TELEPHONE (OUTPATIENT)
Dept: UROLOGY | Age: 49
End: 2019-05-13

## 2019-05-13 NOTE — TELEPHONE ENCOUNTER
Per Jayla at [facility] no prior authorization is required for CPT code 73897.   Call Ref# [id number]

## 2019-05-14 ENCOUNTER — TELEPHONE (OUTPATIENT)
Dept: UROLOGY | Age: 49
End: 2019-05-14

## 2019-05-14 NOTE — TELEPHONE ENCOUNTER
Spoke to Reema Roy. Advised him of the arrival time for surgery on 5/16/19 is 1:30pm.  Second floor at [facility]. NPO midnight. Reema Roy voices understanding.

## 2019-05-16 ENCOUNTER — HOSPITAL ENCOUNTER (OUTPATIENT)
Age: 49
Setting detail: OUTPATIENT SURGERY
Discharge: HOME | End: 2019-05-16
Attending: UROLOGY | Admitting: UROLOGY
Payer: COMMERCIAL

## 2019-05-16 ENCOUNTER — ANESTHESIA EVENT (OUTPATIENT)
Dept: OPERATING ROOM | Age: 49
End: 2019-05-16
Payer: COMMERCIAL

## 2019-05-16 ENCOUNTER — ANESTHESIA (OUTPATIENT)
Dept: OPERATING ROOM | Age: 49
End: 2019-05-16
Payer: COMMERCIAL

## 2019-05-16 VITALS
TEMPERATURE: 97 F | OXYGEN SATURATION: 98 % | WEIGHT: 232.2 LBS | HEART RATE: 70 BPM | DIASTOLIC BLOOD PRESSURE: 82 MMHG | RESPIRATION RATE: 16 BRPM | HEIGHT: 74 IN | BODY MASS INDEX: 29.8 KG/M2 | SYSTOLIC BLOOD PRESSURE: 128 MMHG

## 2019-05-16 VITALS
DIASTOLIC BLOOD PRESSURE: 50 MMHG | RESPIRATION RATE: 10 BRPM | SYSTOLIC BLOOD PRESSURE: 77 MMHG | OXYGEN SATURATION: 99 %

## 2019-05-16 DIAGNOSIS — N32.81 OVERACTIVE BLADDER: Primary | ICD-10-CM

## 2019-05-16 LAB — GLUCOSE BLD-MCNC: 157 MG/DL (ref 70–108)

## 2019-05-16 PROCEDURE — 7100000001 HC PACU RECOVERY - ADDTL 15 MIN: Performed by: UROLOGY

## 2019-05-16 PROCEDURE — 82948 REAGENT STRIP/BLOOD GLUCOSE: CPT

## 2019-05-16 PROCEDURE — 7100000010 HC PHASE II RECOVERY - FIRST 15 MIN: Performed by: UROLOGY

## 2019-05-16 PROCEDURE — 3700000001 HC ADD 15 MINUTES (ANESTHESIA): Performed by: UROLOGY

## 2019-05-16 PROCEDURE — 7100000000 HC PACU RECOVERY - FIRST 15 MIN: Performed by: UROLOGY

## 2019-05-16 PROCEDURE — 3600000012 HC SURGERY LEVEL 2 ADDTL 15MIN: Performed by: UROLOGY

## 2019-05-16 PROCEDURE — 64585 REV/RMV PERPH NSTIM ELTRD RA: CPT | Performed by: UROLOGY

## 2019-05-16 PROCEDURE — 2500000003 HC RX 250 WO HCPCS: Performed by: NURSE ANESTHETIST, CERTIFIED REGISTERED

## 2019-05-16 PROCEDURE — 6360000002 HC RX W HCPCS: Performed by: NURSE ANESTHETIST, CERTIFIED REGISTERED

## 2019-05-16 PROCEDURE — 3600000002 HC SURGERY LEVEL 2 BASE: Performed by: UROLOGY

## 2019-05-16 PROCEDURE — 2709999900 HC NON-CHARGEABLE SUPPLY: Performed by: UROLOGY

## 2019-05-16 PROCEDURE — 7100000011 HC PHASE II RECOVERY - ADDTL 15 MIN: Performed by: UROLOGY

## 2019-05-16 PROCEDURE — 2580000003 HC RX 258

## 2019-05-16 PROCEDURE — 64595 REV/RMV PRPH SAC/GSTR NPG/R: CPT | Performed by: UROLOGY

## 2019-05-16 PROCEDURE — 3700000000 HC ANESTHESIA ATTENDED CARE: Performed by: UROLOGY

## 2019-05-16 PROCEDURE — 2500000003 HC RX 250 WO HCPCS: Performed by: UROLOGY

## 2019-05-16 RX ORDER — CEFAZOLIN SODIUM 1 G/50ML
1 INJECTION, SOLUTION INTRAVENOUS
Status: DISCONTINUED | OUTPATIENT
Start: 2019-05-16 | End: 2019-05-16 | Stop reason: HOSPADM

## 2019-05-16 RX ORDER — SUCCINYLCHOLINE/SOD CL,ISO/PF 200MG/10ML
SYRINGE (ML) INTRAVENOUS PRN
Status: DISCONTINUED | OUTPATIENT
Start: 2019-05-16 | End: 2019-05-16 | Stop reason: SDUPTHER

## 2019-05-16 RX ORDER — HYDROCODONE BITARTRATE AND ACETAMINOPHEN 5; 325 MG/1; MG/1
1 TABLET ORAL EVERY 4 HOURS PRN
Qty: 10 TABLET | Refills: 0 | Status: SHIPPED | OUTPATIENT
Start: 2019-05-16 | End: 2019-05-19

## 2019-05-16 RX ORDER — PROMETHAZINE HYDROCHLORIDE 25 MG/ML
6.25 INJECTION, SOLUTION INTRAMUSCULAR; INTRAVENOUS
Status: DISCONTINUED | OUTPATIENT
Start: 2019-05-16 | End: 2019-05-16 | Stop reason: HOSPADM

## 2019-05-16 RX ORDER — KETOROLAC TROMETHAMINE 30 MG/ML
30 INJECTION, SOLUTION INTRAMUSCULAR; INTRAVENOUS EVERY 6 HOURS PRN
Status: DISCONTINUED | OUTPATIENT
Start: 2019-05-16 | End: 2019-05-16 | Stop reason: HOSPADM

## 2019-05-16 RX ORDER — CEPHALEXIN 500 MG/1
500 CAPSULE ORAL 2 TIMES DAILY
Qty: 6 CAPSULE | Refills: 0 | Status: SHIPPED | OUTPATIENT
Start: 2019-05-16 | End: 2019-05-19

## 2019-05-16 RX ORDER — MEPERIDINE HYDROCHLORIDE 25 MG/ML
12.5 INJECTION INTRAMUSCULAR; INTRAVENOUS; SUBCUTANEOUS EVERY 5 MIN PRN
Status: DISCONTINUED | OUTPATIENT
Start: 2019-05-16 | End: 2019-05-16 | Stop reason: HOSPADM

## 2019-05-16 RX ORDER — FENTANYL CITRATE 50 UG/ML
25 INJECTION, SOLUTION INTRAMUSCULAR; INTRAVENOUS
Status: DISCONTINUED | OUTPATIENT
Start: 2019-05-16 | End: 2019-05-16 | Stop reason: HOSPADM

## 2019-05-16 RX ORDER — FENTANYL CITRATE 50 UG/ML
50 INJECTION, SOLUTION INTRAMUSCULAR; INTRAVENOUS EVERY 5 MIN PRN
Status: DISCONTINUED | OUTPATIENT
Start: 2019-05-16 | End: 2019-05-16 | Stop reason: HOSPADM

## 2019-05-16 RX ORDER — SODIUM CHLORIDE 9 MG/ML
INJECTION, SOLUTION INTRAVENOUS CONTINUOUS
Status: DISCONTINUED | OUTPATIENT
Start: 2019-05-16 | End: 2019-05-16 | Stop reason: HOSPADM

## 2019-05-16 RX ORDER — ONDANSETRON 2 MG/ML
4 INJECTION INTRAMUSCULAR; INTRAVENOUS
Status: DISCONTINUED | OUTPATIENT
Start: 2019-05-16 | End: 2019-05-16 | Stop reason: HOSPADM

## 2019-05-16 RX ORDER — FENTANYL CITRATE 50 UG/ML
INJECTION, SOLUTION INTRAMUSCULAR; INTRAVENOUS PRN
Status: DISCONTINUED | OUTPATIENT
Start: 2019-05-16 | End: 2019-05-16 | Stop reason: SDUPTHER

## 2019-05-16 RX ORDER — LABETALOL HYDROCHLORIDE 5 MG/ML
5 INJECTION, SOLUTION INTRAVENOUS EVERY 10 MIN PRN
Status: DISCONTINUED | OUTPATIENT
Start: 2019-05-16 | End: 2019-05-16 | Stop reason: HOSPADM

## 2019-05-16 RX ORDER — BUPIVACAINE HYDROCHLORIDE 5 MG/ML
INJECTION, SOLUTION EPIDURAL; INTRACAUDAL PRN
Status: DISCONTINUED | OUTPATIENT
Start: 2019-05-16 | End: 2019-05-16 | Stop reason: ALTCHOICE

## 2019-05-16 RX ORDER — PROPOFOL 10 MG/ML
INJECTION, EMULSION INTRAVENOUS PRN
Status: DISCONTINUED | OUTPATIENT
Start: 2019-05-16 | End: 2019-05-16 | Stop reason: SDUPTHER

## 2019-05-16 RX ORDER — ACETAMINOPHEN 325 MG/1
650 TABLET ORAL EVERY 4 HOURS PRN
Status: DISCONTINUED | OUTPATIENT
Start: 2019-05-16 | End: 2019-05-16 | Stop reason: HOSPADM

## 2019-05-16 RX ORDER — ROCURONIUM BROMIDE 10 MG/ML
INJECTION, SOLUTION INTRAVENOUS PRN
Status: DISCONTINUED | OUTPATIENT
Start: 2019-05-16 | End: 2019-05-16 | Stop reason: SDUPTHER

## 2019-05-16 RX ORDER — LIDOCAINE HYDROCHLORIDE 20 MG/ML
INJECTION, SOLUTION INTRAVENOUS PRN
Status: DISCONTINUED | OUTPATIENT
Start: 2019-05-16 | End: 2019-05-16 | Stop reason: SDUPTHER

## 2019-05-16 RX ORDER — ONDANSETRON 2 MG/ML
INJECTION INTRAMUSCULAR; INTRAVENOUS PRN
Status: DISCONTINUED | OUTPATIENT
Start: 2019-05-16 | End: 2019-05-16 | Stop reason: SDUPTHER

## 2019-05-16 RX ORDER — CEFAZOLIN SODIUM 1 G/3ML
INJECTION, POWDER, FOR SOLUTION INTRAMUSCULAR; INTRAVENOUS PRN
Status: DISCONTINUED | OUTPATIENT
Start: 2019-05-16 | End: 2019-05-16 | Stop reason: SDUPTHER

## 2019-05-16 RX ORDER — DEXAMETHASONE SODIUM PHOSPHATE 4 MG/ML
INJECTION, SOLUTION INTRA-ARTICULAR; INTRALESIONAL; INTRAMUSCULAR; INTRAVENOUS; SOFT TISSUE PRN
Status: DISCONTINUED | OUTPATIENT
Start: 2019-05-16 | End: 2019-05-16 | Stop reason: SDUPTHER

## 2019-05-16 RX ORDER — FENTANYL CITRATE 50 UG/ML
25 INJECTION, SOLUTION INTRAMUSCULAR; INTRAVENOUS EVERY 5 MIN PRN
Status: DISCONTINUED | OUTPATIENT
Start: 2019-05-16 | End: 2019-05-16 | Stop reason: HOSPADM

## 2019-05-16 RX ADMIN — PROPOFOL 200 MG: 10 INJECTION, EMULSION INTRAVENOUS at 14:43

## 2019-05-16 RX ADMIN — FENTANYL CITRATE 50 MCG: 50 INJECTION INTRAMUSCULAR; INTRAVENOUS at 15:10

## 2019-05-16 RX ADMIN — ROCURONIUM BROMIDE 5 MG: 10 INJECTION INTRAVENOUS at 14:43

## 2019-05-16 RX ADMIN — LIDOCAINE HYDROCHLORIDE 100 MG: 20 INJECTION, SOLUTION INTRAVENOUS at 14:43

## 2019-05-16 RX ADMIN — CEFAZOLIN 2000 MG: 1 INJECTION, POWDER, FOR SOLUTION INTRAMUSCULAR; INTRAVENOUS; PARENTERAL at 14:58

## 2019-05-16 RX ADMIN — ONDANSETRON HYDROCHLORIDE 4 MG: 4 INJECTION, SOLUTION INTRAMUSCULAR; INTRAVENOUS at 14:48

## 2019-05-16 RX ADMIN — FENTANYL CITRATE 50 MCG: 50 INJECTION INTRAMUSCULAR; INTRAVENOUS at 15:00

## 2019-05-16 RX ADMIN — SODIUM CHLORIDE: 9 INJECTION, SOLUTION INTRAVENOUS at 14:22

## 2019-05-16 RX ADMIN — Medication 180 MG: at 14:43

## 2019-05-16 RX ADMIN — DEXAMETHASONE SODIUM PHOSPHATE 8 MG: 4 INJECTION, SOLUTION INTRAMUSCULAR; INTRAVENOUS at 14:46

## 2019-05-16 RX ADMIN — FENTANYL CITRATE 150 MCG: 50 INJECTION INTRAMUSCULAR; INTRAVENOUS at 14:43

## 2019-05-16 ASSESSMENT — PAIN DESCRIPTION - DESCRIPTORS: DESCRIPTORS: DISCOMFORT

## 2019-05-16 ASSESSMENT — PULMONARY FUNCTION TESTS
PIF_VALUE: 25
PIF_VALUE: 10
PIF_VALUE: 29
PIF_VALUE: 27
PIF_VALUE: 2
PIF_VALUE: 25
PIF_VALUE: 26
PIF_VALUE: 27
PIF_VALUE: 20
PIF_VALUE: 23
PIF_VALUE: 25
PIF_VALUE: 25
PIF_VALUE: 27
PIF_VALUE: 16
PIF_VALUE: 27
PIF_VALUE: 25
PIF_VALUE: 2
PIF_VALUE: 19
PIF_VALUE: 25
PIF_VALUE: 0
PIF_VALUE: 19
PIF_VALUE: 2
PIF_VALUE: 25
PIF_VALUE: 25
PIF_VALUE: 16
PIF_VALUE: 25
PIF_VALUE: 26
PIF_VALUE: 8
PIF_VALUE: 25
PIF_VALUE: 26
PIF_VALUE: 18
PIF_VALUE: 26
PIF_VALUE: 25
PIF_VALUE: 30
PIF_VALUE: 25
PIF_VALUE: 17
PIF_VALUE: 28
PIF_VALUE: 27
PIF_VALUE: 32
PIF_VALUE: 2
PIF_VALUE: 0
PIF_VALUE: 27
PIF_VALUE: 8
PIF_VALUE: 26

## 2019-05-16 ASSESSMENT — PAIN SCALES - GENERAL
PAINLEVEL_OUTOF10: 1
PAINLEVEL_OUTOF10: 0
PAINLEVEL_OUTOF10: 0

## 2019-05-16 ASSESSMENT — PAIN - FUNCTIONAL ASSESSMENT: PAIN_FUNCTIONAL_ASSESSMENT: 0-10

## 2019-05-16 ASSESSMENT — PAIN DESCRIPTION - ORIENTATION: ORIENTATION: RIGHT;LOWER

## 2019-05-16 ASSESSMENT — PAIN DESCRIPTION - LOCATION: LOCATION: BACK

## 2019-05-16 ASSESSMENT — PAIN DESCRIPTION - PAIN TYPE: TYPE: SURGICAL PAIN

## 2019-05-16 NOTE — ANESTHESIA PRE PROCEDURE
Department of Anesthesiology  Preprocedure Note       Name:  Smitah Cast   Age:  51 y.o.  :  1970                                          MRN:  [id number]         Date:  2019      Surgeon: Elise Frank):  Bri Stern MD    Medications prior to admission:   Prior to Admission medications    Medication Sig Start Date End Date Taking?  Authorizing Provider   risperiDONE (RISPERDAL) 0.5 MG tablet Take 0.5 mg by mouth 2 times daily    Historical Provider, MD   oxybutynin (DITROPAN-XL) 5 MG extended release tablet Take 1 tablet by mouth daily 19   YEHUDA Hay CNP   MAGNESIUM OXIDE PO Take by mouth daily    Historical Provider, MD   NAPROXEN PO Take by mouth    Historical Provider, MD   insulin aspart (NOVOLOG FLEXPEN) 100 UNIT/ML injection pen Inject into the skin 3 times daily (before meals) Up to 50 units daily    Historical Provider, MD   metoprolol succinate (TOPROL XL) 50 MG extended release tablet Take 50 mg by mouth daily    Historical Provider, MD   FLUoxetine (PROZAC) 40 MG capsule Take 40 mg by mouth daily    Historical Provider, MD   traZODone (DESYREL) 150 MG tablet Take 300 mg by mouth nightly    Historical Provider, MD   pantoprazole (PROTONIX) 40 MG tablet Take 1 tablet by mouth every morning (before breakfast) 17  YEHUDA Saenz CNP   losartan (COZAAR) 100 MG tablet take 1 tablet by mouth once daily 16   Ton Munoz MD   aspirin EC 81 MG EC tablet Take 1 tablet by mouth daily 16   Ton Munoz MD   tiotropium (SPIRIVA RESPIMAT) 2.5 MCG/ACT AERS inhaler Inhale 2 puffs into the lungs every morning (before breakfast) 16   Ton Munoz MD   budesonide-formoterol [facility]) 160-4.5 MCG/ACT AERO Inhale 2 puffs into the lungs 2 times daily 16   Ton Munoz MD   amLODIPine (NORVASC) 5 MG tablet take 1 tablet by mouth once daily 10/31/16   Ton Munoz MD   Insulin Degludec (TRESIBA FLEXTOUCH SC) Inject 32 Units into the skin every morning     Historical Provider, MD   nitroGLYCERIN (NITROSTAT) 0.4 MG SL tablet Place 1 tablet under the tongue every 5 minutes as needed for Chest pain 9/21/16   Yoel Lowry MD   Multiple Vitamin (MULTIVITAMIN) tablet take 1 tablet by mouth once daily 9/1/16   Necia Bamberger, APRN - CNP   albuterol sulfate HFA (PROAIR HFA) 108 (90 BASE) MCG/ACT inhaler Inhale 2 puffs into the lungs every 4 hours as needed for Wheezing Dx: 493.90 9/1/16   Necia Bamberger, APRN - CNP   zonisamide (ZONEGRAN) 100 MG capsule Take 1 capsule by mouth daily 5/31/16   Yoel Lowry MD   atorvastatin (LIPITOR) 20 MG tablet Take 1 tablet by mouth daily 5/31/16   Yoel Lowry MD   GuanFACINE ER (INTUNIV) 1 MG TB24 tablet Take 4 mg by mouth nightly     Historical Provider, MD   loratadine (CLARITIN) 10 MG tablet Take 1 tablet by mouth daily 8/10/15   Yoel Lowry MD   OXcarbazepine (TRILEPTAL) 300 MG tablet Take 600 mg by mouth daily     Historical Provider, MD       Current medications:    No current facility-administered medications for this visit. No current outpatient medications on file. Facility-Administered Medications Ordered in Other Visits   Medication Dose Route Frequency Provider Last Rate Last Dose    0.9 % sodium chloride infusion   Intravenous Continuous Ana Wakefield 100 mL/hr at 05/16/19 1422      ceFAZolin (ANCEF) 1 g in dextrose 5 % 50 mL IVPB (premix)  1 g Intravenous 30 Min Pre-Op Ana Wakefield           Allergies:     Allergies   Allergen Reactions    Latuda [Lurasidone Hcl] Other (See Comments)     Dizziness      Morphine Itching    Tetracyclines & Related Swelling     Lips and face    Vilazodone Hcl      Nausea and Vomiting    Fish-Derived Products Nausea And Vomiting     seafood    Flagyl [Metronidazole] Itching and Rash       Problem List: COLONOSCOPY POLYPECTOMY HOT BIOPSY performed by Anni Mclean MD at [address] Right    [address] Right 92,96    FOOT SURGERY Bilateral 2006    hammer toes    FOOT SURGERY  1996    removal of piece of glass    HAND SURGERY Right 07/2017    OTHER SURGICAL HISTORY  11/4/15    Excision of 3 cm lipoma LUQ abdominal wall Wisser    [place] OFFICE/OUTPT VISIT,PROCEDURE ONLY N/A 10/16/2018    PLACEMENT OF INTERSTIM DEVICE performed by Martin Wang MD at [address]  2017    UPPER GASTROINTESTINAL ENDOSCOPY N/A 8/9/2018    EGD BIOPSY performed by Anni Mclean MD at [facility]       Social History:    Social History     Tobacco Use    Smoking status: Never Smoker    Smokeless tobacco: Never Used   Substance Use Topics    Alcohol use: Yes     Alcohol/week: 2.4 oz     Types: 4 Cans of beer per week     Comment: Occasionally--Budwe[employer]                                Counseling given: Not Answered      Vital Signs (Current): There were no vitals filed for this visit.                                            BP Readings from Last 3 Encounters:   05/16/19 118/81   05/06/19 128/80   04/05/19 (!) 141/61       NPO Status:                                                                                 BMI:   Wt Readings from Last 3 Encounters:   05/16/19 232 lb 3.2 oz (105.3 kg)   05/06/19 236 lb (107 kg)   04/05/19 245 lb (111.1 kg)     There is no height or weight on file to calculate BMI.    CBC:   Lab Results   Component Value Date    WBC 4.7 05/06/2019    RBC 4.94 05/06/2019    RBC 5.35 06/23/2017    HGB 14.3 05/06/2019    HCT 42.1 05/06/2019    MCV 85.2 05/06/2019    RDW 14.0 03/19/2018     05/06/2019       CMP:   Lab Results   Component Value Date     05/06/2019    K 4.4 05/06/2019    K 3.8 09/25/2018     05/06/2019    CO2 23 05/06/2019    BUN 9 05/06/2019    CREATININE 0.9 05/06/2019    AGRATIO 1.9 03/05/2016    LABGLOM >90 05/06/2019    GLUCOSE 125 05/06/2019    GLUCOSE 87 06/23/2017    PROT 6.2 12/07/2018    PROT 7.9 06/23/2017    CALCIUM 9.3 05/06/2019    BILITOT 0.3 12/07/2018    ALKPHOS 122 12/07/2018    AST 29 12/07/2018    ALT 30 12/07/2018       POC Tests:   Recent Labs     05/16/19  1420   POCGLU 157*       Coags:   Lab Results   Component Value Date    INR 0.95 11/11/2015    APTT 29.6 09/25/2018       HCG (If Applicable): No results found for: PREGTESTUR, PREGSERUM, HCG, HCGQUANT     ABGs: No results found for: PHART, PO2ART, KKR1GND, KYU0VON, BEART, R2NGQKNI     Type & Screen (If Applicable):  No results found for: LABABO, LABRH    Anesthesia Evaluation    Airway: Mallampati: II  TM distance: >3 FB   Neck ROM: full  Mouth opening: > = 3 FB Dental:          Pulmonary:   (+) sleep apnea:            Patient did not smoke on day of surgery. Cardiovascular:    (+) hypertension:,         Rhythm: regular                      Neuro/Psych:   (+) psychiatric history:            GI/Hepatic/Renal:   (+) GERD:,           Endo/Other:    (+) Diabetes, . Pt had no PAT visit       Abdominal:   (+) obese,         Vascular:                                          Anesthesia Plan      general     ASA 3     (PRONE POSITION)  Induction: intravenous. MIPS: Postoperative opioids intended and Prophylactic antiemetics administered. Anesthetic plan and risks discussed with patient. Plan discussed with CRNA.                   Maximilian Moreau MD   5/16/2019

## 2019-05-16 NOTE — PROGRESS NOTES
1531- Pt to PACU,  1551- When went to assess pt incision glue had dried to paper echo and peeled off, slightly opened up and scant amt of drainage, steri strips placed on incision.

## 2019-05-16 NOTE — PROGRESS NOTES
1438:  Patient admitted to [facility] room 1.  no Family/spouse at bedside. Arm bands applied. Bilat. Socks, SCD's applied. Call light in reach. 1440:  Belongings locked in locker. 1445:  Unable to review chart before patient taken to surgery.

## 2019-05-17 NOTE — ANESTHESIA POSTPROCEDURE EVALUATION
Department of Anesthesiology  Postprocedure Note    Patient: Jeri Mcleod  MRN: [id number]  YOB: 1970  Date of evaluation: 5/17/2019  Time:  7:30 AM     Procedure Summary     Date:  05/16/19 Room / Location:  [facility]    Anesthesia Start:  5495 Anesthesia Stop:  4196    Procedure:  REMOVAL INTERSSTIM DEVICE (N/A Back) Diagnosis:  (OVERACTIVE BLACCER)    Surgeon:  Tony Lee MD Responsible Provider:  Bridger Zelaya MD    Anesthesia Type:  general ASA Status:  3          Anesthesia Type: No value filed. Kervin Phase I: Kervin Score: 10    Kervin Phase II: Kervin Score: 10    Last vitals: Reviewed and per EMR flowsheets.        Anesthesia Post Evaluation    Patient location during evaluation: PACU  Patient participation: complete - patient participated  Level of consciousness: awake and alert  Airway patency: patent  Nausea & Vomiting: no nausea  Complications: no  Cardiovascular status: blood pressure returned to baseline and hemodynamically stable  Respiratory status: acceptable and spontaneous ventilation  Hydration status: euvolemic

## 2019-05-18 NOTE — BRIEF OP NOTE
Brief Postoperative Note  ______________________________________________________________    Patient: Nya Gallegos  YOB: 1970  MRN: [id number]  Date of Procedure: 5/16/2019    Pre-Op Diagnosis: OVERACTIVE BLADDER, UNHELPFUL INTERSTIM DEVICE CAUSING PAIN    Post-Op Diagnosis: Same       Procedure(s):  REMOVAL INTERSTIM DEVICE    Anesthesia: Anesthesia type not filed in the log.     Surgeon(s):  Debora Huitron MD    Assistant: none    Estimated Blood Loss (mL): 5 cc    Complications: none    Specimens:   * No specimens in log *    Implants:  * No implants in log *      Drains: * No LDAs found *    Findings: device and entire lead removed    Debora Huitron MD

## 2019-05-18 NOTE — INTERVAL H&P NOTE
[facility]  History and Physical Update    Pt Name: Nya Gallegos  MRN: [id number]  YOB: 1970  Date of evaluation: 5/18/2019    [] I have examined the patient and reviewed the H&P/Consult and there are no changes to the patient or plans. [x] I have examined the patient and reviewed the H&P/Consult and have noted the following changes: No changes per patient.         Debora Huitron MD  Electronically signed 5/18/2019 at 3:17 PM

## 2019-05-18 NOTE — OP NOTE
[facility]  RECORD OF OPERATION     PATIENT NAME: Kaiyln Colon               MEDICAL RECORD NO. [id number]                DATE OF PROCEDURE: 05/16/2019  SURGEON: Melecio Macdonald MD  PRIMARY CARE PHYSICIAN: Yanelis Crystal MD        PREOPERATIVE DIAGNOSIS: urinary incontinence, urgency and frequency, InterStim device in place that is not helping and is causing pain     POSTOPERATIVE DIAGNOSIS: same     PROCEDURE PERFORMED: removal of InterStim sacral nerve stimulator generator and leads     SURGEON: Benjamin Rolon. Katherine Serrano MD    ASSISTANT(S): none     ANESTHESIA: general     BLOOD LOSS:  5 cc     SPECIMENS: none     COMPLICATIONS:  none immediately appreciated. DISCUSSION:  Cecil Corona is a 51y.o. year old male who has a diagnosis of urinary incontinence and urgency / frequency. He has an InterStim sacral nerve stimulation in place but this is not helping and is causing pain. After a history and physical examination was performed, potential diagnostic and therapeutic modalities were discussed with the patient. Removal of the permanent neuro-stimulator and leads was recommended and a discussion of the risks, possible complications, possible side effects, along with the anticipated benefits were reviewed. He was given the opportunity to ask questions. Once answered, informed consent was obtained. He was brought to the operating room on 05/16/2019 for this procedure. The patient was brought to the operating room, properly identified and placed in the prone position on the operating room table after initiation of general anesthesia. The position of the neurostimulator was then found and marked, using his previous incision site as a guide. The incision was again opened and carried down until the device was found. The device was removed from the pocket. The lead was then pulled on and a dimple found under his more midline, small incision that had been used for lead placement. A small incision was again made and the lead found under the incision using a crile clamp. The lead was firmly and gently pulled until the lead was removed. The entire lead with all 4 individual leads was removed. The incisions through which the neurostimulator had been removed and the smaller incision were closed with a subcuticular closure. Steri-Strips and dressings were then applied. Nicole Johnson was then awakened in the operating room and transferred from the operating room table to a stretcher and then to the PACU. She tolerated the procedure well there were no complications.     Maverick tolerated the procedure well and there were no complications

## 2019-05-21 ENCOUNTER — TELEPHONE (OUTPATIENT)
Dept: UROLOGY | Age: 49
End: 2019-05-21

## 2019-05-21 NOTE — TELEPHONE ENCOUNTER
Pt called wanting to  his Norco and they needed a PA per pharmacy. Prior Auth initiated for Lorrin Purchase .  PA sent to [facility]. PA done as expedited.

## 2019-05-29 ENCOUNTER — OFFICE VISIT (OUTPATIENT)
Dept: UROLOGY | Age: 49
End: 2019-05-29
Payer: COMMERCIAL

## 2019-05-29 VITALS
DIASTOLIC BLOOD PRESSURE: 68 MMHG | BODY MASS INDEX: 30.99 KG/M2 | HEIGHT: 74 IN | SYSTOLIC BLOOD PRESSURE: 104 MMHG | WEIGHT: 241.5 LBS

## 2019-05-29 DIAGNOSIS — R35.0 URINARY FREQUENCY: Primary | ICD-10-CM

## 2019-05-29 LAB
BILIRUBIN URINE: NEGATIVE
BLOOD URINE, POC: NEGATIVE
CHARACTER, URINE: CLEAR
COLOR, URINE: YELLOW
GLUCOSE URINE: NEGATIVE MG/DL
KETONES, URINE: NEGATIVE
LEUKOCYTE CLUMPS, URINE: NEGATIVE
NITRITE, URINE: NEGATIVE
PH, URINE: 8.5 (ref 5–9)
POST VOID RESIDUAL (PVR): 28 ML
PROTEIN, URINE: NEGATIVE MG/DL
SPECIFIC GRAVITY, URINE: 1.01 (ref 1–1.03)
UROBILINOGEN, URINE: 1 EU/DL (ref 0–1)

## 2019-05-29 PROCEDURE — 1036F TOBACCO NON-USER: CPT | Performed by: NURSE PRACTITIONER

## 2019-05-29 PROCEDURE — G8427 DOCREV CUR MEDS BY ELIG CLIN: HCPCS | Performed by: NURSE PRACTITIONER

## 2019-05-29 PROCEDURE — 81003 URINALYSIS AUTO W/O SCOPE: CPT | Performed by: NURSE PRACTITIONER

## 2019-05-29 PROCEDURE — 99213 OFFICE O/P EST LOW 20 MIN: CPT | Performed by: NURSE PRACTITIONER

## 2019-05-29 PROCEDURE — 51798 US URINE CAPACITY MEASURE: CPT | Performed by: NURSE PRACTITIONER

## 2019-05-29 PROCEDURE — G8417 CALC BMI ABV UP PARAM F/U: HCPCS | Performed by: NURSE PRACTITIONER

## 2019-05-29 RX ORDER — GUANFACINE 4 MG/1
4 TABLET, EXTENDED RELEASE ORAL
COMMUNITY
Start: 2019-05-05 | End: 2021-05-06

## 2019-05-29 RX ORDER — OXYBUTYNIN CHLORIDE 10 MG/1
10 TABLET, EXTENDED RELEASE ORAL DAILY
Qty: 30 TABLET | Refills: 3 | Status: SHIPPED | OUTPATIENT
Start: 2019-05-29 | End: 2019-07-16 | Stop reason: SDUPTHER

## 2019-05-29 RX ORDER — BLOOD-GLUCOSE METER
KIT MISCELLANEOUS
Refills: 1 | COMMUNITY
Start: 2019-05-08

## 2019-05-29 RX ORDER — BUSPIRONE HYDROCHLORIDE 30 MG/1
30 TABLET ORAL 2 TIMES DAILY
Refills: 0 | COMMUNITY
Start: 2019-04-29 | End: 2019-06-18 | Stop reason: DRUGHIGH

## 2019-05-29 RX ORDER — SUMATRIPTAN 50 MG/1
TABLET, FILM COATED ORAL
Refills: 0 | COMMUNITY
Start: 2019-05-08

## 2019-05-29 RX ORDER — ATORVASTATIN CALCIUM 10 MG/1
10 TABLET, FILM COATED ORAL DAILY
Refills: 0 | Status: ON HOLD | COMMUNITY
Start: 2019-04-29 | End: 2021-05-22 | Stop reason: SDUPTHER

## 2019-05-29 RX ORDER — ALBUTEROL SULFATE 90 UG/1
2 AEROSOL, METERED RESPIRATORY (INHALATION)
COMMUNITY
Start: 2016-09-01 | End: 2019-12-26

## 2019-05-29 RX ORDER — BREXPIPRAZOLE 1 MG/1
2 TABLET ORAL DAILY
Status: ON HOLD | COMMUNITY
Start: 2019-04-24 | End: 2019-12-30 | Stop reason: HOSPADM

## 2019-05-29 RX ORDER — ONDANSETRON 4 MG/1
TABLET, ORALLY DISINTEGRATING ORAL
COMMUNITY
End: 2021-02-11

## 2019-05-29 NOTE — Clinical Note
Saw Deandra Ballard today in follow-up. He recently had his interstim removed secondary to pain. Of course his severe OAB is worse. Titrating up his ditropan and he may try PTNS therapy again. Cystoscopy 8/2018 noted tight bladder neck and mod BPH. Is he a possible candidate for botox or with the enlarged prostate and tight bladder neck should we avoid that treatment modality?

## 2019-06-05 LAB
AVERAGE GLUCOSE: NORMAL
HBA1C MFR BLD: 8.4 %

## 2019-06-18 ENCOUNTER — OFFICE VISIT (OUTPATIENT)
Dept: INTERNAL MEDICINE CLINIC | Age: 49
End: 2019-06-18
Payer: COMMERCIAL

## 2019-06-18 VITALS
BODY MASS INDEX: 30.16 KG/M2 | HEIGHT: 74 IN | HEART RATE: 64 BPM | DIASTOLIC BLOOD PRESSURE: 87 MMHG | WEIGHT: 235 LBS | SYSTOLIC BLOOD PRESSURE: 136 MMHG

## 2019-06-18 DIAGNOSIS — E10.8 TYPE 1 DIABETES MELLITUS WITH COMPLICATION (HCC): ICD-10-CM

## 2019-06-18 PROCEDURE — G0108 DIAB MANAGE TRN  PER INDIV: HCPCS | Performed by: INTERNAL MEDICINE

## 2019-06-18 RX ORDER — INSULIN DEGLUDEC 200 U/ML
32 INJECTION, SOLUTION SUBCUTANEOUS EVERY MORNING
Refills: 0 | COMMUNITY
Start: 2019-05-29

## 2019-06-18 RX ORDER — BUSPIRONE HYDROCHLORIDE 15 MG/1
15 TABLET ORAL 2 TIMES DAILY
Refills: 0 | Status: ON HOLD | COMMUNITY
Start: 2019-06-05 | End: 2019-12-30 | Stop reason: HOSPADM

## 2019-06-18 NOTE — PROGRESS NOTES
only eating once per day. He is checking his blood sugar a lot, but unable to connect BG results with eating/ insulin doses. He reports continuing to struggle with depression. He just can't get moving some days. He does report that PCP has ordered the Beverly and that he will be starting this in a week. This will give Mikhail Shaalejandra better insight to BS results. He is happy about this change. We will follow up 2 months. DSME PLAN:   Discussed general issues about diabetes pathophysiology and management. Counseling at today's visit: BG goals; carbs; exercise; treating low BS; Geetha use. 1. Continue with present insulin doses  2. Rotate your insulin injection site --think of a  board on your belly  3. Stay focused on trying to get 3 meals each day  4. Get the eye exam scheduled! Meter download, medications, PMH and nursing assessment reviewed. Hong Casey states He is willing to participate in this plan of care and verbalized understanding of all instructions provided. Teach back used to verify comprehension. Total time involved in direct patient education: 30 minutes.

## 2019-06-18 NOTE — PATIENT INSTRUCTIONS
1. Continue with present insulin doses  2. Rotate your insulin injection site --think of a  board on your belly  3. Stay focused on trying to get 3 meals each day  4. Get the eye exam scheduled!

## 2019-07-16 ENCOUNTER — OFFICE VISIT (OUTPATIENT)
Dept: UROLOGY | Age: 49
End: 2019-07-16
Payer: COMMERCIAL

## 2019-07-16 VITALS
SYSTOLIC BLOOD PRESSURE: 128 MMHG | WEIGHT: 238 LBS | HEIGHT: 74 IN | DIASTOLIC BLOOD PRESSURE: 80 MMHG | BODY MASS INDEX: 30.54 KG/M2

## 2019-07-16 DIAGNOSIS — R35.0 URINARY FREQUENCY: Primary | ICD-10-CM

## 2019-07-16 DIAGNOSIS — N40.0 BENIGN PROSTATIC HYPERPLASIA, UNSPECIFIED WHETHER LOWER URINARY TRACT SYMPTOMS PRESENT: ICD-10-CM

## 2019-07-16 DIAGNOSIS — N32.81 OVERACTIVE BLADDER: ICD-10-CM

## 2019-07-16 LAB
BILIRUBIN URINE: NEGATIVE
BLOOD URINE, POC: NEGATIVE
CHARACTER, URINE: CLEAR
COLOR, URINE: YELLOW
GLUCOSE URINE: NEGATIVE MG/DL
KETONES, URINE: NEGATIVE
LEUKOCYTE CLUMPS, URINE: NEGATIVE
NITRITE, URINE: NEGATIVE
PH, URINE: 8.5 (ref 5–9)
POST VOID RESIDUAL (PVR): 45 ML
PROTEIN, URINE: NEGATIVE MG/DL
SPECIFIC GRAVITY, URINE: 1.01 (ref 1–1.03)
UROBILINOGEN, URINE: 1 EU/DL (ref 0–1)

## 2019-07-16 PROCEDURE — 51798 US URINE CAPACITY MEASURE: CPT | Performed by: NURSE PRACTITIONER

## 2019-07-16 PROCEDURE — 81003 URINALYSIS AUTO W/O SCOPE: CPT | Performed by: NURSE PRACTITIONER

## 2019-07-16 PROCEDURE — 1036F TOBACCO NON-USER: CPT | Performed by: NURSE PRACTITIONER

## 2019-07-16 PROCEDURE — G8417 CALC BMI ABV UP PARAM F/U: HCPCS | Performed by: NURSE PRACTITIONER

## 2019-07-16 PROCEDURE — G8427 DOCREV CUR MEDS BY ELIG CLIN: HCPCS | Performed by: NURSE PRACTITIONER

## 2019-07-16 PROCEDURE — 99214 OFFICE O/P EST MOD 30 MIN: CPT | Performed by: NURSE PRACTITIONER

## 2019-07-16 RX ORDER — OXYBUTYNIN CHLORIDE 10 MG/1
10 TABLET, EXTENDED RELEASE ORAL 2 TIMES DAILY
Qty: 60 TABLET | Refills: 3 | Status: SHIPPED | OUTPATIENT
Start: 2019-07-16 | End: 2019-12-19 | Stop reason: SDUPTHER

## 2019-07-16 NOTE — PROGRESS NOTES
493.90 1 Inhaler 5    zonisamide (ZONEGRAN) 100 MG capsule Take 1 capsule by mouth daily (Patient taking differently: Take 100 mg by mouth 2 times daily ) 30 capsule 5    loratadine (CLARITIN) 10 MG tablet Take 1 tablet by mouth daily 30 tablet 11    OXcarbazepine (TRILEPTAL) 300 MG tablet Take 600 mg by mouth daily        No current facility-administered medications for this visit. Past Medical History  Benito Watson  has a past medical history of ADHD (attention deficit hyperactivity disorder), Anxiety, Arthritis, Asthma, Bladder dysfunction, Depression, Diabetes mellitus ([facility] [address] Fatigue, GERD (gastroesophageal reflux disease), Headache, Hyperlipidemia, Hypertension, MDRO (multiple drug resistant organisms) resistance, OAB (overactive bladder), Seizures ([facility] [address] Sleep apnea, and Type II or unspecified type diabetes mellitus without mention of complication, not stated as uncontrolled. Past Surgical History  The patient  has a past surgical history that includes Eye surgery ([facility]); Cataract removal (2010); Foot surgery (Bilateral, 2006); Foot surgery (1996); other surgical history (11/4/15); eye surgery (Right, 92,96); Elbow surgery (Right); Upper gastrointestinal endoscopy (2017); cervical fusion (N/A, 9/27/2017); Hand surgery (Right, 07/2017); Colonoscopy (N/A, 2/26/2018); Upper gastrointestinal endoscopy (N/A, 8/9/2018); pr office/outpt visit,procedure only (N/A, 10/16/2018); and pr revise/remove neuroelectrode (N/A, 5/16/2019). Family History  This patient's family history includes Dementia in his mother; Diabetes in his paternal aunt and paternal grandmother; High Blood Pressure in his brother, father, and sister; Hypertension in his father; Other in his father. Social History  Benito Watson  reports that he has never smoked. He has never used smokeless tobacco. He reports that he drinks about 4.0 standard drinks of alcohol per week. He reports that he does not use drugs.       Review of Systems  No

## 2019-07-17 RX ORDER — METHYLPREDNISOLONE ACETATE 80 MG/ML
80 INJECTION, SUSPENSION INTRA-ARTICULAR; INTRALESIONAL; INTRAMUSCULAR; SOFT TISSUE ONCE
Status: CANCELLED | OUTPATIENT
Start: 2019-07-17 | End: 2019-07-17

## 2019-07-17 RX ORDER — BUPIVACAINE HYDROCHLORIDE 2.5 MG/ML
5 INJECTION, SOLUTION EPIDURAL; INFILTRATION; INTRACAUDAL ONCE
Status: CANCELLED | OUTPATIENT
Start: 2019-07-17 | End: 2019-07-17

## 2019-07-18 ENCOUNTER — HOSPITAL ENCOUNTER (OUTPATIENT)
Dept: INTERVENTIONAL RADIOLOGY/VASCULAR | Age: 49
Discharge: HOME | End: 2019-07-18
Payer: COMMERCIAL

## 2019-07-18 VITALS
TEMPERATURE: 98.3 F | DIASTOLIC BLOOD PRESSURE: 107 MMHG | OXYGEN SATURATION: 97 % | SYSTOLIC BLOOD PRESSURE: 164 MMHG | HEART RATE: 73 BPM | RESPIRATION RATE: 16 BRPM

## 2019-07-18 PROCEDURE — 2500000003 HC RX 250 WO HCPCS

## 2019-07-18 PROCEDURE — 6360000004 HC RX CONTRAST MEDICATION: Performed by: RADIOLOGY

## 2019-07-18 PROCEDURE — 2709999900 HC NON-CHARGEABLE SUPPLY

## 2019-07-18 PROCEDURE — 62323 NJX INTERLAMINAR LMBR/SAC: CPT

## 2019-07-18 PROCEDURE — 6360000002 HC RX W HCPCS

## 2019-07-18 RX ORDER — METHYLPREDNISOLONE ACETATE 80 MG/ML
80 INJECTION, SUSPENSION INTRA-ARTICULAR; INTRALESIONAL; INTRAMUSCULAR; SOFT TISSUE ONCE
Status: COMPLETED | OUTPATIENT
Start: 2019-07-18 | End: 2019-07-18

## 2019-07-18 RX ORDER — BUPIVACAINE HYDROCHLORIDE 2.5 MG/ML
5 INJECTION, SOLUTION EPIDURAL; INFILTRATION; INTRACAUDAL ONCE
Status: COMPLETED | OUTPATIENT
Start: 2019-07-18 | End: 2019-07-18

## 2019-07-18 RX ADMIN — IOHEXOL 1 ML: 180 INJECTION INTRAVENOUS at 09:48

## 2019-07-18 RX ADMIN — BUPIVACAINE HYDROCHLORIDE 2 ML: 2.5 INJECTION, SOLUTION EPIDURAL; INFILTRATION; INTRACAUDAL at 09:48

## 2019-07-18 RX ADMIN — METHYLPREDNISOLONE ACETATE 80 MG: 80 INJECTION, SUSPENSION INTRA-ARTICULAR; INTRALESIONAL; INTRAMUSCULAR; SOFT TISSUE at 09:48

## 2019-07-18 ASSESSMENT — PAIN DESCRIPTION - PAIN TYPE: TYPE: CHRONIC PAIN

## 2019-07-18 ASSESSMENT — PAIN SCALES - GENERAL
PAINLEVEL_OUTOF10: 3
PAINLEVEL_OUTOF10: 0
PAINLEVEL_OUTOF10: 0
PAINLEVEL_OUTOF10: 3

## 2019-07-18 ASSESSMENT — PAIN DESCRIPTION - LOCATION: LOCATION: HIP

## 2019-07-18 ASSESSMENT — PAIN DESCRIPTION - ORIENTATION: ORIENTATION: RIGHT;LEFT

## 2019-07-18 NOTE — PROGRESS NOTES
0825 pt arrives ambulatory for nerve block. Procedure explained and questions answered. PT RIGHTS AND RESPONSIBILITIES OFFERED TO PT.  Pt states he has to call his dad to pick him up post procedure. Pt states he has held aspirin since last Friday. Pt states he did not take AM BP meds. Pedal push and pull and hand grasp equal and strong bilaterally. Pt denies numbness. Pt has 3/10 pain in lower back. [address] notified of pt BP.   0933 pt to \[facility]\"" via bed. 1016 pt discharged ambulatory with instructions with no complaints. BP elevated. Pt educated to take BP meds when he gets home. Pt verbalized understanding. bandaid clean, dry and intact.        _m___ Safety:       (Environmental)   [place] to environment   Ensure ID band is correct and in place/ allergy band as needed   Assess for fall risk   Initiate fall precautions as applicable (fall band, side rails, etc.)   Call light within reach   Bed in low position/ wheels locked    _m___ Pain:        Assess pain level and characteristics   Administer analgesics as ordered   Assess effectiveness of pain management and report to MD as needed    __m__ Knowledge Deficit:   Assess baseline knowledge   Provide teaching at level of understanding   Provide teaching via preferred learning method   Evaluate teaching effectiveness    __m__ Hemodynamic/Respiratory Status:       (Pre and Post Procedure Monitoring)   Assess/Monitor vital signs and LOC   Assess Baseline SpO2 prior to any sedation   Obtain weight/height   Assess vital signs/ LOC until patient meets discharge criteria   Monitor procedure site and notify MD of any issues

## 2019-08-05 ENCOUNTER — OFFICE VISIT (OUTPATIENT)
Dept: INTERNAL MEDICINE CLINIC | Age: 49
End: 2019-08-05
Payer: COMMERCIAL

## 2019-08-05 VITALS
HEIGHT: 74 IN | DIASTOLIC BLOOD PRESSURE: 90 MMHG | WEIGHT: 226.2 LBS | SYSTOLIC BLOOD PRESSURE: 130 MMHG | HEART RATE: 126 BPM | BODY MASS INDEX: 29.03 KG/M2

## 2019-08-05 DIAGNOSIS — E10.8 TYPE 1 DIABETES MELLITUS WITH COMPLICATION (HCC): ICD-10-CM

## 2019-08-05 DIAGNOSIS — N52.9 ERECTILE DYSFUNCTION, UNSPECIFIED ERECTILE DYSFUNCTION TYPE: Primary | ICD-10-CM

## 2019-08-05 DIAGNOSIS — I10 ESSENTIAL HYPERTENSION: ICD-10-CM

## 2019-08-05 DIAGNOSIS — F32.A DEPRESSION, UNSPECIFIED DEPRESSION TYPE: ICD-10-CM

## 2019-08-05 DIAGNOSIS — R53.83 FATIGUE, UNSPECIFIED TYPE: ICD-10-CM

## 2019-08-05 PROCEDURE — 1036F TOBACCO NON-USER: CPT | Performed by: INTERNAL MEDICINE

## 2019-08-05 PROCEDURE — 2022F DILAT RTA XM EVC RTNOPTHY: CPT | Performed by: INTERNAL MEDICINE

## 2019-08-05 PROCEDURE — 99214 OFFICE O/P EST MOD 30 MIN: CPT | Performed by: INTERNAL MEDICINE

## 2019-08-05 PROCEDURE — 3045F PR MOST RECENT HEMOGLOBIN A1C LEVEL 7.0-9.0%: CPT | Performed by: INTERNAL MEDICINE

## 2019-08-05 PROCEDURE — G8417 CALC BMI ABV UP PARAM F/U: HCPCS | Performed by: INTERNAL MEDICINE

## 2019-08-05 PROCEDURE — G8427 DOCREV CUR MEDS BY ELIG CLIN: HCPCS | Performed by: INTERNAL MEDICINE

## 2019-08-05 NOTE — PROGRESS NOTES
Encounter   Procedures    Testosterone, free, total     Standing Status:   Future     Standing Expiration Date:   8/5/2020    Prolactin     Standing Status:   Future     Standing Expiration Date:   8/5/2020    TSH     Standing Status:   Future     Standing Expiration Date:   8/5/2020   On 31 medications has 33 diagnosis listed. Patient has a PCP blood pressure fair today, told him to follow up there. Will have his PCP follow his BP  We will see him in 2 weeks for a full ED work-up  In the meantime we will check testosterone prolactin and TSH.

## 2019-08-08 ENCOUNTER — TELEPHONE (OUTPATIENT)
Dept: UROLOGY | Age: 49
End: 2019-08-08

## 2019-08-13 ENCOUNTER — HOSPITAL ENCOUNTER (OUTPATIENT)
Age: 49
Discharge: HOME | End: 2019-08-13
Payer: COMMERCIAL

## 2019-08-13 DIAGNOSIS — R53.83 FATIGUE, UNSPECIFIED TYPE: ICD-10-CM

## 2019-08-13 DIAGNOSIS — N52.9 ERECTILE DYSFUNCTION, UNSPECIFIED ERECTILE DYSFUNCTION TYPE: ICD-10-CM

## 2019-08-13 LAB
PROLACTIN: 9.7 NG/ML
TSH SERPL DL<=0.05 MIU/L-ACNC: 1.97 UIU/ML (ref 0.4–4.2)

## 2019-08-13 PROCEDURE — 84403 ASSAY OF TOTAL TESTOSTERONE: CPT

## 2019-08-13 PROCEDURE — 84443 ASSAY THYROID STIM HORMONE: CPT

## 2019-08-13 PROCEDURE — 84146 ASSAY OF PROLACTIN: CPT

## 2019-08-13 PROCEDURE — 36415 COLL VENOUS BLD VENIPUNCTURE: CPT

## 2019-08-13 PROCEDURE — 84270 ASSAY OF SEX HORMONE GLOBUL: CPT

## 2019-08-15 LAB — TESTOSTERONE FREE: NORMAL

## 2019-08-19 ENCOUNTER — OFFICE VISIT (OUTPATIENT)
Dept: INTERNAL MEDICINE CLINIC | Age: 49
End: 2019-08-19
Payer: COMMERCIAL

## 2019-08-19 VITALS
BODY MASS INDEX: 30.03 KG/M2 | HEIGHT: 74 IN | DIASTOLIC BLOOD PRESSURE: 68 MMHG | WEIGHT: 234 LBS | SYSTOLIC BLOOD PRESSURE: 128 MMHG | HEART RATE: 64 BPM

## 2019-08-19 DIAGNOSIS — N52.9 ERECTILE DYSFUNCTION, UNSPECIFIED ERECTILE DYSFUNCTION TYPE: Primary | ICD-10-CM

## 2019-08-19 DIAGNOSIS — F32.A DEPRESSION, UNSPECIFIED DEPRESSION TYPE: ICD-10-CM

## 2019-08-19 DIAGNOSIS — E10.8 TYPE 1 DIABETES MELLITUS WITH COMPLICATION (HCC): ICD-10-CM

## 2019-08-19 DIAGNOSIS — I10 ESSENTIAL HYPERTENSION: ICD-10-CM

## 2019-08-19 PROCEDURE — 99213 OFFICE O/P EST LOW 20 MIN: CPT | Performed by: INTERNAL MEDICINE

## 2019-08-19 PROCEDURE — 3045F PR MOST RECENT HEMOGLOBIN A1C LEVEL 7.0-9.0%: CPT | Performed by: INTERNAL MEDICINE

## 2019-08-19 PROCEDURE — G8417 CALC BMI ABV UP PARAM F/U: HCPCS | Performed by: INTERNAL MEDICINE

## 2019-08-19 PROCEDURE — 2022F DILAT RTA XM EVC RTNOPTHY: CPT | Performed by: INTERNAL MEDICINE

## 2019-08-19 PROCEDURE — 1036F TOBACCO NON-USER: CPT | Performed by: INTERNAL MEDICINE

## 2019-08-19 PROCEDURE — G8427 DOCREV CUR MEDS BY ELIG CLIN: HCPCS | Performed by: INTERNAL MEDICINE

## 2019-08-19 RX ORDER — SILDENAFIL CITRATE 20 MG/1
20 TABLET ORAL DAILY PRN
Qty: 25 TABLET | Refills: 0 | Status: SHIPPED | OUTPATIENT
Start: 2019-08-19 | End: 2019-09-16 | Stop reason: SDUPTHER

## 2019-08-19 NOTE — PROGRESS NOTES
[facility] PROFESSIONAL SERVS  PHYSICIANS [place] [address] [address]  [address] [facility], [address] [facility]  Dept: [phone]  Dept Fax: [phone]      Chief Complaint   Patient presents with    Erectile Dysfunction     Patient presents for  Evaluation of erectile dysfunction. I saw him here 2 weeks ago. This patient is followed regularly by Dr. Arsh Adler. He says he has not followed up with me in multiple years due to his depression. He has a lot of family issues including his mother passing away last June, and his brother being sentenced to life in prison. For the past few months has been clearing his throat a lot and wondering if it has to do with his CPAP machine. Last year had an innerstim placed in his back for his urinary frequency, he can go up to 20 times a night. However it did not appear to help so he had it removed a few months ago.   He has been having trouble getting an erection for years  He also has a low libido  He has never tried any medications for this  Past Medical History:   Diagnosis Date    ADHD (attention deficit hyperactivity disorder)     Anxiety     Arthritis     Asthma     Bladder dysfunction     Depression     Diabetes mellitus ([address]     Fatigue     GERD (gastroesophageal reflux disease)     Headache     migraines    Hyperlipidemia     Hypertension     MDRO (multiple drug resistant organisms) resistance     yrs ago    OAB (overactive bladder) 10/27/2015    Seizures ([facility]     Sleep apnea     wears cpap    Type II or unspecified type diabetes mellitus without mention of complication, not stated as uncontrolled        Current Outpatient Medications   Medication Sig Dispense Refill    sildenafil (REVATIO) 20 MG tablet Take 1 tablet by mouth daily as needed (ED) 25 tablet 0    oxybutynin (DITROPAN-XL) 10 MG extended release tablet Take 1 tablet by mouth 2 times daily 60 tablet 3    busPIRone (BUSPAR) 15 MG tablet Take 15 mg by mouth 2 times daily  0    TRESIBA

## 2019-08-30 ENCOUNTER — TELEPHONE (OUTPATIENT)
Dept: INTERNAL MEDICINE CLINIC | Age: 49
End: 2019-08-30

## 2019-09-03 ENCOUNTER — NURSE ONLY (OUTPATIENT)
Dept: UROLOGY | Age: 49
End: 2019-09-03
Payer: COMMERCIAL

## 2019-09-03 DIAGNOSIS — R35.0 URINARY FREQUENCY: Primary | ICD-10-CM

## 2019-09-03 PROCEDURE — 64566 NEUROELTRD STIM POST TIBIAL: CPT | Performed by: NURSE PRACTITIONER

## 2019-09-10 ENCOUNTER — NURSE ONLY (OUTPATIENT)
Dept: UROLOGY | Age: 49
End: 2019-09-10
Payer: COMMERCIAL

## 2019-09-10 DIAGNOSIS — N32.81 URGENCY-FREQUENCY SYNDROME: Primary | ICD-10-CM

## 2019-09-10 PROCEDURE — 64566 NEUROELTRD STIM POST TIBIAL: CPT | Performed by: NURSE PRACTITIONER

## 2019-09-16 ENCOUNTER — HOSPITAL ENCOUNTER (OUTPATIENT)
Age: 49
Discharge: HOME | End: 2019-09-16
Payer: COMMERCIAL

## 2019-09-16 ENCOUNTER — OFFICE VISIT (OUTPATIENT)
Dept: INTERNAL MEDICINE CLINIC | Age: 49
End: 2019-09-16
Payer: COMMERCIAL

## 2019-09-16 VITALS
SYSTOLIC BLOOD PRESSURE: 134 MMHG | DIASTOLIC BLOOD PRESSURE: 74 MMHG | WEIGHT: 244 LBS | HEART RATE: 66 BPM | BODY MASS INDEX: 31.32 KG/M2 | HEIGHT: 74 IN

## 2019-09-16 DIAGNOSIS — F32.A DEPRESSION, UNSPECIFIED DEPRESSION TYPE: ICD-10-CM

## 2019-09-16 DIAGNOSIS — N52.9 ERECTILE DYSFUNCTION, UNSPECIFIED ERECTILE DYSFUNCTION TYPE: Primary | ICD-10-CM

## 2019-09-16 DIAGNOSIS — I10 ESSENTIAL HYPERTENSION: ICD-10-CM

## 2019-09-16 DIAGNOSIS — E10.8 TYPE 1 DIABETES MELLITUS WITH COMPLICATION (HCC): ICD-10-CM

## 2019-09-16 LAB
CREATININE, URINE: 195.1 MG/DL
HBA1C MFR BLD: 7.9 % (ref 4.3–5.7)
MICROALBUMIN UR-MCNC: < 1.2 MG/DL
MICROALBUMIN/CREAT UR-RTO: 6 MG/G (ref 0–30)

## 2019-09-16 PROCEDURE — 82043 UR ALBUMIN QUANTITATIVE: CPT

## 2019-09-16 PROCEDURE — 2022F DILAT RTA XM EVC RTNOPTHY: CPT | Performed by: INTERNAL MEDICINE

## 2019-09-16 PROCEDURE — 83036 HEMOGLOBIN GLYCOSYLATED A1C: CPT | Performed by: INTERNAL MEDICINE

## 2019-09-16 PROCEDURE — 99213 OFFICE O/P EST LOW 20 MIN: CPT | Performed by: INTERNAL MEDICINE

## 2019-09-16 PROCEDURE — G8427 DOCREV CUR MEDS BY ELIG CLIN: HCPCS | Performed by: INTERNAL MEDICINE

## 2019-09-16 PROCEDURE — 1036F TOBACCO NON-USER: CPT | Performed by: INTERNAL MEDICINE

## 2019-09-16 PROCEDURE — G8417 CALC BMI ABV UP PARAM F/U: HCPCS | Performed by: INTERNAL MEDICINE

## 2019-09-16 PROCEDURE — 3045F PR MOST RECENT HEMOGLOBIN A1C LEVEL 7.0-9.0%: CPT | Performed by: INTERNAL MEDICINE

## 2019-09-16 RX ORDER — SILDENAFIL CITRATE 20 MG/1
20 TABLET ORAL DAILY PRN
Qty: 25 TABLET | Refills: 0 | Status: SHIPPED | OUTPATIENT
Start: 2019-09-16 | End: 2019-10-31 | Stop reason: SDUPTHER

## 2019-09-17 ENCOUNTER — NURSE ONLY (OUTPATIENT)
Dept: UROLOGY | Age: 49
End: 2019-09-17
Payer: COMMERCIAL

## 2019-09-17 DIAGNOSIS — R32 URINARY INCONTINENCE, UNSPECIFIED TYPE: Primary | ICD-10-CM

## 2019-09-17 PROCEDURE — 64566 NEUROELTRD STIM POST TIBIAL: CPT | Performed by: NURSE PRACTITIONER

## 2019-09-17 NOTE — PROGRESS NOTES
Patient has given me verbal consent to perform PTNS Treatment and has also signed a paper consent which is scanned in under media tab in Epic yes      Following Mary Ann Shone, APRN-CNP plan of care. After patient is in a comfortable sitting position. The Right ankle is prepared by cleansing the sole of the foot and needle insertion site with alcohol. Patch is then applied to sole of the foot and the needle is inserted within the range of the tibial nerve on the inside of the leg. The cord is then hooked to the needle and plugged into the machine. Machine is then turned on and put in test mode to get to a tolerable level the patient can withstand for 30 min. PTNS 3 GIVEN IN RIGHT ANKLE AT LEVEL 19    Needle and patch were removed from patient and discarded into sharps container.

## 2019-09-19 ENCOUNTER — HOSPITAL ENCOUNTER (OUTPATIENT)
Age: 49
Setting detail: SPECIMEN
Discharge: HOME | End: 2019-09-19
Payer: COMMERCIAL

## 2019-09-20 LAB
HAV IGM SER IA-ACNC: NONREACTIVE
HEPATITIS B CORE IGM ANTIBODY: NONREACTIVE
HEPATITIS B SURFACE ANTIGEN: NONREACTIVE
HEPATITIS C ANTIBODY: NONREACTIVE

## 2019-09-21 NOTE — PROGRESS NOTES
3    Multiple Vitamin (MULTIVITAMIN) tablet take 1 tablet by mouth once daily 30 tablet 11    albuterol sulfate HFA (PROAIR HFA) 108 (90 BASE) MCG/ACT inhaler Inhale 2 puffs into the lungs every 4 hours as needed for Wheezing Dx: 493.90 1 Inhaler 5    zonisamide (ZONEGRAN) 100 MG capsule Take 1 capsule by mouth daily (Patient taking differently: Take 400 mg by mouth daily ) 30 capsule 5    loratadine (CLARITIN) 10 MG tablet Take 1 tablet by mouth daily 30 tablet 11    OXcarbazepine (TRILEPTAL) 300 MG tablet Take 600 mg by mouth daily        No current facility-administered medications for this visit. He is currently on disability due to his seizure disorder. Last seizure was 4/22/19    Review of Systems -erectile dysfunction  Blood pressure 134/74, pulse 66, height 6' 2\" (1.88 m), weight 244 lb (110.7 kg). DIABETIC FOOT EXAM    SENSATION: intact  PROPRIOCEPTION: intact  VIBRATORY SENSE: intact  No  ulcers, erythema or other lesions noted  Pulses present and normal           Diagnosis Orders   1. Erectile dysfunction, unspecified erectile dysfunction type  sildenafil (REVATIO) 20 MG tablet   2. Type 1 diabetes mellitus with complication (HCC)  Microalbumin / Creatinine Urine Ratio     DIABETES FOOT EXAM    POCT glycosylated hemoglobin (Hb A1C)    22431 - Collection Capillary Blood Specimen   3. Depression, unspecified depression type     4. Essential hypertension         Orders Placed This Encounter   Procedures    Microalbumin / Creatinine Urine Ratio     Standing Status:   Future     Number of Occurrences:   1     Standing Expiration Date:   9/16/2020    POCT glycosylated hemoglobin (Hb A1C)     DIABETES FOOT EXAM    06085 - Collection Capillary Blood Specimen   On 31 medications has 33 diagnosis listed.    I prescribed generic sildenafil  I did check testosterone prolactin and TSH they are all normal  We will give him generic sildenafil as he cannot afford brand-name  I instructed him how to

## 2019-09-24 ENCOUNTER — NURSE ONLY (OUTPATIENT)
Dept: UROLOGY | Age: 49
End: 2019-09-24
Payer: COMMERCIAL

## 2019-09-24 DIAGNOSIS — R39.15 URGENCY OF URINATION: Primary | ICD-10-CM

## 2019-09-24 PROCEDURE — 64566 NEUROELTRD STIM POST TIBIAL: CPT | Performed by: NURSE PRACTITIONER

## 2019-10-07 ENCOUNTER — OFFICE VISIT (OUTPATIENT)
Dept: INTERNAL MEDICINE CLINIC | Age: 49
End: 2019-10-07
Payer: COMMERCIAL

## 2019-10-07 VITALS
SYSTOLIC BLOOD PRESSURE: 138 MMHG | WEIGHT: 239 LBS | BODY MASS INDEX: 30.67 KG/M2 | HEART RATE: 80 BPM | HEIGHT: 74 IN | DIASTOLIC BLOOD PRESSURE: 80 MMHG

## 2019-10-07 DIAGNOSIS — E10.69 TYPE 1 DIABETES MELLITUS WITH OTHER SPECIFIED COMPLICATION (HCC): ICD-10-CM

## 2019-10-07 PROCEDURE — G0108 DIAB MANAGE TRN  PER INDIV: HCPCS | Performed by: INTERNAL MEDICINE

## 2019-10-07 RX ORDER — HYDROXYZINE PAMOATE 50 MG/1
50 CAPSULE ORAL NIGHTLY
Status: ON HOLD | COMMUNITY
Start: 2019-09-26 | End: 2019-12-30 | Stop reason: SDUPTHER

## 2019-10-08 ENCOUNTER — NURSE ONLY (OUTPATIENT)
Dept: UROLOGY | Age: 49
End: 2019-10-08
Payer: COMMERCIAL

## 2019-10-08 DIAGNOSIS — R39.15 URGENCY OF URINATION: Primary | ICD-10-CM

## 2019-10-08 DIAGNOSIS — R32 URINARY INCONTINENCE, UNSPECIFIED TYPE: ICD-10-CM

## 2019-10-08 PROCEDURE — 64566 NEUROELTRD STIM POST TIBIAL: CPT | Performed by: NURSE PRACTITIONER

## 2019-10-22 ENCOUNTER — NURSE ONLY (OUTPATIENT)
Dept: UROLOGY | Age: 49
End: 2019-10-22
Payer: COMMERCIAL

## 2019-10-22 DIAGNOSIS — N32.81 URGENCY-FREQUENCY SYNDROME: ICD-10-CM

## 2019-10-22 DIAGNOSIS — R39.15 URGENCY OF URINATION: Primary | ICD-10-CM

## 2019-10-22 PROCEDURE — 64566 NEUROELTRD STIM POST TIBIAL: CPT | Performed by: NURSE PRACTITIONER

## 2019-10-30 DIAGNOSIS — N52.9 ERECTILE DYSFUNCTION, UNSPECIFIED ERECTILE DYSFUNCTION TYPE: ICD-10-CM

## 2019-10-31 RX ORDER — SILDENAFIL CITRATE 20 MG/1
20 TABLET ORAL DAILY PRN
Qty: 25 TABLET | Refills: 0 | Status: SHIPPED | OUTPATIENT
Start: 2019-10-31 | End: 2020-01-28 | Stop reason: SDUPTHER

## 2019-11-05 ENCOUNTER — TELEPHONE (OUTPATIENT)
Dept: UROLOGY | Age: 49
End: 2019-11-05

## 2019-11-05 ENCOUNTER — NURSE ONLY (OUTPATIENT)
Dept: UROLOGY | Age: 49
End: 2019-11-05
Payer: COMMERCIAL

## 2019-11-05 DIAGNOSIS — N32.81 URGENCY-FREQUENCY SYNDROME: Primary | ICD-10-CM

## 2019-11-05 DIAGNOSIS — R39.15 URGENCY OF URINATION: ICD-10-CM

## 2019-11-05 PROCEDURE — 64566 NEUROELTRD STIM POST TIBIAL: CPT | Performed by: NURSE PRACTITIONER

## 2019-11-12 ENCOUNTER — NURSE ONLY (OUTPATIENT)
Dept: UROLOGY | Age: 49
End: 2019-11-12
Payer: COMMERCIAL

## 2019-11-12 ENCOUNTER — TELEPHONE (OUTPATIENT)
Dept: UROLOGY | Age: 49
End: 2019-11-12

## 2019-11-12 DIAGNOSIS — R39.15 URGENCY OF URINATION: ICD-10-CM

## 2019-11-12 DIAGNOSIS — N32.81 URGENCY-FREQUENCY SYNDROME: Primary | ICD-10-CM

## 2019-11-12 PROCEDURE — 64566 NEUROELTRD STIM POST TIBIAL: CPT | Performed by: NURSE PRACTITIONER

## 2019-11-26 ENCOUNTER — NURSE ONLY (OUTPATIENT)
Dept: UROLOGY | Age: 49
End: 2019-11-26
Payer: COMMERCIAL

## 2019-11-26 DIAGNOSIS — R39.15 URGENCY OF URINATION: Primary | ICD-10-CM

## 2019-11-26 PROCEDURE — 64566 NEUROELTRD STIM POST TIBIAL: CPT | Performed by: NURSE PRACTITIONER

## 2019-12-09 ENCOUNTER — OFFICE VISIT (OUTPATIENT)
Dept: INTERNAL MEDICINE CLINIC | Age: 49
End: 2019-12-09
Payer: COMMERCIAL

## 2019-12-09 DIAGNOSIS — N52.9 ERECTILE DYSFUNCTION, UNSPECIFIED ERECTILE DYSFUNCTION TYPE: ICD-10-CM

## 2019-12-09 DIAGNOSIS — I10 ESSENTIAL HYPERTENSION: ICD-10-CM

## 2019-12-09 DIAGNOSIS — E10.69 TYPE 1 DIABETES MELLITUS WITH OTHER SPECIFIED COMPLICATION (HCC): Primary | ICD-10-CM

## 2019-12-09 LAB — HBA1C MFR BLD: 8.1 % (ref 4.3–5.7)

## 2019-12-09 PROCEDURE — 83036 HEMOGLOBIN GLYCOSYLATED A1C: CPT | Performed by: INTERNAL MEDICINE

## 2019-12-09 PROCEDURE — 1036F TOBACCO NON-USER: CPT | Performed by: INTERNAL MEDICINE

## 2019-12-09 PROCEDURE — G8427 DOCREV CUR MEDS BY ELIG CLIN: HCPCS | Performed by: INTERNAL MEDICINE

## 2019-12-09 PROCEDURE — 99213 OFFICE O/P EST LOW 20 MIN: CPT | Performed by: INTERNAL MEDICINE

## 2019-12-09 PROCEDURE — 3045F PR MOST RECENT HEMOGLOBIN A1C LEVEL 7.0-9.0%: CPT | Performed by: INTERNAL MEDICINE

## 2019-12-09 PROCEDURE — G8417 CALC BMI ABV UP PARAM F/U: HCPCS | Performed by: INTERNAL MEDICINE

## 2019-12-09 PROCEDURE — G8482 FLU IMMUNIZE ORDER/ADMIN: HCPCS | Performed by: INTERNAL MEDICINE

## 2019-12-09 PROCEDURE — 2022F DILAT RTA XM EVC RTNOPTHY: CPT | Performed by: INTERNAL MEDICINE

## 2019-12-10 ENCOUNTER — NURSE ONLY (OUTPATIENT)
Dept: UROLOGY | Age: 49
End: 2019-12-10
Payer: COMMERCIAL

## 2019-12-10 DIAGNOSIS — N32.81 URGENCY-FREQUENCY SYNDROME: ICD-10-CM

## 2019-12-10 DIAGNOSIS — R39.15 URGENCY OF URINATION: Primary | ICD-10-CM

## 2019-12-10 PROCEDURE — 64566 NEUROELTRD STIM POST TIBIAL: CPT | Performed by: NURSE PRACTITIONER

## 2019-12-19 RX ORDER — OXYBUTYNIN CHLORIDE 10 MG/1
10 TABLET, EXTENDED RELEASE ORAL 2 TIMES DAILY
Qty: 60 TABLET | Refills: 3 | Status: SHIPPED | OUTPATIENT
Start: 2019-12-19 | End: 2020-01-07 | Stop reason: SDUPTHER

## 2019-12-23 ENCOUNTER — HOSPITAL ENCOUNTER (EMERGENCY)
Age: 49
Discharge: HOME | DRG: 750 | End: 2019-12-23
Payer: COMMERCIAL

## 2019-12-23 VITALS
HEIGHT: 74 IN | DIASTOLIC BLOOD PRESSURE: 92 MMHG | TEMPERATURE: 97.8 F | WEIGHT: 237 LBS | SYSTOLIC BLOOD PRESSURE: 144 MMHG | BODY MASS INDEX: 30.42 KG/M2 | OXYGEN SATURATION: 97 % | RESPIRATION RATE: 16 BRPM | HEART RATE: 86 BPM

## 2019-12-23 LAB
ACETAMINOPHEN LEVEL: < 5 UG/ML (ref 0–20)
ALBUMIN SERPL-MCNC: 5.3 G/DL (ref 3.5–5.1)
ALP BLD-CCNC: 131 U/L (ref 38–126)
ALT SERPL-CCNC: 18 U/L (ref 11–66)
AMPHETAMINE+METHAMPHETAMINE URINE SCREEN: NEGATIVE
ANION GAP SERPL CALCULATED.3IONS-SCNC: 18 MEQ/L (ref 8–16)
AST SERPL-CCNC: 19 U/L (ref 5–40)
BARBITURATE QUANTITATIVE URINE: NEGATIVE
BASOPHILS # BLD: 0.8 %
BASOPHILS ABSOLUTE: 0.1 THOU/MM3 (ref 0–0.1)
BENZODIAZEPINE QUANTITATIVE URINE: NEGATIVE
BILIRUB SERPL-MCNC: 0.5 MG/DL (ref 0.3–1.2)
BUN BLDV-MCNC: 9 MG/DL (ref 7–22)
CALCIUM SERPL-MCNC: 10 MG/DL (ref 8.5–10.5)
CANNABINOID QUANTITATIVE URINE: NEGATIVE
CHLORIDE BLD-SCNC: 97 MEQ/L (ref 98–111)
CO2: 18 MEQ/L (ref 23–33)
COCAINE METABOLITE QUANTITATIVE URINE: NEGATIVE
CREAT SERPL-MCNC: 0.8 MG/DL (ref 0.4–1.2)
EOSINOPHIL # BLD: 1.3 %
EOSINOPHILS ABSOLUTE: 0.1 THOU/MM3 (ref 0–0.4)
ERYTHROCYTE [DISTWIDTH] IN BLOOD BY AUTOMATED COUNT: 13 % (ref 11.5–14.5)
ERYTHROCYTE [DISTWIDTH] IN BLOOD BY AUTOMATED COUNT: 39.8 FL (ref 35–45)
ETHYL ALCOHOL, SERUM: < 0.01 %
GFR SERPL CREATININE-BSD FRML MDRD: > 90 ML/MIN/1.73M2
GLUCOSE BLD-MCNC: 252 MG/DL (ref 70–108)
HCT VFR BLD CALC: 47.9 % (ref 42–52)
HEMOGLOBIN: 16.1 GM/DL (ref 14–18)
IMMATURE GRANS (ABS): 0.01 THOU/MM3 (ref 0–0.07)
IMMATURE GRANULOCYTES: 0.1 %
LYMPHOCYTES # BLD: 36.2 %
LYMPHOCYTES ABSOLUTE: 3.1 THOU/MM3 (ref 1–4.8)
MCH RBC QN AUTO: 28.4 PG (ref 26–33)
MCHC RBC AUTO-ENTMCNC: 33.6 GM/DL (ref 32.2–35.5)
MCV RBC AUTO: 84.6 FL (ref 80–94)
MONOCYTES # BLD: 7.7 %
MONOCYTES ABSOLUTE: 0.7 THOU/MM3 (ref 0.4–1.3)
NUCLEATED RED BLOOD CELLS: 0 /100 WBC
OPIATES, URINE: NEGATIVE
OSMOLALITY CALCULATION: 273.6 MOSMOL/KG (ref 275–300)
OXYCODONE: NEGATIVE
PHENCYCLIDINE QUANTITATIVE URINE: NEGATIVE
PLATELET # BLD: 240 THOU/MM3 (ref 130–400)
PMV BLD AUTO: 9.1 FL (ref 9.4–12.4)
POTASSIUM SERPL-SCNC: 3.7 MEQ/L (ref 3.5–5.2)
RBC # BLD: 5.66 MILL/MM3 (ref 4.7–6.1)
SALICYLATE, SERUM: < 0.3 MG/DL (ref 2–10)
SEG NEUTROPHILS: 53.9 %
SEGMENTED NEUTROPHILS ABSOLUTE COUNT: 4.6 THOU/MM3 (ref 1.8–7.7)
SODIUM BLD-SCNC: 133 MEQ/L (ref 135–145)
T4 FREE: 1.31 NG/DL (ref 0.93–1.76)
TOTAL PROTEIN: 8.5 G/DL (ref 6.1–8)
TSH SERPL DL<=0.05 MIU/L-ACNC: 5.51 UIU/ML (ref 0.4–4.2)
WBC # BLD: 8.5 THOU/MM3 (ref 4.8–10.8)

## 2019-12-23 PROCEDURE — 84443 ASSAY THYROID STIM HORMONE: CPT

## 2019-12-23 PROCEDURE — 85025 COMPLETE CBC W/AUTO DIFF WBC: CPT

## 2019-12-23 PROCEDURE — G0480 DRUG TEST DEF 1-7 CLASSES: HCPCS

## 2019-12-23 PROCEDURE — 36415 COLL VENOUS BLD VENIPUNCTURE: CPT

## 2019-12-23 PROCEDURE — 80305 DRUG TEST PRSMV DIR OPT OBS: CPT

## 2019-12-23 PROCEDURE — 80053 COMPREHEN METABOLIC PANEL: CPT

## 2019-12-23 PROCEDURE — 84439 ASSAY OF FREE THYROXINE: CPT

## 2019-12-23 PROCEDURE — 94761 N-INVAS EAR/PLS OXIMETRY MLT: CPT

## 2019-12-23 PROCEDURE — 99285 EMERGENCY DEPT VISIT HI MDM: CPT

## 2019-12-23 ASSESSMENT — ENCOUNTER SYMPTOMS
SHORTNESS OF BREATH: 0
VOMITING: 0
FACIAL SWELLING: 0
ABDOMINAL PAIN: 0
NAUSEA: 0
PHOTOPHOBIA: 0

## 2019-12-23 ASSESSMENT — SLEEP AND FATIGUE QUESTIONNAIRES
DIFFICULTY FALLING ASLEEP: YES
DO YOU HAVE DIFFICULTY SLEEPING: YES
DO YOU USE A SLEEP AID: NO
SLEEP PATTERN: DIFFICULTY FALLING ASLEEP;DISTURBED/INTERRUPTED SLEEP
RESTFUL SLEEP: NO
DIFFICULTY ARISING: NO
DIFFICULTY STAYING ASLEEP: YES

## 2019-12-23 ASSESSMENT — PATIENT HEALTH QUESTIONNAIRE - PHQ9: SUM OF ALL RESPONSES TO PHQ QUESTIONS 1-9: 15

## 2019-12-23 NOTE — ED NOTES
Pt ambulated to bathroom at this time to collect urine sample at this time.       Osmel Mishra RN  12/23/19 4490

## 2019-12-23 NOTE — ED PROVIDER NOTES
[facility] EMERGENCY DEPT      CHIEF COMPLAINT       Chief Complaint   Patient presents with    Mental Health Problem       Nurses Notes reviewed and I agreeexcept as noted in the HPI. HISTORY OF PRESENT ILLNESS    Sarah Davila is a 51 y.o. male who presents to the ED for the evaluation of a mental health problem. The patient states that he was doing his Bible study two nights ago and he noticed his clock radio was moving on his nightstand. The patient states that he's a neat person and it's not typical for the clock to be in that position. He states the alarm clock was moving by itself on the nightstand without his control. When the patient went to put the clock back int he right spot, it felt hot and he was unable to remove his hand. The patient then felt a shock, and he thinks evil spirits are involved. He complains of paresthesias to the left hand since. He states a woman in his apartment complex \"practices that stuff\" but he \"has nothing to do with her. \"  The patient has a past medical history of ADHD, anxiety, depression, HTN, HLD, seizures, sleep apnea, and type II DM. There are no further symptoms or concerns a this time. Location/Symptom: Mental health problem  Timing/Onset: Saturday  Context/Setting: Alarm clock was moving by itself then shocked him    REVIEW OF SYSTEMS      Review of Systems   Constitutional: Negative for fever. HENT: Negative for facial swelling. Eyes: Negative for photophobia. Respiratory: Negative for shortness of breath. Cardiovascular: Negative for chest pain. Gastrointestinal: Negative for abdominal pain, nausea and vomiting. Musculoskeletal: Negative for arthralgias. Skin: Negative for wound. Neurological: Negative for dizziness and numbness (Paresthesias to his left hand). Psychiatric/Behavioral: Positive for hallucinations (Alarm clock moving by itself and shocked patient).         PAST MEDICAL HISTORY    has a past medical history of ADHD Multiple Vitamin (MULTIVITAMIN) tablet take 1 tablet by mouth once daily, Disp-30 tablet, R-11      !! albuterol sulfate HFA (PROAIR HFA) 108 (90 BASE) MCG/ACT inhaler Inhale 2 puffs into the lungs every 4 hours as needed for Wheezing Dx: 493.90, Disp-1 Inhaler, R-5      zonisamide (ZONEGRAN) 100 MG capsule Take 1 capsule by mouth daily, Disp-30 capsule, R-5      loratadine (CLARITIN) 10 MG tablet Take 1 tablet by mouth daily, Disp-30 tablet, R-11      OXcarbazepine (TRILEPTAL) 300 MG tablet Take 600 mg by mouth daily Historical Med       !! - Potential duplicate medications found. Please discuss with provider. ALLERGIES     is allergic to latuda [lurasidone hcl]; morphine; tetracyclines & related; vilazodone hcl; fish-derived products; and flagyl [metronidazole]. FAMILY HISTORY     He indicated that his mother is alive. He indicated that his father is alive. He indicated that the status of his sister is unknown. He indicated that the status of his brother is unknown. He indicated that the status of his paternal grandmother is unknown. He indicated that the status of his paternal aunt is unknown. He indicated that the status of his neg hx is unknown.   family history includes Dementia in his mother; Diabetes in his paternal aunt and paternal grandmother; High Blood Pressure in his brother, father, and sister; Hypertension in his father; Other in his father. SOCIAL HISTORY    reports that he has never smoked. He has never used smokeless tobacco. He reports current alcohol use of about 4.0 standard drinks of alcohol per week. He reports that he does not use drugs. PHYSICAL EXAM     INITIAL VITALS:  height is 6' 2\" (1.88 m) and weight is 237 lb (107.5 kg). His oral temperature is 97.8 °F (36.6 °C). His blood pressure is 144/92 (abnormal) and his pulse is 86. His respiration is 16 and oxygen saturation is 97%. Physical Exam  Vitals signs and nursing note reviewed.    Constitutional:       General: He is not in acute distress. Appearance: He is well-developed. He is not toxic-appearing or diaphoretic. HENT:      Head: Normocephalic and atraumatic. Right Ear: Hearing normal.      Left Ear: Hearing normal.      Nose: Nose normal. No rhinorrhea. Mouth/Throat:      Pharynx: Uvula midline. No oropharyngeal exudate. Eyes:      General: Lids are normal. No scleral icterus. Conjunctiva/sclera: Conjunctivae normal.      Pupils: Pupils are equal, round, and reactive to light. Neck:      Musculoskeletal: Normal range of motion and neck supple. No neck rigidity. Trachea: No tracheal deviation. Cardiovascular:      Rate and Rhythm: Normal rate and regular rhythm. Heart sounds: No murmur. Pulmonary:      Effort: Pulmonary effort is normal. No respiratory distress. Breath sounds: Normal air entry. No decreased breath sounds. Abdominal:      General: There is no distension. Palpations: Abdomen is soft. Abdomen is not rigid. Tenderness: There is no tenderness. There is no guarding. Musculoskeletal: Normal range of motion. Left elbow: Normal.      Left wrist: Normal.      Left upper arm: Normal.      Left forearm: Normal.      Left hand: Normal.   Lymphadenopathy:      Cervical: No cervical adenopathy. Skin:     General: Skin is warm and dry. Coloration: Skin is not pale. Findings: No rash. Neurological:      Mental Status: He is alert and oriented to person, place, and time. GCS: GCS eye subscore is 4. GCS verbal subscore is 5. GCS motor subscore is 6. Gait: Gait normal.      Comments: No gross deficits observed   Psychiatric:         Attention and Perception: Attention and perception normal.         Mood and Affect: Mood and affect normal.         Speech: Speech is tangential.         Behavior: Behavior normal.         Thought Content:  Thought content is delusional.         Cognition and Memory: Cognition and memory normal.         Judgment: Judgment normal.               DIFFERENTIAL DIAGNOSIS:   Including but not limited to delusions, hallucinations, schizophrenia, electric shock, paresthesia    DIAGNOSTIC RESULTS     EKG: All E[facility]'s are [facility] who either signs or Co-signs this chart in the absence of a cardiologist.  None    RADIOLOGY: non-plain film images(s) such as CT,Ultrasound and MRI are read by the radiologist.  Plain radiographic images are visualized and preliminarily interpreted by the emergency physician unless otherwise stated below.   No orders to display       LABS:   Labs Reviewed   CBC WITH AUTO DIFFERENTIAL - Abnormal; Notable for the following components:       Result Value    MPV 9.1 (*)     All other components within normal limits   COMPREHENSIVE METABOLIC PANEL - Abnormal; Notable for the following components:    Glucose 252 (*)     Sodium 133 (*)     Chloride 97 (*)     CO2 18 (*)     Alkaline Phosphatase 131 (*)     Total Protein 8.5 (*)     Alb 5.3 (*)     All other components within normal limits   TSH WITH REFLEX - Abnormal; Notable for the following components:    TSH 5.510 (*)     All other components within normal limits   SALICYLATE LEVEL - Abnormal; Notable for the following components:    Salicylate, Serum < 0.3 (*)     All other components within normal limits   ANION GAP - Abnormal; Notable for the following components:    Anion Gap 18.0 (*)     All other components within normal limits   OSMOLALITY - Abnormal; Notable for the following components:    Osmolality Calc 273.6 (*)     All other components within normal limits   ACETAMINOPHEN LEVEL   ETHANOL   GLOMERULAR FILTRATION RATE, ESTIMATED   RAPID DRUG SCREEN, URINE   T4, FREE       EMERGENCY DEPARTMENT COURSE:   Vitals:    Vitals:    12/23/19 0612 12/23/19 0617 12/23/19 0710 12/23/19 0802   BP: (!) 190/167 (!) 164/111 (!) 160/109 (!) 144/92   Pulse: 116  92 86   Resp: 18  18 16   Temp: 97.8 °F (36.6 °C)      TempSrc: Oral SpO2:  96% 96% 97%   Weight: 237 lb (107.5 kg)      Height: 6' 2\" (1.88 m)        6:20 AM: The patient was seen and evaluated by myself at bedside. 8:08 AM: I consulted TATI Farrell who spoke with the patient and recommended I discharge the patient with follow-up at [facility]. MDM:  The patient was seen in the ER by me for reported spontaneous movement of his alarm clock from which she sustained an electric shock to his left upper extremity. Patient was histrionic and tangential.  Attempts to redirect him were futile causing frustration. Medical clearance labs were ordered and evaluated upon completion. Hyperglycemia of 252 noted and an elevated TSH at 5.5 but otherwise labs unremarkable. Patient was evaluated by Wilbert Gerard from [facility]FFIL[facility]ATE OF [facility], who consulted Dr. Hema Ballard, who advised discharge with follow-up at [facility]. Upon discharge the patient became upset that we did not address the electric shock and movement of his clock. Myself and Kiet Chawla were at bedside and discussed his problem at length. Patient seemed reassured and all questions were addressed. I have given the patient strict written and verbal instructions about care at home, follow-up, and sign andsymptoms of worsening of condition and they did verbalize understanding. CRITICAL CARE:   None    CONSULTS:  None    PROCEDURES:  None    FINAL IMPRESSION      1. Delusion ([address]    2. Possible Electrical shock of hand, initial encounter          DISPOSITION/PLAN     1. Delusion ([address]    2. Possible Electrical shock of hand, initial encounter        PATIENT REFERRED TO:  [facility]  [address] [facility] [address]  [phone]    Go to         DISCHARGE MEDICATIONS:  Discharge Medication List as of 12/23/2019  8:10 AM          (Please note that portions of this note were completed with a voice recognition program.  Efforts were made to edit the dictations but occasionally words are mis-transcribed.)    Scribe:  Stacey Matthews (Name) 12/23/19 6:50 AM Scribing for and in the presence of Scott Porter. Signed by: Marbin West (Name), Scribe, 12/23/19 12:11 PM    Provider:  I personally performed the services described in the documentation, reviewed and edited the documentation which was dictated to the scribe in my presence, and it accurately records my words and actions.     AZIZA Porter 12/23/19 12:11 PM    AZIZA Porter [place]  12/23/19 5651

## 2019-12-23 NOTE — ED NOTES
ED nurse-to-nurse bedside report    Chief Complaint   Patient presents with   [address] Problem      LOC: alert and orientated to name, place, date  Vital signs   Vitals:    12/23/19 0612 12/23/19 0617   BP: (!) 190/167 (!) 164/111   Pulse: 116    Resp: 18    Temp: 97.8 °F (36.6 °C)    TempSrc: Oral    SpO2:  96%   Weight: 237 lb (107.5 kg)    Height: 6' 2\" (1.88 m)       Pain:    Pain Interventions: pt denies pain at this time  Pain Goal: no pain  Oxygen: No    Current needs required none   Telemetry: No  LDAs:    Continuous Infusions:   Mobility: Independent  [place] Fall Risk Score: No flowsheet data found. Fall Interventions: bed in lowest position. Call light within reach.   Report given to: [facility], RN  12/23/19 6022

## 2019-12-23 NOTE — ED TRIAGE NOTES
Pt presents to the ED via ED lobby and states that he was electrocuted on Saturday. Pt states he was doing a bible study and he noticed his alarm clock was moving on the nightstand without his control and hen he went to place it in the right spot he felt the alarm clock feel hot and he was unable to remove his hand and then he felt a shock. Pt states that he thinks evil spirits are involved. Pts thoughts scattered as he explains the story and subject jumps around. Pt has hx of depression and anxiety. VSS, respirations even and unlabored.

## 2019-12-23 NOTE — ED NOTES
Patient resting quietly in cot showing no signs of distress at this time. Updated on POC. Will continue to monitor.       Davey Lau RN  12/23/19 1490

## 2019-12-26 ENCOUNTER — HOSPITAL ENCOUNTER (INPATIENT)
Age: 49
LOS: 4 days | Discharge: HOME | DRG: 750 | End: 2019-12-30
Attending: PSYCHIATRY & NEUROLOGY | Admitting: PSYCHIATRY & NEUROLOGY
Payer: COMMERCIAL

## 2019-12-26 PROBLEM — F29 PSYCHOSIS (HCC): Status: ACTIVE | Noted: 2019-12-26

## 2019-12-26 LAB
ACETAMINOPHEN LEVEL: < 5 UG/ML (ref 0–20)
ALBUMIN SERPL-MCNC: 4.3 G/DL (ref 3.5–5.1)
ALP BLD-CCNC: 111 U/L (ref 38–126)
ALT SERPL-CCNC: 21 U/L (ref 11–66)
AMPHETAMINE+METHAMPHETAMINE URINE SCREEN: NEGATIVE
ANION GAP SERPL CALCULATED.3IONS-SCNC: 16 MEQ/L (ref 8–16)
AST SERPL-CCNC: 19 U/L (ref 5–40)
BACTERIA: ABNORMAL /HPF
BARBITURATE QUANTITATIVE URINE: NEGATIVE
BASOPHILS # BLD: 1 %
BASOPHILS ABSOLUTE: 0.1 THOU/MM3 (ref 0–0.1)
BENZODIAZEPINE QUANTITATIVE URINE: NEGATIVE
BILIRUB SERPL-MCNC: 0.6 MG/DL (ref 0.3–1.2)
BILIRUBIN URINE: NEGATIVE
BLOOD, URINE: NEGATIVE
BUN BLDV-MCNC: 9 MG/DL (ref 7–22)
CALCIUM SERPL-MCNC: 9.3 MG/DL (ref 8.5–10.5)
CANNABINOID QUANTITATIVE URINE: NEGATIVE
CASTS 2: ABNORMAL /LPF
CASTS UA: ABNORMAL /LPF
CHARACTER, URINE: CLEAR
CHLORIDE BLD-SCNC: 95 MEQ/L (ref 98–111)
CHP ED QC CHECK: YES
CO2: 22 MEQ/L (ref 23–33)
COCAINE METABOLITE QUANTITATIVE URINE: NEGATIVE
COLOR: YELLOW
CREAT SERPL-MCNC: 0.9 MG/DL (ref 0.4–1.2)
CRYSTALS, UA: ABNORMAL
EOSINOPHIL # BLD: 0.6 %
EOSINOPHILS ABSOLUTE: 0.1 THOU/MM3 (ref 0–0.4)
EPITHELIAL CELLS, UA: ABNORMAL /HPF
ERYTHROCYTE [DISTWIDTH] IN BLOOD BY AUTOMATED COUNT: 13 % (ref 11.5–14.5)
ERYTHROCYTE [DISTWIDTH] IN BLOOD BY AUTOMATED COUNT: 40.8 FL (ref 35–45)
ETHYL ALCOHOL, SERUM: < 0.01 %
GLUCOSE BLD-MCNC: 282 MG/DL (ref 70–108)
GLUCOSE BLD-MCNC: 377 MG/DL
GLUCOSE BLD-MCNC: 377 MG/DL (ref 70–108)
GLUCOSE BLD-MCNC: 396 MG/DL (ref 70–108)
GLUCOSE BLD-MCNC: 443 MG/DL (ref 70–108)
GLUCOSE BLD-MCNC: 456 MG/DL (ref 70–108)
GLUCOSE URINE: >= 1000 MG/DL
HCT VFR BLD CALC: 45.4 % (ref 42–52)
HEMOGLOBIN: 14.8 GM/DL (ref 14–18)
IMMATURE GRANS (ABS): 0.02 THOU/MM3 (ref 0–0.07)
IMMATURE GRANULOCYTES: 0.2 %
KETONES, URINE: 40
LEUKOCYTE ESTERASE, URINE: NEGATIVE
LIPASE: 57.3 U/L (ref 5.6–51.3)
LYMPHOCYTES # BLD: 14.8 %
LYMPHOCYTES ABSOLUTE: 1.2 THOU/MM3 (ref 1–4.8)
MCH RBC QN AUTO: 28.4 PG (ref 26–33)
MCHC RBC AUTO-ENTMCNC: 32.6 GM/DL (ref 32.2–35.5)
MCV RBC AUTO: 87 FL (ref 80–94)
MISCELLANEOUS 2: ABNORMAL
MONOCYTES # BLD: 7 %
MONOCYTES ABSOLUTE: 0.6 THOU/MM3 (ref 0.4–1.3)
NITRITE, URINE: NEGATIVE
NUCLEATED RED BLOOD CELLS: 0 /100 WBC
OPIATES, URINE: NEGATIVE
OSMOLALITY CALCULATION: 284.9 MOSMOL/KG (ref 275–300)
OXYCODONE: NEGATIVE
PH UA: 6.5 (ref 5–9)
PHENCYCLIDINE QUANTITATIVE URINE: NEGATIVE
PLATELET # BLD: 210 THOU/MM3 (ref 130–400)
PMV BLD AUTO: 9.6 FL (ref 9.4–12.4)
POTASSIUM SERPL-SCNC: 3.9 MEQ/L (ref 3.5–5.2)
PROTEIN UA: 30
RBC # BLD: 5.22 MILL/MM3 (ref 4.7–6.1)
RBC URINE: ABNORMAL /HPF
RENAL EPITHELIAL, UA: ABNORMAL
SALICYLATE, SERUM: < 0.3 MG/DL (ref 2–10)
SEG NEUTROPHILS: 76.4 %
SEGMENTED NEUTROPHILS ABSOLUTE COUNT: 6.4 THOU/MM3 (ref 1.8–7.7)
SODIUM BLD-SCNC: 133 MEQ/L (ref 135–145)
SPECIFIC GRAVITY, URINE: > 1.03 (ref 1–1.03)
TOTAL PROTEIN: 7.2 G/DL (ref 6.1–8)
UROBILINOGEN, URINE: 1 EU/DL (ref 0–1)
WBC # BLD: 8.4 THOU/MM3 (ref 4.8–10.8)
WBC UA: ABNORMAL /HPF
YEAST: ABNORMAL

## 2019-12-26 PROCEDURE — 36415 COLL VENOUS BLD VENIPUNCTURE: CPT

## 2019-12-26 PROCEDURE — G0480 DRUG TEST DEF 1-7 CLASSES: HCPCS

## 2019-12-26 PROCEDURE — 6370000000 HC RX 637 (ALT 250 FOR IP): Performed by: EMERGENCY MEDICINE

## 2019-12-26 PROCEDURE — 80307 DRUG TEST PRSMV CHEM ANLYZR: CPT

## 2019-12-26 PROCEDURE — 6370000000 HC RX 637 (ALT 250 FOR IP): Performed by: PHYSICIAN ASSISTANT

## 2019-12-26 PROCEDURE — 96374 THER/PROPH/DIAG INJ IV PUSH: CPT

## 2019-12-26 PROCEDURE — 99285 EMERGENCY DEPT VISIT HI MDM: CPT

## 2019-12-26 PROCEDURE — 83690 ASSAY OF LIPASE: CPT

## 2019-12-26 PROCEDURE — 80053 COMPREHEN METABOLIC PANEL: CPT

## 2019-12-26 PROCEDURE — 85025 COMPLETE CBC W/AUTO DIFF WBC: CPT

## 2019-12-26 PROCEDURE — 94640 AIRWAY INHALATION TREATMENT: CPT

## 2019-12-26 PROCEDURE — 81001 URINALYSIS AUTO W/SCOPE: CPT

## 2019-12-26 PROCEDURE — 1240000000 HC EMOTIONAL WELLNESS R&B

## 2019-12-26 PROCEDURE — 6370000000 HC RX 637 (ALT 250 FOR IP): Performed by: PSYCHIATRY & NEUROLOGY

## 2019-12-26 PROCEDURE — 82948 REAGENT STRIP/BLOOD GLUCOSE: CPT

## 2019-12-26 RX ORDER — ALBUTEROL SULFATE 90 UG/1
2 AEROSOL, METERED RESPIRATORY (INHALATION) EVERY 4 HOURS PRN
Status: DISCONTINUED | OUTPATIENT
Start: 2019-12-26 | End: 2019-12-30 | Stop reason: HOSPADM

## 2019-12-26 RX ORDER — TRAZODONE HYDROCHLORIDE 50 MG/1
50 TABLET ORAL NIGHTLY PRN
Status: DISCONTINUED | OUTPATIENT
Start: 2019-12-26 | End: 2019-12-26

## 2019-12-26 RX ORDER — OXCARBAZEPINE 300 MG/1
750 TABLET, FILM COATED ORAL 2 TIMES DAILY
Status: DISCONTINUED | OUTPATIENT
Start: 2019-12-26 | End: 2019-12-30 | Stop reason: HOSPADM

## 2019-12-26 RX ORDER — DEXTROSE MONOHYDRATE 50 MG/ML
100 INJECTION, SOLUTION INTRAVENOUS PRN
Status: DISCONTINUED | OUTPATIENT
Start: 2019-12-26 | End: 2019-12-30 | Stop reason: HOSPADM

## 2019-12-26 RX ORDER — MAGNESIUM HYDROXIDE/ALUMINUM HYDROXICE/SIMETHICONE 120; 1200; 1200 MG/30ML; MG/30ML; MG/30ML
30 SUSPENSION ORAL EVERY 6 HOURS PRN
Status: DISCONTINUED | OUTPATIENT
Start: 2019-12-26 | End: 2019-12-30 | Stop reason: HOSPADM

## 2019-12-26 RX ORDER — AMLODIPINE BESYLATE 5 MG/1
5 TABLET ORAL DAILY
Status: DISCONTINUED | OUTPATIENT
Start: 2019-12-26 | End: 2019-12-30 | Stop reason: HOSPADM

## 2019-12-26 RX ORDER — ACETAMINOPHEN 325 MG/1
650 TABLET ORAL EVERY 4 HOURS PRN
Status: DISCONTINUED | OUTPATIENT
Start: 2019-12-26 | End: 2019-12-30 | Stop reason: HOSPADM

## 2019-12-26 RX ORDER — NICOTINE POLACRILEX 4 MG
15 LOZENGE BUCCAL PRN
Status: DISCONTINUED | OUTPATIENT
Start: 2019-12-26 | End: 2019-12-30 | Stop reason: HOSPADM

## 2019-12-26 RX ORDER — IBUPROFEN 400 MG/1
400 TABLET ORAL EVERY 6 HOURS PRN
Status: DISCONTINUED | OUTPATIENT
Start: 2019-12-26 | End: 2019-12-30 | Stop reason: HOSPADM

## 2019-12-26 RX ORDER — HYDROXYZINE HYDROCHLORIDE 25 MG/1
50 TABLET, FILM COATED ORAL 3 TIMES DAILY PRN
Status: DISCONTINUED | OUTPATIENT
Start: 2019-12-26 | End: 2019-12-30 | Stop reason: HOSPADM

## 2019-12-26 RX ORDER — ATORVASTATIN CALCIUM 10 MG/1
10 TABLET, FILM COATED ORAL DAILY
Status: DISCONTINUED | OUTPATIENT
Start: 2019-12-26 | End: 2019-12-30 | Stop reason: HOSPADM

## 2019-12-26 RX ORDER — GUANFACINE 4 MG/1
4 TABLET, EXTENDED RELEASE ORAL
Status: DISCONTINUED | OUTPATIENT
Start: 2019-12-27 | End: 2019-12-30 | Stop reason: HOSPADM

## 2019-12-26 RX ORDER — ASPIRIN 81 MG/1
81 TABLET ORAL DAILY
Status: DISCONTINUED | OUTPATIENT
Start: 2019-12-26 | End: 2019-12-30 | Stop reason: HOSPADM

## 2019-12-26 RX ORDER — LORAZEPAM 2 MG/1
2 TABLET ORAL EVERY 8 HOURS PRN
Status: DISCONTINUED | OUTPATIENT
Start: 2019-12-26 | End: 2019-12-30 | Stop reason: HOSPADM

## 2019-12-26 RX ORDER — BUSPIRONE HYDROCHLORIDE 7.5 MG/1
15 TABLET ORAL 2 TIMES DAILY
Status: DISCONTINUED | OUTPATIENT
Start: 2019-12-26 | End: 2019-12-30 | Stop reason: HOSPADM

## 2019-12-26 RX ORDER — DEXTROSE MONOHYDRATE 25 G/50ML
12.5 INJECTION, SOLUTION INTRAVENOUS PRN
Status: DISCONTINUED | OUTPATIENT
Start: 2019-12-26 | End: 2019-12-30 | Stop reason: HOSPADM

## 2019-12-26 RX ORDER — OXYBUTYNIN CHLORIDE 10 MG/1
10 TABLET, EXTENDED RELEASE ORAL 2 TIMES DAILY
Status: DISCONTINUED | OUTPATIENT
Start: 2019-12-26 | End: 2019-12-30 | Stop reason: HOSPADM

## 2019-12-26 RX ORDER — LOSARTAN POTASSIUM 100 MG/1
100 TABLET ORAL DAILY
Status: DISCONTINUED | OUTPATIENT
Start: 2019-12-26 | End: 2019-12-30 | Stop reason: HOSPADM

## 2019-12-26 RX ORDER — HALOPERIDOL 5 MG/ML
5 INJECTION INTRAMUSCULAR EVERY 8 HOURS PRN
Status: DISCONTINUED | OUTPATIENT
Start: 2019-12-26 | End: 2019-12-30 | Stop reason: HOSPADM

## 2019-12-26 RX ORDER — PANTOPRAZOLE SODIUM 40 MG/1
40 TABLET, DELAYED RELEASE ORAL
Status: DISCONTINUED | OUTPATIENT
Start: 2019-12-27 | End: 2019-12-30 | Stop reason: HOSPADM

## 2019-12-26 RX ORDER — NICOTINE 21 MG/24HR
1 PATCH, TRANSDERMAL 24 HOURS TRANSDERMAL DAILY
Status: DISCONTINUED | OUTPATIENT
Start: 2019-12-26 | End: 2019-12-26

## 2019-12-26 RX ORDER — METOPROLOL SUCCINATE 50 MG/1
50 TABLET, EXTENDED RELEASE ORAL DAILY
Status: DISCONTINUED | OUTPATIENT
Start: 2019-12-26 | End: 2019-12-30 | Stop reason: HOSPADM

## 2019-12-26 RX ORDER — FLUOXETINE HYDROCHLORIDE 20 MG/1
40 CAPSULE ORAL DAILY
Status: DISCONTINUED | OUTPATIENT
Start: 2019-12-26 | End: 2019-12-27

## 2019-12-26 RX ORDER — HALOPERIDOL 5 MG
5 TABLET ORAL EVERY 8 HOURS PRN
Status: DISCONTINUED | OUTPATIENT
Start: 2019-12-26 | End: 2019-12-30 | Stop reason: HOSPADM

## 2019-12-26 RX ORDER — LORAZEPAM 2 MG/ML
2 INJECTION INTRAMUSCULAR EVERY 8 HOURS PRN
Status: DISCONTINUED | OUTPATIENT
Start: 2019-12-26 | End: 2019-12-30 | Stop reason: HOSPADM

## 2019-12-26 RX ADMIN — BREXPIPRAZOLE 4 MG: 2 TABLET ORAL at 20:39

## 2019-12-26 RX ADMIN — INSULIN HUMAN 10 UNITS: 100 INJECTION, SOLUTION PARENTERAL at 10:16

## 2019-12-26 RX ADMIN — FLUOXETINE HYDROCHLORIDE 40 MG: 20 CAPSULE ORAL at 16:24

## 2019-12-26 RX ADMIN — HALOPERIDOL 5 MG: 5 TABLET ORAL at 16:25

## 2019-12-26 RX ADMIN — TRAZODONE HYDROCHLORIDE 150 MG: 50 TABLET ORAL at 20:39

## 2019-12-26 RX ADMIN — AMLODIPINE BESYLATE 5 MG: 5 TABLET ORAL at 16:24

## 2019-12-26 RX ADMIN — LOSARTAN POTASSIUM 100 MG: 100 TABLET, FILM COATED ORAL at 16:24

## 2019-12-26 RX ADMIN — OXCARBAZEPINE 750 MG: 300 TABLET, FILM COATED ORAL at 20:39

## 2019-12-26 RX ADMIN — BUSPIRONE HYDROCHLORIDE 15 MG: 7.5 TABLET ORAL at 20:39

## 2019-12-26 RX ADMIN — LORAZEPAM 2 MG: 2 TABLET ORAL at 16:25

## 2019-12-26 RX ADMIN — ASPIRIN 81 MG: 81 TABLET ORAL at 16:25

## 2019-12-26 RX ADMIN — METOPROLOL SUCCINATE 50 MG: 50 TABLET, FILM COATED, EXTENDED RELEASE ORAL at 16:24

## 2019-12-26 RX ADMIN — OXYBUTYNIN CHLORIDE 10 MG: 10 TABLET, EXTENDED RELEASE ORAL at 20:39

## 2019-12-26 RX ADMIN — ATORVASTATIN CALCIUM 10 MG: 10 TABLET, FILM COATED ORAL at 20:39

## 2019-12-26 RX ADMIN — INSULIN LISPRO 6 UNITS: 100 INJECTION, SOLUTION INTRAVENOUS; SUBCUTANEOUS at 20:39

## 2019-12-26 RX ADMIN — Medication 2 PUFF: at 19:55

## 2019-12-26 ASSESSMENT — SLEEP AND FATIGUE QUESTIONNAIRES
DO YOU HAVE DIFFICULTY SLEEPING: YES
DIFFICULTY STAYING ASLEEP: YES
SLEEP PATTERN: DIFFICULTY FALLING ASLEEP;DISTURBED/INTERRUPTED SLEEP
RESTFUL SLEEP: NO
DO YOU HAVE DIFFICULTY SLEEPING: YES
DIFFICULTY FALLING ASLEEP: YES
DIFFICULTY ARISING: NO
DO YOU USE A SLEEP AID: YES

## 2019-12-26 ASSESSMENT — ENCOUNTER SYMPTOMS
ABDOMINAL PAIN: 0
NAUSEA: 0
SHORTNESS OF BREATH: 0
VOMITING: 0

## 2019-12-26 ASSESSMENT — PAIN SCALES - GENERAL
PAINLEVEL_OUTOF10: 0

## 2019-12-26 ASSESSMENT — LIFESTYLE VARIABLES: HISTORY_ALCOHOL_USE: NO

## 2019-12-26 ASSESSMENT — PATIENT HEALTH QUESTIONNAIRE - PHQ9: SUM OF ALL RESPONSES TO PHQ QUESTIONS 1-9: 15

## 2019-12-26 NOTE — ED NOTES
ED to inpatient nurses report    Chief Complaint   Patient presents with    Depression      Present to ED from home  LOC: alert and orientated to name, place, date  Vital signs   Vitals:    12/26/19 0856   BP: (!) 159/105   Pulse: 101   Resp: 16   Temp: 97.3 °F (36.3 °C)   TempSrc: Oral   SpO2: 96%   Weight: 240 lb (108.9 kg)   Height: 6' 2\" (1.88 m)      Oxygen Baseline RA    Current needs required none   LDAs:    Mobility: Independent  Pending ED orders: none  Present condition: Stable    Electronically signed by Tadeo Arenas RN on 12/26/2019 at 12:35 PM     Tadeo Arenas RN  12/26/19 9989

## 2019-12-26 NOTE — PROGRESS NOTES
Behavioral Health   Admission Note     Admission Type:   Admission Type:  Involuntary    Reason for admission:  Reason for Admission: psychosis    PATIENT STRENGTHS:  Strengths: Positive Support    Patient Strengths and Limitations:  Limitations: Difficulty problem solving/relies on others to help solve problems    Addictive Behavior:   Addictive Behavior  In the past 3 months, have you felt or has someone told you that you have a problem with:  : None  Do you have a history of Chemical Use?: No  Do you have a history of Alcohol Use?: No  Do you have a history of Street Drug Abuse?: No  Histroy of Prescripton Drug Abuse?: No    Medical Problems:   Past Medical History:   Diagnosis Date    ADHD (attention deficit hyperactivity disorder)     Anxiety     Arthritis     Asthma     Bladder dysfunction     Depression     Diabetes mellitus (HCC)     Fatigue     GERD (gastroesophageal reflux disease)     Headache     migraines    Hyperlipidemia     Hypertension     MDRO (multiple drug resistant organisms) resistance     yrs ago    OAB (overactive bladder) 10/27/2015    Seizures ([facility] [address] [facility]     Sleep apnea     wears cpap    Type II or unspecified type diabetes mellitus without mention of complication, not stated as uncontrolled        Status EXAM:  Status and Exam  Normal: No  Facial Expression: Worried  Affect: Blunt  Level of Consciousness: Alert  Mood:Normal: No  Mood: Anxious  Motor Activity:Normal: Yes  Interview Behavior: Cooperative  Preception: [place] to Person, [place] to Time, [place] to Place  Attention:Normal: Yes  Thought Processes: Blocking, Other(See comment)  Thought Content:Normal: No  Thought Content: Delusions, Paranoia  Hallucinations: Visual (Comment)  Delusions: Yes  Delusions: Persecution  Memory:Normal: No  Memory: Confabulation  Insight and Judgment: No  Insight and Judgment: Poor Judgment, Poor Insight  Present Suicidal Ideation: No  Present Homicidal Ideation: No    Pt admitted with followings belongings:  Dentures: None  Vision - Corrective Lenses: Glasses  Hearing Aid: None  Jewelry: None  Body Piercings Removed: N/A  Clothing: Footwear, Jacket / coat, Pants, Shirt, Undergarments (Comment)  Were All Patient Medications Collected?: Yes  Other Valuables: Cell phone, Other (Comment)(, med, ID and cards (pt has implantable device), $2)     Admission order obtained YES. Valuables sent home with N/A. Valuables placed in safe in security envelope, number:  [id number]. Patient's home medications were put on security bag. Patient oriented to surroundings and program expectations and copy of patient rights given. Received admission packet:  YES. Consents reviewed, signed YES. \"An Important Message from [employer] About Your Rights\" form reviewed, signed N/A. Refused N/A. Patient verbalize understanding:  YES. Patient education on precautions: N/A           Patient screened positive for suicide risk on CSSR-S (\"yes\" to question #4, 5, OR 6)  N/A. Physician notified of risk score. Orders received N/A . Provided pt with [employer] handout entitled \"Quitting Smoking. \"  Reviewed handout with pt addressing dangers of smoking, developing coping skills, and providing basic information about quitting. Pt response to counseling:  Pt does not smoke     Pt came to us from the [facility] then to the [facility]. He is paranoid and suspicious. He has not had his medications today and is fearful that someone is going to shoot him. He also thinks that there may be an evil eye in his house. He follows with Devin Cronin.         Ivory Arcos RN

## 2019-12-26 NOTE — PROGRESS NOTES
BHI Biopsychosocial Assessment    Current Level of Psychosocial Functioning     Independent XXX  Dependent    Minimal Assist     Comments:      Psychosocial High Risk Factors (check all that apply)    Unable to obtain meds   Chronic illness/pain    Substance abuse   Lack of Family Support   Financial stress   Isolation XXX  Inadequate Community Resources  Suicide attempt(s)  Not taking medications   Victim of crime   Developmental Delay  Unable to manage personal needs    Age 72 or older   Homeless  No transportation   Readmission within 30 days  Unemployment  Traumatic Event    Family to involve in treatment: Father, Brother     Sexual Orientation:  Heterosexual     Patient Strengths: Outpatient Provider, Communication, Medication Compliance    Patient Barriers: Paranoia     Safety plan: On-going    CMHC/MH history: Patient is currently linked with [facility] for psychiatry with Laura Sewell CNP and counseling with Ravindra Aviles. Plan of Care:  Medication management, group/individual therapies, family meetings, psycho -education, treatment team meetings to assist with stabilization    Initial Discharge Plan:  Patient is currently residing alone in his own apartment, he will return there. Clinical Summary: This is a 51year old, male who is admitted to  for increased psychosis. This is patient's first inpatient psychiatric admission, he has been admitted to the [place] [address] in the past. Patient is currently linked with [facility] for psychiatry with Laura Sewell CNP and individual counseling with Ravindra Aviles. Patient is currently residing in his own apartment in [facility]. It is reported that patient was well managed as an outpatient, however he presented to the crisis center on 12/26/19 due to believing there were 'evil spirits' in his house.  While at the crisis center, patient became paranoid believing that there were two men after him trying to shoot him. Patient was then [facility] by Devin Cronin staff and sent to [address] [place] [address]. [facility] for further evaluation and treatment. Patient denies any illicit drug use, UDS reflects this, patient admits to occasional alcohol use. Denies legal issues. SW will continue to discharge plan.      CHIN Felton

## 2019-12-26 NOTE — PROGRESS NOTES
Provisional Diagnosis:   Psychosis NOS     Risk, Psychosocial and Contextual Factors:  Hallucinations, Isolation, and Delusions. Current MH Treatment: Stephany Menendez CNP and Neha Correa with [facility]. Pt reports compliance. Present Suicidal Behavior:      Verbal: Denied        Attempt: None noted    Access to Weapons:  Denies    Current Suicide Risk: Low, Moderate or High:  Low      Past Suicidal Behavior:       Verbal: Denies    Attempt: Denies    Self-Injurious/Self-Mutilation: Denies    Traumatic Event Within Past 2 Weeks:   Denied    Current Abuse: Denies    Legal: History, drug abuse and possession in 1993 and 2008    Violence: Denies    Protective Factors:  Access to others    Housing:  Lives in an apartment      Clinical Summary:      Patient is a 51year old male who presents to the ED on [address] via [employer] from [facility]. Pt was last seen in the ED on 12/23/19 and was discharged. Per [facility],\"Pt was admitted to the crisis center on 12/24 for increased psychosis, seeing shadows and figures and very fearful. This morning 12/26 client took off sprinting from crisis center. Client came back and reported he was gone for about 30 minutes. Client reported that he overheard the crisis center nurse saying that she was going to let someone in to shoot him. Client reported he left and two people came up on him and acted like the were going to spray (shoot) him. Client repeatedly states I cant be here. I cant be here. \" Client eye contact is intense, mood agitated. Abbi Obrien \"  Clinician spoke with patient who states he is unsure to why [facility] sent him here. Pt denied current suicidal thoughts. Homicidal thoughts and/or plans denied. Pt endorsed experiencing hallucinations, specifically 'evil spirits' in his house. Pt reports paranoia, persecution, that someone is out to hurt him. Pt states that he feels something is going to happen but does not know why.  Pt continues to have intense eye contact throughout the

## 2019-12-26 NOTE — ED NOTES
Remains in safe room with continuous visual observation by campus police.      Jerri Harris RN  12/26/19 8503

## 2019-12-26 NOTE — FLOWSHEET NOTE
Pt is a 47yo man.  responded to a consult for  visit. Pt requested to see a . Pt has started attending a Yazidism in the last couple of weeks. Pt requested a Bible and prayer. Which  provided. Pt has a strong family network in the [address]. Spiritual care to continue to offer support and encouragement. 12/26/19 3799   Encounter Summary   Services provided to: Patient   Referral/Consult From: Physician   Support System Family members   Continue Visiting   (12/26)   Complexity of Encounter Moderate   Length of Encounter 15 minutes   Spiritual/Christianity   Type Spiritual support   Assessment Calm; Approachable   Intervention Active listening;Explored feelings, thoughts, concerns;Explored coping resources;Prayer;Sustaining presence/ Ministry of presence   Outcome Connection/belonging;Comfort;Engaged in conversation

## 2019-12-26 NOTE — ED PROVIDER NOTES
[facility]          CHIEF COMPLAINT       Chief Complaint   Patient presents with    Depression       Nurses Notes reviewed and I agree except as noted in the HPI. HISTORY OF PRESENT ILLNESS    Susan Salmon is a 51 y.o. male who presents to the Emergency Department from home for the evaluation of depression and anxiety. Patient is under an [facility] per [facility]. He was escorted here via police from [facility]. Patient is unsure as to why he is under an [facility]. He states he has been at [facility] for 2 days and then was sent here. Patient states he has been having an increase in his anxiety and depression and states he has been compliant with his medications. He denies visual or auditory hallucinations, suicidal ideation or homicidal ideation. Chart review    Per CHIN Thompson with [facility]:  Patient is a 51year old male who presents to the ED on [facility] via [employer] from [facility]. Pt was last seen in the ED on 12/23/19 and was discharged. Per EM,\"Pt was admitted to the crisis center on 12/24 for increased psychosis, seeing shadows and figures and very fearful. This morning 12/26 client took off sprinting from crisis center. Client came back and reported he was gone for about 30 minutes. Client reported that he overheard the crisis center nurse saying that she was going to let someone in to shoot him. Client reported he left and two people came up on him and acted like the were going to spray (shoot) him. Client repeatedly states I cant be here. I cant be here. \" Client eye contact is intense, mood agitated. Geovanny Hoof Geovanny Hoof Geovanny Hoof \"  Clinician spoke with patient who states he is unsure to why [facility] sent him here. Pt denied current suicidal thoughts. Homicidal thoughts and/or plans denied. Pt endorsed experiencing hallucinations, specifically 'evil spirits' in his house. Pt reports paranoia, persecution, that someone is out to hurt him. Pt reports intermittent alcohol use. Pt was cooperative with the assessment. The HPI was provided by the patient. REVIEW OF SYSTEMS     Review of Systems   Constitutional: Negative for fever. Respiratory: Negative for shortness of breath. Cardiovascular: Negative for chest pain. Gastrointestinal: Negative for abdominal pain, nausea and vomiting. Musculoskeletal: Negative for arthralgias. Psychiatric/Behavioral: Positive for dysphoric mood (depression) and hallucinations. The patient is nervous/anxious. PAST MEDICAL HISTORY    has a past medical history of ADHD (attention deficit hyperactivity disorder), Anxiety, Arthritis, Asthma, Bladder dysfunction, Depression, Diabetes mellitus ([address] Fatigue, GERD (gastroesophageal reflux disease), Headache, Hyperlipidemia, Hypertension, MDRO (multiple drug resistant organisms) resistance, OAB (overactive bladder), Seizures ([address] Sleep apnea, and Type II or unspecified type diabetes mellitus without mention of complication, not stated as uncontrolled. SURGICAL HISTORY      has a past surgical history that includes Eye surgery ([facility]); Cataract removal (2010); Foot surgery (Bilateral, 2006); Foot surgery (1996); other surgical history (11/4/15); eye surgery (Right, 92,96); Elbow surgery (Right); Upper gastrointestinal endoscopy (2017); cervical fusion (N/A, 9/27/2017); Hand surgery (Right, 07/2017); Colonoscopy (N/A, 2/26/2018); Upper gastrointestinal endoscopy (N/A, 8/9/2018); pr office/outpt visit,procedure only (N/A, 10/16/2018); and pr revise/remove neuroelectrode (N/A, 5/16/2019).     CURRENT MEDICATIONS       Previous Medications    ALBUTEROL SULFATE HFA (PROAIR HFA) 108 (90 BASE) MCG/ACT INHALER    Inhale 2 puffs into the lungs every 4 hours as needed for Wheezing Dx: 493.90    AMLODIPINE (NORVASC) 5 MG TABLET    take 1 tablet by mouth once daily    ASPIRIN EC 81 MG EC TABLET    Take 1 tablet by mouth daily    ATORVASTATIN (LIPITOR) 10 MG TABLET    Take 10 mg by mouth daily thyromegaly. Cardiovascular:      Rate and Rhythm: Normal rate and regular rhythm. Heart sounds: Normal heart sounds. No murmur. Pulmonary:      Effort: Pulmonary effort is normal. No respiratory distress. Breath sounds: Normal breath sounds. No decreased breath sounds, wheezing or rhonchi. Chest:      Chest wall: No tenderness. Musculoskeletal: Normal range of motion. Skin:     General: Skin is warm and dry. Coloration: Skin is not pale. Findings: No erythema or rash. Neurological:      Mental Status: He is alert and oriented to person, place, and time. Psychiatric:         Behavior: Behavior normal.         Thought Content:  Thought content normal.         Judgment: Judgment normal.         DIFFERENTIAL DIAGNOSIS:   Anxiety, schizoaffective disorder, bipolar disorder     DIAGNOSTIC RESULTS     EKG: All EKG's are interpreted by the Emergency Department Physician who either signs or Co-signs this chart in the absence of a cardiologist.    None    RADIOLOGY: non-plainfilm images(s) such as CT, Ultrasound and MRI are read by the radiologist.    None    LABS:     Labs Reviewed   COMPREHENSIVE METABOLIC PANEL - Abnormal; Notable for the following components:       Result Value    Glucose 456 (*)     Sodium 133 (*)     Chloride 95 (*)     CO2 22 (*)     All other components within normal limits   LIPASE - Abnormal; Notable for the following components:    Lipase 57.3 (*)     All other components within normal limits   SALICYLATE LEVEL - Abnormal; Notable for the following components:    Salicylate, Serum < 0.3 (*)     All other components within normal limits   URINE WITH REFLEXED MICRO - Abnormal; Notable for the following components:    Glucose, Ur >= 1000 (*)     Ketones, Urine 40 (*)     Specific Gravity, Urine > 1.030 (*)     Protein, UA 30 (*)     All other components within normal limits   POCT GLUCOSE - Abnormal; Notable for the following components:    POC Glucose 396 (*)     All seen independently by myself. Scribe:  Erik Tsang 12/23/16 9:02 AM Scribing for and in the presence of Kris Davison CNP. Signed by: Diego Herr, 12/26/19 12:26 PM      Provider:  I personally performed the services described in the documentation, reviewed and edited the documentation which was dictated to the scribe in my presence, andit accurately records my words and actions.     Kris Davison CNP 12/26/19 12:26 PM        YEHUDA Mcnally - MARK  12/26/19 6641

## 2019-12-26 NOTE — PATIENT CARE CONFERENCE
[facility]  Initial Interdisciplinary Treatment Plan NOTE    Review Date & Time: 12/27/19 1038    Patient was in treatment team    Admission Type:        Reason for admission:         Estimated Length of Stay Update:  3-5 days  Estimated Discharge Date Update: 3-5 days    PATIENT STRENGTHS:  Patient Strengths Strengths: Positive Support  Patient Strengths and Limitations:Limitations: Difficulty problem solving/relies on others to help solve problems  Addictive Behavior:   Medical Problems:  Past Medical History:   Diagnosis Date    ADHD (attention deficit hyperactivity disorder)     Anxiety     Arthritis     Asthma     Bladder dysfunction     Depression     Diabetes mellitus ([address]     Fatigue     GERD (gastroesophageal reflux disease)     Headache     migraines    Hyperlipidemia     Hypertension     MDRO (multiple drug resistant organisms) resistance     yrs ago    OAB (overactive bladder) 10/27/2015    Seizures ([address]     Sleep apnea     wears cpap    Type II or unspecified type diabetes mellitus without mention of complication, not stated as uncontrolled        EDUCATION:   Learner Progress Toward Treatment Goals: Reviewed results and recommendations of this team, Reviewed group plan and strategies, Reviewed signs, symptoms and risk of self harm and violent behavior and Reviewed goals and plan of care    Method: Small group    Outcome: Verbalized understanding and Needs reinforcement    PATIENT GOALS: Improve thought process, Medication Mangement    PLAN/TREATMENT RECOMMENDATIONS UPDATE:   1. What is the most important thing we can help you with while you are here?  - Improve thought process, Medication Mangement  2. Who is your support system? - Family, father/brother  3. Do you have follow-up providers? Francisco Javier Riddle   4. Do you have the ability to pay for your medications? - Yes  5. Where will you be residing when you leave the hospital?  - Own home  6.  Will need a return to work

## 2019-12-26 NOTE — ED NOTES
Pt states that he felt like his blood sugar was low. This paramedic checked and his glucose reading was 396.      Therese Lodi  12/26/19 8405

## 2019-12-27 PROBLEM — F25.1 SCHIZOAFFECTIVE DISORDER, DEPRESSIVE TYPE (HCC): Status: ACTIVE | Noted: 2019-12-27

## 2019-12-27 LAB
AVERAGE GLUCOSE: 192 MG/DL (ref 70–126)
CARBAMAZEPINE, TOTAL: < 2 MCG/ML (ref 2–10)
GLUCOSE BLD-MCNC: 116 MG/DL (ref 70–108)
GLUCOSE BLD-MCNC: 248 MG/DL (ref 70–108)
GLUCOSE BLD-MCNC: 390 MG/DL (ref 70–108)
GLUCOSE BLD-MCNC: 414 MG/DL (ref 70–108)
HBA1C MFR BLD: 8.4 % (ref 4.4–6.4)

## 2019-12-27 PROCEDURE — 36415 COLL VENOUS BLD VENIPUNCTURE: CPT

## 2019-12-27 PROCEDURE — 82948 REAGENT STRIP/BLOOD GLUCOSE: CPT

## 2019-12-27 PROCEDURE — 80156 ASSAY CARBAMAZEPINE TOTAL: CPT

## 2019-12-27 PROCEDURE — 83036 HEMOGLOBIN GLYCOSYLATED A1C: CPT

## 2019-12-27 PROCEDURE — 6370000000 HC RX 637 (ALT 250 FOR IP): Performed by: PHYSICIAN ASSISTANT

## 2019-12-27 PROCEDURE — 94640 AIRWAY INHALATION TREATMENT: CPT

## 2019-12-27 PROCEDURE — 6370000000 HC RX 637 (ALT 250 FOR IP): Performed by: PSYCHIATRY & NEUROLOGY

## 2019-12-27 PROCEDURE — 90792 PSYCH DIAG EVAL W/MED SRVCS: CPT | Performed by: PSYCHIATRY & NEUROLOGY

## 2019-12-27 PROCEDURE — 1240000000 HC EMOTIONAL WELLNESS R&B

## 2019-12-27 RX ORDER — INSULIN GLARGINE 100 [IU]/ML
8 INJECTION, SOLUTION SUBCUTANEOUS 2 TIMES DAILY
Status: DISCONTINUED | OUTPATIENT
Start: 2019-12-27 | End: 2019-12-28

## 2019-12-27 RX ORDER — FLUOXETINE HYDROCHLORIDE 20 MG/1
60 CAPSULE ORAL DAILY
Status: DISCONTINUED | OUTPATIENT
Start: 2019-12-28 | End: 2019-12-30 | Stop reason: HOSPADM

## 2019-12-27 RX ADMIN — BREXPIPRAZOLE 4 MG: 2 TABLET ORAL at 08:24

## 2019-12-27 RX ADMIN — HYDROXYZINE HYDROCHLORIDE 50 MG: 25 TABLET ORAL at 20:54

## 2019-12-27 RX ADMIN — AMLODIPINE BESYLATE 5 MG: 5 TABLET ORAL at 08:23

## 2019-12-27 RX ADMIN — LOSARTAN POTASSIUM 100 MG: 100 TABLET, FILM COATED ORAL at 08:23

## 2019-12-27 RX ADMIN — INSULIN GLARGINE 8 UNITS: 100 INJECTION, SOLUTION SUBCUTANEOUS at 20:54

## 2019-12-27 RX ADMIN — FLUOXETINE HYDROCHLORIDE 40 MG: 20 CAPSULE ORAL at 08:23

## 2019-12-27 RX ADMIN — ATORVASTATIN CALCIUM 10 MG: 10 TABLET, FILM COATED ORAL at 20:54

## 2019-12-27 RX ADMIN — OXYBUTYNIN CHLORIDE 10 MG: 10 TABLET, EXTENDED RELEASE ORAL at 08:23

## 2019-12-27 RX ADMIN — OXYBUTYNIN CHLORIDE 10 MG: 10 TABLET, EXTENDED RELEASE ORAL at 20:54

## 2019-12-27 RX ADMIN — OXCARBAZEPINE 750 MG: 300 TABLET, FILM COATED ORAL at 08:22

## 2019-12-27 RX ADMIN — INSULIN LISPRO 2 UNITS: 100 INJECTION, SOLUTION INTRAVENOUS; SUBCUTANEOUS at 20:55

## 2019-12-27 RX ADMIN — ASPIRIN 81 MG: 81 TABLET ORAL at 08:23

## 2019-12-27 RX ADMIN — METOPROLOL SUCCINATE 50 MG: 50 TABLET, FILM COATED, EXTENDED RELEASE ORAL at 08:23

## 2019-12-27 RX ADMIN — INSULIN LISPRO 12 UNITS: 100 INJECTION, SOLUTION INTRAVENOUS; SUBCUTANEOUS at 08:13

## 2019-12-27 RX ADMIN — PANTOPRAZOLE SODIUM 40 MG: 40 TABLET, DELAYED RELEASE ORAL at 08:23

## 2019-12-27 RX ADMIN — Medication 2 PUFF: at 07:21

## 2019-12-27 RX ADMIN — TIOTROPIUM BROMIDE 18 MCG: 18 CAPSULE ORAL; RESPIRATORY (INHALATION) at 07:21

## 2019-12-27 RX ADMIN — INSULIN GLARGINE 8 UNITS: 100 INJECTION, SOLUTION SUBCUTANEOUS at 12:25

## 2019-12-27 RX ADMIN — BUSPIRONE HYDROCHLORIDE 15 MG: 7.5 TABLET ORAL at 20:54

## 2019-12-27 RX ADMIN — OXCARBAZEPINE 750 MG: 300 TABLET, FILM COATED ORAL at 20:54

## 2019-12-27 RX ADMIN — TRAZODONE HYDROCHLORIDE 150 MG: 50 TABLET ORAL at 20:54

## 2019-12-27 RX ADMIN — BUSPIRONE HYDROCHLORIDE 15 MG: 7.5 TABLET ORAL at 08:23

## 2019-12-27 RX ADMIN — INSULIN LISPRO 10 UNITS: 100 INJECTION, SOLUTION INTRAVENOUS; SUBCUTANEOUS at 12:14

## 2019-12-27 ASSESSMENT — PAIN SCALES - GENERAL
PAINLEVEL_OUTOF10: 0
PAINLEVEL_OUTOF10: 0

## 2019-12-27 ASSESSMENT — SLEEP AND FATIGUE QUESTIONNAIRES
DIFFICULTY FALLING ASLEEP: YES
SLEEP PATTERN: DIFFICULTY FALLING ASLEEP;DISTURBED/INTERRUPTED SLEEP
DIFFICULTY ARISING: NO
DIFFICULTY STAYING ASLEEP: YES
DO YOU USE A SLEEP AID: YES
DO YOU HAVE DIFFICULTY SLEEPING: YES
RESTFUL SLEEP: NO

## 2019-12-27 ASSESSMENT — PATIENT HEALTH QUESTIONNAIRE - PHQ9: SUM OF ALL RESPONSES TO PHQ QUESTIONS 1-9: 15

## 2019-12-27 ASSESSMENT — LIFESTYLE VARIABLES: HISTORY_ALCOHOL_USE: NO

## 2019-12-27 NOTE — PLAN OF CARE
Hospitalist were asked to help with glucose monitoring. Blood glucose has been in the 400s. Medium Dose SSI was ordered on 12/26. HgbA1c shows average glucose is 196. Started Lantus 8 units BID on 12/27. Carb Control Diet & Dietician consulted. Will continue to monitor. Any questions or issues, please do not hesitate to call this provider.      Electronically signed by NIDHI Aguillon Res on 12/27/2019 at 12:00 PM

## 2019-12-27 NOTE — PLAN OF CARE
this evening     Problem: Serum Glucose Level - Abnormal:  Goal: Ability to maintain appropriate glucose levels has stabilized  Description  Ability to maintain appropriate glucose levels has stabilized  Outcome: Not Met This Shift  Note:    this evening, insulin given as ordered     Problem: Discharge Planning  Goal: Knowledge - personal safety  Outcome: Completed   Care plan reviewed with patient.   Patient does verbalize understanding of the plan of care and does contribute to goal setting

## 2019-12-27 NOTE — H&P
recent stressors. He has had \"no motivation at all. \" He has not been able to concentrate recently which has made him more upset and depressed. Reports anhedonia because he is not able to focus on things to enjoy them. \"I sleep a lot that's all I do. \" His appetite has been decreased \"I only eat once a day. \" Reports feeling hopeful but endorses feelings of worthlessness and helplessness. He sees Jessica Sharma at [facility] for medication management. Reports good medication compliance. Denies side effects. He is currently on Rexulti, Buspar, Vistaril, Prozac, Intuniv, Trazodone, and Tegretol. He states his mood is \"not good at all, trying to hope for the best.\" Denies current thoughts of harm to himself or others. States he has not experienced any AH/VH today. He slept pretty good last night. Appetite has been okay. Has not attended groups yet. PSYCHIATRIC HISTORY:      · Outpatient psychiatric provider: Jessica Sharma at [facility]  · Suicide attempts: Denies  · Inpatient psychiatric admissions: Yes- previously admitted to [facility]    Past psychiatric medications includes:     See HPI for current medications  Adverse reactions from psychotropic medications:    Denies      Psychiatric Review of Systems      ·    Obsessions and Compulsions: denies  ·    Keli or Hypomania: denies  ·    Hallucinations: yes-auditory and visual  ·    Panic Attacks:  denies   ·    Delusions:  denies  ·    Phobias: denies       Medical Review of Systems:     Constitutional: Negative for appetite change, diaphoresis, fatigue and fever. HENT: Negative for congestion, sore throat and tinnitus. Eyes: Negative for visual disturbance. Respiratory: Negative for cough, shortness of breath and wheezing. Cardiovascular: Negative for chest pain and leg swelling. Gastrointestinal: Negative for nausea, vomiting, diarrhea. Negative for abdominal pain. Genitourinary: Negative for frequency.    Musculoskeletal: Negative for arthralgias, myalgias and neck stiffness. Skin: Negative for puritis. Neurological: Negative for dizziness, weakness and headaches. All other systems reviewed and are negative.       Past Medical History:        Diagnosis Date    ADHD (attention deficit hyperactivity disorder)     Anxiety     Arthritis     Asthma     Bladder dysfunction     Depression     Diabetes mellitus (HCC)     Fatigue     GERD (gastroesophageal reflux disease)     Headache     migraines    Hyperlipidemia     Hypertension     MDRO (multiple drug resistant organisms) resistance     yrs ago    OAB (overactive bladder) 10/27/2015    Seizures ([address]     Sleep apnea     wears cpap    Type II or unspecified type diabetes mellitus without mention of complication, not stated as uncontrolled        Past Surgical History:        Procedure Laterality Date    CATARACT REMOVAL  2010    bilateral    CERVICAL FUSION N/A 9/27/2017    ACDF C6-7 W/ATLANTIS CORNERSTONE performed by Tricia Mauricio MD at [address] 2/26/2018    COLONOSCOPY POLYPECTOMY HOT BIOPSY performed by South Marsh MD at [address] Right    [address] Right 92,96    FOOT SURGERY Bilateral 2006    hammer toes   [address]    removal of piece of glass    HAND SURGERY Right 07/2017    OTHER SURGICAL HISTORY  11/4/15    Excision of 3 cm lipoma LUQ abdominal wall Wisser    TN OFFICE/OUTPT VISIT,PROCEDURE ONLY N/A 10/16/2018    PLACEMENT OF INTERSTIM DEVICE performed by Acosta Ceballos MD at [address] N/A 5/16/2019    REMOVAL INTERSSTIM DEVICE performed by Acosta Ceballos MD at [address]  2017    UPPER GASTROINTESTINAL ENDOSCOPY N/A 8/9/2018    EGD BIOPSY performed by South Marsh MD at [facility]       Medications Prior to Admission:   Medications Prior to Admission: oxybutynin (DITROPAN-XL) 10 MG extended release tablet, Take 1 tablet  Psychosis ([facility]          Psychosocial and Contextual Factors:       Past Medical History:   Diagnosis Date    ADHD (attention deficit hyperactivity disorder)     Anxiety     Arthritis     Asthma     Bladder dysfunction     Depression     Diabetes mellitus (HCC)     Fatigue     GERD (gastroesophageal reflux disease)     Headache     migraines    Hyperlipidemia     Hypertension     MDRO (multiple drug resistant organisms) resistance     yrs ago    OAB (overactive bladder) 10/27/2015    Seizures ([facility]     Sleep apnea     wears cpap    Type II or unspecified type diabetes mellitus without mention of complication, not stated as uncontrolled           TREATMENT PLAN  Risk Management:  close watch per standard protocol  Psychotherapy:  participation in milieu and group and individual sessions with Attending Physician,  and Physician Assistant/CNP  Reason for Admission to Psychiatric Unit:  Threat to self  Estimated length of stay:  Greater than two midnights will be required to reach therapeutic levels of medications and to stabilize mood to where step down and management in a less restrictive environment is appropriate      GENERAL PATIENT/FAMILY EDUCATION  Patient will understand basic signs and symptoms, Patient will understand benefits/risks and potential side effects from proposed meds and Patient will understand their role in recovery. Family is  active in patient's care. Patient assets that may be helpful during treatment include: Intent to participate and engage in treatment, sufficient fund of knowledge and intellect to understand and utilize treatments. Goals:    Orders received: increase Prozac to 60mg daily. Will obtain Tegretol level today. Encouraged patient to engage in groups, milieu, and individual therapies offered as part of programing.      Behavioral Services  Medicare Certification Upon Admission    I certify that this patient's inpatient psychiatric hospital admission is medically necessary for:   X (1) Treatment which could reasonably be expected to improve this patient's condition,      X (2) Or for diagnostic study;     AND     X (2) The inpatient psychiatric services are provided while the individual is under the care of a physician and are included in the individualized plan of care. Estimated length of stay/service 2-10 days    Plan for post-hospital care: Follow up with [facility]. Pt also to be offered [address] Intensive Outpatient Program.     Electronically signed by Eilda Hickman PA-C on 12/27/2019 at 10:14 AM                                     Psychiatry Attending Attestation     I assessed this patient and reviewed the case and plan of care with Elida Hickman PA-C. I have reviewed the above documentation and I agree with the findings and treatment plan with the following updates. Patient is a 50-year-old single  schizoaffective disorder admitted from [address] [facility] on an [facility] for worsening paranoia and psychosis. Patient was transferred here after he ran away saying that diagnosis would conspiring against him and there are people out there trying to get him. Patient continues to report having some paranoid thoughts here on the unit. He has poor attention and concentration. Patient was labile yesterday however is much more calm today. He reports feeling down sad and low for more days than not for last couple months now. Endorses anhedonia. Reports having trouble falling asleep and staying asleep. Reports poor energy and concentration. Identifies stressors as dealing with multiple medical problems reports fleeting suicidal thoughts. Agree with the rest of the assessment and plan as above. Electronically signed by Elena Singh MD on 12/27/19 at 2:36 PM    **This report has been created using voice recognition software.  It may contain minor errors which are inherent in voice recognition technology. **

## 2019-12-27 NOTE — BH NOTE
PLAN OF CARE:     Start Time: 0900  End Time:  0915    Group Topic:  Daily Goals    Group Type:   Goal Group    Intervention/Goal:  To increase support and identify daily goals    Attendance:  Participated in the goal group by identifying a daily goal.     Affect:   flat    Behavior:   Cooperative but guarded    Response:   Identified a daily goal.     Daily Goal:    To get better. Talk to the doctor. Come to groups.      Progress:  Progressing to goal

## 2019-12-27 NOTE — PLAN OF CARE
Problem: Impaired respiratory status  Goal: Clear lung sounds  Description  Clear lung sounds     Outcome: Ongoing   Improve breath sounds, increase aeration and decrease WOB. Continue with home meds.

## 2019-12-27 NOTE — PLAN OF CARE
Problem: Discharge Planning:  Goal: Discharged to appropriate level of care  Description  Discharged to appropriate level of care  12/27/2019 1049 by Melba Van RN  Outcome: Not Met This Shift  Note:   Discharge planners working with patient to achieve optimal discharge plan, specific to the needs of this patient.    12/26/2019 2145 by Angelica Meng RN  Outcome: Ongoing  Note:   Pt plans to return home and follow up with his provider at [facility]     Problem: Altered Mood, Psychotic Behavior:  Goal: Able to verbalize decrease in frequency and intensity of hallucinations  Description  Able to verbalize decrease in frequency and intensity of hallucinations  12/27/2019 1049 by Melba Van RN  Outcome: Ongoing  Note:   Pt did not C/O of any hallucinations at time of assessment  12/26/2019 2145 by Angelica Meng RN  Outcome: Met This Shift  Note:   Pt denies hallucinations this evening, pt is drowsy, had prn medications on previous shift, fiar eye contact, minimal speech, blunt affect, isolated to bed all evening  Goal: Able to verbalize reality based thinking  Description  Able to verbalize reality based thinking  12/27/2019 1049 by Melba Van RN  Outcome: Ongoing  Note:   Pt still has some paranoia but is able to verbalize reality based thinking  12/26/2019 2145 by Angelica Meng RN  Outcome: Met This Shift  Note:   No delusions voiced, pt denied feelings of paranoia, stated \"not now\"  Goal: Ability to interact with others will improve  Description  Ability to interact with others will improve  12/27/2019 1049 by Melba Van RN  Outcome: Ongoing  Note:   Pt is isolative at this time, he is focused on feeling better  12/26/2019 2145 by Angelica Meng RN  Outcome: Ongoing  Note:   Pt had some interaction with staff, very polite, drowsy, isolated to bed and had no peer interaction  Goal: Patient specific goal  Description  Patient specific goal  12/27/2019 1049 by Melba Van RN  Outcome: Ongoing  Note:   To get better, talk to the doctor, come to groups - ongoing  12/26/2019 2145 by Marcie Roblero RN  Outcome: Ongoing  Note:   Pt had no goal today     Problem: Serum Glucose Level - Abnormal:  Goal: Ability to maintain appropriate glucose levels has stabilized  Description  Ability to maintain appropriate glucose levels has stabilized  12/27/2019 1049 by Christi Henderson RN  Outcome: Ongoing  Note:   Pt has elevated glucose levels, taking insulin per order  12/26/2019 2145 by Marcie Roblero RN  Outcome: Not Met This Shift  Note:    this evening, insulin given as ordered     Problem: Anxiety:  Goal: Level of anxiety will decrease  Description  Level of anxiety will decrease  12/27/2019 1049 by Christi Henderson RN  Outcome: Ongoing  Note:   Pt denies anxiety but appears anxious  12/26/2019 2145 by Marcie Roblero RN  Outcome: Met This Shift  Note:   Pt denied feeling anxious     Problem: Discharge Planning  Goal: Knowledge - personal safety  12/26/2019 2137 by Marcie Roblero RN  Outcome: Completed     Problem: General Medical Problem-Behavioral Health  Goal: Compliance with prescribed medication regimen will improve  Description  Compliance with prescribed medication regimen will improve     12/27/2019 1049 by Christi Henderson RN  Outcome: Met This Shift  Note:   Pt takes all medications as prescribed  12/26/2019 2145 by Marcie Roblero RN  Outcome: Met This Shift  Note:   Pt took meds as prescribed     Problem: Agitation  Goal: Decrease in feelings of agitation  12/27/2019 1049 by Christi Henderson RN  Outcome: Ongoing  Note:   Pt is not currently agitated  12/26/2019 2145 by Marcie Roblero RN  Outcome: Met This Shift  Note:   Pt is calm, drowsy, no agitation     Problem: KNOWLEDGE DEFICIT,EDUCATION,DISCHARGE PLAN  Goal: Knowledge - personal safety  12/27/2019 1049 by Christi Henderson RN  Outcome: Ongoing  Note:   Maintained in safe and secure environment.  Patient did not fill out safety plan

## 2019-12-28 LAB
GLUCOSE BLD-MCNC: 235 MG/DL (ref 70–108)
GLUCOSE BLD-MCNC: 308 MG/DL (ref 70–108)
GLUCOSE BLD-MCNC: 311 MG/DL (ref 70–108)
GLUCOSE BLD-MCNC: 394 MG/DL (ref 70–108)

## 2019-12-28 PROCEDURE — 94640 AIRWAY INHALATION TREATMENT: CPT

## 2019-12-28 PROCEDURE — 6370000000 HC RX 637 (ALT 250 FOR IP): Performed by: PSYCHIATRY & NEUROLOGY

## 2019-12-28 PROCEDURE — 1240000000 HC EMOTIONAL WELLNESS R&B

## 2019-12-28 PROCEDURE — 82948 REAGENT STRIP/BLOOD GLUCOSE: CPT

## 2019-12-28 PROCEDURE — 99232 SBSQ HOSP IP/OBS MODERATE 35: CPT | Performed by: PSYCHIATRY & NEUROLOGY

## 2019-12-28 PROCEDURE — APPSS15 APP SPLIT SHARED TIME 0-15 MINUTES: Performed by: NURSE PRACTITIONER

## 2019-12-28 PROCEDURE — 6370000000 HC RX 637 (ALT 250 FOR IP): Performed by: PHYSICIAN ASSISTANT

## 2019-12-28 PROCEDURE — 90833 PSYTX W PT W E/M 30 MIN: CPT | Performed by: PSYCHIATRY & NEUROLOGY

## 2019-12-28 RX ORDER — INSULIN GLARGINE 100 [IU]/ML
10 INJECTION, SOLUTION SUBCUTANEOUS 2 TIMES DAILY
Status: DISCONTINUED | OUTPATIENT
Start: 2019-12-28 | End: 2019-12-29

## 2019-12-28 RX ADMIN — TRAZODONE HYDROCHLORIDE 150 MG: 50 TABLET ORAL at 21:47

## 2019-12-28 RX ADMIN — LOSARTAN POTASSIUM 100 MG: 100 TABLET, FILM COATED ORAL at 09:32

## 2019-12-28 RX ADMIN — ASPIRIN 81 MG: 81 TABLET ORAL at 09:37

## 2019-12-28 RX ADMIN — INSULIN LISPRO 4 UNITS: 100 INJECTION, SOLUTION INTRAVENOUS; SUBCUTANEOUS at 21:48

## 2019-12-28 RX ADMIN — TIOTROPIUM BROMIDE 18 MCG: 18 CAPSULE ORAL; RESPIRATORY (INHALATION) at 10:34

## 2019-12-28 RX ADMIN — AMLODIPINE BESYLATE 5 MG: 5 TABLET ORAL at 09:32

## 2019-12-28 RX ADMIN — INSULIN GLARGINE 8 UNITS: 100 INJECTION, SOLUTION SUBCUTANEOUS at 09:32

## 2019-12-28 RX ADMIN — HYDROXYZINE HYDROCHLORIDE 50 MG: 25 TABLET ORAL at 21:47

## 2019-12-28 RX ADMIN — INSULIN GLARGINE 10 UNITS: 100 INJECTION, SOLUTION SUBCUTANEOUS at 21:48

## 2019-12-28 RX ADMIN — BREXPIPRAZOLE 4 MG: 2 TABLET ORAL at 09:33

## 2019-12-28 RX ADMIN — PANTOPRAZOLE SODIUM 40 MG: 40 TABLET, DELAYED RELEASE ORAL at 09:37

## 2019-12-28 RX ADMIN — BUSPIRONE HYDROCHLORIDE 15 MG: 7.5 TABLET ORAL at 21:47

## 2019-12-28 RX ADMIN — ATORVASTATIN CALCIUM 10 MG: 10 TABLET, FILM COATED ORAL at 21:48

## 2019-12-28 RX ADMIN — OXYBUTYNIN CHLORIDE 10 MG: 10 TABLET, EXTENDED RELEASE ORAL at 21:47

## 2019-12-28 RX ADMIN — OXCARBAZEPINE 750 MG: 300 TABLET, FILM COATED ORAL at 21:47

## 2019-12-28 RX ADMIN — INSULIN LISPRO 4 UNITS: 100 INJECTION, SOLUTION INTRAVENOUS; SUBCUTANEOUS at 17:33

## 2019-12-28 RX ADMIN — INSULIN LISPRO 8 UNITS: 100 INJECTION, SOLUTION INTRAVENOUS; SUBCUTANEOUS at 13:04

## 2019-12-28 RX ADMIN — FLUOXETINE HYDROCHLORIDE 60 MG: 20 CAPSULE ORAL at 09:32

## 2019-12-28 RX ADMIN — METOPROLOL SUCCINATE 50 MG: 50 TABLET, FILM COATED, EXTENDED RELEASE ORAL at 09:33

## 2019-12-28 RX ADMIN — OXYBUTYNIN CHLORIDE 10 MG: 10 TABLET, EXTENDED RELEASE ORAL at 09:32

## 2019-12-28 RX ADMIN — Medication 2 PUFF: at 10:34

## 2019-12-28 RX ADMIN — BUSPIRONE HYDROCHLORIDE 15 MG: 7.5 TABLET ORAL at 09:32

## 2019-12-28 RX ADMIN — OXCARBAZEPINE 750 MG: 300 TABLET, FILM COATED ORAL at 09:33

## 2019-12-28 RX ADMIN — INSULIN LISPRO 10 UNITS: 100 INJECTION, SOLUTION INTRAVENOUS; SUBCUTANEOUS at 09:35

## 2019-12-28 ASSESSMENT — PAIN SCALES - GENERAL
PAINLEVEL_OUTOF10: 0
PAINLEVEL_OUTOF10: 0

## 2019-12-28 NOTE — PROGRESS NOTES
Depressed Type     Plan:  Continue current meds as ordered  Continue to encourage group attendance. Rodney Fraga CNP  15-                                            Psychiatry Attending Attestation     I assessed this patient and reviewed the case and plan of care with Rodney Fraga CNP. I have reviewed the above documentation and I agree with the findings and treatment plan with the following updates. Patient is doing little better today. Continues to report having some fleeting suicidal thoughts with no intent or plan. But having some vague paranoia however reports feeling safe here on the unit. Reports poor energy but good concentration. Reports having some trouble falling asleep and staying asleep. To help with his paranoia and mood today. He understood and agreed to the plan. Case discussed with staff and the treatment team this morning. Patient denies any side effect from the medications. More than 16 mins of the session was spent doing Supportive psychotherapy. Session started at 10:30am and ended at 11am.     Electronically signed by Jacy Clifford MD on 12/28/19 at 5:51 PM    **This report has been created using voice recognition software. It may contain minor errors which are inherent in voice recognition technology. **

## 2019-12-28 NOTE — PROGRESS NOTES
This RN has reviewed and agrees with Radha Whitfield LPN's data collection and has collaborated with this LPN regarding the patient's care plan.

## 2019-12-28 NOTE — PLAN OF CARE
Problem: Nutrition  Goal: Optimal nutrition therapy  Outcome: Ongoing   Nutrition Problem: Inadequate oral intake  Intervention: Food and/or Nutrient Delivery: Continue current diet  Nutritional Goals: 75% or more of healhty intake during LOS

## 2019-12-28 NOTE — PLAN OF CARE
getting better, going to groups and talk to the dr  12/27/2019 1049 by Garry Ren RN  Outcome: Ongoing  Note:   To get better, talk to the doctor, come to groups - ongoing     Problem: Serum Glucose Level - Abnormal:  Goal: Ability to maintain appropriate glucose levels has stabilized  Description  Ability to maintain appropriate glucose levels has stabilized  12/27/2019 2304 by Fior Mcgrath RN  Outcome: Ongoing  Note:    this evening, had insulin as ordered  12/27/2019 1049 by Garry Ren RN  Outcome: Ongoing  Note:   Pt has elevated glucose levels, taking insulin per order     Problem: Anxiety:  Goal: Level of anxiety will decrease  Description  Level of anxiety will decrease  12/27/2019 2304 by Fior Mcgrath RN  Outcome: Ongoing  Note:   Pt had atarax for c/o little anxious  12/27/2019 1049 by Garry Ren RN  Outcome: Ongoing  Note:   Pt denies anxiety but appears anxious     Problem: KNOWLEDGE DEFICIT,EDUCATION,DISCHARGE PLAN  Goal: Knowledge - personal safety  12/27/2019 2304 by Fior Mcgrath RN  Outcome: Ongoing  Note:   Pt did not work on safety plan this evening  12/27/2019 1049 by Garry Ren RN  Outcome: Ongoing  Note:   Maintained in safe and secure environment. Patient did not fill out safety plan this shift.       Problem: Social interaction  Goal: Increased social interaction  12/27/2019 1019 by Lily Zhang  Outcome: Ongoing     Problem: General Medical Problem-Behavioral Health  Goal: Compliance with prescribed medication regimen will improve  Description  Compliance with prescribed medication regimen will improve     12/27/2019 2304 by Fior Mcgrath RN  Outcome: Completed  Note:   Pt is taking meds as prescribed  12/27/2019 1049 by Garry Ren RN  Outcome: Met This Shift  Note:   Pt takes all medications as prescribed     Problem: Agitation  Goal: Decrease in feelings of agitation  12/27/2019 2304 by Fior Mcgrath RN  Outcome: Completed  Note:   Pt denies

## 2019-12-29 LAB
GLUCOSE BLD-MCNC: 213 MG/DL (ref 70–108)
GLUCOSE BLD-MCNC: 295 MG/DL (ref 70–108)
GLUCOSE BLD-MCNC: 322 MG/DL (ref 70–108)
GLUCOSE BLD-MCNC: 350 MG/DL (ref 70–108)

## 2019-12-29 PROCEDURE — 94640 AIRWAY INHALATION TREATMENT: CPT

## 2019-12-29 PROCEDURE — 82948 REAGENT STRIP/BLOOD GLUCOSE: CPT

## 2019-12-29 PROCEDURE — 6370000000 HC RX 637 (ALT 250 FOR IP): Performed by: PHYSICIAN ASSISTANT

## 2019-12-29 PROCEDURE — 1240000000 HC EMOTIONAL WELLNESS R&B

## 2019-12-29 PROCEDURE — 90833 PSYTX W PT W E/M 30 MIN: CPT | Performed by: PSYCHIATRY & NEUROLOGY

## 2019-12-29 PROCEDURE — 99232 SBSQ HOSP IP/OBS MODERATE 35: CPT | Performed by: PSYCHIATRY & NEUROLOGY

## 2019-12-29 PROCEDURE — 6370000000 HC RX 637 (ALT 250 FOR IP): Performed by: PSYCHIATRY & NEUROLOGY

## 2019-12-29 PROCEDURE — APPSS15 APP SPLIT SHARED TIME 0-15 MINUTES: Performed by: NURSE PRACTITIONER

## 2019-12-29 RX ORDER — INSULIN GLARGINE 100 [IU]/ML
15 INJECTION, SOLUTION SUBCUTANEOUS 2 TIMES DAILY
Status: DISCONTINUED | OUTPATIENT
Start: 2019-12-29 | End: 2019-12-30 | Stop reason: HOSPADM

## 2019-12-29 RX ADMIN — OXCARBAZEPINE 750 MG: 300 TABLET, FILM COATED ORAL at 21:02

## 2019-12-29 RX ADMIN — INSULIN LISPRO 4 UNITS: 100 INJECTION, SOLUTION INTRAVENOUS; SUBCUTANEOUS at 17:41

## 2019-12-29 RX ADMIN — AMLODIPINE BESYLATE 5 MG: 5 TABLET ORAL at 09:15

## 2019-12-29 RX ADMIN — Medication 2 PUFF: at 10:42

## 2019-12-29 RX ADMIN — BREXPIPRAZOLE 6 MG: 2 TABLET ORAL at 09:15

## 2019-12-29 RX ADMIN — TIOTROPIUM BROMIDE 18 MCG: 18 CAPSULE ORAL; RESPIRATORY (INHALATION) at 10:41

## 2019-12-29 RX ADMIN — TRAZODONE HYDROCHLORIDE 150 MG: 50 TABLET ORAL at 21:02

## 2019-12-29 RX ADMIN — METOPROLOL SUCCINATE 50 MG: 50 TABLET, FILM COATED, EXTENDED RELEASE ORAL at 09:14

## 2019-12-29 RX ADMIN — BUSPIRONE HYDROCHLORIDE 15 MG: 7.5 TABLET ORAL at 21:02

## 2019-12-29 RX ADMIN — OXYBUTYNIN CHLORIDE 10 MG: 10 TABLET, EXTENDED RELEASE ORAL at 21:02

## 2019-12-29 RX ADMIN — INSULIN GLARGINE 15 UNITS: 100 INJECTION, SOLUTION SUBCUTANEOUS at 11:09

## 2019-12-29 RX ADMIN — BUSPIRONE HYDROCHLORIDE 15 MG: 7.5 TABLET ORAL at 09:15

## 2019-12-29 RX ADMIN — INSULIN GLARGINE 15 UNITS: 100 INJECTION, SOLUTION SUBCUTANEOUS at 21:03

## 2019-12-29 RX ADMIN — FLUOXETINE HYDROCHLORIDE 60 MG: 20 CAPSULE ORAL at 09:15

## 2019-12-29 RX ADMIN — ATORVASTATIN CALCIUM 10 MG: 10 TABLET, FILM COATED ORAL at 21:02

## 2019-12-29 RX ADMIN — INSULIN LISPRO 6 UNITS: 100 INJECTION, SOLUTION INTRAVENOUS; SUBCUTANEOUS at 12:29

## 2019-12-29 RX ADMIN — LOSARTAN POTASSIUM 100 MG: 100 TABLET, FILM COATED ORAL at 09:14

## 2019-12-29 RX ADMIN — PANTOPRAZOLE SODIUM 40 MG: 40 TABLET, DELAYED RELEASE ORAL at 09:14

## 2019-12-29 RX ADMIN — ASPIRIN 81 MG: 81 TABLET ORAL at 09:13

## 2019-12-29 RX ADMIN — OXCARBAZEPINE 750 MG: 300 TABLET, FILM COATED ORAL at 09:13

## 2019-12-29 RX ADMIN — INSULIN LISPRO 10 UNITS: 100 INJECTION, SOLUTION INTRAVENOUS; SUBCUTANEOUS at 09:13

## 2019-12-29 RX ADMIN — INSULIN LISPRO 4 UNITS: 100 INJECTION, SOLUTION INTRAVENOUS; SUBCUTANEOUS at 21:03

## 2019-12-29 RX ADMIN — OXYBUTYNIN CHLORIDE 10 MG: 10 TABLET, EXTENDED RELEASE ORAL at 12:29
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
## 2019-12-29 NOTE — PLAN OF CARE
is to stay moving forward and go to groups. Problem: Anxiety:  Goal: Level of anxiety will decrease  Description  Level of anxiety will decrease  12/28/2019 2224 by Pam Cooks, RN  Note:   Pt reports some anxiety but improving  12/28/2019 1345 by Tory Harry LPN  Outcome: Ongoing  Note:   Patient denies anxiety during shift assessment. Problem: KNOWLEDGE DEFICIT,EDUCATION,DISCHARGE PLAN  Goal: Knowledge - personal safety  12/28/2019 2224 by Pam Cooks, RN  Outcome: Ongoing  Note:   Pt remained safe and free of harm  12/28/2019 1345 by Tory Harry LPN  Outcome: Ongoing  Note:   Patient did not complete safety plan at this time during shift. Problem: Impaired respiratory status  Goal: Clear lung sounds  Description  Clear lung sounds     12/28/2019 1425 by Makayla Burger RCP  Outcome: Ongoing     Problem: Nutrition  Goal: Optimal nutrition therapy  12/28/2019 2224 by Pam Cooks, RN  Outcome: Ongoing  Note:   Pt ate well and had 100% snack  12/28/2019 1345 by Tory Harry LPN  Outcome: Ongoing  Note:   Good appetite noted during shift. 12/28/2019 1258 by Rachel Monge RD, LD  Outcome: Ongoing     Problem: Serum Glucose Level - Abnormal:  Goal: Ability to maintain appropriate glucose levels has stabilized  Description  Ability to maintain appropriate glucose levels has stabilized  12/28/2019 2224 by Pam Cooks, RN  Outcome: Not Met This Shift  Note:   Blood sugar 308, covered according to prescribed scale  12/28/2019 1345 by Tory Harry LPN  Outcome: Ongoing  Note:   Blood sugars monitored during shift. Patient received insulin per order to cover elevated blood sugars. Care plan reviewed with patient and  verbalize understanding of the plan of care and contribute to goal setting.

## 2019-12-29 NOTE — PLAN OF CARE
Problem: Discharge Planning:  Goal: Discharged to appropriate level of care  Description  Discharged to appropriate level of care  12/29/2019 1041 by Sharmila Carrera. Manjinder Reyes LPN  Outcome: Ongoing  Note:   Discharge planner working with patient to achieve optimal discharge plan, specific to the needs of this patient. 12/28/2019 2224 by Radha Garay RN  Outcome: Ongoing  Note:   Pt plans on being discharged home and following with [facility]     Problem: Altered Mood, Psychotic Behavior:  Goal: Able to verbalize decrease in frequency and intensity of hallucinations  Description  Able to verbalize decrease in frequency and intensity of hallucinations  12/29/2019 1041 by Sharmila Carrera. Manjinder Reyes LPN  Outcome: Ongoing  Note:   Patient denies auditory hallucinations this shift. He states \"thank god, not today\". 12/28/2019 2224 by Radha Garay RN  Outcome: Ongoing  Note:   Pt denies hallucinations but states he does continue to feel some paranoia at times  Goal: Able to verbalize reality based thinking  Description  Able to verbalize reality based thinking  12/29/2019 1041 by Sharmila Carrera. Manjinder Reyes LPN  Outcome: Ongoing  Note:   Patient is alert and oriented to person, place, time and situation. 12/28/2019 2224 by Radha Garay RN  Outcome: Ongoing  Note:   Pt is alert and oriented to all and has realistic thinking  Goal: Ability to interact with others will improve  Description  Ability to interact with others will improve  12/29/2019 1041 by Sharmila Carrera. Manjinder Reyes LPN  Outcome: Ongoing  Note:   Out on unit interacting with peers. 12/28/2019 2224 by Radha Garay RN  Outcome: Ongoing  Note:   Pt interacting well with peers and staff  Goal: Patient specific goal  Description  Patient specific goal  12/29/2019 1041 by Sharmila Carrera. Manjinder Reyes LPN  Outcome: Ongoing  12/28/2019 2224 by Radha Garay RN  Outcome: Ongoing  Note:   Pt is taking medications, attending and participating in groups and care planning.  Pt has good interaction with staff and peers      Problem: Serum Glucose Level - Abnormal:  Goal: Ability to maintain appropriate glucose levels has stabilized  Description  Ability to maintain appropriate glucose levels has stabilized  12/29/2019 1041 by Victoria Jhaveri. Chaz Bell LPN  Outcome: Ongoing  Note:   Glucose level is controlled with sliding scale. 12/28/2019 2224 by Maureen Celestin RN  Outcome: Not Met This Shift  Note:   Blood sugar 308, covered according to prescribed scale     Problem: Anxiety:  Goal: Level of anxiety will decrease  Description  Level of anxiety will decrease  12/29/2019 1041 by Victoria Jhaveri. Chaz Bell LPN  Outcome: Ongoing  Note:   Patient denies anxiety this shift. 12/28/2019 2224 by Maureen Celestin RN  Note:   Pt reports some anxiety but improving     Problem: KNOWLEDGE DEFICIT,EDUCATION,DISCHARGE PLAN  Goal: Knowledge - personal safety  12/29/2019 1041 by Victoria Jhaveri. Chaz Bell LPN  Outcome: Ongoing  12/28/2019 2224 by Maureen Celestin RN  Outcome: Ongoing  Note:   Pt remained safe and free of harm     Problem: Nutrition  Goal: Optimal nutrition therapy  12/29/2019 1041 by Victoria Jhaveri.  Chaz Bell LPN  Outcome: Ongoing  12/28/2019 2224 by Maureen Celestin RN  Outcome: Ongoing  Note:   Pt ate well and had 100% snack

## 2019-12-29 NOTE — PROGRESS NOTES
Topic Healthy Living     Start 1100    End 7344    Participation Active Listening       Response Receptive to group material       Behavior Appropriate Topic  Depression

## 2019-12-30 VITALS
HEART RATE: 71 BPM | SYSTOLIC BLOOD PRESSURE: 135 MMHG | TEMPERATURE: 96.3 F | BODY MASS INDEX: 30.8 KG/M2 | HEIGHT: 74 IN | OXYGEN SATURATION: 96 % | DIASTOLIC BLOOD PRESSURE: 80 MMHG | WEIGHT: 240 LBS | RESPIRATION RATE: 16 BRPM

## 2019-12-30 LAB
GLUCOSE BLD-MCNC: 264 MG/DL (ref 70–108)
GLUCOSE BLD-MCNC: 296 MG/DL (ref 70–108)

## 2019-12-30 PROCEDURE — 5130000000 HC BRIDGE APPOINTMENT

## 2019-12-30 PROCEDURE — 82948 REAGENT STRIP/BLOOD GLUCOSE: CPT

## 2019-12-30 PROCEDURE — 99239 HOSP IP/OBS DSCHRG MGMT >30: CPT | Performed by: PSYCHIATRY & NEUROLOGY

## 2019-12-30 PROCEDURE — 6370000000 HC RX 637 (ALT 250 FOR IP): Performed by: PSYCHIATRY & NEUROLOGY

## 2019-12-30 PROCEDURE — 94640 AIRWAY INHALATION TREATMENT: CPT

## 2019-12-30 PROCEDURE — 6370000000 HC RX 637 (ALT 250 FOR IP): Performed by: PHYSICIAN ASSISTANT

## 2019-12-30 RX ORDER — TRAZODONE HYDROCHLORIDE 150 MG/1
150 TABLET ORAL NIGHTLY
Qty: 30 TABLET | Refills: 0 | Status: SHIPPED | OUTPATIENT
Start: 2019-12-30

## 2019-12-30 RX ORDER — BUSPIRONE HYDROCHLORIDE 15 MG/1
15 TABLET ORAL 2 TIMES DAILY
Qty: 45 TABLET | Refills: 0 | Status: SHIPPED | OUTPATIENT
Start: 2019-12-30 | End: 2020-01-14

## 2019-12-30 RX ORDER — HYDROXYZINE PAMOATE 50 MG/1
50 CAPSULE ORAL NIGHTLY
Qty: 30 CAPSULE | Refills: 0 | Status: SHIPPED | OUTPATIENT
Start: 2019-12-30

## 2019-12-30 RX ORDER — FLUOXETINE HYDROCHLORIDE 20 MG/1
60 CAPSULE ORAL DAILY
Qty: 90 CAPSULE | Refills: 0 | Status: SHIPPED | OUTPATIENT
Start: 2019-12-31

## 2019-12-30 RX ADMIN — BUSPIRONE HYDROCHLORIDE 15 MG: 7.5 TABLET ORAL at 09:14

## 2019-12-30 RX ADMIN — PANTOPRAZOLE SODIUM 40 MG: 40 TABLET, DELAYED RELEASE ORAL at 09:27

## 2019-12-30 RX ADMIN — ASPIRIN 81 MG: 81 TABLET ORAL at 09:10

## 2019-12-30 RX ADMIN — BREXPIPRAZOLE 6 MG: 2 TABLET ORAL at 09:09

## 2019-12-30 RX ADMIN — FLUOXETINE HYDROCHLORIDE 60 MG: 20 CAPSULE ORAL at 09:14

## 2019-12-30 RX ADMIN — INSULIN LISPRO 6 UNITS: 100 INJECTION, SOLUTION INTRAVENOUS; SUBCUTANEOUS at 13:03

## 2019-12-30 RX ADMIN — OXCARBAZEPINE 750 MG: 300 TABLET, FILM COATED ORAL at 09:10

## 2019-12-30 RX ADMIN — AMLODIPINE BESYLATE 5 MG: 5 TABLET ORAL at 09:10

## 2019-12-30 RX ADMIN — Medication 2 PUFF: at 07:18

## 2019-12-30 RX ADMIN — INSULIN GLARGINE 15 UNITS: 100 INJECTION, SOLUTION SUBCUTANEOUS at 09:07

## 2019-12-30 RX ADMIN — OXYBUTYNIN CHLORIDE 10 MG: 10 TABLET, EXTENDED RELEASE ORAL at 09:14

## 2019-12-30 RX ADMIN — METOPROLOL SUCCINATE 50 MG: 50 TABLET, FILM COATED, EXTENDED RELEASE ORAL at 09:10

## 2019-12-30 RX ADMIN — INSULIN LISPRO 6 UNITS: 100 INJECTION, SOLUTION INTRAVENOUS; SUBCUTANEOUS at 09:06

## 2019-12-30 RX ADMIN — LOSARTAN POTASSIUM 100 MG: 100 TABLET, FILM COATED ORAL at 09:10

## 2019-12-30 RX ADMIN — TIOTROPIUM BROMIDE 18 MCG: 18 CAPSULE ORAL; RESPIRATORY (INHALATION) at 07:18

## 2019-12-30 ASSESSMENT — PAIN SCALES - GENERAL: PAINLEVEL_OUTOF10: 0

## 2019-12-30 NOTE — PLAN OF CARE
Problem: Discharge Planning:  Goal: Discharged to appropriate level of care  Description  Discharged to appropriate level of care  12/29/2019 2228 by Hanh Christopher RN  Outcome: Ongoing  Note:   Ongoing. Problem: Altered Mood, Psychotic Behavior:  Goal: Able to verbalize decrease in frequency and intensity of hallucinations  Description  Able to verbalize decrease in frequency and intensity of hallucinations  12/29/2019 2228 by Hanh Christopher RN  Outcome: Ongoing  Note:   Patient denies hallucinations      Problem: Altered Mood, Psychotic Behavior:  Goal: Able to verbalize reality based thinking  Description  Able to verbalize reality based thinking  12/29/2019 2228 by Hanh Christopher RN  Outcome: Ongoing  Note:   Patient alert and oriented x 4.       Problem: Altered Mood, Psychotic Behavior:  Goal: Ability to interact with others will improve  Description  Ability to interact with others will improve  12/29/2019 2228 by Hanh Christopher RN  Outcome: Ongoing  Note:   Patient on the fringe with peers      Problem: Altered Mood, Psychotic Behavior:  Goal: Patient specific goal  Description  Patient specific goal  12/29/2019 2228 by Hanh Christopher RN  Outcome: Ongoing  Note:   Patient able to set goals      Problem: Serum Glucose Level - Abnormal:  Goal: Ability to maintain appropriate glucose levels has stabilized  Description  Ability to maintain appropriate glucose levels has stabilized  12/29/2019 2228 by Hanh Christopher RN  Outcome: Ongoing  Note:   See labs for details      Problem: Anxiety:  Goal: Level of anxiety will decrease  Description  Level of anxiety will decrease  12/29/2019 2228 by Hanh Christopher RN  Outcome: Ongoing  Note:   Denies anxiety      Problem: KNOWLEDGE DEFICIT,EDUCATION,DISCHARGE PLAN  Goal: Knowledge - personal safety  12/29/2019 2228 by Hanh Christopher RN  Outcome: Ongoing     Problem: Nutrition  Goal: Optimal nutrition therapy  12/29/2019 2228 by Hanh Christopher RN  Outcome: Ongoing  Note:   See intake and output for details    Care plan reviewed with patient.   Patient does verbalize understanding of the plan of care and does contribute to goal setting

## 2019-12-30 NOTE — PLAN OF CARE
Problem: Impaired respiratory status  Goal: Clear lung sounds  Description  Clear lung sounds     Outcome: Ongoing   Improve breath sounds, increase aeration and decrease WOB.

## 2019-12-30 NOTE — DISCHARGE SUMMARY
is not able to focus on things to enjoy them. \"I sleep a lot that's all I do. \" His appetite has been decreased \"I only eat once a day. \" Reports feeling hopeful but endorses feelings of worthlessness and helplessness. He sees Rao Hirsch at [facility] for medication management. Reports good medication compliance. Denies side effects. He is currently on Rexulti, Buspar, Vistaril, Prozac, Intuniv, Trazodone, and Tegretol.      He states his mood is \"not good at all, trying to hope for the best.\" Denies current thoughts of harm to himself or others. States he has not experienced any AH/VH today. He slept pretty good last night. Appetite has been okay. Has not attended groups yet.       Hospital Course:   Upon admission, Bennett Madera was provided a safe secure environment, introduced to unit milieu. Patient participated in groups and individual therapies. Meds were adjusted as noted below. After few days of hospital care, patient began to feel improvement. Depression lifted, thoughts to harm self ceased. Sleep improved, appetite was good. On morning rounds 12/30/2019, Bennett Madera  endorses feeling ready for discharge. Patient denies suicidal or homicidal ideations, denies hallucinations or delusions. Denies SE's from meds. It was decided that maximum benefit from hospital care had been achieved and patient can be discharged. Consults:   None    Significant Diagnostic Studies: Routine labs and diagnostics    Treatments: Psychotropic medications, therapy with group, milieu, and 1:1 with nurses, social workers, PARizwanC/CNP, and Attending physician.       Discharge Medications:  Current Discharge Medication List      CONTINUE these medications which have CHANGED    Details   busPIRone (BUSPAR) 15 MG tablet Take 15 mg by mouth 2 times daily for 15 days  Qty: 45 tablet, Refills: 0      hydrOXYzine (VISTARIL) 50 MG capsule Take 1 capsule by mouth nightly  Qty: 30 capsule, Refills: 0      FLUoxetine (PROZAC) 20 MG capsule Take 3 capsules by mouth daily  Qty: 90 capsule, Refills: 0      traZODone (DESYREL) 150 MG tablet Take 1 tablet by mouth nightly  Qty: 30 tablet, Refills: 0      brexpiprazole (REXULTI) 3 MG TABS tablet Take 2 tablets by mouth daily  Qty: 60 tablet, Refills: 0         CONTINUE these medications which have NOT CHANGED    Details   oxybutynin (DITROPAN-XL) 10 MG extended release tablet Take 1 tablet by mouth 2 times daily  Qty: 60 tablet, Refills: 3      sildenafil (REVATIO) 20 MG tablet Take 1 tablet by mouth daily as needed (ED)  Qty: 25 tablet, Refills: 0    Associated Diagnoses: Erectile dysfunction, unspecified erectile dysfunction type      TRESIBA FLEXTOUCH 200 UNIT/ML SOPN Inject 32 Units as directed every morning  Refills: 0      mometasone (ASMANEX 30 METERED DOSES) 220 MCG/INH inhaler Inhale 2 puffs into the lungs as needed      Erenumab-aooe 70 MG/ML SOAJ Inject 70 mg into the skin every 30 days       ondansetron (ZOFRAN-ODT) 4 MG disintegrating tablet ondansetron 4 mg disintegrating tablet   as needed      SUMAtriptan (IMITREX) 50 MG tablet Refills: 0      atorvastatin (LIPITOR) 10 MG tablet Take 10 mg by mouth daily   Refills: 0      guanFACINE HCl ER 4 MG TB24 4 mg       MAGNESIUM OXIDE PO Take by mouth daily      insulin aspart (NOVOLOG FLEXPEN) 100 UNIT/ML injection pen Inject into the skin 3 times daily (before meals) Up to 50 units daily. Inject 1:12 grams carb bkfst/ lunch. 1:30 grams dinner. 1:50 mg/dL BT      metoprolol succinate (TOPROL XL) 50 MG extended release tablet Take 50 mg by mouth daily      pantoprazole (PROTONIX) 40 MG tablet Take 1 tablet by mouth every morning (before breakfast)  Qty: 30 tablet, Refills: 6    Associated Diagnoses: Gastroesophageal reflux disease, esophagitis presence not specified; Hematemesis with nausea; Nonintractable epilepsy without status epilepticus, unspecified epilepsy type ([facility] [address]  Epigastric pain      losartan (COZAAR) 100 MG tablet take 1 tablet by mouth once daily  Qty: 30 tablet, Refills: 5    Associated Diagnoses: Essential hypertension      aspirin EC 81 MG EC tablet Take 1 tablet by mouth daily  Qty: 30 tablet, Refills: 11    Associated Diagnoses: Essential hypertension      tiotropium (SPIRIVA RESPIMAT) 2.5 MCG/ACT AERS inhaler Inhale 2 puffs into the lungs every morning (before breakfast)  Qty: 1 Inhaler, Refills: 5    Associated Diagnoses: Mild persistent asthma without complication      budesonide-formoterol (SYMBICORT) 160-4.5 MCG/ACT AERO Inhale 2 puffs into the lungs 2 times daily  Qty: 1 Inhaler, Refills: 5    Associated Diagnoses: Mild persistent asthma without complication      amLODIPine (NORVASC) 5 MG tablet take 1 tablet by mouth once daily  Qty: 30 tablet, Refills: 5      Multiple Vitamin (MULTIVITAMIN) tablet take 1 tablet by mouth once daily  Qty: 30 tablet, Refills: 11      albuterol sulfate HFA (PROAIR HFA) 108 (90 BASE) MCG/ACT inhaler Inhale 2 puffs into the lungs every 4 hours as needed for Wheezing Dx: 493.90  Qty: 1 Inhaler, Refills: 5    Associated Diagnoses: Mild persistent asthma without complication      zonisamide (ZONEGRAN) 100 MG capsule Take 1 capsule by mouth daily  Qty: 30 capsule, Refills: 5    Associated Diagnoses: Epilepsy without status epilepticus, not intractable, unspecified      loratadine (CLARITIN) 10 MG tablet Take 1 tablet by mouth daily  Qty: 30 tablet, Refills: 11      OXcarbazepine (TRILEPTAL) 300 MG tablet Take 1,500 mg by mouth daily Pt takes 750 mg BID      FREESTYLE LITE strip Refills: 1         STOP taking these medications       risperiDONE (RISPERDAL) 0.5 MG tablet Comments:   Reason for Stopping:         NAPROXEN PO Comments:   Reason for Stopping:         nitroGLYCERIN (NITROSTAT) 0.4 MG SL tablet Comments:   Reason for Stopping:              Discharge Exam:  Level of consciousness:  Within normal limits  Appearance: Street clothes, seated, with good grooming  Behavior/Motor: No abnormalities noted  Attitude toward examiner:  Cooperative, attentive, good eye contact  Speech:  spontaneous, normal rate, normal volume and well articulated  Mood:  euthymic  Affect:  Full range  Thought processes:  linear, goal directed and coherent  Thought content:  denies homicidal ideation  Suicidal Ideation:  denies suicidal ideation  Delusions:  no evidence of delusions  Perceptual Disturbance:  denies any perceptual disturbance  Cognition:  Intact  Memory: age appropriate  Insight & Judgement: fair  Medication side effects: denies     Disposition: home    Patient Instructions: Activity: activity as tolerated  1. Patient instructed to take medications regularly and follow up with outpatient appointments. Follow-up as scheduled with Marcelo Garcia       Signed:    Electronically signed by Sourav Kelly MD on 12/30/19 at 10:01 AM    Time Spent on discharge is more than 35 minutes in the examination, evaluation, counseling and review of medications and discharge plan.

## 2019-12-30 NOTE — BH NOTE
PLAN OF CARE:     Start Time: 0900  End Time:   0940    Group Topic:  Daily Goals    Group Type:   Goal Group    Intervention/Goal:  To increase support and identify daily goals    Attendance:  Attended group    Affect:   Brightens with interaction    Behavior:   Cooperative and pleasant    Response:   appropriate    Daily Goal:    To go home and focus on everyday life.      Progress:  Progressing to goal

## 2019-12-31 ENCOUNTER — TELEPHONE (OUTPATIENT)
Dept: PSYCHIATRY | Age: 49
End: 2019-12-31

## 2020-01-07 ENCOUNTER — OFFICE VISIT (OUTPATIENT)
Dept: UROLOGY | Age: 50
End: 2020-01-07
Payer: COMMERCIAL

## 2020-01-07 VITALS
WEIGHT: 231 LBS | HEIGHT: 74 IN | DIASTOLIC BLOOD PRESSURE: 80 MMHG | BODY MASS INDEX: 29.65 KG/M2 | SYSTOLIC BLOOD PRESSURE: 118 MMHG

## 2020-01-07 LAB
BILIRUBIN URINE: NEGATIVE
BLOOD URINE, POC: NEGATIVE
CHARACTER, URINE: CLEAR
COLOR, URINE: YELLOW
GFR SERPL CREATININE-BSD FRML MDRD: 90 ML/MIN/1.73M2
GLUCOSE URINE: NEGATIVE MG/DL
KETONES, URINE: NEGATIVE
LEUKOCYTE CLUMPS, URINE: NEGATIVE
NITRITE, URINE: NEGATIVE
PH, URINE: 6.5 (ref 5–9)
PROTEIN, URINE: NEGATIVE MG/DL
SPECIFIC GRAVITY, URINE: 1.02 (ref 1–1.03)
UROBILINOGEN, URINE: 1 EU/DL (ref 0–1)

## 2020-01-07 PROCEDURE — 64566 NEUROELTRD STIM POST TIBIAL: CPT | Performed by: NURSE PRACTITIONER

## 2020-01-07 PROCEDURE — 1111F DSCHRG MED/CURRENT MED MERGE: CPT | Performed by: NURSE PRACTITIONER

## 2020-01-07 PROCEDURE — 99213 OFFICE O/P EST LOW 20 MIN: CPT | Performed by: NURSE PRACTITIONER

## 2020-01-07 PROCEDURE — 81003 URINALYSIS AUTO W/O SCOPE: CPT | Performed by: NURSE PRACTITIONER

## 2020-01-07 PROCEDURE — 1036F TOBACCO NON-USER: CPT | Performed by: NURSE PRACTITIONER

## 2020-01-07 PROCEDURE — G8417 CALC BMI ABV UP PARAM F/U: HCPCS | Performed by: NURSE PRACTITIONER

## 2020-01-07 PROCEDURE — G8482 FLU IMMUNIZE ORDER/ADMIN: HCPCS | Performed by: NURSE PRACTITIONER

## 2020-01-07 PROCEDURE — G8427 DOCREV CUR MEDS BY ELIG CLIN: HCPCS | Performed by: NURSE PRACTITIONER

## 2020-01-07 RX ORDER — OXYBUTYNIN CHLORIDE 10 MG/1
10 TABLET, EXTENDED RELEASE ORAL 2 TIMES DAILY
Qty: 60 TABLET | Refills: 3 | Status: SHIPPED | OUTPATIENT
Start: 2020-01-07 | End: 2020-07-22

## 2020-01-07 NOTE — PROGRESS NOTES
Patient has given me verbal consent to perform PTNS Treatment and has also signed a paper consent which is scanned in under media tab in [address] [place] [address] plan of care. After patient is in a comfortable sitting position. The Right ankle is prepared by cleansing the sole of the foot and needle insertion site with alcohol. Patch is then applied to sole of the foot and the needle is inserted within the range of the tibial nerve on the inside of the leg. The cord is then hooked to the needle and plugged into the machine. Machine is then turned on and put in test mode to get to a tolerable level the patient can withstand for 30 min. PTNS 12 GIVEN IN RIGHT ANKLE AT LEVEL 19    Needle and patch were removed from patient and discarded into sharps container.

## 2020-01-07 NOTE — PROGRESS NOTES
Josr Davis is a 51 y.o. male was seen today in follow-up for severe OAB and bph. Pt has a hx of polyuria and OAB spanning back to 2011. He has tried and failed vesicare, ditropan, and myrbetriq. Pt reports he has tried PTNS therapy in the past with improvement in symptoms initially but they returned after a period of time. He underwent office cystoscopy with Dr. Scott Lima 8/6/18 and was noted to have moderate BPH and a tight bladder neck. Dr. Scott Lima recommended interstim placement and pt underwent trial in the office on 8/27/18 and placement of interstim sacral nerve stimulator, stage 1 and 2 by Dr. Scott Lima on 10/16/18. Pt reported 70-75% improvement in symptoms following interstim placement but reported discomfort when lying on his right side with sleeping. Device was turned off and pt did not feel the benefits of the device outweighed the discomfort he was experiencing with sleeping. Interstim device and leads removed 5/16/19 by Dr. Scott Lima. Pt underwent a 12 week course of PTNS and today was his last session. He reports urinary symptoms improved. He is now down to waking up 0-1 x per night to urinate from every 1 hour previously. He reports urinary frequency stable. He notes when he goes to urinate the urine shoots onto the back of the toilet, the seat, the wall, the floor. He reports his stream splits off in different directions. He also notes sometimes he has to \"hit on his bladder\" to get it to fully empty.      Current Outpatient Medications   Medication Sig Dispense Refill    oxybutynin (DITROPAN-XL) 10 MG extended release tablet Take 1 tablet by mouth 2 times daily 60 tablet 3    busPIRone (BUSPAR) 15 MG tablet Take 15 mg by mouth 2 times daily for 15 days 45 tablet 0    hydrOXYzine (VISTARIL) 50 MG capsule Take 1 capsule by mouth nightly 30 capsule 0    FLUoxetine (PROZAC) 20 MG capsule Take 3 capsules by mouth daily 90 capsule 0    traZODone (DESYREL) 150 MG placed in visit on 01/07/20   POCT Urinalysis No Micro (Auto)   Result Value Ref Range    Glucose, Ur Negative NEGATIVE mg/dl    Bilirubin Urine Negative     Ketones, Urine Negative NEGATIVE    Specific Gravity, Urine 1.020 1.002 - 1.03    Blood, UA POC Negative NEGATIVE    pH, Urine 6.50 5.0 - 9.0    Protein, Urine Negative NEGATIVE mg/dl    Urobilinogen, Urine 1.00 0.0 - 1.0 eu/dl    Nitrite, Urine Negative NEGATIVE    Leukocyte Clumps, Urine Negative NEGATIVE    Color, Urine Yellow YELLOW-STR    Character, Urine Clear [id number]       Patients recent PSA values are as follows  Lab Results   Component Value Date    PSA 0.48 08/03/2018    PSA 0.46 10/19/2015    PSA 0.52 12/19/2011        Recent BUN/Creatinine:  Lab Results   Component Value Date    BUN 9 12/26/2019    CREATININE 0.9 12/26/2019         Assessment & Plan  Severe urinary incontinence, frequency, urgency, nocturia  BPH    Pt's OAB improved. However he is reporting that he is having splitting and spraying of stream.  PVR 50 mls. We will schedule him for office cystoscopy with one of the physicians to further evaluate and rule out stricture.

## 2020-01-09 ENCOUNTER — TELEPHONE (OUTPATIENT)
Dept: UROLOGY | Age: 50
End: 2020-01-09

## 2020-01-28 ENCOUNTER — HOSPITAL ENCOUNTER (OUTPATIENT)
Age: 50
Discharge: HOME | End: 2020-01-28
Payer: COMMERCIAL

## 2020-01-28 ENCOUNTER — HOSPITAL ENCOUNTER (OUTPATIENT)
Dept: GENERAL RADIOLOGY | Age: 50
Discharge: HOME | End: 2020-01-28
Payer: COMMERCIAL

## 2020-01-28 ENCOUNTER — PROCEDURE VISIT (OUTPATIENT)
Dept: UROLOGY | Age: 50
End: 2020-01-28
Payer: COMMERCIAL

## 2020-01-28 VITALS
BODY MASS INDEX: 30.16 KG/M2 | DIASTOLIC BLOOD PRESSURE: 80 MMHG | HEIGHT: 74 IN | SYSTOLIC BLOOD PRESSURE: 134 MMHG | WEIGHT: 235 LBS

## 2020-01-28 LAB
BILIRUBIN URINE: NEGATIVE
BLOOD URINE, POC: NEGATIVE
CHARACTER, URINE: CLEAR
COLOR, URINE: YELLOW
GLUCOSE URINE: 100 MG/DL
KETONES, URINE: NEGATIVE
LEUKOCYTE CLUMPS, URINE: NEGATIVE
NITRITE, URINE: NEGATIVE
PH, URINE: 7 (ref 5–9)
PROTEIN, URINE: NEGATIVE MG/DL
SPECIFIC GRAVITY, URINE: 1.02 (ref 1–1.03)
UROBILINOGEN, URINE: 0.2 EU/DL (ref 0–1)

## 2020-01-28 PROCEDURE — 1036F TOBACCO NON-USER: CPT | Performed by: UROLOGY

## 2020-01-28 PROCEDURE — 52000 CYSTOURETHROSCOPY: CPT | Performed by: UROLOGY

## 2020-01-28 PROCEDURE — G8482 FLU IMMUNIZE ORDER/ADMIN: HCPCS | Performed by: UROLOGY

## 2020-01-28 PROCEDURE — 99213 OFFICE O/P EST LOW 20 MIN: CPT | Performed by: UROLOGY

## 2020-01-28 PROCEDURE — 1111F DSCHRG MED/CURRENT MED MERGE: CPT | Performed by: UROLOGY

## 2020-01-28 PROCEDURE — 81003 URINALYSIS AUTO W/O SCOPE: CPT | Performed by: UROLOGY

## 2020-01-28 PROCEDURE — G8417 CALC BMI ABV UP PARAM F/U: HCPCS | Performed by: UROLOGY

## 2020-01-28 PROCEDURE — 74019 RADEX ABDOMEN 2 VIEWS: CPT

## 2020-01-28 PROCEDURE — G8427 DOCREV CUR MEDS BY ELIG CLIN: HCPCS | Performed by: UROLOGY

## 2020-01-28 RX ORDER — SILDENAFIL CITRATE 20 MG/1
20 TABLET ORAL DAILY PRN
Qty: 25 TABLET | Refills: 0 | Status: SHIPPED | OUTPATIENT
Start: 2020-01-28 | End: 2020-03-02 | Stop reason: SDUPTHER

## 2020-01-31 ENCOUNTER — TELEPHONE (OUTPATIENT)
Dept: UROLOGY | Age: 50
End: 2020-01-31

## 2020-01-31 NOTE — PROGRESS NOTES
REMOVAL  2010    bilateral    CERVICAL FUSION N/A 9/27/2017    ACDF C6-7 W/ATLANTIS CORNERSTONE performed by Delbert Gowers, MD at [facility] N/A 2/26/2018    COLONOSCOPY POLYPECTOMY HOT BIOPSY performed by Clare Bone MD at [address] Right    [address],96    FOOT SURGERY Bilateral 2006    hammer toes   [address]    removal of piece of glass    HAND SURGERY Right 07/2017    OTHER SURGICAL HISTORY  11/4/15    Excision of 3 cm lipoma LUQ abdominal wall Wisser    [place] OFFICE/OUTPT VISIT,PROCEDURE ONLY N/A 10/16/2018    PLACEMENT OF INTERSTIM DEVICE performed by Jonelle Gómez MD at [address] N/A 5/16/2019    REMOVAL INTERSSTIM DEVICE performed by Jonelle Gómez MD at [address]  2017    UPPER GASTROINTESTINAL ENDOSCOPY N/A 8/9/2018    EGD BIOPSY performed by Clare Bone MD at [address] Endoscopy     Family History   Problem Relation Age of Onset    Hypertension Father     Other Father         COPD    High Blood Pressure Father     Dementia Mother     High Blood Pressure Sister     High Blood Pressure Brother     Diabetes Paternal Aunt     Diabetes Paternal Grandmother     Cancer Neg Hx     High Cholesterol Neg Hx     Kidney Disease Neg Hx     Stroke Neg Hx     Colon Cancer Neg Hx     Breast Cancer Neg Hx     Colon Polyps Neg Hx      Outpatient Medications Marked as Taking for the 1/28/20 encounter (Procedure visit) with Jody Wells MD   Medication Sig Dispense Refill    polyethylene glycol (GLYCOLAX) powder Take 17 g by mouth daily Dispense 238 Gram Bottle.   Use as Directed 510 g 1    oxybutynin (DITROPAN-XL) 10 MG extended release tablet Take 1 tablet by mouth 2 times daily 60 tablet 3    hydrOXYzine (VISTARIL) 50 MG capsule Take 1 capsule by mouth nightly 30 capsule 0    FLUoxetine (PROZAC) 20 MG capsule Take 3 capsules by mouth daily 90 capsule 0    traZODone (DESYREL) 150 MG tablet Take 1 tablet by mouth nightly 30 tablet 0    brexpiprazole (REXULTI) 3 MG TABS tablet Take 2 tablets by mouth daily 60 tablet 0    [DISCONTINUED] sildenafil (REVATIO) 20 MG tablet Take 1 tablet by mouth daily as needed (ED) 25 tablet 0    TRESIBA FLEXTOUCH 200 UNIT/ML SOPN Inject 32 Units as directed every morning  0    mometasone (ASMANEX 30 METERED DOSES) 220 MCG/INH inhaler Inhale 2 puffs into the lungs as needed      Erenumab-aooe 70 MG/ML SOAJ Inject 70 mg into the skin every 30 days       FREESTYLE LITE strip   1    ondansetron (ZOFRAN-ODT) 4 MG disintegrating tablet ondansetron 4 mg disintegrating tablet   as needed      SUMAtriptan (IMITREX) 50 MG tablet   0    atorvastatin (LIPITOR) 10 MG tablet Take 10 mg by mouth daily   0    guanFACINE HCl ER 4 MG TB24 4 mg       MAGNESIUM OXIDE PO Take by mouth daily      insulin aspart (NOVOLOG FLEXPEN) 100 UNIT/ML injection pen Inject into the skin 3 times daily (before meals) Up to 50 units daily. Inject 1:12 grams carb bkfst/ lunch. 1:30 grams dinner.  1:50 mg/dL BT      metoprolol succinate (TOPROL XL) 50 MG extended release tablet Take 50 mg by mouth daily      losartan (COZAAR) 100 MG tablet take 1 tablet by mouth once daily 30 tablet 5    aspirin EC 81 MG EC tablet Take 1 tablet by mouth daily 30 tablet 11    tiotropium (SPIRIVA RESPIMAT) 2.5 MCG/ACT AERS inhaler Inhale 2 puffs into the lungs every morning (before breakfast) 1 Inhaler 5    budesonide-formoterol (SYMBICORT) 160-4.5 MCG/ACT AERO Inhale 2 puffs into the lungs 2 times daily 1 Inhaler 5    amLODIPine (NORVASC) 5 MG tablet take 1 tablet by mouth once daily 30 tablet 5    Multiple Vitamin (MULTIVITAMIN) tablet take 1 tablet by mouth once daily 30 tablet 11    albuterol sulfate HFA (PROAIR HFA) 108 (90 BASE) MCG/ACT inhaler Inhale 2 puffs into the lungs every 4 hours as needed for Wheezing Dx: 493.90 1 Inhaler 5    zonisamide (ZONEGRAN) 100 MG capsule Take 1 capsule by mouth daily (Patient taking differently: Take 200 mg by mouth 2 times daily ) 30 capsule 5    loratadine (CLARITIN) 10 MG tablet Take 1 tablet by mouth daily 30 tablet 11    OXcarbazepine (TRILEPTAL) 300 MG tablet Take 1,500 mg by mouth daily Pt takes 750 mg BID         Latuda [lurasidone hcl]; Morphine; Tetracyclines & related; Vilazodone hcl; Fish-derived products; and Flagyl [metronidazole]  Social History     Tobacco Use   Smoking Status Never Smoker   Smokeless Tobacco Never Used      (If patient a smoker, smoking cessation counseling offered)   Social History     Substance and Sexual Activity   Alcohol Use Yes    Alcohol/week: 4.0 standard drinks    Types: 4 Cans of beer per week    Comment: Occasionally--[employer]       REVIEW OF SYSTEMS:  Constitutional: negative  Eyes: negative  Respiratory: negative  Cardiovascular: negative  Gastrointestinal: negative  Genitourinary: see HPI  Musculoskeletal: negative  Skin: negative   Neurological: negative  Hematological/Lymphatic: negative  Psychological: negative        Physical Exam:    This a 51 y.o. male  Vitals:    01/28/20 1254   BP: 134/80     Body mass index is 30.17 kg/m². Constitutional: Patient in no acute distress;       Assessment and Plan        1. Benign localized prostatic hyperplasia with lower urinary tract symptoms (LUTS)    2. Urgency of urination    3. Urinary frequency               Plan:        BPH with mostly irritative symptoms  Failed PTNS and interstim  Discussed botox for OAB vs outlet surgery  Discussed that patient may need staged approach to manage voiding dysfunction  There is a possibility of a neurogenic component  Follow up for greenlight photovaporization of prostate        Prescriptions Ordered:  No orders of the defined types were placed in this encounter.      Orders Placed:  Orders Placed This Encounter   Procedures    POCT Urinalysis No Micro (Auto)    FL CYSTOURETHROSCOPY

## 2020-01-31 NOTE — TELEPHONE ENCOUNTER
The patient seen Dr Virgilio Esposito on 01/28/2020. He does not want the surgery for the prostate. He already has ED and does not want to have more problems with the ED. Can anything else be ordered instead of having surgery? He would like to have improvement with his urinary frequency and over active bladder. He takes ditropan xl 10 mg BID and picked the prescription for sildenafil. Please advise. Thank you.

## 2020-02-11 ENCOUNTER — TELEPHONE (OUTPATIENT)
Dept: UROLOGY | Age: 50
End: 2020-02-11

## 2020-02-11 ENCOUNTER — OFFICE VISIT (OUTPATIENT)
Dept: UROLOGY | Age: 50
End: 2020-02-11
Payer: COMMERCIAL

## 2020-02-11 VITALS
HEIGHT: 74 IN | SYSTOLIC BLOOD PRESSURE: 130 MMHG | DIASTOLIC BLOOD PRESSURE: 80 MMHG | WEIGHT: 233 LBS | BODY MASS INDEX: 29.9 KG/M2

## 2020-02-11 PROCEDURE — G8417 CALC BMI ABV UP PARAM F/U: HCPCS | Performed by: UROLOGY

## 2020-02-11 PROCEDURE — 1036F TOBACCO NON-USER: CPT | Performed by: UROLOGY

## 2020-02-11 PROCEDURE — G8427 DOCREV CUR MEDS BY ELIG CLIN: HCPCS | Performed by: UROLOGY

## 2020-02-11 PROCEDURE — 99214 OFFICE O/P EST MOD 30 MIN: CPT | Performed by: UROLOGY

## 2020-02-11 PROCEDURE — G8482 FLU IMMUNIZE ORDER/ADMIN: HCPCS | Performed by: UROLOGY

## 2020-02-11 NOTE — TELEPHONE ENCOUNTER
Patient scheduled for a Cystoscopy with Bladder Botox under MAC with Dr Radha Ramon on 3/31/20. We are asking for clearance.

## 2020-02-11 NOTE — TELEPHONE ENCOUNTER
Information mailed to patient at his request.  Regarding the greenlight laser photovaporization on 4/21/2020.

## 2020-02-12 NOTE — TELEPHONE ENCOUNTER
SURGERY [address]  [address] [place] [address] [address] [place] [address] [address] BENTON ESQUEDA AM OFFENEGG II.LUIS, Yobani Worrell [address]      Phone *[phone] *[phone]   Surgical Scheduling Direct Line Phone *[phone] Fax *[phone]      Hardy uAstin 1970 male    [address]  Marital Status: Single         Home Phone: [phone]      Cell Phone:    Telephone Information:   Mobile [phone]          Surgeon: Dr. Jeff Delgadillo  Surgery Date: 3/31/20   Time: 1:00 pm    Procedure: Cystoscopy with Bladder Botox    Diagnosis: Urinary Frequency     Important Medical History: In Epic    Special Inst/Equip:     CPT Codes:    21925,     Latex Allergy:  No     Cardiac Device:  No     Anesthesia:  MAC          Admission Type:  Same Day                             Admit Prior to Day of Surgery: No    Case Location:  Main OR           Preadmission Testing:Phone Call              PAT Date and Time:______________________________________________________    PAT Confirmation #: ______________________________________________________    Post Op Visit: ___________________________________________________________    Need Preop Cardiac Clearance: Yes    Does Patient have Cardiologist/physician?      Dr Primo Roman to clear    Surgery Confirmation #: __________________________________________________    Desiree Howland: ________________________   Date: __________________________     [employer] Name: [employer]

## 2020-02-12 NOTE — TELEPHONE ENCOUNTER
DO NOT TAKE ASPIRIN, PLAVIX, FISH OIL, GLUCOSAMINE CHONDROITIN, MULTIVITAMINS, VITAMIN A, VITAMIN E, VITAMIN B, COUMADIN, OR MOTRIN-LIKE DRUGS 7 DAYS PRIOR TO SURGERY AND 3 DAYS FOLLOWING     Alvaro Spine 1970 Diagnosis: Urinary Frequency    Surgical Physician: Dr. Monica Russell have been scheduled for the procedure marked below:      Surgery: Cystoscopy with Bladder Botox          Date: 3/31/20     Anesthesia: MAC     Place of Service: [facility] CONTACTED 1 TO 2-DAYS PRIOR TO YOUR SURGERY WITH YOUR SURGERY ARRIVAL TIME        INSTRUCTIONS AS MARKED BELOW:    1.  DO NOT eat or drink anything after midnight before surgery. 2.  We prefer you shower or bathe with an antibacterial soap (Dial) the morning of surgery. 3.  Please ensure to have a  with you to transport you home. 4.  Please bring a current medication list, photo ID and insurance card(s) with you  5. Okay to take Tylenol  6. If you take Glucophage or Metformin, hold 48-hours prior to surgery  7. Take blood pressure or heart medication as directed, if taken in the morning take with a small sip of water  8. PLEASE BRING THIS LETTER WITH YOU AND SHOW IT TO THE  AT [place] [address]. 9.  Please send a copy to the Family Dr: Edie Senior MD  10  May assist with your Robotic surgery  11. Make sure the pre op testing is done as soon as possible. Orders given  12. Do the urinalysis 7 days prior to the surgery date. 13. You should receive a follow up appointment upon discharge from the hospital.  If you do not the office will call in 1-2 days to schedule.     Date: 2/12/2020

## 2020-02-13 NOTE — PROGRESS NOTES
Andres Jamison MD        [address] [facility] [address]  Dept: [phone]  Dept Fax: [phone]: [address] [address] Urology Office Note -     Patient:  Jhoan Simeon  YOB: 1970    The patient is a 51 y.o. male who presents today for evaluation of the following problems: bph  Chief Complaint   Patient presents with    Follow-up     discuss treatment- urgency of urination. bph with luts         HISTORY OF PRESENT ILLNESS:     BPH with LUTS  Onset was  Years ago  Overall, the problem(s) are unchanged  Severity is described as moderate to severe. Associated Symptoms: No dysuria, no gross hematuria. Current Pain Severity: 0    Here to discuss options moving forward  Has high riding bladder neck and obstructing prostate  Concerned about retrograde ejaculation  Also concerned about risk of retention with botox  Here to discuss all options    Summary of Previous Records:  Jhoan Simeon is a 51 y.o. male was seen today in follow-up for severe OAB and bph. Pt has a hx of polyuria and OAB spanning back to 2011. He has tried and failed vesicare, ditropan, and myrbetriq. Pt reports he has tried PTNS therapy in the past with improvement in symptoms initially but they returned after a period of time. He underwent office cystoscopy with Dr. Jorgensen 8/6/18 and was noted to have moderate BPH and a tight bladder neck. Dr. Jorgensen recommended interstim placement and pt underwent trial in the office on 8/27/18 and placement of interstim sacral nerve stimulator, stage 1 and 2 by Dr. Jorgensen on 10/16/18. Pt reported 70-75% improvement in symptoms following interstim placement but reported discomfort when lying on his right side with sleeping. Device was turned off and pt did not feel the benefits of the device outweighed the discomfort he was experiencing with sleeping.    Interstim device and Diabetes Paternal Aunt     Diabetes Paternal Grandmother     Cancer Neg Hx     High Cholesterol Neg Hx     Kidney Disease Neg Hx     Stroke Neg Hx     Colon Cancer Neg Hx     Breast Cancer Neg Hx     Colon Polyps Neg Hx      Outpatient Medications Marked as Taking for the 2/11/20 encounter (Office Visit) with Mckay Hopkins MD   Medication Sig Dispense Refill    sildenafil (REVATIO) 20 MG tablet Take 1 tablet by mouth daily as needed (ED) 25 tablet 0    polyethylene glycol (GLYCOLAX) powder Take 17 g by mouth daily Dispense 238 Gram Bottle. Use as Directed 510 g 1    oxybutynin (DITROPAN-XL) 10 MG extended release tablet Take 1 tablet by mouth 2 times daily 60 tablet 3    hydrOXYzine (VISTARIL) 50 MG capsule Take 1 capsule by mouth nightly 30 capsule 0    FLUoxetine (PROZAC) 20 MG capsule Take 3 capsules by mouth daily 90 capsule 0    traZODone (DESYREL) 150 MG tablet Take 1 tablet by mouth nightly 30 tablet 0    brexpiprazole (REXULTI) 3 MG TABS tablet Take 2 tablets by mouth daily 60 tablet 0    TRESIBA FLEXTOUCH 200 UNIT/ML SOPN Inject 32 Units as directed every morning  0    mometasone (ASMANEX 30 METERED DOSES) 220 MCG/INH inhaler Inhale 2 puffs into the lungs as needed      Erenumab-aooe 70 MG/ML SOAJ Inject 70 mg into the skin every 30 days       FREESTYLE LITE strip   1    ondansetron (ZOFRAN-ODT) 4 MG disintegrating tablet ondansetron 4 mg disintegrating tablet   as needed      SUMAtriptan (IMITREX) 50 MG tablet   0    atorvastatin (LIPITOR) 10 MG tablet Take 10 mg by mouth daily   0    guanFACINE HCl ER 4 MG TB24 4 mg       MAGNESIUM OXIDE PO Take by mouth daily      insulin aspart (NOVOLOG FLEXPEN) 100 UNIT/ML injection pen Inject into the skin 3 times daily (before meals) Up to 50 units daily. Inject 1:12 grams carb bkfst/ lunch. 1:30 grams dinner.  1:50 mg/dL BT      metoprolol succinate (TOPROL XL) 50 MG extended release tablet Take 50 mg by mouth daily      losartan (COZAAR) 100 MG tablet take 1 tablet by mouth once daily 30 tablet 5    aspirin EC 81 MG EC tablet Take 1 tablet by mouth daily 30 tablet 11    tiotropium (SPIRIVA RESPIMAT) 2.5 MCG/ACT AERS inhaler Inhale 2 puffs into the lungs every morning (before breakfast) 1 Inhaler 5    budesonide-formoterol (SYMBICORT) 160-4.5 MCG/ACT AERO Inhale 2 puffs into the lungs 2 times daily 1 Inhaler 5    amLODIPine (NORVASC) 5 MG tablet take 1 tablet by mouth once daily 30 tablet 5    Multiple Vitamin (MULTIVITAMIN) tablet take 1 tablet by mouth once daily 30 tablet 11    albuterol sulfate HFA (PROAIR HFA) 108 (90 BASE) MCG/ACT inhaler Inhale 2 puffs into the lungs every 4 hours as needed for Wheezing Dx: 493.90 1 Inhaler 5    zonisamide (ZONEGRAN) 100 MG capsule Take 1 capsule by mouth daily (Patient taking differently: Take 200 mg by mouth 2 times daily ) 30 capsule 5    loratadine (CLARITIN) 10 MG tablet Take 1 tablet by mouth daily 30 tablet 11    OXcarbazepine (TRILEPTAL) 300 MG tablet Take 1,500 mg by mouth daily Pt takes 750 mg BID         Latuda [lurasidone hcl]; Morphine; Tetracyclines & related; Vilazodone hcl; Fish-derived products; and Flagyl [metronidazole]  Social History     Tobacco Use   Smoking Status Never Smoker   Smokeless Tobacco Never Used      (If patient a smoker, smoking cessation counseling offered)   Social History     Substance and Sexual Activity   Alcohol Use Yes    Alcohol/week: 4.0 standard drinks    Types: 4 Cans of beer per week    Comment: Occasionally--[employer]       REVIEW OF SYSTEMS:  Constitutional: negative  Eyes: negative  Respiratory: negative  Cardiovascular: negative  Gastrointestinal: negative  Genitourinary: see HPI  Musculoskeletal: negative  Skin: negative   Neurological: negative  Hematological/Lymphatic: negative  Psychological: negative        Physical Exam:    This a 51 y.o. male  Vitals:    02/11/20 1419   BP: 130/80     Body mass index is 29.92 kg/m².   Constitutional: Patient in no acute distress;       Assessment and Plan        1. Benign localized prostatic hyperplasia with lower urinary tract symptoms (LUTS)    2. Urinary incontinence, unspecified type               Plan:        BPH with mostly irritative symptoms  Failed PTNS and interstim  Discussed botox for OAB vs outlet surgery  Discussed that patient may need staged approach to manage voiding dysfunction  There is a possibility of a neurogenic component  Worried about side effect of retrograde ejaculation  Will follow up for cystoscopy with botox 100 units. Retention risk was discussed at length        Prescriptions Ordered:  No orders of the defined types were placed in this encounter. Orders Placed:  No orders of the defined types were placed in this encounter.            Romy Trammell MD

## 2020-02-18 ENCOUNTER — TELEPHONE (OUTPATIENT)
Dept: UROLOGY | Age: 50
End: 2020-02-18

## 2020-03-03 ENCOUNTER — PREP FOR PROCEDURE (OUTPATIENT)
Dept: UROLOGY | Age: 50
End: 2020-03-03

## 2020-03-03 RX ORDER — SODIUM CHLORIDE 9 MG/ML
INJECTION, SOLUTION INTRAVENOUS CONTINUOUS
Status: CANCELLED | OUTPATIENT
Start: 2020-03-31

## 2020-03-05 RX ORDER — SILDENAFIL CITRATE 20 MG/1
20 TABLET ORAL DAILY PRN
Qty: 25 TABLET | Refills: 0 | Status: SHIPPED | OUTPATIENT
Start: 2020-03-05 | End: 2020-04-29 | Stop reason: SDUPTHER

## 2020-03-09 ENCOUNTER — HOSPITAL ENCOUNTER (OUTPATIENT)
Age: 50
Discharge: HOME | End: 2020-03-09
Payer: COMMERCIAL

## 2020-03-09 ENCOUNTER — HOSPITAL ENCOUNTER (OUTPATIENT)
Dept: GENERAL RADIOLOGY | Age: 50
Discharge: HOME | End: 2020-03-09
Payer: COMMERCIAL

## 2020-03-09 ENCOUNTER — OFFICE VISIT (OUTPATIENT)
Dept: INTERNAL MEDICINE CLINIC | Age: 50
End: 2020-03-09
Payer: COMMERCIAL

## 2020-03-09 VITALS
DIASTOLIC BLOOD PRESSURE: 80 MMHG | SYSTOLIC BLOOD PRESSURE: 138 MMHG | WEIGHT: 239 LBS | BODY MASS INDEX: 30.67 KG/M2 | RESPIRATION RATE: 16 BRPM | HEIGHT: 74 IN | HEART RATE: 98 BPM

## 2020-03-09 LAB
ANION GAP SERPL CALCULATED.3IONS-SCNC: 11 MEQ/L (ref 8–16)
BASOPHILS # BLD: 0.4 %
BASOPHILS ABSOLUTE: 0 THOU/MM3 (ref 0–0.1)
BILIRUBIN URINE: NEGATIVE
BLOOD, URINE: NEGATIVE
BUN BLDV-MCNC: 6 MG/DL (ref 7–22)
CALCIUM SERPL-MCNC: 9.1 MG/DL (ref 8.5–10.5)
CHARACTER, URINE: CLEAR
CHLORIDE BLD-SCNC: 96 MEQ/L (ref 98–111)
CO2: 21 MEQ/L (ref 23–33)
COLOR: YELLOW
CREAT SERPL-MCNC: 0.7 MG/DL (ref 0.4–1.2)
EKG ATRIAL RATE: 71 BPM
EKG P AXIS: 69 DEGREES
EKG P-R INTERVAL: 166 MS
EKG Q-T INTERVAL: 396 MS
EKG QRS DURATION: 96 MS
EKG QTC CALCULATION (BAZETT): 430 MS
EKG R AXIS: 49 DEGREES
EKG T AXIS: 59 DEGREES
EKG VENTRICULAR RATE: 71 BPM
EOSINOPHIL # BLD: 2.3 %
EOSINOPHILS ABSOLUTE: 0.2 THOU/MM3 (ref 0–0.4)
ERYTHROCYTE [DISTWIDTH] IN BLOOD BY AUTOMATED COUNT: 12.4 % (ref 11.5–14.5)
ERYTHROCYTE [DISTWIDTH] IN BLOOD BY AUTOMATED COUNT: 38.8 FL (ref 35–45)
GFR SERPL CREATININE-BSD FRML MDRD: > 90 ML/MIN/1.73M2
GLUCOSE BLD-MCNC: 162 MG/DL (ref 70–108)
GLUCOSE URINE: NEGATIVE MG/DL
HCT VFR BLD CALC: 42.8 % (ref 42–52)
HEMOGLOBIN: 14.1 GM/DL (ref 14–18)
IMMATURE GRANS (ABS): 0.02 THOU/MM3 (ref 0–0.07)
IMMATURE GRANULOCYTES: 0.3 %
KETONES, URINE: NEGATIVE
LEUKOCYTE ESTERASE, URINE: NEGATIVE
LYMPHOCYTES # BLD: 39.6 %
LYMPHOCYTES ABSOLUTE: 2.9 THOU/MM3 (ref 1–4.8)
MCH RBC QN AUTO: 28.4 PG (ref 26–33)
MCHC RBC AUTO-ENTMCNC: 32.9 GM/DL (ref 32.2–35.5)
MCV RBC AUTO: 86.3 FL (ref 80–94)
MONOCYTES # BLD: 6.1 %
MONOCYTES ABSOLUTE: 0.5 THOU/MM3 (ref 0.4–1.3)
NITRITE, URINE: NEGATIVE
NUCLEATED RED BLOOD CELLS: 0 /100 WBC
PH UA: 6.5 (ref 5–9)
PLATELET # BLD: 182 THOU/MM3 (ref 130–400)
PMV BLD AUTO: 9.5 FL (ref 9.4–12.4)
POTASSIUM SERPL-SCNC: 3.9 MEQ/L (ref 3.5–5.2)
PROTEIN UA: NEGATIVE
RBC # BLD: 4.96 MILL/MM3 (ref 4.7–6.1)
SEG NEUTROPHILS: 51.3 %
SEGMENTED NEUTROPHILS ABSOLUTE COUNT: 3.8 THOU/MM3 (ref 1.8–7.7)
SODIUM BLD-SCNC: 128 MEQ/L (ref 135–145)
SPECIFIC GRAVITY, URINE: 1.01 (ref 1–1.03)
UROBILINOGEN, URINE: 1 EU/DL (ref 0–1)
WBC # BLD: 7.4 THOU/MM3 (ref 4.8–10.8)

## 2020-03-09 PROCEDURE — 93005 ELECTROCARDIOGRAM TRACING: CPT

## 2020-03-09 PROCEDURE — 81003 URINALYSIS AUTO W/O SCOPE: CPT

## 2020-03-09 PROCEDURE — 99214 OFFICE O/P EST MOD 30 MIN: CPT | Performed by: INTERNAL MEDICINE

## 2020-03-09 PROCEDURE — 36415 COLL VENOUS BLD VENIPUNCTURE: CPT

## 2020-03-09 PROCEDURE — G8427 DOCREV CUR MEDS BY ELIG CLIN: HCPCS | Performed by: INTERNAL MEDICINE

## 2020-03-09 PROCEDURE — 71046 X-RAY EXAM CHEST 2 VIEWS: CPT

## 2020-03-09 PROCEDURE — 3046F HEMOGLOBIN A1C LEVEL >9.0%: CPT | Performed by: INTERNAL MEDICINE

## 2020-03-09 PROCEDURE — 93010 ELECTROCARDIOGRAM REPORT: CPT | Performed by: NUCLEAR MEDICINE

## 2020-03-09 PROCEDURE — 2022F DILAT RTA XM EVC RTNOPTHY: CPT | Performed by: INTERNAL MEDICINE

## 2020-03-09 PROCEDURE — G8417 CALC BMI ABV UP PARAM F/U: HCPCS | Performed by: INTERNAL MEDICINE

## 2020-03-09 PROCEDURE — 80048 BASIC METABOLIC PNL TOTAL CA: CPT

## 2020-03-09 PROCEDURE — G8482 FLU IMMUNIZE ORDER/ADMIN: HCPCS | Performed by: INTERNAL MEDICINE

## 2020-03-09 PROCEDURE — 85025 COMPLETE CBC W/AUTO DIFF WBC: CPT

## 2020-03-09 PROCEDURE — 1036F TOBACCO NON-USER: CPT | Performed by: INTERNAL MEDICINE

## 2020-03-09 NOTE — LETTER
[address] [facility] Internal Medicine  [facility]  [address] [place] [address]  Phone: [phone]  Fax: [phone] [address] Lelia Singh MD        2020    [facility] [address]      Dear Rito Gilliland:                     PATIENT: Josh Walls          : 1970      DATE:  3/9/20    TO:  Dr. Dr. Gifty Cerna       Patient can proceed with surgery. The following procedures were reviewed by the physician:    EKG    Labs    Chest X-ray              If you have any questions or concerns, please don't hesitate to call.     Sincerely,        Roselyn Nicole MD

## 2020-03-09 NOTE — PROGRESS NOTES
difficulty with general anesthetic. SLEEP APNEA SCREENING:patient has sleep apnea    Blood pressure 138/80, pulse 98, resp. rate 16, height 6' 2\" (1.88 m), weight 239 lb (108.4 kg). Body mass index is 30.69 kg/m². Physical Examination: General appearance - alert, well appearing, and in no distress  Mental status - alert, oriented to person, place, and time  Neck - Neck is supple, no JVD or carotid bruits. No thyromegaly or adenopathy. Chest - clear to auscultation, no wheezes, rales or rhonchi, symmetric air entry  Heart - normal rate, regular rhythm, normal S1, S2, no murmurs, rubs, clicks or gallops  Abdomen - soft, nontender, nondistended, no masses or organomegaly  Neurological - alert, oriented, normal speech, no focal findings or movement disorder noted  Musculoskeletal - no joint tenderness, deformity or swelling  Extremities - peripheral pulses normal, no pedal edema, no clubbing or cyanosis  Skin - normal coloration and turgor, no rashes, no suspicious skin lesions noted    I have reviewed recent diagnostic testing including EKG,  labs     Diagnosis Orders   1. Preop examination     2. Seizures ([address]     3. Schizoaffective disorder, depressive type ([address]     4. Type 1 diabetes mellitus with other specified complication ([address]     5. Essential hypertension     6. Benign prostatic hyperplasia with urinary frequency                 No orders of the defined types were placed in this encounter. Patient has no h/o MI or CHF  Patient is active to > 4 mets without any cardiac symptoms. Planned surgery is  I low risk. EKG normal sinus rhythm normal  Sodium 128 labs otherwise okay  Patient is on multiple psychiatric medications  No further testing needed. There are no contraindications to proceed with surgery. Acceptable risk from a cardiopulmonary/medical standpoint. Will notify referring surgeon. Recommend routine DVT/PE prophylaxis as per surgery.

## 2020-03-11 ENCOUNTER — APPOINTMENT (OUTPATIENT)
Dept: GENERAL RADIOLOGY | Age: 50
End: 2020-03-11
Payer: COMMERCIAL

## 2020-03-11 ENCOUNTER — APPOINTMENT (OUTPATIENT)
Dept: CT IMAGING | Age: 50
End: 2020-03-11
Payer: COMMERCIAL

## 2020-03-11 ENCOUNTER — HOSPITAL ENCOUNTER (EMERGENCY)
Age: 50
Discharge: HOME | End: 2020-03-11
Attending: FAMILY MEDICINE
Payer: COMMERCIAL

## 2020-03-11 VITALS
SYSTOLIC BLOOD PRESSURE: 160 MMHG | OXYGEN SATURATION: 100 % | RESPIRATION RATE: 18 BRPM | HEART RATE: 70 BPM | DIASTOLIC BLOOD PRESSURE: 98 MMHG | TEMPERATURE: 98.2 F

## 2020-03-11 LAB
ALBUMIN SERPL-MCNC: 4.3 G/DL (ref 3.5–5.1)
ALP BLD-CCNC: 113 U/L (ref 38–126)
ALT SERPL-CCNC: 22 U/L (ref 11–66)
ANION GAP SERPL CALCULATED.3IONS-SCNC: 13 MEQ/L (ref 8–16)
AST SERPL-CCNC: 23 U/L (ref 5–40)
BACTERIA: NORMAL
BASOPHILS # BLD: 0.5 %
BASOPHILS ABSOLUTE: 0 THOU/MM3 (ref 0–0.1)
BILIRUB SERPL-MCNC: 0.3 MG/DL (ref 0.3–1.2)
BILIRUBIN DIRECT: < 0.2 MG/DL (ref 0–0.3)
BILIRUBIN URINE: NEGATIVE
BLOOD, URINE: NEGATIVE
BUN BLDV-MCNC: 12 MG/DL (ref 7–22)
CALCIUM SERPL-MCNC: 9.2 MG/DL (ref 8.5–10.5)
CASTS: NORMAL /LPF
CASTS: NORMAL /LPF
CHARACTER, URINE: CLEAR
CHLORIDE BLD-SCNC: 96 MEQ/L (ref 98–111)
CO2: 20 MEQ/L (ref 23–33)
COLOR: YELLOW
CREAT SERPL-MCNC: 1.1 MG/DL (ref 0.4–1.2)
CRYSTALS: NORMAL
EKG ATRIAL RATE: 74 BPM
EKG P AXIS: 70 DEGREES
EKG P-R INTERVAL: 160 MS
EKG Q-T INTERVAL: 384 MS
EKG QRS DURATION: 94 MS
EKG QTC CALCULATION (BAZETT): 426 MS
EKG R AXIS: 54 DEGREES
EKG T AXIS: 69 DEGREES
EKG VENTRICULAR RATE: 74 BPM
EOSINOPHIL # BLD: 0.5 %
EOSINOPHILS ABSOLUTE: 0 THOU/MM3 (ref 0–0.4)
EPITHELIAL CELLS, UA: NORMAL /HPF
ERYTHROCYTE [DISTWIDTH] IN BLOOD BY AUTOMATED COUNT: 13 % (ref 11.5–14.5)
ERYTHROCYTE [DISTWIDTH] IN BLOOD BY AUTOMATED COUNT: 41.7 FL (ref 35–45)
FLU A ANTIGEN: NEGATIVE
FLU B ANTIGEN: NEGATIVE
GFR SERPL CREATININE-BSD FRML MDRD: 86 ML/MIN/1.73M2
GLUCOSE BLD-MCNC: 276 MG/DL (ref 70–108)
GLUCOSE BLD-MCNC: 309 MG/DL (ref 70–108)
GLUCOSE, URINE: NEGATIVE MG/DL
HCT VFR BLD CALC: 43.3 % (ref 42–52)
HEMOGLOBIN: 14.2 GM/DL (ref 14–18)
IMMATURE GRANS (ABS): 0.01 THOU/MM3 (ref 0–0.07)
IMMATURE GRANULOCYTES: 0.1 %
KETONES, URINE: NEGATIVE
LEUKOCYTE ESTERASE, URINE: NEGATIVE
LIPASE: 26 U/L (ref 5.6–51.3)
LYMPHOCYTES # BLD: 27 %
LYMPHOCYTES ABSOLUTE: 2 THOU/MM3 (ref 1–4.8)
MCH RBC QN AUTO: 28.9 PG (ref 26–33)
MCHC RBC AUTO-ENTMCNC: 32.8 GM/DL (ref 32.2–35.5)
MCV RBC AUTO: 88.2 FL (ref 80–94)
MISCELLANEOUS LAB TEST RESULT: NORMAL
MONOCYTES # BLD: 5.6 %
MONOCYTES ABSOLUTE: 0.4 THOU/MM3 (ref 0.4–1.3)
NITRITE, URINE: NEGATIVE
NUCLEATED RED BLOOD CELLS: 0 /100 WBC
OSMOLALITY CALCULATION: 270.4 MOSMOL/KG (ref 275–300)
PH UA: 5.5 (ref 5–9)
PLATELET # BLD: 214 THOU/MM3 (ref 130–400)
PMV BLD AUTO: 9.9 FL (ref 9.4–12.4)
POTASSIUM SERPL-SCNC: 4.1 MEQ/L (ref 3.5–5.2)
PROTEIN UA: NEGATIVE MG/DL
RBC # BLD: 4.91 MILL/MM3 (ref 4.7–6.1)
RBC URINE: NORMAL /HPF
RENAL EPITHELIAL, UA: NORMAL
SEG NEUTROPHILS: 66.3 %
SEGMENTED NEUTROPHILS ABSOLUTE COUNT: 5 THOU/MM3 (ref 1.8–7.7)
SODIUM BLD-SCNC: 129 MEQ/L (ref 135–145)
SPECIFIC GRAVITY UA: 1.01 (ref 1–1.03)
TOTAL PROTEIN: 6.9 G/DL (ref 6.1–8)
TROPONIN T: < 0.01 NG/ML
UROBILINOGEN, URINE: 1 EU/DL (ref 0–1)
WBC # BLD: 7.5 THOU/MM3 (ref 4.8–10.8)
WBC UA: NORMAL /HPF
YEAST: NORMAL

## 2020-03-11 PROCEDURE — 99283 EMERGENCY DEPT VISIT LOW MDM: CPT

## 2020-03-11 PROCEDURE — 82948 REAGENT STRIP/BLOOD GLUCOSE: CPT

## 2020-03-11 PROCEDURE — 80053 COMPREHEN METABOLIC PANEL: CPT

## 2020-03-11 PROCEDURE — 81001 URINALYSIS AUTO W/SCOPE: CPT

## 2020-03-11 PROCEDURE — 93010 ELECTROCARDIOGRAM REPORT: CPT | Performed by: NUCLEAR MEDICINE

## 2020-03-11 PROCEDURE — 93005 ELECTROCARDIOGRAM TRACING: CPT | Performed by: FAMILY MEDICINE

## 2020-03-11 PROCEDURE — 85025 COMPLETE CBC W/AUTO DIFF WBC: CPT

## 2020-03-11 PROCEDURE — 36415 COLL VENOUS BLD VENIPUNCTURE: CPT

## 2020-03-11 PROCEDURE — 70450 CT HEAD/BRAIN W/O DYE: CPT

## 2020-03-11 PROCEDURE — 84484 ASSAY OF TROPONIN QUANT: CPT

## 2020-03-11 PROCEDURE — 71045 X-RAY EXAM CHEST 1 VIEW: CPT

## 2020-03-11 PROCEDURE — 2580000003 HC RX 258: Performed by: FAMILY MEDICINE

## 2020-03-11 PROCEDURE — 82248 BILIRUBIN DIRECT: CPT

## 2020-03-11 PROCEDURE — 87086 URINE CULTURE/COLONY COUNT: CPT

## 2020-03-11 PROCEDURE — 83690 ASSAY OF LIPASE: CPT

## 2020-03-11 PROCEDURE — 87804 INFLUENZA ASSAY W/OPTIC: CPT

## 2020-03-11 RX ORDER — 0.9 % SODIUM CHLORIDE 0.9 %
500 INTRAVENOUS SOLUTION INTRAVENOUS ONCE
Status: COMPLETED | OUTPATIENT
Start: 2020-03-11 | End: 2020-03-11

## 2020-03-11 RX ORDER — SODIUM CHLORIDE 9 MG/ML
INJECTION, SOLUTION INTRAVENOUS CONTINUOUS
Status: DISCONTINUED | OUTPATIENT
Start: 2020-03-11 | End: 2020-03-11 | Stop reason: HOSPADM

## 2020-03-11 RX ADMIN — SODIUM CHLORIDE 500 ML: 9 INJECTION, SOLUTION INTRAVENOUS at 15:11

## 2020-03-11 RX ADMIN — SODIUM CHLORIDE: 9 INJECTION, SOLUTION INTRAVENOUS at 15:16

## 2020-03-11 ASSESSMENT — ENCOUNTER SYMPTOMS
WHEEZING: 0
EYE DISCHARGE: 0
ABDOMINAL PAIN: 0
COUGH: 0

## 2020-03-11 NOTE — ED PROVIDER NOTES
FREESTYLE LITE STRIP        GUANFACINE HCL ER 4 MG TB24    4 mg     HYDROXYZINE (VISTARIL) 50 MG CAPSULE    Take 1 capsule by mouth nightly    INSULIN ASPART (NOVOLOG FLEXPEN) 100 UNIT/ML INJECTION PEN    Inject into the skin 3 times daily (before meals) Up to 50 units daily. Inject 1:12 grams carb bkfst/ lunch. 1:30 grams dinner. 1:50 mg/dL BT    LORATADINE (CLARITIN) 10 MG TABLET    Take 1 tablet by mouth daily    LOSARTAN (COZAAR) 100 MG TABLET    take 1 tablet by mouth once daily    MAGNESIUM OXIDE PO    Take by mouth daily    METOPROLOL SUCCINATE (TOPROL XL) 50 MG EXTENDED RELEASE TABLET    Take 50 mg by mouth daily    MOMETASONE (ASMANEX 30 METERED DOSES) 220 MCG/INH INHALER    Inhale 2 puffs into the lungs as needed    MULTIPLE VITAMIN (MULTIVITAMIN) TABLET    take 1 tablet by mouth once daily    ONDANSETRON (ZOFRAN-ODT) 4 MG DISINTEGRATING TABLET    ondansetron 4 mg disintegrating tablet   as needed    OXCARBAZEPINE (TRILEPTAL) 300 MG TABLET    Take 1,500 mg by mouth daily Pt takes 750 mg BID    OXYBUTYNIN (DITROPAN-XL) 10 MG EXTENDED RELEASE TABLET    Take 1 tablet by mouth 2 times daily    PANTOPRAZOLE (PROTONIX) 40 MG TABLET    Take 1 tablet by mouth every morning (before breakfast)    POLYETHYLENE GLYCOL (GLYCOLAX) POWDER    Take 17 g by mouth daily    SILDENAFIL (REVATIO) 20 MG TABLET    Take 1 tablet by mouth daily as needed (ED)    SUMATRIPTAN (IMITREX) 50 MG TABLET        TIOTROPIUM (SPIRIVA RESPIMAT) 2.5 MCG/ACT AERS INHALER    Inhale 2 puffs into the lungs every morning (before breakfast)    TRAZODONE (DESYREL) 150 MG TABLET    Take 1 tablet by mouth nightly    TRESIBA FLEXTOUCH 200 UNIT/ML SOPN    Inject 32 Units as directed every morning    ZONISAMIDE (ZONEGRAN) 100 MG CAPSULE    Take 1 capsule by mouth daily       ALLERGIES     is allergic to latuda [lurasidone hcl]; morphine; tetracyclines & related; vilazodone hcl; fish-derived products; and flagyl [metronidazole].     FAMILY HISTORY     He indicated that his mother is alive. He indicated that his father is alive. He indicated that the status of his sister is unknown. He indicated that the status of his brother is unknown. He indicated that the status of his paternal grandmother is unknown. He indicated that the status of his paternal aunt is unknown. He indicated that the status of his neg hx is unknown.   family history includes Dementia in his mother; Diabetes in his paternal aunt and paternal grandmother; High Blood Pressure in his brother, father, and sister; Hypertension in his father; Other in his father. SOCIAL HISTORY      reports that he has never smoked. He has never used smokeless tobacco. He reports current alcohol use. He reports that he does not use drugs. PHYSICAL EXAM     INITIAL VITALS:  oral temperature is 98.2 °F (36.8 °C). His blood pressure is 160/98 (abnormal) and his pulse is 70. His respiration is 18 and oxygen saturation is 100%. Physical Exam  Vitals signs and nursing note reviewed. Constitutional:       Comments: GCS 15   HENT:      Head: Normocephalic. Comments: Ear canals clear, TMs normal    Nose is clear    Mouth and throat normal    Eyes:      Extraocular Movements: Extraocular movements intact. Pupils: Pupils are equal, round, and reactive to light. Neck:      Musculoskeletal: Normal range of motion and neck supple. No neck rigidity. Comments: No JVD  Cardiovascular:      Rate and Rhythm: Normal rate and regular rhythm. Pulses: Normal pulses. Heart sounds: Normal heart sounds. Pulmonary:      Effort: Pulmonary effort is normal.      Breath sounds: Normal breath sounds. No wheezing. Abdominal:      General: There is no distension. Palpations: Abdomen is soft. Tenderness: There is no abdominal tenderness. There is no guarding or rebound. Musculoskeletal: Normal range of motion. General: No swelling. Skin:     General: Skin is warm and dry.    Neurological: General: No focal deficit present. Mental Status: He is alert. Motor: No weakness. Psychiatric:         Mood and Affect: Mood normal.         Behavior: Behavior normal.         DIFFERENTIAL DIAGNOSIS:     Fatigue tiredness, off-balance feeling at times to get around    We will check the brain for any acute abnormality there. Reassess his hydration blood sugar evaluate for influenza    DIAGNOSTIC RESULTS     EKG: All EKG's are interpreted by the Emergency Department Physician who either signs or Co-signs this chart in the absence of a cardiologist.  EKG interpreted by Herbie Villafana MD:    EKG showed sinus rhythm with rate 74. QRS complexes show normal axis, normal conduction. ST-T waves show no acute change        RADIOLOGY: non-plain film images(s) such as CT, Ultrasound and MRI are read by the radiologist.    CT HEAD WO CONTRAST   Final Result   No acute intracranial findings. **This report has been created using voice recognition software. It may contain minor errors which are inherent in voice recognition technology. **      Final report electronically signed by Dr. Anish Camilo on 3/11/2020 3:58 PM      XR CHEST PORTABLE   Final Result   Normal mobile chest. Prior anterior cervical fusion. **This report has been created using voice recognition software. It may contain minor errors which are inherent in voice recognition technology. **      Final report electronically signed by Dr. Hilda Rincon on 3/11/2020 4:19 PM           LABS:   Labs Reviewed   BASIC METABOLIC PANEL - Abnormal; Notable for the following components:       Result Value    Sodium 129 (*)     Chloride 96 (*)     CO2 20 (*)     Glucose 309 (*)     All other components within normal limits   GLOMERULAR FILTRATION RATE, ESTIMATED - Abnormal; Notable for the following components:    Est, Glom Filt Rate 86 (*)     All other components within normal limits   OSMOLALITY - Abnormal; Notable for the following components:    Osmolality Calc 270.4 (*)     All other components within normal limits   POCT GLUCOSE - Abnormal; Notable for the following components:    POC Glucose 276 (*)     All other components within normal limits   RAPID INFLUENZA A/B ANTIGENS   CULTURE, URINE   CBC WITH AUTO DIFFERENTIAL   HEPATIC FUNCTION PANEL   LIPASE   TROPONIN   URINALYSIS WITH MICROSCOPIC   ANION GAP       EMERGENCY DEPARTMENT COURSE:   Vitals:    Vitals:    03/11/20 1437 03/11/20 1632   BP: 131/85 (!) 160/98   Pulse: 77 70   Resp: 20 18   Temp: 98.2 °F (36.8 °C)    TempSrc: Oral    SpO2: 98% 100%       2:58 PM EDT: The patient was seen and evaluated. Nursing notes reviewed    IV hydration    CT scan of the brain normal    Blood sugar was 309    Renal function normal    EKG and troponin normal    Normal CBC    Influenza swabs negative    Urinalysis normal    With IV hydration is feeling better  I suspect a lot of his fatigue tiredness is due to his sleep apnea and he may need to have his CPAP adjusted      May have some degree of peripheral neuropathy affecting gait but do not see any sign of stroke    He feels better, discharge home        CRITICAL CARE:   none     CONSULTS:  none    PROCEDURES:  none     FINAL IMPRESSION      1. Fatigue, unspecified type    2.  Type II diabetes mellitus with manifestations ([facility]          DISPOSITION/PLAN     Follow-up primary care 1 week    consider getting his sleep apnea reevaluated    PATIENT REFERRED TO:  Luz Marina Costa MD  [id number] [phone]    In 1 week        DISCHARGE MEDICATIONS:  New Prescriptions    No medications on file       (Please note that portions of this note were completed with a voice recognition program.  Efforts were made to edit the dictations but occasionally words are mis-transcribed.)    Scribe:  Guille Curtis 3/11/20 2:58 PM EDT Scribing for and in the presence of Jenn Sequeira MD.    Signed by: Severino Em Hayde(Name), Diego, 03/11/20 7:05 PM    Provider:  I personally performed the services described in the documentation, reviewed and edited the documentation which was dictated to the scribe in my presence, and it accurately records my words and actions.     Kuldeep Rojas MD 3/11/20 7:05 PM       Kuldeep Rojas MD  03/11/20 2160

## 2020-03-11 NOTE — ED TRIAGE NOTES
Patient to ER due to feeling fatigue and off balance for about 3 weeks now. Patient states he is also type 1 diabetic. Patient states he checks blood sugars regularly and he doesn't think its that. Patient drinks a couple times a week a couple 24 oz drinks at a time. He doesn't know for sure but doesn't think that's related as well.

## 2020-03-12 ENCOUNTER — HOSPITAL ENCOUNTER (EMERGENCY)
Age: 50
Discharge: HOME | End: 2020-03-12
Payer: COMMERCIAL

## 2020-03-12 VITALS
SYSTOLIC BLOOD PRESSURE: 129 MMHG | DIASTOLIC BLOOD PRESSURE: 76 MMHG | TEMPERATURE: 98.8 F | WEIGHT: 239 LBS | HEART RATE: 73 BPM | BODY MASS INDEX: 30.69 KG/M2 | OXYGEN SATURATION: 98 % | RESPIRATION RATE: 16 BRPM

## 2020-03-12 PROCEDURE — 99213 OFFICE O/P EST LOW 20 MIN: CPT | Performed by: NURSE PRACTITIONER

## 2020-03-12 PROCEDURE — 6370000000 HC RX 637 (ALT 250 FOR IP): Performed by: NURSE PRACTITIONER

## 2020-03-12 PROCEDURE — 99212 OFFICE O/P EST SF 10 MIN: CPT

## 2020-03-12 PROCEDURE — 2500000003 HC RX 250 WO HCPCS: Performed by: NURSE PRACTITIONER

## 2020-03-12 PROCEDURE — 12001 RPR S/N/AX/GEN/TRNK 2.5CM/<: CPT | Performed by: NURSE PRACTITIONER

## 2020-03-12 PROCEDURE — 12001 RPR S/N/AX/GEN/TRNK 2.5CM/<: CPT

## 2020-03-12 RX ORDER — DIAPER,BRIEF,INFANT-TODD,DISP
EACH MISCELLANEOUS ONCE
Status: COMPLETED | OUTPATIENT
Start: 2020-03-12 | End: 2020-03-12

## 2020-03-12 RX ORDER — LIDOCAINE HYDROCHLORIDE 10 MG/ML
5 INJECTION, SOLUTION INFILTRATION; PERINEURAL ONCE
Status: COMPLETED | OUTPATIENT
Start: 2020-03-12 | End: 2020-03-12

## 2020-03-12 RX ADMIN — BACITRACIN ZINC: 500 OINTMENT TOPICAL at 19:40

## 2020-03-12 RX ADMIN — LIDOCAINE HYDROCHLORIDE 5 ML: 10 INJECTION, SOLUTION INFILTRATION; PERINEURAL at 19:40

## 2020-03-12 ASSESSMENT — PAIN SCALES - GENERAL
PAINLEVEL_OUTOF10: 0
PAINLEVEL_OUTOF10: 2

## 2020-03-12 ASSESSMENT — PAIN DESCRIPTION - DESCRIPTORS: DESCRIPTORS: ACHING

## 2020-03-12 ASSESSMENT — PAIN DESCRIPTION - LOCATION: LOCATION: FINGER (COMMENT WHICH ONE)

## 2020-03-12 ASSESSMENT — ENCOUNTER SYMPTOMS
VOMITING: 0
SHORTNESS OF BREATH: 0
NAUSEA: 0

## 2020-03-12 ASSESSMENT — PAIN DESCRIPTION - PAIN TYPE: TYPE: ACUTE PAIN

## 2020-03-12 NOTE — ED PROVIDER NOTES
10/01/2010, 09/10/2016    Pneumococcal Polysaccharide (Ofeyeethq32) 09/10/2016    Td, unspecified formulation 09/02/2015    Tdap (Boostrix, Adacel) 06/10/2013       FAMILY HISTORY     Patient's family history includes Dementia in his mother; Diabetes in his paternal aunt and paternal grandmother; High Blood Pressure in his brother, father, and sister; Hypertension in his father; Other in his father. SOCIAL HISTORY     Patient  reports that he has never smoked. He has never used smokeless tobacco. He reports current alcohol use. He reports that he does not use drugs. PHYSICAL EXAM     ED TRIAGE VITALS  BP: 129/76, Temp: 98.8 °F (37.1 °C), Pulse: 73, Resp: 16, SpO2: 98 %,Estimated body mass index is 30.69 kg/m² as calculated from the following:    Height as of 3/9/20: 6' 2\" (1.88 m). Weight as of this encounter: 239 lb (108.4 kg). ,No LMP for male patient. Physical Exam  Vitals signs and nursing note reviewed. Constitutional:       General: He is not in acute distress. Appearance: He is well-developed. He is not diaphoretic. Eyes:      Conjunctiva/sclera:      Right eye: Right conjunctiva is not injected. Left eye: Left conjunctiva is not injected. Pupils: Pupils are equal.   Neck:      Musculoskeletal: Normal range of motion. Cardiovascular:      Rate and Rhythm: Normal rate and regular rhythm. Heart sounds: No murmur. Pulmonary:      Effort: Pulmonary effort is normal. No respiratory distress. Breath sounds: Normal breath sounds. Musculoskeletal:      Right knee: He exhibits normal range of motion. Left knee: He exhibits normal range of motion. Right hand: He exhibits laceration (distal 2nd digit). He exhibits normal range of motion and normal capillary refill. Hands:    Skin:     General: Skin is warm. Findings: Laceration present. No rash. Comments: 1 cm laceration noted to the distal palmar surface of the left index finger.    Neurological: Mental Status: He is alert and oriented to person, place, and time. Psychiatric:         Behavior: Behavior normal.         DIAGNOSTIC RESULTS     Labs:No results found for this visit on 03/12/20. IMAGING:    No orders to display         EKG:  none    URGENT CARE COURSE:     Vitals:    03/12/20 1904   BP: 129/76   Pulse: 73   Resp: 16   Temp: 98.8 °F (37.1 °C)   TempSrc: Oral   SpO2: 98%   Weight: 239 lb (108.4 kg)       Medications   lidocaine 1 % injection 5 mL (has no administration in time range)   bacitracin zinc ointment (has no administration in time range)            PROCEDURES:  Lac Repair  Date/Time: 3/12/2020 6:59 PM  Performed by: YEHUDA Lynn CNP  Authorized by: YEHUDA Lynn CNP     Consent:     Consent obtained:  Verbal    Consent given by:  Patient    Risks discussed:  Infection, poor cosmetic result and poor wound healing    Alternatives discussed:  No treatment  Anesthesia (see MAR for exact dosages): Anesthesia method:  Local infiltration    Local anesthetic:  Lidocaine 1% w/o epi  Laceration details:     Location:  Finger    Length (cm):  1  Pre-procedure details:     Preparation:  Patient was prepped and draped in usual sterile fashion  Exploration:     Hemostasis achieved with:  Direct pressure    Wound exploration: wound explored through full range of motion      Contaminated: no    Treatment:     Area cleansed with:  Soap and water and Shur-Clens    Amount of cleaning:  Standard  Skin repair:     Repair method:  Sutures    Suture size:  5-0    Suture material:  Nylon    Suture technique:  Simple interrupted    Number of sutures:  2  Approximation:     Approximation:  Close  Post-procedure details:     Dressing:  Antibiotic ointment and adhesive bandage    Patient tolerance of procedure: Tolerated well, no immediate complications         FINAL IMPRESSION      1.  Laceration of left index finger without foreign body without damage to nail, initial encounter DISPOSITION/ PLAN     Wound was closed as described in the procedure note above. I did discuss wound care and signs of infection for which to monitor with the patient. He is advised to apply over-the-counter triple antibiotic ointment twice a day until the sutures come out in 1 week. He is advised to either return or follow-up with his PCP in 1 week for suture removal.  He is agreeable to plan as discussed.       PATIENT REFERRED TO:  Vaishnavi Pagan MD  [address] [facility] [address] [place] [address]      DISCHARGE MEDICATIONS:  New Prescriptions    No medications on file       Discontinued Medications    No medications on file       Current Discharge Medication List          YEHUDA Ley CNP    (Please note that portions of this note were completed with a voice recognition program. Efforts were made to edit the dictations but occasionally words are mis-transcribed.)          YEHUDA Ley CNP  03/12/20 3251

## 2020-03-13 LAB
ORGANISM: ABNORMAL
URINE CULTURE, ROUTINE: ABNORMAL

## 2020-03-17 ENCOUNTER — TELEPHONE (OUTPATIENT)
Dept: UROLOGY | Age: 50
End: 2020-03-17

## 2020-03-17 NOTE — TELEPHONE ENCOUNTER
Returned call to patient. Message was he wanted to reschedule the 3/31/2020 surgery with Dr Jeff Delgadillo. Cysto bladder botox 100 units. Message left, at this time elective surgical cases are not being scheduled, due COVID-19. Patient will be contacted when we are able to schedule.

## 2020-04-28 ENCOUNTER — TELEPHONE (OUTPATIENT)
Dept: UROLOGY | Age: 50
End: 2020-04-28

## 2020-04-28 NOTE — TELEPHONE ENCOUNTER
Ellie Germain returned call. His new telephone# [phone]. Explained to him that Dr Kathy Dc has asked for him to be be scheduled for the outpatient surgery on 5/1/20. This had been previously scheduled for 3/31/20. Cancelled due to Covid-19. Ellie Germain is unable to have the surgery on 5/1/20. Explained once the restrictions have been lifted, he will be called to rescheduled. Ellie Germain voiced understanding. Called OR to cancel the case. Schedulers not available. Will contact them tomorrow.

## 2020-04-28 NOTE — TELEPHONE ENCOUNTER
Ney Hartmann is not returning calls to confirm the surgery. That Dr Deng Ashton has requested to be scheduled on 5/1/2020. Maverick's father has given him messages. He is not calling back.

## 2020-04-28 NOTE — TELEPHONE ENCOUNTER
SURGERY [address] [place] [address] [address] [place] ESQUEDA AM OFFENEGG II.LUIS, Yobani Worrell [address]      Phone *[phone] *[phone]   Surgical Scheduling Direct Line Phone *[phone] Fax *[address] 1970 male    [address]  Marital Status: Single         Home Phone: [phone]      Cell Phone:    Telephone Information:   Mobile [phone]          Surgeon: Dr. Nikki Cuellar  Surgery Date: 5/1/2020  Time: Lincoln Hairston    Procedure: Cystoscopy with bladder botox injection 100 units    Diagnosis: Urinary incontinence, Urinary frequency, urinary urgency    Important Medical History: In EPIC    Special Inst/Equip:     CPT Codes:    33821,   Latex Allergy:  No     Cardiac Device:  No   Anesthesia:  MAC          Admission Type:  Same Day                             Admit Prior to Day of Surgery: No    Case Location:  Main OR           Preadmission Testing:Phone Call              PAT Date and Time:______________________________________________________    PAT Confirmation #: ______________________________________________________    Post Op Visit: ___________________________________________________________    Need Preop Cardiac Clearance YES    Does Patient have Cardiologist/physician?      Dr Corona Seay    Surgery Confirmation #: __________________________________________________    Elenor Risser: ________________________   Date: __________________________     [employer] Name: [employer]

## 2020-04-29 ENCOUNTER — TELEPHONE (OUTPATIENT)
Dept: UROLOGY | Age: 50
End: 2020-04-29

## 2020-04-29 RX ORDER — SILDENAFIL CITRATE 20 MG/1
20 TABLET ORAL DAILY PRN
Qty: 25 TABLET | Refills: 0 | Status: SHIPPED | OUTPATIENT
Start: 2020-04-29 | End: 2020-05-28 | Stop reason: SDUPTHER

## 2020-05-19 ENCOUNTER — TELEPHONE (OUTPATIENT)
Dept: UROLOGY | Age: 50
End: 2020-05-19

## 2020-05-20 ENCOUNTER — TELEPHONE (OUTPATIENT)
Dept: UROLOGY | Age: 50
End: 2020-05-20

## 2020-05-21 ENCOUNTER — TELEPHONE (OUTPATIENT)
Dept: UROLOGY | Age: 50
End: 2020-05-21

## 2020-05-21 ENCOUNTER — PREP FOR PROCEDURE (OUTPATIENT)
Dept: UROLOGY | Age: 50
End: 2020-05-21

## 2020-05-21 RX ORDER — SODIUM CHLORIDE 9 MG/ML
INJECTION, SOLUTION INTRAVENOUS CONTINUOUS
Status: CANCELLED | OUTPATIENT
Start: 2020-05-27

## 2020-05-21 NOTE — PROGRESS NOTES
PAT call attempted, patient unavailable, left message to please call us back at your earliest convenience; [phone]

## 2020-05-23 ENCOUNTER — HOSPITAL ENCOUNTER (OUTPATIENT)
Age: 50
Discharge: HOME | End: 2020-05-23
Payer: COMMERCIAL

## 2020-05-23 LAB
BILIRUBIN URINE: NEGATIVE
BLOOD, URINE: NEGATIVE
CHARACTER, URINE: CLEAR
COLOR: YELLOW
GLUCOSE URINE: NEGATIVE MG/DL
KETONES, URINE: NEGATIVE
LEUKOCYTE ESTERASE, URINE: NEGATIVE
NITRITE, URINE: NEGATIVE
PERFORMING LAB: NORMAL
PH UA: 7 (ref 5–9)
PROTEIN UA: NEGATIVE
REPORT: NORMAL
SARS-COV-2: NOT DETECTED
SPECIFIC GRAVITY, URINE: 1.01 (ref 1–1.03)
UROBILINOGEN, URINE: 0.2 EU/DL (ref 0–1)

## 2020-05-23 PROCEDURE — 81003 URINALYSIS AUTO W/O SCOPE: CPT

## 2020-05-23 PROCEDURE — U0002 COVID-19 LAB TEST NON-CDC: HCPCS

## 2020-05-27 ENCOUNTER — ANESTHESIA (OUTPATIENT)
Dept: OPERATING ROOM | Age: 50
End: 2020-05-27
Payer: COMMERCIAL

## 2020-05-27 ENCOUNTER — HOSPITAL ENCOUNTER (OUTPATIENT)
Age: 50
Setting detail: OUTPATIENT SURGERY
Discharge: HOME | End: 2020-05-27
Attending: UROLOGY | Admitting: UROLOGY
Payer: COMMERCIAL

## 2020-05-27 ENCOUNTER — ANESTHESIA EVENT (OUTPATIENT)
Dept: OPERATING ROOM | Age: 50
End: 2020-05-27
Payer: COMMERCIAL

## 2020-05-27 VITALS — SYSTOLIC BLOOD PRESSURE: 110 MMHG | DIASTOLIC BLOOD PRESSURE: 65 MMHG | OXYGEN SATURATION: 97 %

## 2020-05-27 VITALS
TEMPERATURE: 97 F | RESPIRATION RATE: 14 BRPM | OXYGEN SATURATION: 5 % | DIASTOLIC BLOOD PRESSURE: 70 MMHG | SYSTOLIC BLOOD PRESSURE: 119 MMHG | HEART RATE: 70 BPM

## 2020-05-27 PROCEDURE — 2580000003 HC RX 258

## 2020-05-27 PROCEDURE — 6360000002 HC RX W HCPCS: Performed by: UROLOGY

## 2020-05-27 PROCEDURE — 6360000002 HC RX W HCPCS

## 2020-05-27 PROCEDURE — 2709999900 HC NON-CHARGEABLE SUPPLY: Performed by: UROLOGY

## 2020-05-27 PROCEDURE — 3700000000 HC ANESTHESIA ATTENDED CARE: Performed by: UROLOGY

## 2020-05-27 PROCEDURE — 3600000013 HC SURGERY LEVEL 3 ADDTL 15MIN: Performed by: UROLOGY

## 2020-05-27 PROCEDURE — 3700000001 HC ADD 15 MINUTES (ANESTHESIA): Performed by: UROLOGY

## 2020-05-27 PROCEDURE — 6360000002 HC RX W HCPCS: Performed by: NURSE ANESTHETIST, CERTIFIED REGISTERED

## 2020-05-27 PROCEDURE — 3600000003 HC SURGERY LEVEL 3 BASE: Performed by: UROLOGY

## 2020-05-27 PROCEDURE — 7100000010 HC PHASE II RECOVERY - FIRST 15 MIN: Performed by: UROLOGY

## 2020-05-27 PROCEDURE — 7100000011 HC PHASE II RECOVERY - ADDTL 15 MIN: Performed by: UROLOGY

## 2020-05-27 RX ORDER — SODIUM CHLORIDE 9 MG/ML
INJECTION, SOLUTION INTRAVENOUS CONTINUOUS
Status: DISCONTINUED | OUTPATIENT
Start: 2020-05-27 | End: 2020-05-27 | Stop reason: HOSPADM

## 2020-05-27 RX ORDER — ONDANSETRON 2 MG/ML
INJECTION INTRAMUSCULAR; INTRAVENOUS PRN
Status: DISCONTINUED | OUTPATIENT
Start: 2020-05-27 | End: 2020-05-27 | Stop reason: SDUPTHER

## 2020-05-27 RX ORDER — NALOXONE HYDROCHLORIDE 0.4 MG/ML
INJECTION, SOLUTION INTRAMUSCULAR; INTRAVENOUS; SUBCUTANEOUS PRN
Status: DISCONTINUED | OUTPATIENT
Start: 2020-05-27 | End: 2020-05-27 | Stop reason: SDUPTHER

## 2020-05-27 RX ORDER — PROPOFOL 10 MG/ML
INJECTION, EMULSION INTRAVENOUS PRN
Status: DISCONTINUED | OUTPATIENT
Start: 2020-05-27 | End: 2020-05-27 | Stop reason: SDUPTHER

## 2020-05-27 RX ORDER — FENTANYL CITRATE 50 UG/ML
INJECTION, SOLUTION INTRAMUSCULAR; INTRAVENOUS PRN
Status: DISCONTINUED | OUTPATIENT
Start: 2020-05-27 | End: 2020-05-27 | Stop reason: SDUPTHER

## 2020-05-27 RX ORDER — CIPROFLOXACIN 500 MG/1
500 TABLET, FILM COATED ORAL 2 TIMES DAILY
Qty: 6 TABLET | Refills: 0 | Status: SHIPPED | OUTPATIENT
Start: 2020-05-27 | End: 2020-05-30

## 2020-05-27 RX ORDER — MIDAZOLAM HYDROCHLORIDE 1 MG/ML
INJECTION INTRAMUSCULAR; INTRAVENOUS PRN
Status: DISCONTINUED | OUTPATIENT
Start: 2020-05-27 | End: 2020-05-27 | Stop reason: SDUPTHER

## 2020-05-27 RX ORDER — LIDOCAINE HYDROCHLORIDE 20 MG/ML
INJECTION, SOLUTION INTRAVENOUS PRN
Status: DISCONTINUED | OUTPATIENT
Start: 2020-05-27 | End: 2020-05-27 | Stop reason: SDUPTHER

## 2020-05-27 RX ORDER — DEXAMETHASONE SODIUM PHOSPHATE 4 MG/ML
INJECTION, SOLUTION INTRA-ARTICULAR; INTRALESIONAL; INTRAMUSCULAR; INTRAVENOUS; SOFT TISSUE PRN
Status: DISCONTINUED | OUTPATIENT
Start: 2020-05-27 | End: 2020-05-27 | Stop reason: SDUPTHER

## 2020-05-27 RX ADMIN — MIDAZOLAM HYDROCHLORIDE 2 MG: 1 INJECTION, SOLUTION INTRAMUSCULAR; INTRAVENOUS at 12:30

## 2020-05-27 RX ADMIN — PROPOFOL 100 MG: 10 INJECTION, EMULSION INTRAVENOUS at 12:35

## 2020-05-27 RX ADMIN — NALOXONE HYDROCHLORIDE 0.2 MG: 0.4 INJECTION, SOLUTION INTRAMUSCULAR; INTRAVENOUS; SUBCUTANEOUS at 12:49

## 2020-05-27 RX ADMIN — SODIUM CHLORIDE: 9 INJECTION, SOLUTION INTRAVENOUS at 12:55

## 2020-05-27 RX ADMIN — ONDANSETRON HYDROCHLORIDE 4 MG: 4 INJECTION, SOLUTION INTRAMUSCULAR; INTRAVENOUS at 12:46

## 2020-05-27 RX ADMIN — LIDOCAINE HYDROCHLORIDE 50 MG: 20 INJECTION, SOLUTION INTRAVENOUS at 12:43

## 2020-05-27 RX ADMIN — CEFAZOLIN 2 G: 10 INJECTION, POWDER, FOR SOLUTION INTRAVENOUS at 12:34

## 2020-05-27 RX ADMIN — FENTANYL CITRATE 100 MCG: 50 INJECTION, SOLUTION INTRAMUSCULAR; INTRAVENOUS at 12:33

## 2020-05-27 RX ADMIN — LIDOCAINE HYDROCHLORIDE 80 MG: 20 INJECTION, SOLUTION INTRAVENOUS at 12:34

## 2020-05-27 RX ADMIN — DEXAMETHASONE SODIUM PHOSPHATE 10 MG: 4 INJECTION, SOLUTION INTRAMUSCULAR; INTRAVENOUS at 12:44

## 2020-05-27 RX ADMIN — LIDOCAINE HYDROCHLORIDE 50 MG: 20 INJECTION, SOLUTION INTRAVENOUS at 12:38

## 2020-05-27 RX ADMIN — LIDOCAINE HYDROCHLORIDE 75 MG: 20 INJECTION, SOLUTION INTRAVENOUS at 12:41

## 2020-05-27 RX ADMIN — SODIUM CHLORIDE: 9 INJECTION, SOLUTION INTRAVENOUS at 09:40

## 2020-05-27 ASSESSMENT — PULMONARY FUNCTION TESTS
PIF_VALUE: 1
PIF_VALUE: 0
PIF_VALUE: 1
PIF_VALUE: 1
PIF_VALUE: 0
PIF_VALUE: 1
PIF_VALUE: 0
PIF_VALUE: 1
PIF_VALUE: 0
PIF_VALUE: 1
PIF_VALUE: 0
PIF_VALUE: 1

## 2020-05-27 ASSESSMENT — PAIN SCALES - GENERAL
PAINLEVEL_OUTOF10: 0
PAINLEVEL_OUTOF10: 0

## 2020-05-27 NOTE — ANESTHESIA PRE PROCEDURE
Department of Anesthesiology  Preprocedure Note       Name:  Taylor Cox   Age:  53 y.o.  :  1970                                          MRN:  [id number]         Date:  2020      Surgeon: Mortimer Gerlach):  Aaliyah Trotter MD    Procedure: Procedure(s):  CYSTOSCOPY WITH BLADDER BOTOX 100 UNITS    Medications prior to admission:   Prior to Admission medications    Medication Sig Start Date End Date Taking? Authorizing Provider   polyethylene glycol (GLYCOLAX) powder Take 17 g by mouth daily   Yes Historical Provider, MD   oxybutynin (DITROPAN-XL) 10 MG extended release tablet Take 1 tablet by mouth 2 times daily 20  Yes YEHUDA Mejia CNP   hydrOXYzine (VISTARIL) 50 MG capsule Take 1 capsule by mouth nightly 19  Yes Byron Mercado MD   FLUoxetine (PROZAC) 20 MG capsule Take 3 capsules by mouth daily 19  Yes Byron Mercado MD   traZODone (DESYREL) 150 MG tablet Take 1 tablet by mouth nightly 19  Yes Byron Mercado MD   brexpiprazole (REXULTI) 3 MG TABS tablet Take 2 tablets by mouth daily 19  Yes Byron Mercado MD   TRESIBA FLEXTOUCH 200 UNIT/ML SOPN Inject 32 Units as directed every morning 19  Yes Historical Provider, MD   mometasone (ASMANEX 30 METERED DOSES) 220 MCG/INH inhaler Inhale 2 puffs into the lungs as needed   Yes Historical Provider, MD   Erenumab-aooe 70 MG/ML SOAJ Inject 70 mg into the skin every 30 days  3/7/19  Yes Historical Provider, MD   SUMAtriptan (IMITREX) 50 MG tablet  19  Yes Historical Provider, MD   atorvastatin (LIPITOR) 10 MG tablet Take 10 mg by mouth daily  19  Yes Historical Provider, MD   guanFACINE HCl ER 4 MG TB24 4 mg  19  Yes Historical Provider, MD   MAGNESIUM OXIDE PO Take by mouth daily   Yes Historical Provider, MD   insulin aspart (NOVOLOG FLEXPEN) 100 UNIT/ML injection pen Inject into the skin 3 times daily (before meals) Up to 50 units daily.  Inject 1:12 grams carb daily 11/22/16   Ivonne Tovar MD       Current medications:    Current Facility-Administered Medications   Medication Dose Route Frequency Provider Last Rate Last Dose    0.9 % sodium chloride infusion   Intravenous Continuous Ana Ashu        ceFAZolin (ANCEF) 2 g in dextrose 5 % 50 mL IVPB  2 g Intravenous 30 Min Pre-Op Ana Ashu        onabotulinumtoxin A (BOTOX) injection 100 Units  100 Units Intramuscular Once Ana Long           Allergies: Allergies   Allergen Reactions    Latuda [Lurasidone Hcl] Other (See Comments)     Dizziness      Morphine Itching    Tetracyclines & Related Swelling     Lips and face    Vilazodone Hcl      Nausea and Vomiting    Fish-Derived Products Nausea And Vomiting     seafood    Flagyl [Metronidazole] Itching and Rash       Problem List:    Patient Active Problem List   Diagnosis Code    Urgency of urination R39.15    Epilepsy ([facility] [address] G40.909    Benign prostatic hyperplasia N40.0    Depression F32.9    Hypertensive urgency I16.0    Head ache R51    Asthma J45.909    Excessive sleepiness G47.10    Seizures (HCC) R56.9    Fatigue R53.83    Snoring R06.83    ADHD (attention deficit hyperactivity disorder) F90.9    HTN (hypertension) I10    Obstructive sleep apnea on CPAP G47.33, Z99.89    Weight gain R63.5    Obesity (BMI 30.0-34. 9) E66.9    Allergic rhinitis J30.9    Type 1 diabetes mellitus (HCC) E10.9    Acute suppurative OM H66.009    OE (otitis externa) H60.90    Hearing loss H91.90    Cerumen impaction H61.20    Dizziness R42    ETD (eustachian tube dysfunction) H69.80    Lipoma of skin and subcutaneous tissue (excluding face) D17.30    Overactive bladder N32.81    Chest pain syndrome R07.9    Foot callus L84    Herniation of cervical intervertebral disc with radiculopathy M50.10    Urgency-frequency syndrome Y39.22    Complication of device K66. 9XXA    Psychosis ([facility] [address] F29    Schizoaffective disorder, depressive type ([facility] [address] F25.1       Past Medical History:        Diagnosis Date    ADHD (attention deficit hyperactivity disorder)     Anxiety     Arthritis     Asthma     Bladder dysfunction     Depression     Diabetes mellitus ([facility] [address]     Fatigue     GERD (gastroesophageal reflux disease)     Headache     migraines    Hyperlipidemia     Hypertension     MDRO (multiple drug resistant organisms) resistance     yrs ago    OAB (overactive bladder) 10/27/2015    Seizures ([facility] [address]     Sleep apnea     wears cpap    Type II or unspecified type diabetes mellitus without mention of complication, not stated as uncontrolled        Past Surgical History:        Procedure Laterality Date    CATARACT REMOVAL  2010    bilateral    CERVICAL FUSION N/A 9/27/2017    ACDF C6-7 W/ATLANTIS CORNERSTONE performed by Manuelito Walker MD at [address] 2/26/2018    COLONOSCOPY POLYPECTOMY HOT BIOPSY performed by Lilliana Lala MD at [address] Right    [address] Right 92,96    FOOT SURGERY Bilateral 2006    hammer toes   [address]    removal of piece of glass    HAND SURGERY Right 07/2017    OTHER SURGICAL HISTORY  11/4/15    Excision of 3 cm lipoma LUQ abdominal wall Wisser    [place] OFFICE/OUTPT VISIT,PROCEDURE ONLY N/A 10/16/2018    PLACEMENT OF INTERSTIM DEVICE performed by Damir Curiel MD at [address] [facility] N/A 5/16/2019    REMOVAL INTERSSTIM DEVICE performed by Damir Curiel MD at [address] [facility]  2017    UPPER GASTROINTESTINAL ENDOSCOPY N/A 8/9/2018    EGD BIOPSY performed by Lilliana Lala MD at [facility]       Social History:    Social History     Tobacco Use    Smoking status: Never Smoker    Smokeless tobacco: Never Used   Substance Use Topics    Alcohol use: Yes     Comment: a coupld days a week and a couple 24oz drinks per day of drinking

## 2020-05-27 NOTE — H&P
 UPPER GASTROINTESTINAL ENDOSCOPY  2017    UPPER GASTROINTESTINAL ENDOSCOPY N/A 8/9/2018    EGD BIOPSY performed by Latrell Johnson MD at [address] [facility]       Medications Prior to Admission:    Prior to Admission medications    Medication Sig Start Date End Date Taking?  Authorizing Provider   sildenafil (REVATIO) 20 MG tablet Take 1 tablet by mouth daily as needed (ED) 4/29/20   Grant Sanchez MD   polyethylene glycol [facility]) powder Take 17 g by mouth daily    Historical Provider, MD   oxybutynin (DITROPAN-XL) 10 MG extended release tablet Take 1 tablet by mouth 2 times daily 1/7/20   YEHUDA Chapa - CNP   hydrOXYzine (VISTARIL) 50 MG capsule Take 1 capsule by mouth nightly 12/30/19   Lashell Armstrong MD   FLUoxetine (PROZAC) 20 MG capsule Take 3 capsules by mouth daily 12/31/19   Lashell Armstrong MD   traZODone (DESYREL) 150 MG tablet Take 1 tablet by mouth nightly 12/30/19   Lashell Armstrong MD   brexpiprazole (REXULTI) 3 MG TABS tablet Take 2 tablets by mouth daily 12/31/19   Lashell Armstrong MD   TRESIBA FLEXTOUCH 200 UNIT/ML SOPN Inject 32 Units as directed every morning 5/29/19   Historical Provider, MD   mometasone (ASMANEX 30 METERED DOSES) 220 MCG/INH inhaler Inhale 2 puffs into the lungs as needed    Historical Provider, MD   Erenumab-aooe 70 MG/ML SOAJ Inject 70 mg into the skin every 30 days  3/7/19   Historical Provider, MD   FREESTYLE LITE strip  5/8/19   Historical Provider, MD   ondansetron (ZOFRAN-ODT) 4 MG disintegrating tablet ondansetron 4 mg disintegrating tablet   as needed    Historical Provider, MD   SUMAtriptan (IMITREX) 50 MG tablet  5/8/19   Historical Provider, MD   atorvastatin (LIPITOR) 10 MG tablet Take 10 mg by mouth daily  4/29/19   Historical Provider, MD   guanFACINE HCl ER 4 MG TB24 4 mg  5/5/19   Historical Provider, MD   MAGNESIUM OXIDE PO Take by mouth daily    Historical Provider, MD   insulin aspart (NOVOLOG FLEXPEN) 100 UNIT/ML injection pen Inject into the skin 3 times daily (before meals) Up to 50 units daily. Inject 1:12 grams carb bkfst/ lunch. 1:30 grams dinner. 1:50 mg/dL BT    Historical Provider, MD   metoprolol succinate (TOPROL XL) 50 MG extended release tablet Take 50 mg by mouth daily    Historical Provider, MD   pantoprazole (PROTONIX) 40 MG tablet Take 1 tablet by mouth every morning (before breakfast) 6/5/17 1/14/20  YEHUDA Sanz CNP   losartan (COZAAR) 100 MG tablet take 1 tablet by mouth once daily 11/22/16   Nathan Victoria MD   aspirin EC 81 MG EC tablet Take 1 tablet by mouth daily 11/22/16   Nathan Victoria MD   tiotropium (SPIRIVA RESPIMAT) 2.5 MCG/ACT AERS inhaler Inhale 2 puffs into the lungs every morning (before breakfast) 11/22/16   Nathan Victoria MD   budesonide-formoterol [facility]) 160-4.5 MCG/ACT AERO Inhale 2 puffs into the lungs 2 times daily 11/22/16   Nathan Victoria MD   amLODIPine (NORVASC) 5 MG tablet take 1 tablet by mouth once daily 10/31/16   Nathan Victoria MD   Multiple Vitamin (MULTIVITAMIN) tablet take 1 tablet by mouth once daily 9/1/16   YEHUDA Silvestre CNP   albuterol sulfate HFA (PROAIR HFA) 108 (90 BASE) MCG/ACT inhaler Inhale 2 puffs into the lungs every 4 hours as needed for Wheezing Dx: 493.90 9/1/16   YEHUDA Silvestre CNP   zonisamide (ZONEGRAN) 100 MG capsule Take 1 capsule by mouth daily  Patient taking differently: Take 200 mg by mouth 2 times daily  5/31/16   Nathan Victoria MD   loratadine (CLARITIN) 10 MG tablet Take 1 tablet by mouth daily 8/10/15   Nathan Victoria MD   OXcarbazepine (TRILEPTAL) 300 MG tablet Take 1,500 mg by mouth daily Pt takes 750 mg BID    Historical Provider, MD       Allergies:  Aloha Huffman hcl];  Morphine; Tetracyclines & related; Vilazodone hcl; Fish-derived products; and Flagyl [metronidazole]    Social History:    Social negative  Respiratory: negative  Cardiovascular: negative  Gastrointestinal: negative  Genitourinary: no acute issues  Musculoskeletal: negative  Skin: negative   Neurological: negative  Hematological/Lymphatic: negative  Psychological: negative    Physical Exam:      No data found. Constitutional: Patient in no acute distress; Neuro: alert and oriented to person place and time. Psych: Mood and affect normal.  Skin: Normal  Lungs: Respiratory effort normal, CTA  Cardiovascular:  Normal peripheral pulses; no murmur. Normal rhythm  Abdomen: Soft, non-tender, non-distended with no CVA, flank pain, hepatosplenomegaly or hernia. Kidneys normal.  Bladder non-tender and not distended. LABS:   No results for input(s): WBC, HGB, HCT, MCV, PLT in the last 72 hours. No results for input(s): NA, K, CL, CO2, PHOS, BUN, CREATININE in the last 72 hours. Invalid input(s): CA  Lab Results   Component Value Date    PSA 0.48 08/03/2018    PSA 0.46 10/19/2015    PSA 0.52 12/19/2011         Urinalysis: No results for input(s): COLORU, PHUR, LABCAST, WBCUA, RBCUA, MUCUS, TRICHOMONAS, YEAST, BACTERIA, CLARITYU, SPECGRAV, LEUKOCYTESUR, UROBILINOGEN, BILIRUBINUR, BLOODU in the last 72 hours. Invalid input(s): NITRATE, GLUCOSEUKETONESUAMORPHOUS     -----------------------------------------------------------------      Assessment and Plan     Impression:    Patient Active Problem List   Diagnosis    Urgency of urination    Epilepsy ([facility] [address]    Benign prostatic hyperplasia    Depression    Hypertensive urgency    Head ache    Asthma    Excessive sleepiness    Seizures (HCC)    Fatigue    Snoring    ADHD (attention deficit hyperactivity disorder)    HTN (hypertension)    Obstructive sleep apnea on CPAP    Weight gain    Obesity (BMI 30.0-34. 9)    Allergic rhinitis    Type 1 diabetes mellitus (HCC)    Acute suppurative OM    OE (otitis externa)    Hearing loss    Cerumen impaction    Dizziness    ETD

## 2020-05-28 RX ORDER — SILDENAFIL CITRATE 20 MG/1
20 TABLET ORAL DAILY PRN
Qty: 25 TABLET | Refills: 0 | Status: SHIPPED | OUTPATIENT
Start: 2020-05-28 | End: 2020-07-30 | Stop reason: SDUPTHER

## 2020-05-29 ENCOUNTER — TELEPHONE (OUTPATIENT)
Dept: UROLOGY | Age: 50
End: 2020-05-29

## 2020-05-29 NOTE — TELEPHONE ENCOUNTER
Left a message advising patient surgery follow up virtual visit. Advised the patient to call if that time does not work.

## 2020-06-01 NOTE — OP NOTE
Patient:  Virgilio Sahni  MRN: [id number]  YOB: 1970    FACILITY: [address] [place] [address]    DATE: 5/27/2020    SURGEON: Evelyn Valverde MD     ASSISTANT: none    PREOPERATIVE DIAGNOSIS: URINARY INCONTINENCE, URINARY FREQUENCY, urge urinary incontinence, overactive bladder    POSTOPERATIVE DIAGNOSIS: as above     PROCEDURE PERFORMED: CYSTOSCOPY WITH BLADDER BOTOX 100 UNITS    ANESTHESIA: Monitor Anesthesia Care    ESTIMATED BLOOD LOSS: 0 ml    COMPLICATIONS: None immediate    DRAINS: none    SPECIMENS: none    INJECTION: 100 IU of onabotulinumA toxin. INDICATIONS FOR PROCEDURE:  The patient is a 53 y.o. male who presents today with URINARY INCONTINENCE, URINARY FREQUENCY here for CYSTOSCOPY WITH BLADDER BOTOX 100 UNITS. After risks, benefits and alternatives of the procedure were discussed with the patient, the patient elected to proceed. DETAILS OF THE PROCEDURE:  The patient was correctly identified in the preoperative holding area. he was brought back to the operating room and placed in the dorsal lithotomy position. EPC cuffs were on, in place, and fully functional. Conscious sedation was administered by anesthesia. he was given antibiotic prophylaxis. The patient was then prepped and draped in the usual sterile fashion. After appropriate time-out was performed with all parties consenting, a 22-Slovenian cystoscope with a 30-degree lens was inserted into the bladder. A thorough and complete cystoscopy was then performed which showed no abnormalities of the bladder mucosa. The ureteral orifices were patent in the orthotopic location. An injection needle was then inserted into the cystoscope. 100 units of botulinum toxin were then systematically and sequentially injected into the posterior bladder wall with care to avoid the trigone and the ureteral orifices. A total of 20 injections with 0.5 mL per site were injected.   There was minimal bleeding following the procedure, and

## 2020-06-12 ENCOUNTER — OFFICE VISIT (OUTPATIENT)
Dept: UROLOGY | Age: 50
End: 2020-06-12
Payer: COMMERCIAL

## 2020-06-12 VITALS — HEIGHT: 74 IN | WEIGHT: 244.2 LBS | TEMPERATURE: 99 F | BODY MASS INDEX: 31.34 KG/M2

## 2020-06-12 LAB
BILIRUBIN URINE: NEGATIVE
BLOOD URINE, POC: NORMAL
CHARACTER, URINE: CLEAR
COLOR, URINE: YELLOW
GLUCOSE URINE: NEGATIVE MG/DL
KETONES, URINE: NEGATIVE
LEUKOCYTE CLUMPS, URINE: NEGATIVE
NITRITE, URINE: NEGATIVE
PH, URINE: 7 (ref 5–9)
POST VOID RESIDUAL (PVR): 273 ML
PROTEIN, URINE: NEGATIVE MG/DL
SPECIFIC GRAVITY, URINE: 1.02 (ref 1–1.03)
UROBILINOGEN, URINE: 1 EU/DL (ref 0–1)

## 2020-06-12 PROCEDURE — G8427 DOCREV CUR MEDS BY ELIG CLIN: HCPCS | Performed by: UROLOGY

## 2020-06-12 PROCEDURE — 99213 OFFICE O/P EST LOW 20 MIN: CPT | Performed by: UROLOGY

## 2020-06-12 PROCEDURE — 1036F TOBACCO NON-USER: CPT | Performed by: UROLOGY

## 2020-06-12 PROCEDURE — G8417 CALC BMI ABV UP PARAM F/U: HCPCS | Performed by: UROLOGY

## 2020-06-12 PROCEDURE — 51798 US URINE CAPACITY MEASURE: CPT | Performed by: UROLOGY

## 2020-06-29 NOTE — PROGRESS NOTES
Views)    Result Date: 1/28/2020  PROCEDURE: XR ABDOMEN (2 VIEWS) CLINICAL INFORMATION: Left-sided abdominal pain TECHNIQUE: 3 AP supine and upright abdominal radiographs COMPARISON: None FINDINGS: There is no free intraperitoneal air. Bowel gas pattern is nonobstructive. There is a large amount retained stool in the colon. No abnormal masses are seen. Phleboliths are present in the pelvis. There are degenerative changes in the thoracolumbar spine. 1. No pneumoperitoneum. 2. Nonobstructive bowel gas pattern with a large amount of retained stool in the colon.  Final report electronically signed by Dr. Moe Carrion on 1/28/2020 3:29 PM      PAST MEDICAL, FAMILY AND SOCIAL HISTORY:  Past Medical History:   Diagnosis Date    ADHD (attention deficit hyperactivity disorder)     Anxiety     Arthritis     Asthma     Bladder dysfunction     Depression     Diabetes mellitus ([address]     Fatigue     GERD (gastroesophageal reflux disease)     Headache     migraines    Hyperlipidemia     Hypertension     MDRO (multiple drug resistant organisms) resistance     yrs ago    OAB (overactive bladder) 10/27/2015    Seizures ([facility] [address]     Sleep apnea     wears cpap    Type II or unspecified type diabetes mellitus without mention of complication, not stated as uncontrolled      Past Surgical History:   Procedure Laterality Date    CATARACT REMOVAL  2010    bilateral    CERVICAL FUSION N/A 9/27/2017    ACDF C6-7 W/ATLANTIS CORNERSTONE performed by Ade Marrero MD at [address] [place] [address] 2/26/2018    COLONOSCOPY POLYPECTOMY HOT BIOPSY performed by Zay Pena MD at [address] [place] [address] N/A 5/27/2020    CYSTOSCOPY WITH BLADDER BOTOX 100 UNITS performed by Mauri Newberry MD at [address] [place] [address] Dr Theodore    [address] Right 92,96    FOOT SURGERY Bilateral 2006    hammer toes    FOOT SURGERY  1996    removal of piece of glass    HAND SURGERY Right 07/2017    OTHER SURGICAL HISTORY  11/4/15    Excision of 3 cm lipoma LUQ abdominal wall Wisser    [place] OFFICE/OUTPT VISIT,PROCEDURE ONLY N/A 10/16/2018    PLACEMENT OF INTERSTIM DEVICE performed by Earl Rodriguez MD at [address] N/A 5/16/2019    REMOVAL INTERSSTIM DEVICE performed by Earl Rodriguez MD at [facility] [facility]  2017    UPPER GASTROINTESTINAL ENDOSCOPY N/A 8/9/2018    EGD BIOPSY performed by Zay Pena MD at [facility]     Family History   Problem Relation Age of Onset    Hypertension Father     Other Father         COPD    High Blood Pressure Father     Dementia Mother     High Blood Pressure Sister     High Blood Pressure Brother     Diabetes Paternal Aunt     Diabetes Paternal Grandmother     Cancer Neg Hx     High Cholesterol Neg Hx     Kidney Disease Neg Hx     Stroke Neg Hx     Colon Cancer Neg Hx     Breast Cancer Neg Hx     Colon Polyps Neg Hx      Outpatient Medications Marked as Taking for the 6/12/20 encounter (Office Visit) with Mauri Newberry MD   Medication Sig Dispense Refill    pantoprazole (PROTONIX) 40 MG tablet Take 1 tablet by mouth every morning (before breakfast) 30 tablet 6    sildenafil (REVATIO) 20 MG tablet Take 1 tablet by mouth daily as needed (ED) 25 tablet 0    polyethylene glycol (GLYCOLAX) powder Take 17 g by mouth daily      oxybutynin (DITROPAN-XL) 10 MG extended release tablet Take 1 tablet by mouth 2 times daily 60 tablet 3    hydrOXYzine (VISTARIL) 50 MG capsule Take 1 capsule by mouth nightly 30 capsule 0    FLUoxetine (PROZAC) 20 MG capsule Take 3 capsules by mouth daily 90 capsule 0    traZODone (DESYREL) 150 MG tablet Take 1 tablet by mouth nightly 30 tablet 0    brexpiprazole (REXULTI) 3 MG TABS tablet Take 2 tablets by mouth daily 60 tablet 0    TRESIBA FLEXTOUCH 200 UNIT/ML SOPN Inject 32 Units as directed every morning  0    mometasone (ASMANEX 30 METERED DOSES) 220 MCG/INH inhaler

## 2020-07-22 ENCOUNTER — OFFICE VISIT (OUTPATIENT)
Dept: UROLOGY | Age: 50
End: 2020-07-22
Payer: COMMERCIAL

## 2020-07-22 VITALS — BODY MASS INDEX: 31.7 KG/M2 | TEMPERATURE: 97.8 F | HEIGHT: 74 IN | WEIGHT: 247 LBS

## 2020-07-22 LAB
BILIRUBIN URINE: NEGATIVE
BLOOD URINE, POC: NEGATIVE
CHARACTER, URINE: CLEAR
COLOR, URINE: YELLOW
GLUCOSE URINE: 100 MG/DL
KETONES, URINE: NEGATIVE
LEUKOCYTE CLUMPS, URINE: NEGATIVE
NITRITE, URINE: NEGATIVE
PH, URINE: 6 (ref 5–9)
POST VOID RESIDUAL (PVR): 32 ML
PROTEIN, URINE: NEGATIVE MG/DL
SPECIFIC GRAVITY, URINE: 1.02 (ref 1–1.03)
UROBILINOGEN, URINE: 0.2 EU/DL (ref 0–1)

## 2020-07-22 PROCEDURE — 81003 URINALYSIS AUTO W/O SCOPE: CPT | Performed by: NURSE PRACTITIONER

## 2020-07-22 PROCEDURE — G8427 DOCREV CUR MEDS BY ELIG CLIN: HCPCS | Performed by: NURSE PRACTITIONER

## 2020-07-22 PROCEDURE — 1036F TOBACCO NON-USER: CPT | Performed by: NURSE PRACTITIONER

## 2020-07-22 PROCEDURE — 99213 OFFICE O/P EST LOW 20 MIN: CPT | Performed by: NURSE PRACTITIONER

## 2020-07-22 PROCEDURE — 51798 US URINE CAPACITY MEASURE: CPT | Performed by: NURSE PRACTITIONER

## 2020-07-22 PROCEDURE — G8417 CALC BMI ABV UP PARAM F/U: HCPCS | Performed by: NURSE PRACTITIONER

## 2020-07-22 RX ORDER — BUSPIRONE HYDROCHLORIDE 15 MG/1
15 TABLET ORAL 3 TIMES DAILY
COMMUNITY

## 2020-07-22 RX ORDER — BENZTROPINE MESYLATE 0.5 MG/1
0.5 TABLET ORAL 2 TIMES DAILY
COMMUNITY

## 2020-07-22 ASSESSMENT — ENCOUNTER SYMPTOMS
ABDOMINAL PAIN: 0
BACK PAIN: 0

## 2020-07-22 NOTE — PROGRESS NOTES
[address] [place] [address]  Dept: [phone]  Loc: [phone]    Visit Date: 7/22/2020        HPI:     Carlos Alberto Cervantes is a 53 y.o. male who presents today for:  Chief Complaint   Patient presents with    Follow-up    Urinary Frequency     urinary urgency       HPI   Pt seen in follow up for OAB. Pt has a hx of polyuria and OAB spanning back to 2011. He has tried and failed vesicare, ditropan, and myrbetriq. Pt reports he has tried PTNS therapy in the past with improvement in symptoms initially but they returned after a period of time. He underwent office cystoscopy with Dr. Declan Esquivel 8/6/18 and was noted to have moderate BPH and a tight bladder neck. Dr. Declan Esquivel recommended interstim placement and pt underwent trial in the office on 8/27/18 and placement of interstim sacral nerve stimulator, stage 1 and 2 by Dr. Declan Esquivel on 10/16/18.       Pt reported 70-75% improvement in symptoms following interstim placement but reported discomfort when lying on his right side with sleeping. Device was turned off and pt did not feel the benefits of the device outweighed the discomfort he was experiencing with sleeping. Interstim device and leads removed 5/16/19 by Dr. Declan Esquivel.       Pt underwent a 12 week course of PTNS in the end of 2019. Last cystoscopy 1/30/2020 by Dr. Lashell Rod noted lateral lobe hypertrophy with high riding bladder neck and mild trabeculation. Botox for OAB vs outlet surgery was discussed and pt decided to proceed with Botox. He underwent cystoscopy with bladder botox injections (100 units) 5/27/2020 by Dr. Lashell Rod. Pt reports he is doing much better. Able to sleep through the night some nights. Still has some daytime frequency and urgency but down to ~10 x per day from 20+ in the past.  He is satisfied with symptoms at this time.      Current Outpatient Medications   Medication Sig Dispense Refill  Polyethylene Glycol 3350 (GLYCOLAX PO) Take by mouth      busPIRone (BUSPAR) 15 MG tablet Take 15 mg by mouth 3 times daily      benztropine (COGENTIN) 0.5 MG tablet Take 0.5 mg by mouth 2 times daily      ARIPiprazole Lauroxil (ARISTADA IM) Inject into the muscle Injection once every 2 months      amoxicillin (AMOXIL) 500 MG capsule Take 2 capsules by mouth 2 times daily for 14 days H pylori eradication 56 capsule 0    azithromycin (ZITHROMAX) 500 MG tablet Take 1 tablet by mouth daily for 14 days H. Pylori eradication 14 tablet 0    sildenafil (REVATIO) 20 MG tablet Take 1 tablet by mouth daily as needed (ED) 25 tablet 0    hydrOXYzine (VISTARIL) 50 MG capsule Take 1 capsule by mouth nightly 30 capsule 0    FLUoxetine (PROZAC) 20 MG capsule Take 3 capsules by mouth daily 90 capsule 0    traZODone (DESYREL) 150 MG tablet Take 1 tablet by mouth nightly 30 tablet 0    brexpiprazole (REXULTI) 3 MG TABS tablet Take 2 tablets by mouth daily 60 tablet 0    TRESIBA FLEXTOUCH 200 UNIT/ML SOPN Inject 32 Units as directed every morning  0    mometasone (ASMANEX 30 METERED DOSES) 220 MCG/INH inhaler Inhale 2 puffs into the lungs as needed      Erenumab-aooe 70 MG/ML SOAJ Inject 70 mg into the skin every 30 days       FREESTYLE LITE strip   1    ondansetron (ZOFRAN-ODT) 4 MG disintegrating tablet ondansetron 4 mg disintegrating tablet   as needed      SUMAtriptan (IMITREX) 50 MG tablet   0    atorvastatin (LIPITOR) 10 MG tablet Take 10 mg by mouth daily   0    guanFACINE HCl ER 4 MG TB24 4 mg       MAGNESIUM OXIDE PO Take by mouth daily      insulin aspart (NOVOLOG FLEXPEN) 100 UNIT/ML injection pen Inject into the skin 3 times daily (before meals) Up to 50 units daily. Inject 1:12 grams carb bkfst/ lunch. 1:30 grams dinner.  1:50 mg/dL BT      metoprolol succinate (TOPROL XL) 50 MG extended release tablet Take 50 mg by mouth daily      losartan (COZAAR) 100 MG tablet take 1 tablet by mouth once daily 30 tablet 5    aspirin EC 81 MG EC tablet Take 1 tablet by mouth daily 30 tablet 11    tiotropium (SPIRIVA RESPIMAT) 2.5 MCG/ACT AERS inhaler Inhale 2 puffs into the lungs every morning (before breakfast) 1 Inhaler 5    budesonide-formoterol (SYMBICORT) 160-4.5 MCG/ACT AERO Inhale 2 puffs into the lungs 2 times daily 1 Inhaler 5    amLODIPine (NORVASC) 5 MG tablet take 1 tablet by mouth once daily 30 tablet 5    Multiple Vitamin (MULTIVITAMIN) tablet take 1 tablet by mouth once daily 30 tablet 11    albuterol sulfate HFA (PROAIR HFA) 108 (90 BASE) MCG/ACT inhaler Inhale 2 puffs into the lungs every 4 hours as needed for Wheezing Dx: 493.90 1 Inhaler 5    zonisamide (ZONEGRAN) 100 MG capsule Take 1 capsule by mouth daily (Patient taking differently: Take 200 mg by mouth 2 times daily ) 30 capsule 5    loratadine (CLARITIN) 10 MG tablet Take 1 tablet by mouth daily 30 tablet 11    OXcarbazepine (TRILEPTAL) 300 MG tablet Take 1,500 mg by mouth daily Pt takes 750 mg BID      pantoprazole (PROTONIX) 40 MG tablet Take 1 tablet by mouth every morning (before breakfast) 30 tablet 6     No current facility-administered medications for this visit. Past Medical History  Katheryn Everett  has a past medical history of ADHD (attention deficit hyperactivity disorder), Anxiety, Arthritis, Asthma, Bladder dysfunction, Depression, Diabetes mellitus ([address] Fatigue, GERD (gastroesophageal reflux disease), Headache, Hyperlipidemia, Hypertension, MDRO (multiple drug resistant organisms) resistance, OAB (overactive bladder), Seizures ([address] Sleep apnea, and Type II or unspecified type diabetes mellitus without mention of complication, not stated as uncontrolled. Past Surgical History  The patient  has a past surgical history that includes [address] surgery [address]); Cataract removal (2010); Foot surgery (Bilateral, 2006); Foot surgery (1996); other surgical history (11/4/15); eye surgery (Right, 92,96);  Elbow surgery (Right); Upper gastrointestinal endoscopy (2017); cervical fusion (N/A, 9/27/2017); Hand surgery (Right, 07/2017); Colonoscopy (N/A, 2/26/2018); Upper gastrointestinal endoscopy (N/A, 8/9/2018); pr office/outpt visit,procedure only (N/A, 10/16/2018); pr revise/remove neuroelectrode (N/A, 5/16/2019); and Cystoscopy (N/A, 5/27/2020). Family History  This patient's family history includes Dementia in his mother; Diabetes in his paternal aunt and paternal grandmother; High Blood Pressure in his brother, father, and sister; Hypertension in his father; Other in his father. Social History  Maria T Chowdary  reports that he has never smoked. He has never used smokeless tobacco. He reports current alcohol use. He reports that he does not use drugs. Subjective:      Review of Systems   Constitutional: Negative for activity change, appetite change, chills, diaphoresis, fatigue, fever and unexpected weight change. Gastrointestinal: Negative for abdominal pain. Genitourinary: Negative for decreased urine volume, difficulty urinating, dysuria, flank pain, frequency, hematuria and urgency. Musculoskeletal: Negative for back pain. Objective:   Temp 97.8 °F (36.6 °C)   Ht 6' 2\" (1.88 m)   Wt 247 lb (112 kg)   BMI 31.71 kg/m²     Physical Exam  Constitutional:       General: He is not in acute distress. Appearance: Normal appearance. He is not ill-appearing or diaphoretic. HENT:      Head: Normocephalic and atraumatic. Right Ear: External ear normal.      Left Ear: External ear normal.      Nose: Nose normal.      Mouth/Throat:      Mouth: Mucous membranes are moist.   Eyes:      General: No scleral icterus. Right eye: No discharge. Left eye: No discharge. Pulmonary:      Effort: Pulmonary effort is normal. No respiratory distress. Skin:     General: Skin is warm. Neurological:      Mental Status: He is alert and oriented to person, place, and time. Mental status is at baseline.    Psychiatric: Mood and Affect: Mood normal.         Behavior: Behavior normal.         Thought Content: Thought content normal.         POC  Results for POC orders placed in visit on 07/22/20   POCT Urinalysis No Micro (Auto)   Result Value Ref Range    Glucose, Ur 100 (A) NEGATIVE mg/dl    Bilirubin Urine Negative     Ketones, Urine Negative NEGATIVE    Specific Gravity, Urine 1.020 1.002 - 1.03    Blood, UA POC Negative NEGATIVE    pH, Urine 6.00 5.0 - 9.0    Protein, Urine Negative NEGATIVE mg/dl    Urobilinogen, Urine 0.20 0.0 - 1.0 eu/dl    Nitrite, Urine Negative NEGATIVE    Leukocyte Clumps, Urine Negative NEGATIVE    Color, Urine Yellow YELLOW-STR    Character, Urine Clear CLR-SL.[id number]   poct post void residual   Result Value Ref Range    post void residual 32 ml         Patients recent PSA values are as follows  Lab Results   Component Value Date    PSA 0.48 08/03/2018    PSA 0.46 10/19/2015    PSA 0.52 12/19/2011        Recent BUN/Creatinine:  Lab Results   Component Value Date    BUN 12 03/11/2020    CREATININE 1.1 03/11/2020         Assessment:   OAB    Plan:     Notes improvement with some hesitancy remaining. PVR today in office only 32 mls. Doing well. F/u in 2-3 months with PVR.

## 2020-07-30 RX ORDER — SILDENAFIL CITRATE 20 MG/1
20 TABLET ORAL DAILY PRN
Qty: 25 TABLET | Refills: 0 | Status: SHIPPED | OUTPATIENT
Start: 2020-07-30 | End: 2020-12-28 | Stop reason: SDUPTHER

## 2020-08-17 ENCOUNTER — NURSE TRIAGE (OUTPATIENT)
Dept: OTHER | Facility: CLINIC | Age: 50
End: 2020-08-17

## 2020-08-17 NOTE — TELEPHONE ENCOUNTER
Reason for Disposition   Patient wants to be seen    Answer Assessment - Initial Assessment Questions  1. SYMPTOMS: \"What symptoms are you concerned about? \"      Pt states his blood sugar are all over the place  2. ONSET:  \"When did the symptoms start? \"      Couple months  3. BLOOD GLUCOSE: \"What is your blood glucose level? \"       156  4. USUAL RANGE: \"What is your blood glucose level usually? \" (e.g., usual fasting morning value, usual evening value)      *No Answer*  5. TYPE 1 or 2:  \"Do you know what type of diabetes you have? \"  (e.g., Type 1, Type 2, Gestational; doesn't know)       *No Answer*  6. INSULIN: \"Do you take insulin? \" \"What type of insulin(s) do you use? What is the mode of delivery? (syringe, pen (e.g., injection or  pump)       *No Answer*  7. DIABETES PILLS: \"Do you take any pills for your diabetes? \"      *No Answer*  8. OTHER SYMPTOMS: \"Do you have any symptoms? \" (e.g., fever, frequent urination, difficulty breathing, vomiting)      *No Answer*  9. LOW BLOOD GLUCOSE TREATMENT: \"What have you done so far to treat the low blood glucose level? \"      *No Answer*  10. FOOD: \"When did you last eat or drink? \"        *No Answer*  11. ALONE: Alvarez Hockey you alone right now or is someone with you? \"         *No Answer*  12. PREGNANCY: \"Is there any chance you are pregnant? \" \"When was your last menstrual period? \"        *No Answer*    Protocols used: DIABETES - LOW BLOOD SUGAR-ADULT-OH    Call transferred to [facility]

## 2020-09-08 ENCOUNTER — HOSPITAL ENCOUNTER (OUTPATIENT)
Dept: PHYSICAL THERAPY | Age: 50
Setting detail: THERAPIES SERIES
Discharge: HOME | End: 2020-09-08
Payer: COMMERCIAL

## 2020-09-08 PROCEDURE — 97162 PT EVAL MOD COMPLEX 30 MIN: CPT

## 2020-09-08 PROCEDURE — 97110 THERAPEUTIC EXERCISES: CPT
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
## 2020-09-15 ENCOUNTER — HOSPITAL ENCOUNTER (OUTPATIENT)
Dept: PHYSICAL THERAPY | Age: 50
Setting detail: THERAPIES SERIES
Discharge: HOME | End: 2020-09-15
Payer: COMMERCIAL

## 2020-09-15 PROCEDURE — 97110 THERAPEUTIC EXERCISES: CPT

## 2020-09-15 NOTE — PROGRESS NOTES
[facility]  PHYSICAL THERAPY  [] EVALUATION  [x] DAILY NOTE (LAND) [] DAILY NOTE (AQUATIC ) [] PROGRESS NOTE [] DISCHARGE NOTE    [x] OUTPATIENT REHABILITATION [facility]   [] [place] [address]    [] [facility]   [] [place]    Date: 9/15/2020  Patient Name:  Miguel Sheth  : 1970  MRN: [id number]    Referring Practitioner Elisha De La Fuente MD   Diagnosis Low back pain [M54.5]    Treatment Diagnosis Acute lumbar pain, core and BLE weakness   Date of Evaluation 20    Additional Pertinent History HTN, asthma, diabetes, anxiety/depression, arthritis, seizures-takes medication, last occurred in July. Functional Outcome Measure Used Oswestry (modified)   Functional Outcome Score 48% (20)       Insurance: Primary: Payor: Eric Patel /  /  / ,   Secondary: [facility]   Authorization Information: Patient responsible for what [employer] doesn't pay   Visit # 2, 2/10 for progress note   Visits Allowed: No limit   Recertification Date:    Physician Follow-Up: Upon completion of PT   Physician Orders:    History of Present Illness:      SUBJECTIVE: Pt reports back pain is dependent on the day. Pt verbalizes compliance with HEP.        TREATMENT   Precautions: none   Pain: 3/10 low back    X in shaded column indicates activity completed today   Modalities Parameters/  Location  Notes                     Manual Therapy Time/Technique  Notes   MET for right pelvic upslip 5x5 sec hold, x3 sets x Long leg traction in slight hip ABD and IR with forceful cough   MET for right pelvic anterior rotation 5x5 sec hold x hooklying with self resisted left hip flexion with simultaneous right hamstring set         Exercise/Intervention   Notes   SKTC, piriformis stretches 3x15 sec hold  x    DKTC, LTR stretches 3x15 sec hold  x    Abdominal bracing 10x5 sec hold  x    DLS: march, SLR, heel slide, combo alt UE/LE 10x  x C/o hip pain with SLR             Hip adduction provided. []  Patient unable to verbalize and/or demonstrate understanding of education provided. Will continue education. []  Barriers to learning:    PLAN:  []  Plan of care initiated. Plan to see patient 2 times per week for 12 weeks to address the treatment planned outlined above.   [x]  Continue with current plan of care  []  Modify plan of care as follows:    []  Hold pending physician visit  []  Discharge    Time In 1431   Time Out 1506   Timed Code Minutes: 35 min   Total Treatment Time: 35 min       Electronically Signed by: Shahida Agarwal

## 2020-09-22 ENCOUNTER — HOSPITAL ENCOUNTER (OUTPATIENT)
Dept: PHYSICAL THERAPY | Age: 50
Setting detail: THERAPIES SERIES
Discharge: HOME | End: 2020-09-22
Payer: COMMERCIAL

## 2020-09-22 PROCEDURE — 97110 THERAPEUTIC EXERCISES: CPT

## 2020-09-22 NOTE — PROGRESS NOTES
[address] [facility]  PHYSICAL THERAPY  [x] DAILY NOTE (LAND) [] DAILY NOTE (AQUATIC ) [] PROGRESS NOTE [] DISCHARGE NOTE    [x] [facility] [place]   [] [place] [address]    [] [facility]   [] [place]    Date: 2020  Patient Name:  Seb Vogt  : 1970  MRN: [id number]    Referring Practitioner Britt Schneider MD   Diagnosis Low back pain [M54.5]    Treatment Diagnosis Acute lumbar pain, core and BLE weakness   Date of Evaluation 20    Additional Pertinent History HTN, asthma, diabetes, anxiety/depression, arthritis, seizures-takes medication, last occurred in July. Functional Outcome Measure Used Oswestry (modified)   Functional Outcome Score 48% (20)       Insurance: Primary: Payor: Karen Hickey /  /  / ,   Secondary: [facility]   Authorization Information: Patient responsible for what [employer] doesn't pay   Visit # 3, 3/10 for progress note   Visits Allowed: No limit   Recertification Date:    Physician Follow-Up: Upon completion of PT   Physician Orders:    History of Present Illness:      SUBJECTIVE: Pt reporting low back pain 2/10 with more pain on right side and down right leg. Patient notices having more leg pain at night.       TREATMENT   Precautions: none   Pain: 2/10 low back    X in shaded column indicates activity completed today   Modalities Parameters/  Location  Notes                     Manual Therapy Time/Technique  Notes   MET for right pelvic upslip 5x5 sec hold, x3 sets  Long leg traction in slight hip ABD and IR with forceful cough   MET for right pelvic anterior rotation 5x5 sec hold  hook lying with self resisted left hip flexion with simultaneous right hamstring set         Exercise/Intervention   Notes   SKTC, piriformis stretches 3x15 sec hold  x    DKTC, LTR stretches 3x15 sec hold  x    Hamstring stretch with strap  3x15 sec hold   Reporting tightness but denies pain   Abdominal bracing 10x5

## 2020-09-24 ENCOUNTER — HOSPITAL ENCOUNTER (OUTPATIENT)
Dept: PHYSICAL THERAPY | Age: 50
Setting detail: THERAPIES SERIES
Discharge: HOME | End: 2020-09-24
Payer: COMMERCIAL

## 2020-09-24 PROCEDURE — 97110 THERAPEUTIC EXERCISES: CPT

## 2020-09-24 NOTE — PROGRESS NOTES
[address] [facility]  PHYSICAL THERAPY  [x] DAILY NOTE (LAND) [] DAILY NOTE (AQUATIC ) [] PROGRESS NOTE [] DISCHARGE NOTE    [x] OUTPATIENT REHABILITATION CENTER [facility]   [] [place] [address]    [] [facility]   [] [place]    Date: 2020  Patient Name:  David Harvey   : 1970  MRN: [id number]    Referring Practitioner Warrick Dakin, MD   Diagnosis Low back pain [M54.5]    Treatment Diagnosis Acute lumbar pain, core and BLE weakness   Date of Evaluation 20    Additional Pertinent History HTN, asthma, diabetes, anxiety/depression, arthritis, seizures-takes medication, last occurred in July. Functional Outcome Measure Used Oswestry (modified)   Functional Outcome Score 48% (20)       Insurance: Primary: Payor: Alex Jernigan /  /  / ,   Secondary: [employer]   Authorization Information: Patient responsible for what [employer] doesn't pay   Visit # 4, 10 for progress note   Visits Allowed: No limit   Recertification Date:    Physician Follow-Up: Upon completion of PT   Physician Orders:    History of Present Illness:      SUBJECTIVE: Patient reports that he is having 3/10 pain today. Patient reports that he continues to have pain down his right leg. Patient states that he feels better after completing exercises but the relief in pain only last for a short period of time.      TREATMENT   Precautions: none   Pain: 3/10 low/mid back and down RLE    X in shaded column indicates activity completed today   Modalities Parameters/  Location  Notes                     Manual Therapy Time/Technique  Notes   MET for right pelvic upslip 5x5 sec hold, x3 sets  Long leg traction in slight hip ABD and IR with forceful cough   MET for right pelvic anterior rotation 5x5 sec hold  hook lying with self resisted left hip flexion with simultaneous right hamstring set         Exercise/Intervention   Notes   SKTC, piriformis stretches 3x15 sec hold  x    DKTC, LTR stretches 3x15 sec hold  x    Hamstring stretch with strap  3x15 sec hold  x Reporting tightness RLE> LLE, but denies pain   Abdominal bracing 10x5 sec hold  x    DLS: march, SLR, heel slide, combo alt UE/LE 15x each, RLE SLR 10x  x C/o hip pain with RLE heel slides and SLR R>L             Hip adduction ball squeeze 15x5 sec  x              Hip abduction with tband 10x5 sec  x Orange . Seated DLS: abdominal bracing 10x5 sec  x                          March, LAQ 15x each  x                          Hamstring curls with tband 15x  x Orange                         Post Shoulder Rolls and Scap Retractions 15x each  x                         Rows, Extension, Horizontal Abd and Bilat ER  15x each  x           Hip flexor stretch and calf stretch at step  3x15 sec hold  x                                  Specific Interventions Next Treatment: progress reps and to standing DLS as tolerated, modalities/manual as needed    Activity/Treatment Tolerance:  [x]  Patient tolerated treatment well  []  Patient limited by fatigue  []  Patient limited by pain   []  Patient limited by medical complications  []  Other:     Assessment: Patient tolerated treatment session well. Increased reps with supine exercises this date. Patient required cues for abdominal bracing with exercises. Patient noted greater tightness at his RLE with HS stretch but denied any pain while completing. Patient noted pain in hip right hip region with the completion of heel slides. GOALS:  Patient Goal: Decrease or eliminate pain  Short Term Goals:  Time Frame: 6 weeks  Patient will report reduced pain to 2/10 to tolerate sitting and standing longer durations. Patient will demonstrate good core engagement and postural awareness during therapy session with minimal to no cues  to carry over to functional activities, lifting, and housework. Patient will have good lumbar and hip flexibility for ease bending over and completing ADLs.   Patient will improve B

## 2020-09-29 ENCOUNTER — HOSPITAL ENCOUNTER (OUTPATIENT)
Dept: PHYSICAL THERAPY | Age: 50
Setting detail: THERAPIES SERIES
Discharge: HOME | End: 2020-09-29
Payer: COMMERCIAL

## 2020-09-29 PROCEDURE — 97110 THERAPEUTIC EXERCISES: CPT

## 2020-09-29 NOTE — PROGRESS NOTES
3x15 sec hold  x Reporting tightness RLE> LLE, but denies pain   Abdominal bracing 10x5 sec hold      DLS: march, SLR, heel slide, combo alt UE/LE 15x each  x C/o hip pain with RLE heel slides              Hip adduction ball squeeze 15x5 sec  x              Hip abduction with tband 15x5 sec  x Orange . Seated DLS: abdominal bracing 15x5 sec                            March, LAQ 15x each  x                          Hamstring curls with tband 15x  x Orange                         Post Shoulder Rolls and Scap Retractions 15x each  x                         Rows, Extension, Horizontal Abd and Bilat ER  15x each  x Orange          Hip flexor stretch and calf stretch at step  3x15 sec hold  x           Nustep seat/arms 15 x5 min  x Cues for abdominal bracing                   Specific Interventions Next Treatment: progress reps and to standing DLS as tolerated, modalities/manual as needed    Activity/Treatment Tolerance:  [x]  Patient tolerated treatment well  []  Patient limited by fatigue  []  Patient limited by pain   []  Patient limited by medical complications  []  Other:     Assessment: Initiated Nustep at start of session and continued with DLS, ending with stretches with good tolerance. Pt reports feeling looser at end of session and usually lasts a couple hours. GOALS:  Patient Goal: Decrease or eliminate pain  Short Term Goals:  Time Frame: 6 weeks  Patient will report reduced pain to 2/10 to tolerate sitting and standing longer durations. Patient will demonstrate good core engagement and postural awareness during therapy session with minimal to no cues  to carry over to functional activities, lifting, and housework. Patient will have good lumbar and hip flexibility for ease bending over and completing ADLs. Patient will improve B hip and knee strength to 5/5 for stabilization when lifting and cleaning.    Long Term Goals:  Time Frame: 12 weeks  Patient will be independent and compliant with HEP daily to

## 2020-10-06 ENCOUNTER — HOSPITAL ENCOUNTER (OUTPATIENT)
Dept: PHYSICAL THERAPY | Age: 50
Setting detail: THERAPIES SERIES
Discharge: HOME | End: 2020-10-06
Payer: COMMERCIAL

## 2020-10-06 PROCEDURE — 97110 THERAPEUTIC EXERCISES: CPT

## 2020-10-06 NOTE — PROGRESS NOTES
[address] [facility]  PHYSICAL THERAPY  [x] DAILY NOTE (LAND) [] DAILY NOTE (AQUATIC ) [] PROGRESS NOTE [] DISCHARGE NOTE    [x] OUTPATIENT REHABILITATION CENTER [facility] [place]   [] [place] [address]    [] [facility]   [] [facility]    Date: 10/6/2020  Patient Name:  Cesar Suarez   : 1970  MRN: [id number]    Referring Practitioner Angel Davis MD   Diagnosis Low back pain [M54.5]    Treatment Diagnosis Acute lumbar pain, core and BLE weakness   Date of Evaluation 20    Additional Pertinent History HTN, asthma, diabetes, anxiety/depression, arthritis, seizures-takes medication, last occurred in July. Functional Outcome Measure Used Oswestry (modified)   Functional Outcome Score 48% (20)       Insurance: Primary: Payor: [employer] /  /  / ,   Secondary: [employer]   Authorization Information: Patient responsible for what [place] doesn't pay   Visit # 6, 10 for progress note   Visits Allowed: No limit   Recertification Date:    Physician Follow-Up: Upon completion of PT   Physician Orders:    History of Present Illness:      SUBJECTIVE: Patient reports low back pain 3/10 today and reporting no other complains.     TREATMENT   Precautions: none   Pain: 3/10 mid to low back, right side    X in shaded column indicates activity completed today   Modalities Parameters/  Location  Notes                     Manual Therapy Time/Technique  Notes   MET for right pelvic upslip 5x5 sec hold, x3 sets  Long leg traction in slight hip ABD and IR with forceful cough   MET for right pelvic anterior rotation 5x5 sec hold  hook lying with self resisted left hip flexion with simultaneous right hamstring set         Exercise/Intervention   Notes   SKTC, piriformis stretches 3x15 sec hold  x    DKTC, LTR stretches 3x15 sec hold  x    Hamstring stretch with strap  3x15 sec hold  x Reporting tightness RLE> LLE, but denies pain          DLS: march, SLR, heel slide, combo alt UE/LE 15x each  x C/o hip pain with RLE heel slides              Hip adduction ball squeeze 15x5 sec  x              Hip abduction with tband 15x5 sec  x Orange . Seated DLS: abdominal bracing 15x5 sec                            March, LAQ 15x each  x                          Hamstring curls with tband 15x  x Orange                         Post Shoulder Rolls and Scap Retractions 15x each  x                         Rows, Extension, Horizontal Abd and Bilat ER  15x each  x Orange          Hip flexor stretch and calf stretch at step  3x15 sec hold  x           Nustep seat/arms 15 5 min   Cues for abdominal bracing   Heel/toe Raises and 3 way hip 15  x  more pain on right side of low back with stance on R   [place] Chest Level 5 both arms                       Level 5 1 arm 15  10  X     Hydro Hip Level 4 bilateral and unilateral  15  x Bracing during            10  x      Specific Interventions Next Treatment: progress reps and to standing DLS as tolerated, modalities/manual as needed    Activity/Treatment Tolerance:  [x]  Patient tolerated treatment well  []  Patient limited by fatigue  []  Patient limited by pain   []  Patient limited by medical complications  []  Other:     Assessment: Progressed with standing exercises with patient reporting having an increase in right side low back pain 5/10 with added standing exercises. Also added hydro hip and chest with patient tolerating well. Patient reporting low back pain 3/10 at end of session. GOALS:  Patient Goal: Decrease or eliminate pain  Short Term Goals:  Time Frame: 6 weeks  Patient will report reduced pain to 2/10 to tolerate sitting and standing longer durations. Patient will demonstrate good core engagement and postural awareness during therapy session with minimal to no cues  to carry over to functional activities, lifting, and housework. Patient will have good lumbar and hip flexibility for ease bending over and completing ADL's.   Patient will improve B hip and knee strength to 5/5 for stabilization when lifting and cleaning. Long Term Goals:  Time Frame: 12 weeks  Patient will be independent and compliant with HEP daily to achieve above goals. Pt will reduce Oswestry score from 48% to 35% to tolerate sitting and standing longer durations. Patient Education:   [x]  HEP/Education Completed: Cues for abdominal bracing with exercise. Hold off on standing exercises and continue with HEP as previous. [employer] Access Code:   []  No new Education completed  []  Reviewed Prior HEP      []  Patient verbalized and/or demonstrated understanding of education provided. []  Patient unable to verbalize and/or demonstrate understanding of education provided. Will continue education. []  Barriers to learning:    PLAN:  2 times per week for 12 weeks.   [x]  Continue with current plan of care  []  Modify plan of care as follows:    []  Hold pending physician visit  []  Discharge    Time In 1345   Time Out 1437   Timed Code Minutes: 52 min   Total Treatment Time:  52 min       Electronically Signed by: Annice Bloch

## 2020-10-08 ENCOUNTER — HOSPITAL ENCOUNTER (OUTPATIENT)
Age: 50
Setting detail: SPECIMEN
Discharge: HOME | End: 2020-10-08
Payer: COMMERCIAL

## 2020-10-08 PROCEDURE — 87338 HPYLORI STOOL AG IA: CPT

## 2020-10-09 ENCOUNTER — HOSPITAL ENCOUNTER (OUTPATIENT)
Dept: PHYSICAL THERAPY | Age: 50
Setting detail: THERAPIES SERIES
Discharge: HOME | End: 2020-10-09
Payer: COMMERCIAL

## 2020-10-09 PROCEDURE — 97110 THERAPEUTIC EXERCISES: CPT

## 2020-10-09 NOTE — PROGRESS NOTES
[address] [facility]  PHYSICAL THERAPY  [x] DAILY NOTE (LAND) [] DAILY NOTE (AQUATIC ) [] PROGRESS NOTE [] DISCHARGE NOTE    [x] OUTPATIENT REHABILITATION CENTER [facility]   [] [place] [address]    [] [facility]   [] [employer]    Date: 10/9/2020  Patient Name:  Cristina Beltran   : 1970  MRN: [id number]    Referring Practitioner Carie Glass MD   Diagnosis Low back pain [M54.5]    Treatment Diagnosis Acute lumbar pain, core and BLE weakness   Date of Evaluation 20    Additional Pertinent History HTN, asthma, diabetes, anxiety/depression, arthritis, seizures-takes medication, last occurred in July. Functional Outcome Measure Used Oswestry (modified)   Functional Outcome Score 48% (20)       Insurance: Primary: Payor: Keara Monae /  /  / ,   Secondary: [facility]   Authorization Information: Patient responsible for what [place] doesn't pay   Visit # 7, 10 for progress note   Visits Allowed: No limit   Recertification Date: 34   Physician Follow-Up: Upon completion of PT   Physician Orders:    History of Present Illness:      SUBJECTIVE: Patient reports pain stays at a 3/10. Pt continues to do HEP.      TREATMENT   Precautions: none   Pain: 3/10 mid to low back, right side    X in shaded column indicates activity completed today   Modalities Parameters/  Location  Notes                     Manual Therapy Time/Technique  Notes   MET for right pelvic upslip 5x5 sec hold, x3 sets  Long leg traction in slight hip ABD and IR with forceful cough   MET for right pelvic anterior rotation 5x5 sec hold  hook lying with self resisted left hip flexion with simultaneous right hamstring set         Exercise/Intervention   Notes   SKTC, piriformis stretches 3x15 sec hold      DKTC, LTR stretches 3x15 sec hold      Hamstring stretch with strap  3x15 sec hold   Reporting tightness RLE> LLE, but denies pain          DLS: march, SLR, heel slide, combo alt UE/LE 15x each C/o hip pain with RLE heel slides              Hip adduction ball squeeze 15x5 sec                Hip abduction with tband 15x5 sec   Orange . Seated DLS on silver thera-disc: abdominal bracing 10x5 sec                            March, LAQ 15x each  x                          Hamstring curls with tband 15x  x Orange                         Post Shoulder Rolls and Scap Retractions 15x each  x                         Rows, Extension, Horizontal Abd and Bilat ER  15x each  x Orange          Hip flexor, hamstring, and calf stretch at step  3x15 sec hold  x           Nustep seat 14/arms 15 5 min  x Cues for abdominal bracing   Heel/toe Raises and 3 way hip 15  x    Squats 10  x C/o low back pain   Hydro Chest Level 5 both arms                       Level 5 1 arm 15  10  X     Hydro Hip Level 4 bilateral and unilateral with ab bracing 15  x                    Specific Interventions Next Treatment: progress reps and to standing DLS as tolerated, modalities/manual as needed    Activity/Treatment Tolerance:  [x]  Patient tolerated treatment well  []  Patient limited by fatigue  []  Patient limited by pain   []  Patient limited by medical complications  []  Other:     Assessment: Continued with stabilization and stretching program but held supine exercises this date. Pt tolerated well with 2/10 pain reported at end of session. Introduced squats with cues for correct technique but did cause some low back pain. GOALS:  Patient Goal: Decrease or eliminate pain  Short Term Goals:  Time Frame: 6 weeks  Patient will report reduced pain to 2/10 to tolerate sitting and standing longer durations. Patient will demonstrate good core engagement and postural awareness during therapy session with minimal to no cues  to carry over to functional activities, lifting, and housework. Patient will have good lumbar and hip flexibility for ease bending over and completing ADL's.   Patient will improve B hip and knee strength to 5/5 for stabilization when lifting and cleaning. Long Term Goals:  Time Frame: 12 weeks  Patient will be independent and compliant with HEP daily to achieve above goals. Pt will reduce Oswestry score from 48% to 35% to tolerate sitting and standing longer durations. Patient Education:   []  HEP/Education Completed: Cues for abdominal bracing with exercise. Hold off on standing exercises and continue with HEP as previous. [employer] Access Code:   [x]  No new Education completed  []  Reviewed Prior HEP      []  Patient verbalized and/or demonstrated understanding of education provided. []  Patient unable to verbalize and/or demonstrate understanding of education provided. Will continue education. []  Barriers to learning:    PLAN:  2 times per week for 12 weeks.   [x]  Continue with current plan of care  []  Modify plan of care as follows:    []  Hold pending physician visit  []  Discharge    Time In 1315   Time Out 1355   Timed Code Minutes: 40 min   Total Treatment Time:  40 min       Electronically Signed by: Jairon Ashton

## 2020-10-10 LAB — HELICOBACTER PYLORI ANTIGEN, STOOL: NORMAL

## 2020-10-13 ENCOUNTER — HOSPITAL ENCOUNTER (OUTPATIENT)
Dept: PHYSICAL THERAPY | Age: 50
Setting detail: THERAPIES SERIES
Discharge: HOME | End: 2020-10-13
Payer: COMMERCIAL

## 2020-10-13 PROCEDURE — 97110 THERAPEUTIC EXERCISES: CPT

## 2020-10-13 NOTE — PROGRESS NOTES
[address] [facility]  PHYSICAL THERAPY  [x] DAILY NOTE (LAND) [] DAILY NOTE (AQUATIC ) [] PROGRESS NOTE [] DISCHARGE NOTE    [x] [facility] [place]   [] [facility]    [] [facility]   [] Geetha Quintero    Date: 10/13/2020  Patient Name:  Elva Richardson   : 1970  MRN: [id number]    Referring Practitioner Ann Castañeda MD   Diagnosis Low back pain [M54.5]    Treatment Diagnosis Acute lumbar pain, core and BLE weakness   Date of Evaluation 20    Additional Pertinent History HTN, asthma, diabetes, anxiety/depression, arthritis, seizures-takes medication, last occurred in July. Functional Outcome Measure Used Oswestry (modified)   Functional Outcome Score 48% (20)       Insurance: Primary: Payor: Stephani Stephens /  /  / ,   Secondary: [facility]   Authorization Information: Patient responsible for what [employer] doesn't pay   Visit # 8, 8/10 for progress note   Visits Allowed: No limit   Recertification Date:    Physician Follow-Up: Upon completion of PT   Physician Orders:    History of Present Illness:      SUBJECTIVE: Patient reporting that pain is still 3/10 and stating that is pretty much baseline for him at this point.     TREATMENT   Precautions: none   Pain: 3/10 mid to low back, right side    X in shaded column indicates activity completed today   Modalities Parameters/  Location  Notes                     Manual Therapy Time/Technique  Notes   MET for right pelvic upslip 5x5 sec hold, x3 sets  Long leg traction in slight hip ABD and IR with forceful cough   MET for right pelvic anterior rotation 5x5 sec hold  hook lying with self resisted left hip flexion with simultaneous right hamstring set         Exercise/Intervention   Notes   SKTC, piriformis stretches 3x15 sec hold      DKTC, LTR stretches 3x15 sec hold      Hamstring stretch with strap  3x15 sec hold   Reporting tightness RLE> LLE, but denies pain          DLS: march, lifting, and housework. Patient will have good lumbar and hip flexibility for ease bending over and completing ADL's. Patient will improve B hip and knee strength to 5/5 for stabilization when lifting and cleaning. Long Term Goals:  Time Frame: 12 weeks  Patient will be independent and compliant with HEP daily to achieve above goals. Pt will reduce Oswestry score from 48% to 35% to tolerate sitting and standing longer durations. Patient Education:   []  HEP/Education Completed: Cues for abdominal bracing with exercise. Hold off on standing exercises and continue with HEP as previous. [employer] Access Code:   [x]  No new Education completed  []  Reviewed Prior HEP      []  Patient verbalized and/or demonstrated understanding of education provided. []  Patient unable to verbalize and/or demonstrate understanding of education provided. Will continue education. []  Barriers to learning:    PLAN:  2 times per week for 12 weeks.   [x]  Continue with current plan of care  []  Modify plan of care as follows:    []  Hold pending physician visit  []  Discharge    Time In 1308   Time Out 1341   Timed Code Minutes: 33 min   Total Treatment Time:  33 min       Electronically Signed by: Jose Luis Moreno

## 2020-10-15 ENCOUNTER — HOSPITAL ENCOUNTER (OUTPATIENT)
Dept: PHYSICAL THERAPY | Age: 50
Setting detail: THERAPIES SERIES
Discharge: HOME | End: 2020-10-15
Payer: COMMERCIAL

## 2020-10-15 PROCEDURE — 97110 THERAPEUTIC EXERCISES: CPT

## 2020-10-15 NOTE — DISCHARGE SUMMARY
[address] [facility]  PHYSICAL THERAPY  [] DAILY NOTE (LAND) [] DAILY NOTE (AQUATIC ) [] PROGRESS NOTE [x] DISCHARGE NOTE    [x] [facility] [place]   [facility]    [] [facility]   [] [facility]    Date: 10/15/2020  Patient Name:  Kristine Puga   : 1970  MRN: [id number]    Referring Practitioner Serina Rivera MD   Diagnosis Low back pain [M54.5]    Treatment Diagnosis Acute lumbar pain, core and BLE weakness   Date of Evaluation 20    Additional Pertinent History HTN, asthma, diabetes, anxiety/depression, arthritis, seizures-takes medication, last occurred in July. Functional Outcome Measure Used Oswestry (modified)   Functional Outcome Score 48% (20) 48% (10/15/20)      Insurance: Primary: Payor: Therese Llamas /  /  / ,   Secondary: [facility]   Authorization Information: Patient responsible for what Karen doesn't pay   Visit # 9, 10 for progress note   Visits Allowed: No limit   Recertification Date:    Physician Follow-Up: Upon completion of PT   Physician Orders:    History of Present Illness:      SUBJECTIVE: Patient reporting pain is the same.      TREATMENT   Precautions: none   Pain: 310 mid to low back, right side    X in shaded column indicates activity completed today   Modalities Parameters/  Location  Notes                     Manual Therapy Time/Technique  Notes   MET for right pelvic upslip 5x5 sec hold, x3 sets  Long leg traction in slight hip ABD and IR with forceful cough   MET for right pelvic anterior rotation 5x5 sec hold  hook lying with self resisted left hip flexion with simultaneous right hamstring set         Exercise/Intervention   Notes   SKTC, piriformis stretches 3x15 sec hold  x    DKTC, LTR stretches 3x15 sec hold  x    Hamstring stretch with strap  3x15 sec hold  x           DLS: march, SLR, heel slide, combo alt UE/LE 15x each   C/o hip pain with RLE heel slides              Hip adduction ball squeeze 15x5 sec                Hip abduction with tband 15x5 sec   Orange . Seated DLS on silver thera-disc: abdominal bracing 10x5 sec                            March, LAQ 15x each                            Hamstring curls with tband 15x   Orange                         Post Shoulder Rolls and Scap Retractions 15x each                           Rows, Extension, Horizontal Abd and Bilat ER  15x each   Orange          Hip flexor, hamstring and calf stretch at step  3x15 sec hold      NK table with bracing    5# 10   Bilateral LE's    Nustep seat 14/arms 15 5 min   Cues for abdominal bracing   Heel/toe Raises and 3 way hip 15      Total Gym squats 15      [place] Chest Level 5 both arms                       Level 5 one  Arm 15  10  X     Hydro Hip Level 4 bilateral and unilateral with ab bracing 15      Hydro Stick with bracing. All 4 directions  10  x Slight pain on right side low back during                 Activity/Treatment Tolerance:  [x]  Patient tolerated treatment well  []  Patient limited by fatigue  []  Patient limited by pain   []  Patient limited by medical complications  []  Other:     Assessment: Pt demos fair progress toward goals. Pain averages 3/10. No change in Oswestry score, which took 10 minutes to complete. Minimal to no change in strength. Pt still requires cues for abdominal bracing during exercises. Sitting and standing tolerance is still limited. Pt compliance with HEP. Recommend follow up with Dr. Kevin Salinas is pain persists but plateaued with therapy. GOALS:  Patient Goal: Decrease or eliminate pain  Short Term Goals:  Time Frame: 6 weeks  Patient will report reduced pain to 2/10 to tolerate sitting and standing longer durations. GOAL NOT MET: pain stay at 3/10 in low back, unable to stand or sit >10 minutes.   Discontinue Goal  Patient will demonstrate good core engagement and postural awareness during therapy session with minimal to no cues  to carry over to functional

## 2020-11-24 ENCOUNTER — OFFICE VISIT (OUTPATIENT)
Dept: UROLOGY | Age: 50
End: 2020-11-24
Payer: COMMERCIAL

## 2020-11-24 VITALS — BODY MASS INDEX: 32.85 KG/M2 | WEIGHT: 256 LBS | TEMPERATURE: 97 F | HEIGHT: 74 IN

## 2020-11-24 LAB
BILIRUBIN URINE: NEGATIVE
BLOOD URINE, POC: NEGATIVE
CHARACTER, URINE: CLEAR
COLOR, URINE: YELLOW
GLUCOSE URINE: 100 MG/DL
KETONES, URINE: NEGATIVE
LEUKOCYTE CLUMPS, URINE: NEGATIVE
NITRITE, URINE: NEGATIVE
PH, URINE: 7 (ref 5–9)
POST VOID RESIDUAL (PVR): 95 ML
PROTEIN, URINE: NEGATIVE MG/DL
SPECIFIC GRAVITY, URINE: 1.02 (ref 1–1.03)
UROBILINOGEN, URINE: 1 EU/DL (ref 0–1)

## 2020-11-24 PROCEDURE — 99213 OFFICE O/P EST LOW 20 MIN: CPT | Performed by: NURSE PRACTITIONER

## 2020-11-24 PROCEDURE — 81003 URINALYSIS AUTO W/O SCOPE: CPT | Performed by: NURSE PRACTITIONER

## 2020-11-24 PROCEDURE — G8417 CALC BMI ABV UP PARAM F/U: HCPCS | Performed by: NURSE PRACTITIONER

## 2020-11-24 PROCEDURE — G8484 FLU IMMUNIZE NO ADMIN: HCPCS | Performed by: NURSE PRACTITIONER

## 2020-11-24 PROCEDURE — G8427 DOCREV CUR MEDS BY ELIG CLIN: HCPCS | Performed by: NURSE PRACTITIONER

## 2020-11-24 PROCEDURE — 1036F TOBACCO NON-USER: CPT | Performed by: NURSE PRACTITIONER

## 2020-11-24 PROCEDURE — 51798 US URINE CAPACITY MEASURE: CPT | Performed by: NURSE PRACTITIONER

## 2020-11-24 ASSESSMENT — ENCOUNTER SYMPTOMS
ABDOMINAL PAIN: 0
BACK PAIN: 0

## 2020-11-24 NOTE — PROGRESS NOTES
[address] [place] [address],[address] [address]  Dept: [phone]  Loc: [phone]    Visit Date: 11/24/2020        HPI:     Kristine Puga is a 53 y.o. male who presents today for:  Chief Complaint   Patient presents with    Follow-up     urgency and frequency pvr today        HPI   Pt seen in follow up for OAB. Pt has a hx of polyuria and OAB spanning back to 2011. He has tried and failed vesicare, ditropan, and myrbetriq. Pt reports he has tried PTNS therapy in the past with improvement in symptoms initially but they returned after a period of time. He underwent office cystoscopy with Dr. Gilbert Weeks 8/6/18 and was noted to have moderate BPH and a tight bladder neck. Dr. Gilbert Weeks recommended interstim placement and pt underwent trial in the office on 8/27/18 and placement of interstim sacral nerve stimulator, stage 1 and 2 by Dr. Gilbert Weeks on 10/16/18.       Pt reported 70-75% improvement in symptoms following interstim placement but reported discomfort when lying on his right side with sleeping. Device was turned off and pt did not feel the benefits of the device outweighed the discomfort he was experiencing with sleeping. Interstim device and leads removed 5/16/19 by Dr. Gilbert Weeks.       Pt underwent a 12 week course of PTNS in the end of 2019. Last cystoscopy 1/30/2020 by Dr. Casper Soares noted lateral lobe hypertrophy with high riding bladder neck and mild trabeculation. Botox for OAB vs outlet surgery was discussed and pt decided to proceed with Botox. He underwent cystoscopy with bladder botox injections (100 units) 5/27/2020 by Dr. Casper Soares. Pt reports he is doing much better. Able to sleep through the night some nights. Still has some daytime frequency and urgency but down to ~4-6 x per day from 20+ in the past.  He is satisfied with symptoms at this time.      Notes occasional incontinence at night on the nights he doesn't wear his CPAP. Current Outpatient Medications   Medication Sig Dispense Refill    sildenafil (REVATIO) 20 MG tablet Take 1 tablet by mouth daily as needed (ED) 25 tablet 0    Polyethylene Glycol 3350 (GLYCOLAX PO) Take by mouth      busPIRone (BUSPAR) 15 MG tablet Take 15 mg by mouth 3 times daily      benztropine (COGENTIN) 0.5 MG tablet Take 0.5 mg by mouth 2 times daily      ARIPiprazole Lauroxil (ARISTADA IM) Inject into the muscle Injection once every 2 months      pantoprazole (PROTONIX) 40 MG tablet Take 1 tablet by mouth every morning (before breakfast) 30 tablet 6    hydrOXYzine (VISTARIL) 50 MG capsule Take 1 capsule by mouth nightly 30 capsule 0    FLUoxetine (PROZAC) 20 MG capsule Take 3 capsules by mouth daily 90 capsule 0    traZODone (DESYREL) 150 MG tablet Take 1 tablet by mouth nightly 30 tablet 0    brexpiprazole (REXULTI) 3 MG TABS tablet Take 2 tablets by mouth daily 60 tablet 0    TRESIBA FLEXTOUCH 200 UNIT/ML SOPN Inject 32 Units as directed every morning  0    mometasone (ASMANEX 30 METERED DOSES) 220 MCG/INH inhaler Inhale 2 puffs into the lungs as needed      Erenumab-aooe 70 MG/ML SOAJ Inject 70 mg into the skin every 30 days       FREESTYLE LITE strip   1    ondansetron (ZOFRAN-ODT) 4 MG disintegrating tablet ondansetron 4 mg disintegrating tablet   as needed      SUMAtriptan (IMITREX) 50 MG tablet   0    atorvastatin (LIPITOR) 10 MG tablet Take 10 mg by mouth daily   0    guanFACINE HCl ER 4 MG TB24 4 mg       MAGNESIUM OXIDE PO Take by mouth daily      insulin aspart (NOVOLOG FLEXPEN) 100 UNIT/ML injection pen Inject into the skin 3 times daily (before meals) Up to 50 units daily. Inject 1:12 grams carb bkfst/ lunch. 1:30 grams dinner.  1:50 mg/dL BT      metoprolol succinate (TOPROL XL) 50 MG extended release tablet Take 50 mg by mouth daily      losartan (COZAAR) 100 MG tablet take 1 tablet by mouth once daily 30 tablet 5    aspirin EC 81 MG EC tablet Take 1 endoscopy (N/A, 8/9/2018); pr office/outpt visit,procedure only (N/A, 10/16/2018); pr revise/remove neuroelectrode (N/A, 5/16/2019); and Cystoscopy (N/A, 5/27/2020). Family History  This patient's family history includes Dementia in his mother; Diabetes in his paternal aunt and paternal grandmother; High Blood Pressure in his brother, father, and sister; Hypertension in his father; Other in his father. Social History  Andre Griffith  reports that he has never smoked. He has never used smokeless tobacco. He reports current alcohol use. He reports that he does not use drugs. Subjective:      Review of Systems   Constitutional: Negative for activity change, appetite change, chills, diaphoresis, fatigue, fever and unexpected weight change. Gastrointestinal: Negative for abdominal pain. Genitourinary: Negative for decreased urine volume, difficulty urinating, dysuria, flank pain, frequency, hematuria and urgency. Musculoskeletal: Negative for back pain. Objective:   Temp 97 °F (36.1 °C) (Temporal)   Ht 6' 2\" (1.88 m)   Wt 256 lb (116.1 kg)   BMI 32.87 kg/m²     Physical Exam  Constitutional:       General: He is not in acute distress. Appearance: Normal appearance. He is not ill-appearing or diaphoretic. HENT:      Head: Normocephalic and atraumatic. Right Ear: External ear normal.      Left Ear: External ear normal.      Nose: Nose normal.      Mouth/Throat:      Mouth: Mucous membranes are moist.   Eyes:      General: No scleral icterus. Right eye: No discharge. Left eye: No discharge. Pulmonary:      Effort: Pulmonary effort is normal. No respiratory distress. Abdominal:      Tenderness: There is no abdominal tenderness. There is no right CVA tenderness or left CVA tenderness. Skin:     General: Skin is warm. Neurological:      Mental Status: He is alert and oriented to person, place, and time. Mental status is at baseline.    Psychiatric:         Mood and Affect: Mood normal.         Behavior: Behavior normal.         Thought Content: Thought content normal.         POC  Results for POC orders placed in visit on 11/24/20   POCT Urinalysis No Micro (Auto)   Result Value Ref Range    Glucose, Ur 100 (A) NEGATIVE mg/dl    Bilirubin Urine Negative     Ketones, Urine Negative NEGATIVE    Specific Gravity, Urine 1.020 1.002 - 1.030    Blood, UA POC Negative NEGATIVE    pH, Urine 7.00 5.0 - 9.0    Protein, Urine Negative NEGATIVE mg/dl    Urobilinogen, Urine 1.00 0.0 - 1.0 eu/dl    Nitrite, Urine Negative NEGATIVE    Leukocyte Clumps, Urine Negative NEGATIVE    Color, Urine Yellow YELLOW-STRAW    Character, Urine Clear CLR-SL.CLOUD   poct post void residual   Result Value Ref Range    post void residual 95 ml         Patients recent PSA values are as follows  Lab Results   Component Value Date    PSA 0.48 08/03/2018    PSA 0.46 10/19/2015    PSA 0.52 12/19/2011        Recent BUN/Creatinine:  Lab Results   Component Value Date    BUN 12 03/11/2020    CREATININE 1.1 03/11/2020         Assessment:   OAB with incontinence    Plan:     Pt doing well at this time and is still happy with the control of his symptoms following botox injections 5/2020. F/u in 3 months with PVR. Call for worsening in symptoms prior.

## 2020-12-28 RX ORDER — SILDENAFIL CITRATE 20 MG/1
20 TABLET ORAL DAILY PRN
Qty: 25 TABLET | Refills: 0 | Status: SHIPPED | OUTPATIENT
Start: 2020-12-28 | End: 2020-12-29 | Stop reason: SDUPTHER

## 2020-12-30 RX ORDER — SILDENAFIL CITRATE 20 MG/1
20 TABLET ORAL DAILY PRN
Qty: 25 TABLET | Refills: 2 | Status: ON HOLD | OUTPATIENT
Start: 2020-12-30 | End: 2021-05-22 | Stop reason: HOSPADM

## 2021-01-19 ENCOUNTER — HOSPITAL ENCOUNTER (OUTPATIENT)
Dept: MRI IMAGING | Age: 51
Discharge: HOME | End: 2021-01-19
Payer: COMMERCIAL

## 2021-01-19 DIAGNOSIS — M54.50 LUMBAR PAIN: ICD-10-CM

## 2021-01-19 PROCEDURE — 72148 MRI LUMBAR SPINE W/O DYE: CPT

## 2021-03-10 RX ORDER — BUPIVACAINE HYDROCHLORIDE 2.5 MG/ML
5 INJECTION, SOLUTION EPIDURAL; INFILTRATION; INTRACAUDAL ONCE
Status: CANCELLED | OUTPATIENT
Start: 2021-03-10 | End: 2021-03-10

## 2021-03-10 RX ORDER — DEXAMETHASONE SODIUM PHOSPHATE 4 MG/ML
4 INJECTION, SOLUTION INTRA-ARTICULAR; INTRALESIONAL; INTRAMUSCULAR; INTRAVENOUS; SOFT TISSUE ONCE
Status: CANCELLED | OUTPATIENT
Start: 2021-03-10 | End: 2021-03-10

## 2021-04-02 RX ORDER — DEXAMETHASONE SODIUM PHOSPHATE 4 MG/ML
4 INJECTION, SOLUTION INTRA-ARTICULAR; INTRALESIONAL; INTRAMUSCULAR; INTRAVENOUS; SOFT TISSUE ONCE
Status: CANCELLED | OUTPATIENT
Start: 2021-04-02 | End: 2021-04-02

## 2021-04-02 RX ORDER — BUPIVACAINE HYDROCHLORIDE 2.5 MG/ML
5 INJECTION, SOLUTION EPIDURAL; INFILTRATION; INTRACAUDAL ONCE
Status: CANCELLED | OUTPATIENT
Start: 2021-04-02 | End: 2021-04-02

## 2021-04-21 RX ORDER — BUPIVACAINE HYDROCHLORIDE 2.5 MG/ML
5 INJECTION, SOLUTION EPIDURAL; INFILTRATION; INTRACAUDAL ONCE
Status: CANCELLED | OUTPATIENT
Start: 2021-04-21 | End: 2021-04-21

## 2021-04-21 RX ORDER — DEXAMETHASONE SODIUM PHOSPHATE 4 MG/ML
4 INJECTION, SOLUTION INTRA-ARTICULAR; INTRALESIONAL; INTRAMUSCULAR; INTRAVENOUS; SOFT TISSUE ONCE
Status: CANCELLED | OUTPATIENT
Start: 2021-04-21 | End: 2021-04-21

## 2021-04-22 ENCOUNTER — HOSPITAL ENCOUNTER (OUTPATIENT)
Dept: INTERVENTIONAL RADIOLOGY/VASCULAR | Age: 51
Discharge: HOME | End: 2021-04-22
Payer: COMMERCIAL

## 2021-04-22 VITALS
DIASTOLIC BLOOD PRESSURE: 86 MMHG | TEMPERATURE: 98.3 F | SYSTOLIC BLOOD PRESSURE: 136 MMHG | OXYGEN SATURATION: 99 % | HEART RATE: 68 BPM | RESPIRATION RATE: 18 BRPM

## 2021-04-22 PROCEDURE — 6360000004 HC RX CONTRAST MEDICATION: Performed by: RADIOLOGY

## 2021-04-22 PROCEDURE — 62323 NJX INTERLAMINAR LMBR/SAC: CPT | Performed by: RADIOLOGY

## 2021-04-22 PROCEDURE — 6360000002 HC RX W HCPCS

## 2021-04-22 PROCEDURE — 2709999900 IR FLUORO GUIDED LUMBAR PUNCTURE THERAPY

## 2021-04-22 RX ORDER — BUPIVACAINE HYDROCHLORIDE 2.5 MG/ML
5 INJECTION, SOLUTION EPIDURAL; INFILTRATION; INTRACAUDAL ONCE
Status: COMPLETED | OUTPATIENT
Start: 2021-04-22 | End: 2021-04-22

## 2021-04-22 RX ORDER — DEXAMETHASONE SODIUM PHOSPHATE 4 MG/ML
4 INJECTION, SOLUTION INTRA-ARTICULAR; INTRALESIONAL; INTRAMUSCULAR; INTRAVENOUS; SOFT TISSUE ONCE
Status: COMPLETED | OUTPATIENT
Start: 2021-04-22 | End: 2021-04-22

## 2021-04-22 RX ADMIN — IOHEXOL 1 ML: 180 INJECTION INTRAVENOUS at 14:01

## 2021-04-22 RX ADMIN — BUPIVACAINE HYDROCHLORIDE 2 ML: 2.5 INJECTION, SOLUTION EPIDURAL; INFILTRATION; INTRACAUDAL at 14:01

## 2021-04-22 RX ADMIN — DEXAMETHASONE SODIUM PHOSPHATE 4 MG: 4 INJECTION, SOLUTION INTRA-ARTICULAR; INTRALESIONAL; INTRAMUSCULAR; INTRAVENOUS; SOFT TISSUE at 14:02

## 2021-04-22 ASSESSMENT — PAIN DESCRIPTION - ORIENTATION: ORIENTATION: RIGHT

## 2021-04-22 ASSESSMENT — PAIN DESCRIPTION - DESCRIPTORS
DESCRIPTORS: ACHING
DESCRIPTORS: ACHING

## 2021-04-22 ASSESSMENT — PAIN - FUNCTIONAL ASSESSMENT
PAIN_FUNCTIONAL_ASSESSMENT: 0-10
PAIN_FUNCTIONAL_ASSESSMENT: 0-10

## 2021-04-22 ASSESSMENT — PAIN SCALES - GENERAL
PAINLEVEL_OUTOF10: 1
PAINLEVEL_OUTOF10: 3

## 2021-04-22 NOTE — OP NOTE
Department of Radiology  Post Procedure Progress Note    Pre-Procedure Diagnosis:  Lumbar radiculopathy     Procedure Performed:  Epidural block/steroid injection      Anesthesia: local     Findings: successful    Immediate Complications:  None    Estimated Blood Loss: minimal    SEE DICTATED PROCEDURE NOTE FOR COMPLETE DETAILS.       Elfrpat Qureshi MD   4/22/2021 1:36 PM

## 2021-04-22 NOTE — H&P
Formulation and discussion of sedation / procedure plans, risks, benefits, side effects and alternatives with patient and/or responsible adult completed.     Electronically signed by Mary Morris MD on 4/22/2021 at 1:36 PM

## 2021-04-22 NOTE — PROGRESS NOTES
1310 pt admitted to opn per ambulation for a lumbar epidural  PT RIGHTS AND RESPONSIBILITIES OFFERED TO PT.pt states off asa since last Friday. Pedal push and pull equal and strong. Pt states pain is in the back area and radiated down the right leg to calf area. 1328 report called to cesar lindsay   1352 pt to specials per cart. 1410 pt return per cart to opn. bandaid dry and intact. No bleeding, swelling noted. Pedal push and pull equal and strong. Pt noted with slight numbness in the right calf and shin area only. 1430 pt took drink, crackers in. bandaid dry and intact. Pedal push and pull equal and strong. No new numbness, tingling noted   1435 discharge instructions given to patient. Verbalize understanding. Pt up in room. Gait steady. No issues noted  1445 discharge per ambulation to home.  Taxi service called for patient .                                     __m__ Safety:       (Environmental)  [place] to environment   Ensure ID band is correct and in place/ allergy band as needed   Assess for fall risk   Initiate fall precautions as applicable (fall band, side rails, etc.)   Call light within reach   Bed in low position/ wheels locked    __m__ Pain:        Assess pain level and characteristics   Administer analgesics as ordered   Assess effectiveness of pain management and report to MD as needed    ___m_ Knowledge Deficit:   Assess baseline knowledge   Provide teaching at level of understanding   Provide teaching via preferred learning method   Evaluate teaching effectiveness    _m___ Hemodynamic/Respiratory Status:       (Pre and Post Procedure Monitoring)   Assess/Monitor vital signs and LOC   Assess Baseline SpO2 prior to any sedation   Obtain weight/height   Assess vital signs/ LOC until patient meets discharge criteria   Monitor procedure site and notify MD of any issues

## 2021-05-06 ENCOUNTER — OFFICE VISIT (OUTPATIENT)
Dept: CARDIOLOGY CLINIC | Age: 51
End: 2021-05-06
Payer: COMMERCIAL

## 2021-05-06 VITALS
WEIGHT: 250 LBS | HEART RATE: 72 BPM | BODY MASS INDEX: 32.08 KG/M2 | HEIGHT: 74 IN | DIASTOLIC BLOOD PRESSURE: 70 MMHG | SYSTOLIC BLOOD PRESSURE: 116 MMHG

## 2021-05-06 DIAGNOSIS — R07.9 CHEST PAIN, UNSPECIFIED TYPE: Primary | ICD-10-CM

## 2021-05-06 DIAGNOSIS — R94.39 ABNORMAL STRESS TEST: ICD-10-CM

## 2021-05-06 DIAGNOSIS — R06.02 SOB (SHORTNESS OF BREATH) ON EXERTION: ICD-10-CM

## 2021-05-06 PROCEDURE — 3017F COLORECTAL CA SCREEN DOC REV: CPT | Performed by: INTERNAL MEDICINE

## 2021-05-06 PROCEDURE — 1036F TOBACCO NON-USER: CPT | Performed by: INTERNAL MEDICINE

## 2021-05-06 PROCEDURE — G8427 DOCREV CUR MEDS BY ELIG CLIN: HCPCS | Performed by: INTERNAL MEDICINE

## 2021-05-06 PROCEDURE — G8417 CALC BMI ABV UP PARAM F/U: HCPCS | Performed by: INTERNAL MEDICINE

## 2021-05-06 PROCEDURE — 99204 OFFICE O/P NEW MOD 45 MIN: CPT | Performed by: INTERNAL MEDICINE

## 2021-05-06 RX ORDER — GALCANEZUMAB 120 MG/ML
INJECTION, SOLUTION SUBCUTANEOUS
COMMUNITY
Start: 2021-04-22

## 2021-05-06 RX ORDER — FLUTICASONE PROPIONATE 50 MCG
SPRAY, SUSPENSION (ML) NASAL
COMMUNITY
Start: 2021-04-26

## 2021-05-06 RX ORDER — SILDENAFIL 25 MG/1
TABLET, FILM COATED ORAL
Status: ON HOLD | COMMUNITY
Start: 2021-03-31 | End: 2021-05-22 | Stop reason: HOSPADM

## 2021-05-06 RX ORDER — RISPERIDONE 1 MG/1
1 TABLET, FILM COATED ORAL 2 TIMES DAILY
COMMUNITY
Start: 2021-03-31

## 2021-05-06 RX ORDER — NITROGLYCERIN 0.4 MG/1
TABLET SUBLINGUAL
COMMUNITY
Start: 2021-03-31 | End: 2022-07-07

## 2021-05-06 NOTE — PROGRESS NOTES
[address] [facility] [place] [address] [place] [address]  Dept: [phone]  Dept Fax: [phone]  Loc: [phone]    Visit Date: 5/6/2021    Mr. Jaycob Garcia is a 49 y.o. male  who presented for:  Chief Complaint   Patient presents with    New Patient    Chest Pain    Shortness of Breath       HPI:   50 yo c hx of DM, HTN, HLD, MERCEDEZ on CPAP, Asthma and seizure is referred for chest pains. As per patient he was in [facility] for chest pain and shortness of breath. Records are not currently available. EKG from 3/2020 showed normal sinus rhythm. Had stress test done there which showed TID. There was concerns for multivessel CAD. But patient apparently signed out AMA from hospital.   HbA1: 8      Current Outpatient Medications:     fluticasone (FLONASE) 50 MCG/ACT nasal spray, instill 1 spray into each nostril once daily, Disp: , Rfl:     Ondansetron 4 MG FILM, Q6H, Disp: , Rfl:     sildenafil (VIAGRA) 25 MG tablet, Sildenafil Active 25 MG PO Daily March 31st, 2021 2:12pm, Disp: , Rfl:     risperiDONE (RISPERDAL) 1 MG tablet, Risperidone (Risperdal) 1 mg Tablet Active 1 MG PO Twice Daily March 31st, 2021 2:13pm, Disp: , Rfl:     nitroGLYCERIN (NITROSTAT) 0.4 MG SL tablet, One Time Only, Disp: , Rfl:     EMGALITY 120 MG/ML SOAJ, inject 1 milliliter ( 120 milligrams ) subcutaneously and INTO TH. ..  (REFER TO PRESCRIPTION NOTES). , Disp: , Rfl:     sildenafil (REVATIO) 20 MG tablet, Take 1 tablet by mouth daily as needed (ED), Disp: 25 tablet, Rfl: 2    Polyethylene Glycol 3350 (GLYCOLAX PO), Take by mouth, Disp: , Rfl:     busPIRone (BUSPAR) 15 MG tablet, Take 15 mg by mouth 3 times daily, Disp: , Rfl:     benztropine (COGENTIN) 0.5 MG tablet, Take 0.5 mg by mouth 2 times daily, Disp: , Rfl:     ARIPiprazole Lauroxil (ARISTADA IM), Inject into the muscle Injection once every 2 months, Disp: , Rfl:     hydrOXYzine (VISTARIL) 50 MG capsule, Take 1 inhaler, Inhale 2 puffs into the lungs every 4 hours as needed for Wheezing Dx: 493.90, Disp: 1 Inhaler, Rfl: 5    zonisamide (ZONEGRAN) 100 MG capsule, Take 1 capsule by mouth daily (Patient taking differently: Take 200 mg by mouth 2 times daily ), Disp: 30 capsule, Rfl: 5    loratadine (CLARITIN) 10 MG tablet, Take 1 tablet by mouth daily, Disp: 30 tablet, Rfl: 11    OXcarbazepine (TRILEPTAL) 300 MG tablet, Take 1,500 mg by mouth daily Pt takes 750 mg BID, Disp: , Rfl:     pantoprazole (PROTONIX) 40 MG tablet, Take 1 tablet by mouth every morning (before breakfast), Disp: 30 tablet, Rfl: 6    Past Medical History  Wanda Ryan  has a past medical history of ADHD (attention deficit hyperactivity disorder), Anxiety, Arthritis, Asthma, Bladder dysfunction, Depression, Diabetes mellitus ([address] Fatigue, GERD (gastroesophageal reflux disease), Headache, Hyperlipidemia, Hypertension, MDRO (multiple drug resistant organisms) resistance, OAB (overactive bladder), Seizures ([address] Sleep apnea, and Type II or unspecified type diabetes mellitus without mention of complication, not stated as uncontrolled. Social History  Wanda Ryan  reports that he has never smoked. He has never used smokeless tobacco. He reports current alcohol use. He reports that he does not use drugs. Family History  Wanda Ryan family history includes Dementia in his mother; Diabetes in his paternal aunt and paternal grandmother; High Blood Pressure in his brother, father, and sister; Hypertension in his father; Other in his father.     Past Surgical History   Past Surgical History:   Procedure Laterality Date    CATARACT REMOVAL  2010    bilateral    CERVICAL FUSION N/A 9/27/2017    ACDF C6-7 W/ATLANTIS CORNERSTONE performed by Jose Martin Palma MD at [address] [place] [address] 2/26/2018    COLONOSCOPY POLYPECTOMY HOT BIOPSY performed by Pooja Sanchez MD at [facility]    CYSTOSCOPY N/A 5/27/2020    CYSTOSCOPY WITH BLADDER BOTOX 100 UNITS performed by Brooklyn Newberry MD at [address] [place] [address]      [address]    FOOT SURGERY Bilateral 2006    hammer toes    FOOT SURGERY  1996    removal of piece of glass    HAND SURGERY Right 07/2017    OTHER SURGICAL HISTORY  11/4/15    Excision of 3 cm lipoma LUQ abdominal wall Wisser    [place] OFFICE/OUTPT VISIT,PROCEDURE ONLY N/A 10/16/2018    PLACEMENT OF INTERSTIM DEVICE performed by Brook Brown MD at [address] N/A 5/16/2019    REMOVAL INTERSSTIM DEVICE performed by Brook Brown MD at [address]  2017    UPPER GASTROINTESTINAL ENDOSCOPY N/A 8/9/2018    EGD BIOPSY performed by Lizy Stevenson MD at [facility]       Subjective:     REVIEW OF SYSTEMS  Constitutional: denies sweats, chills and fever  HENT: denies  congestion, sinus pressure, sneezing and sore throat. Eyes: denies  pain, discharge, redness and itching. Respiratory: denies apnea, cough  Gastrointestinal: denies blood in stool, constipation, diarrhea   Endocrine: denies cold intolerance, heat intolerance, polydipsia. Genitourinary: denies dysuria, enuresis, flank pain and hematuria. Musculoskeletal: denies arthralgias, joint swelling and neck pain. Neurological: denies numbness and headaches. Psychiatric/Behavioral: denies agitation, confusion, decreased concentration and dysphoric mood    All others reviewed and are negative. Objective:     /70   Pulse 72   Ht 6' 2\" (1.88 m)   Wt 250 lb (113.4 kg)   BMI 32.10 kg/m²     Wt Readings from Last 3 Encounters:   05/06/21 250 lb (113.4 kg)   02/11/21 249 lb (112.9 kg)   11/24/20 256 lb (116.1 kg)     BP Readings from Last 3 Encounters:   05/06/21 116/70   04/22/21 136/86   02/11/21 (!) 156/95       PHYSICAL EXAM  Constitutional: Oriented to person, place, and time. Appears well-developed and well-nourished. HENT:   Head: Normocephalic and atraumatic.    Eyes: EOM are 12/27/2019       Lab Results   Component Value Date    TRIG 95 08/06/2015    HDL 95 08/06/2015    LDLCALC 81 08/06/2015       Lab Results   Component Value Date    TSH 5.510 12/23/2019         Testing Reviewed:      I haveindividually reviewed the below cardiac tests    EKG:    ECHO: No results found for this or any previous visit. STRESS:    CATH:    Assessment/Plan       Diagnosis Orders   1. Chest pain, unspecified type       Chest pains with abnormal stress test, TID  DM  HTN  HLD  Asthma  MERCEDEZ on CPaP  Seizures in 2/2021    Was recently in [facility] for same  Extensive testing was done  Not currently available  Awaiting full records  Stress test done there showed TID, no ischemia  He signed out AMA there  Cath was not done there  Will scheduled for [facility], to evaluate further due to this finding. Continue Aspirin, metoporlol, statin  The patient is asked to make an attempt to improve diet and exercise patterns to aid in medical management of this problem. Advised more plant based nutrition/meditarrean diet   Advised patient to call office or seek immediate medical attention if there is any new onset of  any chest pain, sob, palpitations, lightheadedness, dizziness, orthopnea, PND or pedal edema. All medication side effects were discussed in details. Thank youfor allowing me to participate in the care of this patient. Please do not hesitate to contact me for any further questions. Return in about 4 weeks (around 6/3/2021), or if symptoms worsen or fail to improve, for Regular follow up, Review testing.        Electronically signed by Loin Estrella MD [facility]  5/6/2021 at 2:46 PM EDT

## 2021-05-06 NOTE — PROGRESS NOTES
New patient here for check up     Pt c/o chest pain ,rates pain 9/10, last noticed 2 weeks ago, pain does not radiate, sob, dizziness and lightheaded, heart palpitations     Pt denies swelling in legs and feet,

## 2021-05-20 ENCOUNTER — PREP FOR PROCEDURE (OUTPATIENT)
Dept: CARDIOLOGY | Age: 51
End: 2021-05-20

## 2021-05-20 RX ORDER — SODIUM CHLORIDE 9 MG/ML
25 INJECTION, SOLUTION INTRAVENOUS PRN
Status: CANCELLED | OUTPATIENT
Start: 2021-05-20

## 2021-05-20 RX ORDER — DIPHENHYDRAMINE HYDROCHLORIDE 50 MG/ML
50 INJECTION INTRAMUSCULAR; INTRAVENOUS ONCE
Status: CANCELLED | OUTPATIENT
Start: 2021-05-20 | End: 2021-05-20

## 2021-05-20 RX ORDER — NITROGLYCERIN 0.4 MG/1
0.4 TABLET SUBLINGUAL EVERY 5 MIN PRN
Status: CANCELLED | OUTPATIENT
Start: 2021-05-20

## 2021-05-20 RX ORDER — SODIUM CHLORIDE 0.9 % (FLUSH) 0.9 %
5-40 SYRINGE (ML) INJECTION EVERY 12 HOURS SCHEDULED
Status: CANCELLED | OUTPATIENT
Start: 2021-05-20

## 2021-05-20 RX ORDER — SODIUM CHLORIDE 0.9 % (FLUSH) 0.9 %
5-40 SYRINGE (ML) INJECTION PRN
Status: CANCELLED | OUTPATIENT
Start: 2021-05-20

## 2021-05-20 RX ORDER — SODIUM CHLORIDE 9 MG/ML
INJECTION, SOLUTION INTRAVENOUS CONTINUOUS
Status: CANCELLED | OUTPATIENT
Start: 2021-05-20

## 2021-05-20 RX ORDER — ASPIRIN 325 MG
325 TABLET ORAL ONCE
Status: CANCELLED | OUTPATIENT
Start: 2021-05-20 | End: 2021-05-20

## 2021-05-21 PROBLEM — I25.10 CAD IN NATIVE ARTERY: Status: ACTIVE | Noted: 2021-05-21

## 2021-06-25 ENCOUNTER — TELEPHONE (OUTPATIENT)
Dept: CARDIOLOGY CLINIC | Age: 51
End: 2021-06-25

## 2021-06-25 NOTE — TELEPHONE ENCOUNTER
Luis Angel Vences called to rs his appt for today 06-25, I canceled todays appt, but didn't RS since today's appt was for a 4 wk f/u and [facility] doesn't have anything soon with Ashok, He called at 0116 to cancel his 0115 appt, he said the reason was for transportation issues, he was going to try to catch the bus but it is raining outside. Please call him back with an appt date and time.

## 2021-07-08 ENCOUNTER — OFFICE VISIT (OUTPATIENT)
Dept: CARDIOLOGY CLINIC | Age: 51
End: 2021-07-08
Payer: COMMERCIAL

## 2021-07-08 VITALS
HEIGHT: 74 IN | BODY MASS INDEX: 31.57 KG/M2 | SYSTOLIC BLOOD PRESSURE: 138 MMHG | DIASTOLIC BLOOD PRESSURE: 88 MMHG | WEIGHT: 246 LBS | HEART RATE: 80 BPM

## 2021-07-08 DIAGNOSIS — I25.83 CORONARY ARTERY DISEASE DUE TO LIPID RICH PLAQUE: Primary | ICD-10-CM

## 2021-07-08 DIAGNOSIS — I25.10 CORONARY ARTERY DISEASE DUE TO LIPID RICH PLAQUE: Primary | ICD-10-CM

## 2021-07-08 PROCEDURE — G8427 DOCREV CUR MEDS BY ELIG CLIN: HCPCS | Performed by: INTERNAL MEDICINE

## 2021-07-08 PROCEDURE — 3017F COLORECTAL CA SCREEN DOC REV: CPT | Performed by: INTERNAL MEDICINE

## 2021-07-08 PROCEDURE — 99214 OFFICE O/P EST MOD 30 MIN: CPT | Performed by: INTERNAL MEDICINE

## 2021-07-08 PROCEDURE — 1036F TOBACCO NON-USER: CPT | Performed by: INTERNAL MEDICINE

## 2021-07-08 PROCEDURE — G8417 CALC BMI ABV UP PARAM F/U: HCPCS | Performed by: INTERNAL MEDICINE

## 2021-07-08 NOTE — PROGRESS NOTES
Pt here for hospital f/u-FFR of proximal LAD was 0.78 (Obstructive)   Successful PCI / Drug Eluting Stent of the proximal Left Anterior   Descending Coronary Artery.

## 2021-07-08 NOTE — PROGRESS NOTES
[address] [facility] [place] [address] [place] [address]  Dept: [phone]  Dept Fax: [phone]  Loc: [phone]    Visit Date: 7/8/2021    Mr. Katiuska De La O is a 49 y.o. male  who presented for:  Chief Complaint   Patient presents with    Check-Up       HPI:   48 yo c hx of CAD s/p PCI, DM, HTN, HLD, MERCEDEZ on CPAP, Asthma and seizure is here for a follow up. He underwent FFR guided PCI of mid LAD. Denies any chest pain, sob, palpitations, lightheadedness, dizziness, orthopnea, PND or pedal edema. On DAPT doing well      Current Outpatient Medications:     ticagrelor (BRILINTA) 90 MG TABS tablet, Take 1 tablet by mouth 2 times daily, Disp: 180 tablet, Rfl: 3    atorvastatin (LIPITOR) 40 MG tablet, Take 1 tablet by mouth daily, Disp: 90 tablet, Rfl: 3    fluticasone (FLONASE) 50 MCG/ACT nasal spray, instill 1 spray into each nostril once daily, Disp: , Rfl:     risperiDONE (RISPERDAL) 1 MG tablet, Risperidone (Risperdal) 1 mg Tablet Active 1 MG PO Twice Daily March 31st, 2021 2:13pm, Disp: , Rfl:     nitroGLYCERIN (NITROSTAT) 0.4 MG SL tablet, One Time Only, Disp: , Rfl:     EMGALITY 120 MG/ML SOAJ, inject 1 milliliter ( 120 milligrams ) subcutaneously and INTO TH. ..  (REFER TO PRESCRIPTION NOTES). , Disp: , Rfl:     busPIRone (BUSPAR) 15 MG tablet, Take 15 mg by mouth 3 times daily, Disp: , Rfl:     benztropine (COGENTIN) 0.5 MG tablet, Take 0.5 mg by mouth 2 times daily, Disp: , Rfl:     ARIPiprazole Lauroxil (ARISTADA IM), Inject into the muscle Injection once every 2 months, Disp: , Rfl:     pantoprazole (PROTONIX) 40 MG tablet, Take 1 tablet by mouth every morning (before breakfast), Disp: 30 tablet, Rfl: 6    hydrOXYzine (VISTARIL) 50 MG capsule, Take 1 capsule by mouth nightly, Disp: 30 capsule, Rfl: 0    FLUoxetine (PROZAC) 20 MG capsule, Take 3 capsules by mouth daily, Disp: 90 capsule, Rfl: 0    traZODone (DESYREL) 150 MG tablet, Take 1 tablet by mouth nightly, Disp: 30 tablet, Rfl: 0    TRESIBA FLEXTOUCH 200 UNIT/ML SOPN, Inject 32 Units as directed every morning, Disp: , Rfl: 0    mometasone (ASMANEX 30 METERED DOSES) 220 MCG/INH inhaler, Inhale 2 puffs into the lungs as needed, Disp: , Rfl:     Erenumab-aooe 70 MG/ML SOAJ, Inject 70 mg into the skin every 30 days , Disp: , Rfl:     FREESTYLE LITE strip, , Disp: , Rfl: 1    SUMAtriptan (IMITREX) 50 MG tablet, , Disp: , Rfl: 0    insulin aspart (NOVOLOG FLEXPEN) 100 UNIT/ML injection pen, Inject into the skin 3 times daily (before meals) Up to 50 units daily. Inject 1:12 grams carb bkfst/ lunch. 1:30 grams dinner.  1:50 mg/dL BT, Disp: , Rfl:     metoprolol succinate (TOPROL XL) 50 MG extended release tablet, Take 50 mg by mouth daily, Disp: , Rfl:     losartan (COZAAR) 100 MG tablet, take 1 tablet by mouth once daily, Disp: 30 tablet, Rfl: 5    aspirin EC 81 MG EC tablet, Take 1 tablet by mouth daily, Disp: 30 tablet, Rfl: 11    tiotropium (SPIRIVA RESPIMAT) 2.5 MCG/ACT AERS inhaler, Inhale 2 puffs into the lungs every morning (before breakfast), Disp: 1 Inhaler, Rfl: 5    budesonide-formoterol (SYMBICORT) 160-4.5 MCG/ACT AERO, Inhale 2 puffs into the lungs 2 times daily, Disp: 1 Inhaler, Rfl: 5    amLODIPine (NORVASC) 5 MG tablet, take 1 tablet by mouth once daily, Disp: 30 tablet, Rfl: 5    albuterol sulfate HFA (PROAIR HFA) 108 (90 BASE) MCG/ACT inhaler, Inhale 2 puffs into the lungs every 4 hours as needed for Wheezing Dx: 493.90, Disp: 1 Inhaler, Rfl: 5    zonisamide (ZONEGRAN) 100 MG capsule, Take 1 capsule by mouth daily (Patient taking differently: Take 200 mg by mouth 2 times daily ), Disp: 30 capsule, Rfl: 5    loratadine (CLARITIN) 10 MG tablet, Take 1 tablet by mouth daily, Disp: 30 tablet, Rfl: 11    OXcarbazepine (TRILEPTAL) 300 MG tablet, Take 1,500 mg by mouth daily Pt takes 750 mg BID, Disp: , Rfl:     Past Medical History  Geraldine Card  has a past medical history of ADHD (attention deficit hyperactivity disorder), Anxiety, Arthritis, Asthma, Bladder dysfunction, Depression, Diabetes mellitus ([facility] Fatigue, GERD (gastroesophageal reflux disease), Headache, Hyperlipidemia, Hypertension, MDRO (multiple drug resistant organisms) resistance, OAB (overactive bladder), Seizures ([facility] [facility] Sleep apnea, and Type II or unspecified type diabetes mellitus without mention of complication, not stated as uncontrolled. Social History  Carlos Collazo  reports that he has never smoked. He has never used smokeless tobacco. He reports current alcohol use. He reports that he does not use drugs. Family History  Carlos Collazo family history includes Dementia in his mother; Diabetes in his paternal aunt and paternal grandmother; High Blood Pressure in his brother, father, and sister; Hypertension in his father; Other in his father.     Past Surgical History   Past Surgical History:   Procedure Laterality Date    CATARACT REMOVAL  2010    bilateral    CERVICAL FUSION N/A 9/27/2017    ACDF C6-7 W/ATLANTIS CORNERSTONE performed by Blane Owens MD at [place] [address] N/A 2/26/2018    COLONOSCOPY POLYPECTOMY HOT BIOPSY performed by Daylin Jones MD at [facility] [address] N/A 5/27/2020    CYSTOSCOPY WITH BLADDER BOTOX 100 UNITS performed by Autumn Edwards MD at [address] [place] [address] Dr Theodore    [address] Right [address]96    FOOT SURGERY Bilateral 2006    hammer toes    FOOT SURGERY  1996    removal of piece of glass    HAND SURGERY Right 07/2017    OTHER SURGICAL HISTORY  11/4/15    Excision of 3 cm lipoma LUQ abdominal wall Wisser    [facility] OFFICE/OUTPT VISIT,PROCEDURE ONLY N/A 10/16/2018    PLACEMENT OF INTERSTIM DEVICE performed by Lucía Mandel MD at [address] N/A 5/16/2019    REMOVAL INTERSSTIM DEVICE performed by Lucía Mandel MD at [facility]  2017    [facility] GASTROINTEST[facility]AL [facility]OSCOPY N/A 8/9/2018    EGD BIOPSY performed by Fabien Syk MD at [facility]       Subjective:     REVIEW OF SYSTEMS  Constitutional: denies sweats, chills and fever  HENT: denies  congestion, sinus pressure, sneezing and sore throat. Eyes: denies  pain, discharge, redness and itching. Respiratory: denies apnea, cough  Gastrointestinal: denies blood in stool, constipation, diarrhea   Endocrine: denies cold intolerance, heat intolerance, polydipsia. Genitourinary: denies dysuria, enuresis, flank pain and hematuria. Musculoskeletal: denies arthralgias, joint swelling and neck pain. Neurological: denies numbness and headaches. Psychiatric/Behavioral: denies agitation, confusion, decreased concentration and dysphoric mood    All others reviewed and are negative. Objective:     /88   Pulse 80   Ht 6' 2\" (1.88 m)   Wt 246 lb (111.6 kg)   BMI 31.58 kg/m²     Wt Readings from Last 3 Encounters:   07/08/21 246 lb (111.6 kg)   05/21/21 250 lb (113.4 kg)   05/06/21 250 lb (113.4 kg)     BP Readings from Last 3 Encounters:   07/08/21 138/88   05/22/21 129/82   05/06/21 116/70       PHYSICAL EXAM  Constitutional: Oriented to person, place, and time. Appears well-developed and well-nourished. HENT:   Head: Normocephalic and atraumatic. Eyes: EOM are normal. Pupils are equal, round, and reactive to light. Neck: Normal range of motion. Neck supple. No JVD present. Cardiovascular: Normal rate , normal heart sounds and intact distal pulses. Pulmonary/Chest: Effort normal and breath sounds normal. No respiratory distress. No wheezes. No rales. Abdominal: Soft. Bowel sounds are normal. No distension. There is no tenderness. Musculoskeletal: Normal range of motion. No edema. Neurological: Alert and oriented to person, place, and time. No cranial nerve deficit. Coordination normal.   Skin: Skin is warm and dry. Psychiatric: Normal mood and affect.        Lab Results Component Value Date    CKTOTAL 139 07/29/2014    CKTOTAL 130 02/08/2013    CKTOTAL 148 02/07/2013    CKMB 1.0 07/29/2014    CKMB 0.8 10/27/2011    CKMBINDEX 0.7 07/29/2014       Lab Results   Component Value Date    WBC 9.5 05/21/2021    RBC 5.07 05/21/2021    RBC 5.35 06/23/2017    HGB 14.6 05/21/2021    HCT 46.0 05/21/2021    MCV 90.7 05/21/2021    MCH 28.8 05/21/2021    MCHC 31.7 05/21/2021    RDW 13.9 04/07/2021     05/21/2021    MPV 9.1 05/21/2021       Lab Results   Component Value Date     05/21/2021    K 3.5 05/21/2021     05/21/2021    CO2 24 05/21/2021    BUN 9 05/21/2021    LABALBU 4.1 01/25/2021    LABALBU 4.7 06/23/2017    LABALBU 4.3 05/15/2012    CREATININE 1.0 05/21/2021    CALCIUM 9.2 05/21/2021    LABGLOM >90 05/21/2021    GLUCOSE 194 05/21/2021    GLUCOSE 300 04/07/2021       Lab Results   Component Value Date    ALKPHOS 124 01/25/2021    ALT 37 01/25/2021    AST 34 01/25/2021    PROT 6.4 01/25/2021    PROT 7.9 06/23/2017    BILITOT 0.5 01/25/2021    BILIDIR <0.2 03/11/2020    LABALBU 4.1 01/25/2021    LABALBU 4.7 06/23/2017    LABALBU 4.3 05/15/2012       Lab Results   Component Value Date    MG 1.9 05/21/2021       Lab Results   Component Value Date    INR 1.00 05/21/2021    INR 0.95 11/11/2015    INR 0.96 05/08/2015         Lab Results   Component Value Date    LABA1C 8.4 12/27/2019       Lab Results   Component Value Date    TRIG 95 08/06/2015    HDL 95 08/06/2015    LDLCALC 81 08/06/2015       Lab Results   Component Value Date    TSH 5.510 12/23/2019         Testing Reviewed:      I haveindividually reviewed the below cardiac tests    EKG:    ECHO: No results found for this or any previous visit. STRESS:    CATH:    Assessment/Plan       Diagnosis Orders   1.  Coronary artery disease due to lipid rich plaque       CAD s/p PCI  DM  HTN  HLD  Asthma  MERCEDEZ on CPaP  Seizures in 2/2021    Doing well since PCI of LAD  On DAPT with aspirin, and brilinta, tolerating them well, no bleeding  Continue rest of the management  Continue Aspirin, metoporlol, statin  The patient is asked to make an attempt to improve diet and exercise patterns to aid in medical management of this problem. Advised more plant based nutrition/meditarrean diet   Advised patient to call office or seek immediate medical attention if there is any new onset of  any chest pain, sob, palpitations, lightheadedness, dizziness, orthopnea, PND or pedal edema. All medication side effects were discussed in details. Thank youfor allowing me to participate in the care of this patient. Please do not hesitate to contact me for any further questions. Return in about 1 year (around 7/8/2022), or if symptoms worsen or fail to improve, for Regular follow up, Review testing.        Electronically signed by Angelito Gonsalez MD [facility]  7/8/2021 at 2:46 PM EDT

## 2021-12-13 ENCOUNTER — OFFICE VISIT (OUTPATIENT)
Dept: CARDIOLOGY CLINIC | Age: 51
End: 2021-12-13
Payer: COMMERCIAL

## 2021-12-13 VITALS
HEART RATE: 81 BPM | WEIGHT: 244.2 LBS | HEIGHT: 74 IN | BODY MASS INDEX: 31.34 KG/M2 | SYSTOLIC BLOOD PRESSURE: 126 MMHG | DIASTOLIC BLOOD PRESSURE: 82 MMHG

## 2021-12-13 DIAGNOSIS — R56.9 SEIZURES (HCC): ICD-10-CM

## 2021-12-13 DIAGNOSIS — R42 DIZZINESS: ICD-10-CM

## 2021-12-13 DIAGNOSIS — I25.10 CORONARY ARTERY DISEASE INVOLVING NATIVE CORONARY ARTERY OF NATIVE HEART WITHOUT ANGINA PECTORIS: ICD-10-CM

## 2021-12-13 DIAGNOSIS — R06.02 SOB (SHORTNESS OF BREATH): ICD-10-CM

## 2021-12-13 DIAGNOSIS — Z99.89 OSA ON CPAP: ICD-10-CM

## 2021-12-13 DIAGNOSIS — J45.909 ASTHMA, UNSPECIFIED ASTHMA SEVERITY, UNSPECIFIED WHETHER COMPLICATED, UNSPECIFIED WHETHER PERSISTENT: ICD-10-CM

## 2021-12-13 DIAGNOSIS — G47.33 OSA ON CPAP: ICD-10-CM

## 2021-12-13 DIAGNOSIS — Z95.5 HISTORY OF CORONARY ANGIOPLASTY WITH INSERTION OF STENT: ICD-10-CM

## 2021-12-13 DIAGNOSIS — I10 ESSENTIAL HYPERTENSION: ICD-10-CM

## 2021-12-13 DIAGNOSIS — E66.9 OBESITY (BMI 30.0-34.9): ICD-10-CM

## 2021-12-13 PROCEDURE — 3017F COLORECTAL CA SCREEN DOC REV: CPT | Performed by: NURSE PRACTITIONER

## 2021-12-13 PROCEDURE — G8427 DOCREV CUR MEDS BY ELIG CLIN: HCPCS | Performed by: NURSE PRACTITIONER

## 2021-12-13 PROCEDURE — G8417 CALC BMI ABV UP PARAM F/U: HCPCS | Performed by: NURSE PRACTITIONER

## 2021-12-13 PROCEDURE — 99214 OFFICE O/P EST MOD 30 MIN: CPT | Performed by: NURSE PRACTITIONER

## 2021-12-13 PROCEDURE — G8484 FLU IMMUNIZE NO ADMIN: HCPCS | Performed by: NURSE PRACTITIONER

## 2021-12-13 PROCEDURE — 93000 ELECTROCARDIOGRAM COMPLETE: CPT | Performed by: NURSE PRACTITIONER

## 2021-12-13 PROCEDURE — 1036F TOBACCO NON-USER: CPT | Performed by: NURSE PRACTITIONER

## 2021-12-13 NOTE — PROGRESS NOTES
[facility] PROFESSIONAL SERVICES  [facility] [address]   [address] [address]   Dept: [phone]   Dept Fax: [phone]   Loc: [phone]      Chief Complaint   Patient presents with    Follow-up    Shortness of Breath    Shaking   OV for sob and intermittent dizziness and left hand twitching in 50 y/o male with history of  CAD s/p PCI, IDDM, HTN, HLD, asthma, MERCEDEZ on CPAP, Asthma and seizure. Reports sob with exertion that has increased over the last two months as well as some dizziness, and left hand twitching/shaking. Denies chest pain, palpitations, RAJNI, lightheadedness, dizziness or syncope. EKG SR, no acute abnormalities. Denies any recent illness or trauma. Denies wt gain and is sleeping in normal position in bed. Has been staying with friend and not having his CPAP.     Cardiologist:  Dr. Loretta Ruth:   No fever, no chills, No fatigue or weight loss  Pulmonary:   + dyspnea, no wheezing  Cardiac:    Denies recent chest pain   GI:     No nausea or vomiting, no abdominal pain  Neuro:    + intermittent dizziness, no light headedness or syncope  Musculoskeletal:  No recent active issues  Extremities:   No edema, good peripheral pulses      Past Medical History:   Diagnosis Date    ADHD (attention deficit hyperactivity disorder)     Anxiety     Arthritis     Asthma     Bladder dysfunction     Depression     Diabetes mellitus (HCC)     Fatigue     GERD (gastroesophageal reflux disease)     Headache     migraines    Hyperlipidemia     Hypertension     MDRO (multiple drug resistant organisms) resistance     yrs ago    OAB (overactive bladder) 10/27/2015    Seizures ([facility] [address]     Sleep apnea     wears cpap    Type II or unspecified type diabetes mellitus without mention of complication, not stated as uncontrolled        Allergies   Allergen Reactions    Latuda [Lurasidone Hcl] Other (See Comments)     Dizziness      Morphine Itching    Tetracyclines & Related Swelling     Lips and face    Vilazodone Hcl      Nausea and Vomiting    Fish-Derived Products Nausea And Vomiting     seafood    Flagyl [Metronidazole] Itching and Rash       Current Outpatient Medications   Medication Sig Dispense Refill    ticagrelor (BRILINTA) 90 MG TABS tablet Take 1 tablet by mouth 2 times daily 180 tablet 3    atorvastatin (LIPITOR) 40 MG tablet Take 1 tablet by mouth daily 90 tablet 3    fluticasone (FLONASE) 50 MCG/ACT nasal spray instill 1 spray into each nostril once daily      risperiDONE (RISPERDAL) 1 MG tablet Risperidone (Risperdal) 1 mg Tablet Active 1 MG PO Twice Daily March 31st, 2021 2:13pm      nitroGLYCERIN (NITROSTAT) 0.4 MG SL tablet One Time Only      EMGALITY 120 MG/ML SOAJ inject 1 milliliter ( 120 milligrams ) subcutaneously and INTO TH. ..  (REFER TO PRESCRIPTION NOTES).  busPIRone (BUSPAR) 15 MG tablet Take 15 mg by mouth 3 times daily      benztropine (COGENTIN) 0.5 MG tablet Take 0.5 mg by mouth 2 times daily      ARIPiprazole Lauroxil (ARISTADA IM) Inject into the muscle Injection once every 2 months      pantoprazole (PROTONIX) 40 MG tablet Take 1 tablet by mouth every morning (before breakfast) 30 tablet 6    hydrOXYzine (VISTARIL) 50 MG capsule Take 1 capsule by mouth nightly 30 capsule 0    FLUoxetine (PROZAC) 20 MG capsule Take 3 capsules by mouth daily 90 capsule 0    traZODone (DESYREL) 150 MG tablet Take 1 tablet by mouth nightly 30 tablet 0    TRESIBA FLEXTOUCH 200 UNIT/ML SOPN Inject 32 Units as directed every morning  0    mometasone (ASMANEX 30 METERED DOSES) 220 MCG/INH inhaler Inhale 2 puffs into the lungs as needed      Erenumab-aooe 70 MG/ML SOAJ Inject 70 mg into the skin every 30 days       FREESTYLE LITE strip   1    SUMAtriptan (IMITREX) 50 MG tablet   0    insulin aspart (NOVOLOG FLEXPEN) 100 UNIT/ML injection pen Inject into the skin 3 times daily (before meals) Up to 50 units daily.  Inject 1:12 grams carb bkfst/ lunch. 1:30 grams dinner. 1:50 mg/dL BT      metoprolol succinate (TOPROL XL) 50 MG extended release tablet Take 50 mg by mouth daily      losartan (COZAAR) 100 MG tablet take 1 tablet by mouth once daily 30 tablet 5    aspirin EC 81 MG EC tablet Take 1 tablet by mouth daily 30 tablet 11    tiotropium (SPIRIVA RESPIMAT) 2.5 MCG/ACT AERS inhaler Inhale 2 puffs into the lungs every morning (before breakfast) 1 Inhaler 5    budesonide-formoterol (SYMBICORT) 160-4.5 MCG/ACT AERO Inhale 2 puffs into the lungs 2 times daily 1 Inhaler 5    amLODIPine (NORVASC) 5 MG tablet take 1 tablet by mouth once daily 30 tablet 5    albuterol sulfate HFA (PROAIR HFA) 108 (90 BASE) MCG/ACT inhaler Inhale 2 puffs into the lungs every 4 hours as needed for Wheezing Dx: 493.90 1 Inhaler 5    zonisamide (ZONEGRAN) 100 MG capsule Take 1 capsule by mouth daily (Patient taking differently: Take 200 mg by mouth 2 times daily ) 30 capsule 5    loratadine (CLARITIN) 10 MG tablet Take 1 tablet by mouth daily 30 tablet 11    OXcarbazepine (TRILEPTAL) 300 MG tablet Take 1,500 mg by mouth daily Pt takes 750 mg BID       No current facility-administered medications for this visit.        Social History     Socioeconomic History    Marital status: Single     Spouse name: Not on file    Number of children: Not on file    Years of education: Not on file    Highest education level: Not on file   Occupational History    Occupation: Unemployed   Tobacco Use    Smoking status: Never Smoker    Smokeless tobacco: Never Used   Vaping Use    Vaping Use: Never used   Substance and Sexual Activity    Alcohol use: Yes     Comment: a coupld days a week and a couple 24oz drinks per day of drinking    Drug use: Never    Sexual activity: Yes   Other Topics Concern    Not on file   Social History Narrative    Not on file     Social Determinants of Health     Financial Resource Strain:     Difficulty of Paying Living Expenses: Not on file   Food Insecurity:     Worried About [facility] in the Last Year: Not on file    Antonio of Food in the Last Year: Not on file   Transportation Needs:     Lack of Transportation (Medical): Not on file    Lack of Transportation (Non-Medical): Not on file   Physical Activity:     Days of Exercise per Week: Not on file    Minutes of Exercise per Session: Not on file   Stress:     Feeling of Stress : Not on file   Social Connections:     Frequency of Communication with Friends and Family: Not on file    Frequency of Social Gatherings with Friends and Family: Not on file    Attends [employer] Services: Not on file    Active Member of [address] [place] [address] or Organizations: Not on file    Attends Club or Organization Meetings: Not on file    Marital Status: Not on file   Intimate Partner Violence:     Fear of Current or Ex-Partner: Not on file    Emotionally Abused: Not on file    Physically Abused: Not on file    Sexually Abused: Not on file   Housing Stability:     Unable to Pay for Housing in the Last Year: Not on file    Number of Places Lived in the Last Year: Not on file    Unstable Housing in the Last Year: Not on file       Family History   Problem Relation Age of Onset    Hypertension Father     Other Father         COPD    High Blood Pressure Father     Dementia Mother     High Blood Pressure Sister     High Blood Pressure Brother     Diabetes Paternal Aunt     Diabetes Paternal Grandmother     Cancer Neg Hx     High Cholesterol Neg Hx     Kidney Disease Neg Hx     Stroke Neg Hx     Colon Cancer Neg Hx     Breast Cancer Neg Hx     Colon Polyps Neg Hx        Blood pressure 126/82, pulse 81, height 6' 2\" (1.88 m), weight 244 lb 3.2 oz (110.8 kg).     General:   Well developed, well nourished  Lungs:   Clear to auscultation  Heart:    Normal S1 S2, No murmur, rubs, or gallops  Abdomen:   Soft, non tender, no organomegalies, positive bowel sounds  Extremities:   No edema, no cyanosis, good peripheral pulses  Neurological:   Awake, alert, oriented. No obvious focal deficits  Musculoskeletal:  No obvious deformities    EKG: SR, no acute abnormalities    LHC/PCI: 5/21/21  Procedure Summary   FFR of proximal LAD was 0.78 (Obstructive)   Successful PCI / Drug Eluting Stent of the proximal Left Anterior   Descending Coronary Artery. Recommendations   -DAPT for atleast one year   -Lipid lowering therapy   -Aggressive risk factor and lifestyle modifications.   -Consider Cardiac rehab   -Monitor access site for bleeding/hematoma   -Monitor hemodynamics/telemetry for arrhythmias   -Post PCI care. Estimated Blood Loss:5 ml. Complications:No complications. Signatures      ----------------------------------------------------------------   Electronically signed by Lily Ayala MD     Echo: 5/21/21  Summary   Technically difficult examination. Left ventricular size and systolic function is normal. Ejection fraction   was estimated at 55-60%. LV wall thickness is within normal limits and   there are no obvious wall motion abnormalities. The right ventricular size appears normal with normal systolic function   and wall thickness. Signature      ----------------------------------------------------------------   Electronically signed by Lily Ayala MD     Diagnosis Orders   1. SOB (shortness of breath)  EKG 12 lead    Cardiac event monitor   2. Dizziness  EKG 12 lead    Cardiac event monitor   3. Coronary artery disease involving native coronary artery of native heart without angina pectoris     4. History of coronary angioplasty with insertion of stent      LAD 5/21/21   5. Obesity (BMI 30.0-34.9)     6. Essential hypertension     7. Asthma, unspecified asthma severity, unspecified whether complicated, unspecified whether persistent     8. Seizures ([address]     9.  MERCEDEZ on CPAP         Orders Placed This Encounter   Procedures    Cardiac event monitor     Standing Status: Future     Standing Expiration Date:   12/13/2022    EKG 12 lead     Order Specific Question:   Reason for Exam?     Answer: Other   OV for sob and intermittent dizziness and left hand twitching in 48 y/o male    History of  CAD s/p PCI, IDDM, HTN, HLD, asthma, MERCEDEZ on CPAP, Asthma and seizure. EF 55-60%. EKG SR, no acute abnormalities    Reports sob with exertion that has increased over the last two months as well as some dizziness, and left hand twitching/shaking. Denies chest pain, palpitations, RAJNI, lightheadedness, dizziness or syncope. EKG SR, no acute abnormalities. Denies any recent illness or trauma. Denies wt gain and is sleeping in normal position in bed. Has been staying with friend and not having his CPAP.   On norvasc, asa, brilinta, lipitor, losartan, toprol xl, SL NTG prn(has not needed)    30 dayEvent monitor and follow with Dr. Armando Escobar for further recommendations as indicated  Known asthma and MERCEDEZ on CPAP  Has not been wearing CPAP, instructed on importance of and he is aggreable to wear it daily  Instructed to discuss hand twitching/shaking with neurology as he follows up re: seizures  He is instructed to seek emergency medical care if new or worsening symptoms such as chest pain, sob, passing out    Continue Dr Komal Hannon current treatment plan  Follow up with Dr Armando Escobar as scheduled or sooner if needed

## 2021-12-20 ENCOUNTER — HOSPITAL ENCOUNTER (OUTPATIENT)
Dept: NON INVASIVE DIAGNOSTICS | Age: 51
Discharge: HOME | End: 2021-12-20
Payer: COMMERCIAL

## 2021-12-20 DIAGNOSIS — R06.02 SOB (SHORTNESS OF BREATH): ICD-10-CM

## 2021-12-20 DIAGNOSIS — R42 DIZZINESS: ICD-10-CM

## 2021-12-20 PROCEDURE — 93270 REMOTE 30 DAY ECG REV/REPORT: CPT

## 2021-12-20 NOTE — PROCEDURES
The skin was prepped and a 30 day cardiac event monitor was applied. The patient was instructed on the documentation, symptoms, purpose of the monitor, as well as the things to avoid while wearing the monitor. The patient was instructed to remove the monitor on 01/19/22.  Serial Number applied [id number]

## 2022-01-30 NOTE — PROCEDURES
[address] [place] [address]                                 EVENT MONITOR    PATIENT NAME: Soledad Fry                    :        1970  MED REC NO:   [id number]                           ROOM:  ACCOUNT NO:   [de-identified]                           ADMIT DATE: 2021  PROVIDER:     Carmen Mejia MD    DURATION:  Date of study was from 2021 to 2022. INDICATION FOR STUDY: Shortness of breath, dizziness. INTERPRETATION:  The patient's monitoring period was from 2021 to  2022. The baseline rhythm was sinus rhythm with an average heart  rate of 90 beats per minute. There were no significant atrial or  ventricular tachyarrhythmias that were noted on the study. No  high-grade AV blocks or sinus pauses greater than 3 seconds. SUMMARY:  1. Sinus rhythm with an average heart rate of 90 beats per minute. 2.  No significant atrial or ventricular tachyarrhythmias. 3.  No significant pauses. 4.  Clinical correlation is necessary.         Rufina Ellison MD    D: 2022 15:30:57       T: 2022 15:54:24     [id number]  Job#: [id number]     Doc#: [id number]    CC:

## 2022-02-28 ENCOUNTER — TELEPHONE (OUTPATIENT)
Dept: CARDIOLOGY CLINIC | Age: 52
End: 2022-02-28

## 2022-06-01 RX ORDER — TICAGRELOR 90 MG/1
TABLET ORAL
Qty: 180 TABLET | Refills: 3 | Status: SHIPPED | OUTPATIENT
Start: 2022-06-01 | End: 2022-07-07

## 2022-06-06 RX ORDER — ATORVASTATIN CALCIUM 40 MG/1
TABLET, FILM COATED ORAL
Qty: 90 TABLET | Refills: 0 | Status: SHIPPED | OUTPATIENT
Start: 2022-06-06 | End: 2022-07-07 | Stop reason: SDUPTHER

## 2022-07-07 ENCOUNTER — OFFICE VISIT (OUTPATIENT)
Dept: CARDIOLOGY CLINIC | Age: 52
End: 2022-07-07
Payer: COMMERCIAL

## 2022-07-07 VITALS
BODY MASS INDEX: 32.42 KG/M2 | SYSTOLIC BLOOD PRESSURE: 138 MMHG | WEIGHT: 252.6 LBS | HEIGHT: 74 IN | HEART RATE: 75 BPM | DIASTOLIC BLOOD PRESSURE: 80 MMHG

## 2022-07-07 DIAGNOSIS — R07.9 CHEST PAIN, UNSPECIFIED TYPE: Primary | ICD-10-CM

## 2022-07-07 DIAGNOSIS — R06.02 SOB (SHORTNESS OF BREATH): ICD-10-CM

## 2022-07-07 PROCEDURE — G8427 DOCREV CUR MEDS BY ELIG CLIN: HCPCS | Performed by: INTERNAL MEDICINE

## 2022-07-07 PROCEDURE — 99214 OFFICE O/P EST MOD 30 MIN: CPT | Performed by: INTERNAL MEDICINE

## 2022-07-07 PROCEDURE — 3017F COLORECTAL CA SCREEN DOC REV: CPT | Performed by: INTERNAL MEDICINE

## 2022-07-07 PROCEDURE — 1036F TOBACCO NON-USER: CPT | Performed by: INTERNAL MEDICINE

## 2022-07-07 PROCEDURE — 93000 ELECTROCARDIOGRAM COMPLETE: CPT | Performed by: INTERNAL MEDICINE

## 2022-07-07 PROCEDURE — G8417 CALC BMI ABV UP PARAM F/U: HCPCS | Performed by: INTERNAL MEDICINE

## 2022-07-07 RX ORDER — NITROGLYCERIN 0.4 MG/1
TABLET SUBLINGUAL
Qty: 25 TABLET | Refills: 1 | Status: CANCELLED | OUTPATIENT
Start: 2022-07-07

## 2022-07-07 RX ORDER — NITROGLYCERIN 0.4 MG/1
0.4 TABLET SUBLINGUAL EVERY 5 MIN PRN
Qty: 25 TABLET | Refills: 1 | Status: SHIPPED | OUTPATIENT
Start: 2022-07-07

## 2022-07-07 RX ORDER — CLOPIDOGREL BISULFATE 75 MG/1
75 TABLET ORAL DAILY
Qty: 90 TABLET | Refills: 1 | Status: SHIPPED | OUTPATIENT
Start: 2022-07-07

## 2022-07-07 RX ORDER — ATORVASTATIN CALCIUM 40 MG/1
TABLET, FILM COATED ORAL
Qty: 90 TABLET | Refills: 1 | Status: SHIPPED | OUTPATIENT
Start: 2022-07-07

## 2022-07-07 RX ORDER — NITROGLYCERIN 0.4 MG/1
0.4 TABLET SUBLINGUAL EVERY 5 MIN PRN
COMMUNITY
End: 2022-07-07 | Stop reason: SDUPTHER

## 2022-07-07 NOTE — PROGRESS NOTES
[address] [facility] [place] [address] [place] [address]  Dept: [phone]  Dept Fax: [phone]  Loc: [phone]    Visit Date: 7/7/2022    Mr. Glory Servin is a 47 y.o. male  who presented for:  Chief Complaint   Patient presents with    1 Year Follow Up    Chest Pain    Shortness of Breath       HPI:   46 yo c hx of CAD s/p PCI, DM, HTN, HLD, MERCEDEZ on CPAP, Asthma and seizure is here for a follow up. He underwent FFR guided PCI of mid LAD. Denies any chest pain,  palpitations, lightheadedness, dizziness, orthopnea, PND or pedal edema. On DAPT doing well. He reports some episodic sob. States his Asthma is good. Current Outpatient Medications:     nitroGLYCERIN (NITROSTAT) 0.4 MG SL tablet, Place 1 tablet under the tongue every 5 minutes as needed for Chest pain up to max of 3 total doses.  If no relief after 1 dose, call 911., Disp: 25 tablet, Rfl: 1    atorvastatin (LIPITOR) 40 MG tablet, take 1 tablet by mouth once daily, Disp: 90 tablet, Rfl: 1    clopidogrel (PLAVIX) 75 MG tablet, Take 1 tablet by mouth daily, Disp: 90 tablet, Rfl: 1    fluticasone (FLONASE) 50 MCG/ACT nasal spray, instill 1 spray into each nostril once daily, Disp: , Rfl:     risperiDONE (RISPERDAL) 1 MG tablet, Take 1 mg by mouth 2 times daily , Disp: , Rfl:     EMGALITY 120 MG/ML SOAJ, every 30 days , Disp: , Rfl:     busPIRone (BUSPAR) 15 MG tablet, Take 15 mg by mouth 3 times daily, Disp: , Rfl:     benztropine (COGENTIN) 0.5 MG tablet, Take 0.5 mg by mouth 2 times daily, Disp: , Rfl:     ARIPiprazole Lauroxil (ARISTADA IM), Inject into the muscle every 30 days , Disp: , Rfl:     hydrOXYzine (VISTARIL) 50 MG capsule, Take 1 capsule by mouth nightly, Disp: 30 capsule, Rfl: 0    FLUoxetine (PROZAC) 20 MG capsule, Take 3 capsules by mouth daily, Disp: 90 capsule, Rfl: 0    traZODone (DESYREL) 150 MG tablet, Take 1 tablet by mouth nightly, Disp: 30 ADHD (attention deficit hyperactivity disorder), Anxiety, Arthritis, Asthma, Bladder dysfunction, Depression, Diabetes mellitus ([facility] [address] Fatigue, GERD (gastroesophageal reflux disease), Headache, Hyperlipidemia, Hypertension, MDRO (multiple drug resistant organisms) resistance, OAB (overactive bladder), Seizures ([address] Sleep apnea, and Type II or unspecified type diabetes mellitus without mention of complication, not stated as uncontrolled. Social History  Shanita Gutierrez  reports that he has never smoked. He has never used smokeless tobacco. He reports current alcohol use. He reports that he does not use drugs. Family History  Shanita Gutierrez family history includes Dementia in his mother; Diabetes in his paternal aunt and paternal grandmother; High Blood Pressure in his brother, father, and sister; Hypertension in his father; Other in his father.     Past Surgical History   Past Surgical History:   Procedure Laterality Date    CATARACT REMOVAL  2010    bilateral    CERVICAL FUSION N/A 9/27/2017    ACDF C6-7 W/ATLANTIS CORNERSTONE performed by Eriberto Doan MD at [facility] N/A 2/26/2018    COLONOSCOPY POLYPECTOMY HOT BIOPSY performed by Valencia Primrose, MD at [facility] N/A 5/27/2020    CYSTOSCOPY WITH BLADDER BOTOX 100 UNITS performed by Adams Cowan MD at [address] Right    [address] Right 92,96    FOOT SURGERY Bilateral 2006    hammer toes    FOOT SURGERY  1996    removal of piece of glass    HAND SURGERY Right 07/2017    OTHER SURGICAL HISTORY  11/4/15    Excision of 3 cm lipoma LUQ abdominal wall Wisser    IN OFFICE/OUTPT VISIT,PROCEDURE ONLY N/A 10/16/2018    PLACEMENT OF INTERSTIM DEVICE performed by Juan Miguel Macedo MD at [address] N/A 5/16/2019    REMOVAL INTERSSTIM DEVICE performed by Juan Miguel Macedo MD at [facility]  2017    UPPER GASTROINTESTINAL ENDOSCOPY N/A 8/9/2018    EGD BIOPSY performed by George Coyle MD at [facility]       Subjective:     REVIEW OF SYSTEMS  Constitutional: denies sweats, chills and fever  HENT: denies  congestion, sinus pressure, sneezing and sore throat. Eyes: denies  pain, discharge, redness and itching. Respiratory: denies apnea, cough  Gastrointestinal: denies blood in stool, constipation, diarrhea   Endocrine: denies cold intolerance, heat intolerance, polydipsia. Genitourinary: denies dysuria, enuresis, flank pain and hematuria. Musculoskeletal: denies arthralgias, joint swelling and neck pain. Neurological: denies numbness and headaches. Psychiatric/Behavioral: denies agitation, confusion, decreased concentration and dysphoric mood    All others reviewed and are negative. Objective:     /80   Pulse 75   Ht 6' 2\" (1.88 m)   Wt 252 lb 9.6 oz (114.6 kg)   BMI 32.43 kg/m²     Wt Readings from Last 3 Encounters:   07/07/22 252 lb 9.6 oz (114.6 kg)   12/13/21 244 lb 3.2 oz (110.8 kg)   07/08/21 246 lb (111.6 kg)     BP Readings from Last 3 Encounters:   07/07/22 138/80   12/13/21 126/82   07/08/21 138/88       PHYSICAL EXAM  Constitutional: Oriented to person, place, and time. Appears well-developed and well-nourished. HENT:   Head: Normocephalic and atraumatic. Eyes: EOM are normal. Pupils are equal, round, and reactive to light. Neck: Normal range of motion. Neck supple. No JVD present. Cardiovascular: Normal rate , normal heart sounds and intact distal pulses. Pulmonary/Chest: Effort normal and breath sounds normal. No respiratory distress. No wheezes. No rales. Abdominal: Soft. Bowel sounds are normal. No distension. There is no tenderness. Musculoskeletal: Normal range of motion. No edema. Neurological: Alert and oriented to person, place, and time. No cranial nerve deficit. Coordination normal.   Skin: Skin is warm and dry. Psychiatric: Normal mood and affect.        Lab Results   Component Value Date/Time    CKTOTAL 139 07/29/2014 02:42 PM    CKTOTAL 130 02/08/2013 01:08 AM    CKTOTAL 148 02/07/2013 07:20 PM    CKMB 1.0 07/29/2014 02:42 PM    CKMB 0.8 10/27/2011 11:29 AM    CKMBINDEX 0.7 07/29/2014 02:42 PM       Lab Results   Component Value Date/Time    WBC 9.5 05/21/2021 10:18 AM    RBC 5.07 05/21/2021 10:18 AM    RBC 5.35 06/23/2017 02:17 PM    HGB 14.6 05/21/2021 10:18 AM    HCT 46.0 05/21/2021 10:18 AM    MCV 90.7 05/21/2021 10:18 AM    MCH 28.8 05/21/2021 10:18 AM    MCHC 31.7 05/21/2021 10:18 AM    RDW 13.9 04/07/2021 11:07 PM     05/21/2021 10:18 AM    MPV 9.1 05/21/2021 10:18 AM       Lab Results   Component Value Date/Time     05/21/2021 10:19 AM    K 3.5 05/21/2021 10:19 AM     05/21/2021 10:19 AM    CO2 24 05/21/2021 10:19 AM    BUN 9 05/21/2021 10:19 AM    LABALBU 4.1 01/25/2021 03:42 PM    LABALBU 4.7 06/23/2017 02:17 PM    LABALBU 4.3 05/15/2012 02:39 PM    CREATININE 1.0 05/21/2021 10:19 AM    CALCIUM 9.2 05/21/2021 10:19 AM    LABGLOM >90 05/21/2021 10:19 AM    GLUCOSE 194 05/21/2021 10:19 AM    GLUCOSE 300 04/07/2021 11:07 PM       Lab Results   Component Value Date/Time    ALKPHOS 124 01/25/2021 03:42 PM    ALT 37 01/25/2021 03:42 PM    AST 34 01/25/2021 03:42 PM    PROT 6.4 01/25/2021 03:42 PM    PROT 7.9 06/23/2017 02:17 PM    BILITOT 0.5 01/25/2021 03:42 PM    BILIDIR <0.2 03/11/2020 03:13 PM    LABALBU 4.1 01/25/2021 03:42 PM    LABALBU 4.7 06/23/2017 02:17 PM    LABALBU 4.3 05/15/2012 02:39 PM       Lab Results   Component Value Date/Time    MG 1.9 05/21/2021 10:19 AM       Lab Results   Component Value Date    INR 1.00 05/21/2021    INR 0.95 11/11/2015    INR 0.96 05/08/2015         Lab Results   Component Value Date/Time    LABA1C 8.4 12/27/2019 11:16 AM       Lab Results   Component Value Date/Time    TRIG 95 08/06/2015 12:10 PM    HDL 95 08/06/2015 12:10 PM    LDLCALC 81 08/06/2015 12:10 PM       Lab Results   Component Value Date/Time    TSH 5.510 12/23/2019 06:50 AM         Testing Reviewed:      I haveindividually reviewed the below cardiac tests    EKG:    ECHO: No results found for this or any previous visit. STRESS:    CATH:    Assessment/Plan       Diagnosis Orders   1. Chest pain, unspecified type  EKG 12 lead    EKG 12 lead   2. SOB (shortness of breath)  EKG 12 lead    EKG 12 lead     CAD s/p PCI  Episodic sob  DM  HTN  HLD  Asthma  MERCEDEZ on CPaP  Seizures in 2/2021    Doing well since PCI of LAD  On DAPT with aspirin, and brilinta,  Due to episodic sob, Will dc bilinta and add plavix  Will get proBNP and BMP and follow up  EKG is SR, no ST-T  Continue rest of the management  Continue Aspirin, metoporlol, statin  The patient is asked to make an attempt to improve diet and exercise patterns to aid in medical management of this problem. Advised more plant based nutrition/meditarrean diet   Advised patient to call office or seek immediate medical attention if there is any new onset of  any chest pain, sob, palpitations, lightheadedness, dizziness, orthopnea, PND or pedal edema. All medication side effects were discussed in details. Thank youfor allowing me to participate in the care of this patient. Please do not hesitate to contact me for any further questions. Return in about 3 months (around 10/7/2022), or if symptoms worsen or fail to improve, for Review testing, Regular follow up.        Electronically signed by Shaji Lacy MD [facility]  7/7/2022 at 2:46 PM EDT

## 2022-09-07 ENCOUNTER — OFFICE VISIT (OUTPATIENT)
Dept: UROLOGY | Age: 52
End: 2022-09-07
Payer: COMMERCIAL

## 2022-09-07 VITALS
BODY MASS INDEX: 32.98 KG/M2 | HEIGHT: 74 IN | WEIGHT: 257 LBS | DIASTOLIC BLOOD PRESSURE: 70 MMHG | SYSTOLIC BLOOD PRESSURE: 114 MMHG

## 2022-09-07 DIAGNOSIS — R35.0 URINARY FREQUENCY: Primary | ICD-10-CM

## 2022-09-07 DIAGNOSIS — Z12.5 SCREENING PSA (PROSTATE SPECIFIC ANTIGEN): ICD-10-CM

## 2022-09-07 LAB
BILIRUBIN URINE: NEGATIVE
BLOOD URINE, POC: NEGATIVE
CHARACTER, URINE: CLEAR
COLOR, URINE: YELLOW
GLUCOSE URINE: NEGATIVE MG/DL
KETONES, URINE: NEGATIVE
LEUKOCYTE CLUMPS, URINE: NEGATIVE
NITRITE, URINE: NEGATIVE
PH, URINE: 7 (ref 5–9)
POST VOID RESIDUAL (PVR): 10 ML
PROTEIN, URINE: NEGATIVE MG/DL
SPECIFIC GRAVITY, URINE: 1.02 (ref 1–1.03)
UROBILINOGEN, URINE: 2 EU/DL (ref 0–1)

## 2022-09-07 PROCEDURE — 99214 OFFICE O/P EST MOD 30 MIN: CPT | Performed by: NURSE PRACTITIONER

## 2022-09-07 PROCEDURE — 81003 URINALYSIS AUTO W/O SCOPE: CPT | Performed by: NURSE PRACTITIONER

## 2022-09-07 PROCEDURE — 51798 US URINE CAPACITY MEASURE: CPT | Performed by: NURSE PRACTITIONER

## 2022-09-07 PROCEDURE — G8427 DOCREV CUR MEDS BY ELIG CLIN: HCPCS | Performed by: NURSE PRACTITIONER

## 2022-09-07 PROCEDURE — G8417 CALC BMI ABV UP PARAM F/U: HCPCS | Performed by: NURSE PRACTITIONER

## 2022-09-07 PROCEDURE — 1036F TOBACCO NON-USER: CPT | Performed by: NURSE PRACTITIONER

## 2022-09-07 PROCEDURE — 3017F COLORECTAL CA SCREEN DOC REV: CPT | Performed by: NURSE PRACTITIONER

## 2022-09-07 RX ORDER — ARIPIPRAZOLE 10 MG/1
TABLET ORAL
COMMUNITY
Start: 2022-09-06

## 2022-09-07 RX ORDER — SOLIFENACIN SUCCINATE 10 MG/1
10 TABLET, FILM COATED ORAL DAILY
Qty: 90 TABLET | Refills: 0 | Status: SHIPPED | OUTPATIENT
Start: 2022-09-07 | End: 2022-10-11

## 2022-09-07 ASSESSMENT — ENCOUNTER SYMPTOMS
NAUSEA: 0
ABDOMINAL PAIN: 0
BACK PAIN: 0
VOMITING: 0

## 2022-09-07 NOTE — PROGRESS NOTES
[address] [address]  Dept: [phone]  Loc: [phone]    Visit Date: 9/7/2022        HPI:     Angelo Nascimento is a 47 y.o. male who presents today for:  Chief Complaint   Patient presents with    Follow-up    Urinary Frequency       HPI  Pt seen in follow up for OAB. Pt has a hx of polyuria and OAB spanning back to 2011. He has tried and failed vesicare, ditropan, and myrbetriq. Pt reports he has tried PTNS therapy in the past with improvement in symptoms initially but they returned after a period of time. He underwent office cystoscopy with Dr. Ludwig Dc 8/6/18 and was noted to have moderate BPH and a tight bladder neck. Dr. Ludwig Dc recommended interstim placement and pt underwent trial in the office on 8/27/18 and placement of interstim sacral nerve stimulator, stage 1 and 2 by Dr. Ludwig Dc on 10/16/18. Pt reported 70-75% improvement in symptoms following interstim placement but reported discomfort when lying on his right side with sleeping. Device was turned off and pt did not feel the benefits of the device outweighed the discomfort he was experiencing with sleeping. Interstim device and leads removed 5/16/19 by Dr. Ludwig Dc. Pt underwent a 12 week course of PTNS in the end of 2019. Last cystoscopy 1/30/2020 by Dr. lEizabet Wooten noted lateral lobe hypertrophy with high riding bladder neck and mild trabeculation. Botox for OAB vs outlet surgery was discussed and pt decided to proceed with Botox. He underwent cystoscopy with bladder botox injections (100 units) 5/27/2020 by Dr. Elizabet Wooten. Pt reports he did awesome until recently. Notes he is starting to have return of daytime frequency and urgency and is having episodes of complete incontinence at night. These enuresis episodes occur about 1 x per week. No dysuria or hematuria.      Current Outpatient Medications   Medication Sig Dispense Refill    ARIPiprazole (ABILIFY) 10 MG tablet       nitroGLYCERIN (NITROSTAT) 0.4 MG SL tablet Place 1 tablet under the tongue every 5 minutes as needed for Chest pain up to max of 3 total doses. If no relief after 1 dose, call 911. 25 tablet 1    atorvastatin (LIPITOR) 40 MG tablet take 1 tablet by mouth once daily 90 tablet 1    clopidogrel (PLAVIX) 75 MG tablet Take 1 tablet by mouth daily 90 tablet 1    fluticasone (FLONASE) 50 MCG/ACT nasal spray instill 1 spray into each nostril once daily      risperiDONE (RISPERDAL) 1 MG tablet Take 1 mg by mouth 2 times daily       EMGALITY 120 MG/ML SOAJ every 30 days       busPIRone (BUSPAR) 15 MG tablet Take 15 mg by mouth 3 times daily      benztropine (COGENTIN) 0.5 MG tablet Take 0.5 mg by mouth 2 times daily      ARIPiprazole Lauroxil (ARISTADA IM) Inject into the muscle every 30 days       pantoprazole (PROTONIX) 40 MG tablet Take 1 tablet by mouth every morning (before breakfast) 30 tablet 6    hydrOXYzine (VISTARIL) 50 MG capsule Take 1 capsule by mouth nightly 30 capsule 0    FLUoxetine (PROZAC) 20 MG capsule Take 3 capsules by mouth daily 90 capsule 0    traZODone (DESYREL) 150 MG tablet Take 1 tablet by mouth nightly 30 tablet 0    TRESIBA FLEXTOUCH 200 UNIT/ML SOPN Inject 32 Units as directed every morning  0    mometasone (ASMANEX) 220 MCG/INH inhaler Inhale 2 puffs into the lungs as needed      FREESTYLE LITE strip   1    SUMAtriptan (IMITREX) 50 MG tablet   0    insulin aspart (NOVOLOG) 100 UNIT/ML injection pen Inject into the skin 3 times daily (before meals) Up to 50 units daily. Inject 1:12 grams carb bkfst/ lunch. 1:30 grams dinner.  1:50 mg/dL BT      metoprolol succinate (TOPROL XL) 50 MG extended release tablet Take 50 mg by mouth daily      losartan (COZAAR) 100 MG tablet take 1 tablet by mouth once daily 30 tablet 5    aspirin EC 81 MG EC tablet Take 1 tablet by mouth daily 30 tablet 11    tiotropium (SPIRIVA RESPIMAT) 2.5 MCG/ACT AERS inhaler Inhale 2 puffs into the lungs every morning (before breakfast) 1 Inhaler 5    budesonide-formoterol (SYMBICORT) 160-4.5 MCG/ACT AERO Inhale 2 puffs into the lungs 2 times daily 1 Inhaler 5    amLODIPine (NORVASC) 5 MG tablet take 1 tablet by mouth once daily 30 tablet 5    albuterol sulfate HFA (PROAIR HFA) 108 (90 BASE) MCG/ACT inhaler Inhale 2 puffs into the lungs every 4 hours as needed for Wheezing Dx: 493.90 1 Inhaler 5    zonisamide (ZONEGRAN) 100 MG capsule Take 1 capsule by mouth daily (Patient taking differently: Take 100 mg by mouth 2 times daily) 30 capsule 5    loratadine (CLARITIN) 10 MG tablet Take 1 tablet by mouth daily 30 tablet 11    OXcarbazepine (TRILEPTAL) 300 MG tablet Take 1,500 mg by mouth daily Pt takes 750 mg BID       No current facility-administered medications for this visit. Past Medical History  Marcelo P[place]acola  has a past medical history of ADHD (attention deficit hyperactivity disorder), Anxiety, Arthritis, Asthma, Bladder dysfunction, Depression, Diabetes mellitus ([address] Fatigue, GERD (gastroesophageal reflux disease), Headache, Hyperlipidemia, Hypertension, MDRO (multiple drug resistant organisms) resistance, OAB (overactive bladder), Seizures ([address] Sleep apnea, and Type II or unspecified type diabetes mellitus without mention of complication, not stated as uncontrolled. Past Surgical History  The patient  has a past surgical history that includes Eye surgery ([facility]); Cataract removal (2010); Foot surgery (Bilateral, 2006); Foot surgery (1996); other surgical history (11/4/15); eye surgery (Right, 92,96); Elbow surgery (Right); Upper gastrointestinal endoscopy (2017); cervical fusion (N/A, 9/27/2017); Hand surgery (Right, 07/2017); Colonoscopy (N/A, 2/26/2018); Upper gastrointestinal endoscopy (N/A, 8/9/2018); pr office/outpt visit,procedure only (N/A, 10/16/2018); pr revise/remove neuroelectrode (N/A, 5/16/2019); and Cystoscopy (N/A, 5/27/2020).     Family History  This patient's family history includes Dementia in his mother; Diabetes in his paternal aunt and paternal grandmother; High Blood Pressure in his brother, father, and sister; Hypertension in his father; Other in his father. Social History  Jaymes Seip  reports that he has never smoked. He has never used smokeless tobacco. He reports current alcohol use. He reports that he does not use drugs. Subjective:      Review of Systems   Constitutional:  Negative for activity change, appetite change, chills, diaphoresis, fatigue, fever and unexpected weight change. Gastrointestinal:  Negative for abdominal pain, nausea and vomiting. Genitourinary:  Positive for frequency and urgency. Negative for decreased urine volume, difficulty urinating, dysuria, flank pain and hematuria. Enuresis   Musculoskeletal:  Negative for back pain. Objective:   /70   Ht 6' 2\" (1.88 m)   Wt 257 lb (116.6 kg)   BMI 33.00 kg/m²     Physical Exam  Vitals reviewed. Constitutional:       General: He is not in acute distress. Appearance: Normal appearance. He is well-developed. He is not ill-appearing or diaphoretic. HENT:      Head: Normocephalic and atraumatic. Right Ear: External ear normal.      Left Ear: External ear normal.      Nose: Nose normal.      Mouth/Throat:      Mouth: Mucous membranes are moist.   Eyes:      General: No scleral icterus. Right eye: No discharge. Left eye: No discharge. Neck:      Vascular: No JVD. Trachea: No tracheal deviation. Pulmonary:      Effort: Pulmonary effort is normal. No respiratory distress. Abdominal:      General: There is no distension. Tenderness: There is no abdominal tenderness. There is no right CVA tenderness or left CVA tenderness. Musculoskeletal:         General: Normal range of motion. Neurological:      Mental Status: He is alert and oriented to person, place, and time. Mental status is at baseline. Psychiatric:         Mood and Affect: Mood normal.         Behavior: Behavior normal.         Thought Content: Thought content normal.       POC  Results for POC orders placed in visit on 09/07/22   POCT Urinalysis No Micro (Auto)   Result Value Ref Range    Glucose, Ur Negative NEGATIVE mg/dl    Bilirubin Urine Negative     Ketones, Urine Negative NEGATIVE    Specific Gravity, Urine 1.020 1.002 - 1.030    Blood, UA POC Negative NEGATIVE    pH, Urine 7.00 5.0 - 9.0    Protein, Urine Negative NEGATIVE mg/dl    Urobilinogen, Urine 2.00 0.0 - 1.0 eu/dl    Nitrite, Urine Negative NEGATIVE    Leukocyte Clumps, Urine Negative NEGATIVE    Color, Urine Yellow [id number]-STRAW    Character, Urine Clear [id number]   poct post void residual   Result Value Ref Range    post void residual 10 ml         Patients recent PSA values are as follows  Lab Results   Component Value Date    PSA 0.48 08/03/2018    PSA 0.46 10/19/2015    PSA 0.52 12/19/2011        Recent BUN/Creatinine:  Lab Results   Component Value Date/Time    BUN 9 05/21/2021 10:19 AM    CREATININE 1.0 05/21/2021 10:19 AM       Assessment:   OAB with incontinence    Plan:     Pt is having worsening in frequency/urgency and new nocturnal incontinence episodes. He is interested in considering bladder botox injections again. We will schedule OV with Dr. Marianne Maynard to further discuss. Pt aware he may need another office cystoscopy prior to proceeding with further injections. Trial Vesicare 10 mg daily until f/u appt. Script sent to pharmacy. PSA prior to f/u.

## 2022-09-26 ENCOUNTER — HOSPITAL ENCOUNTER (OUTPATIENT)
Age: 52
Discharge: HOME | End: 2022-09-26
Payer: COMMERCIAL

## 2022-09-26 DIAGNOSIS — Z12.5 SCREENING PSA (PROSTATE SPECIFIC ANTIGEN): ICD-10-CM

## 2022-09-26 DIAGNOSIS — R06.02 SOB (SHORTNESS OF BREATH): ICD-10-CM

## 2022-09-26 DIAGNOSIS — R07.9 CHEST PAIN, UNSPECIFIED TYPE: ICD-10-CM

## 2022-09-26 LAB
ANION GAP SERPL CALCULATED.3IONS-SCNC: 12 MEQ/L (ref 8–16)
BUN BLDV-MCNC: 11 MG/DL (ref 7–22)
CALCIUM SERPL-MCNC: 9 MG/DL (ref 8.5–10.5)
CHLORIDE BLD-SCNC: 98 MEQ/L (ref 98–111)
CO2: 22 MEQ/L (ref 23–33)
CREAT SERPL-MCNC: 1 MG/DL (ref 0.4–1.2)
GLUCOSE BLD-MCNC: 177 MG/DL (ref 70–108)
POTASSIUM SERPL-SCNC: 3.7 MEQ/L (ref 3.5–5.2)
PRO-BNP: 26.8 PG/ML (ref 0–900)
PROSTATE SPECIFIC ANTIGEN: 0.25 NG/ML (ref 0–1)
SODIUM BLD-SCNC: 132 MEQ/L (ref 135–145)

## 2022-09-26 PROCEDURE — 36415 COLL VENOUS BLD VENIPUNCTURE: CPT

## 2022-09-26 PROCEDURE — 84153 ASSAY OF PSA TOTAL: CPT

## 2022-09-26 PROCEDURE — 83880 ASSAY OF NATRIURETIC PEPTIDE: CPT

## 2022-09-26 PROCEDURE — 80048 BASIC METABOLIC PNL TOTAL CA: CPT

## 2022-10-11 ENCOUNTER — PROCEDURE VISIT (OUTPATIENT)
Dept: UROLOGY | Age: 52
End: 2022-10-11
Payer: COMMERCIAL

## 2022-10-11 VITALS
DIASTOLIC BLOOD PRESSURE: 82 MMHG | BODY MASS INDEX: 32.73 KG/M2 | HEIGHT: 74 IN | SYSTOLIC BLOOD PRESSURE: 112 MMHG | WEIGHT: 255 LBS

## 2022-10-11 DIAGNOSIS — R35.0 URINARY FREQUENCY: Primary | ICD-10-CM

## 2022-10-11 LAB — GFR SERPL CREATININE-BSD FRML MDRD: 79 ML/MIN/1.73M2

## 2022-10-11 PROCEDURE — 52000 CYSTOURETHROSCOPY: CPT | Performed by: UROLOGY

## 2022-10-11 RX ORDER — ARIPIPRAZOLE LAUROXIL 882 MG/3.2ML
INJECTION, SUSPENSION, EXTENDED RELEASE INTRAMUSCULAR
COMMUNITY
Start: 2022-09-06

## 2022-10-11 NOTE — PROGRESS NOTES
Cystoscopy    Operative Note    Patient:  Troy Carter  MRN: [id number]  YOB: 1970    Date: 10/11/22  Surgeon: Luisito Diaz MD  Anesthesia: Marybel Croft  Indications:     Pt is having worsening in frequency/urgency and new nocturnal incontinence episodes. He is interested in considering bladder botox injections again. We will schedule OV with Dr. Indigo Browne to further discuss. Pt aware he may need another office cystoscopy prior to proceeding with further injections. Trial Vesicare 10 mg daily until f/u appt. Script sent to pharmacy. PSA prior to f/u. Position: Supine  EBL: 0 ml    Findings:   The patient was prepped and draped in the usual sterile fashion. The flexible cystoscope was advanced through the urethra and into the bladder. The bladder was thoroughly inspected and the following was noted:    Residual Urine: minimal.  Urine clear, with no obvious infection  Urethra: No abnormalities of the urethra are noted. Urethral dilation was not performed. Prostate: open prostatic urethra with no obvious obstruction, intravesical extension of prostate not present. There was no previous TURP defect. Bladder: no tumor noted . Mild trabeculation noted. no bladder diverticulum. Ureters: Orifices with normal configuration and location. The cystoscope was removed. The patient tolerated the procedure well. No obstruction on cysto.  Will schedule again  Stop vesicare  On nitro- can't give sildenafil PRN  Cysto botox 100 u discussed retention risk pt had great results in past (ludy)

## 2022-10-13 ENCOUNTER — TELEPHONE (OUTPATIENT)
Dept: UROLOGY | Age: 52
End: 2022-10-13

## 2022-10-14 ENCOUNTER — TELEPHONE (OUTPATIENT)
Dept: UROLOGY | Age: 52
End: 2022-10-14

## 2022-10-14 DIAGNOSIS — Z01.818 PRE-OP TESTING: ICD-10-CM

## 2022-10-14 DIAGNOSIS — R35.0 URINARY FREQUENCY: Primary | ICD-10-CM

## 2022-10-14 NOTE — TELEPHONE ENCOUNTER
Patient is scheduled with Dr. Keven Cristina on 11/16/2022. Surgery consent to be done upon arrival.  Dr. Nicki Givens to clear. Patient to do urine culture, chest xray and labs on 11//2/2022. Surgery instructions mailed to patient. Patient will have an adult over the age of 25 with them at discharge and 24 hours after procedure.

## 2022-10-14 NOTE — TELEPHONE ENCOUNTER
SURGERY [address] [place] [address] Drive [address] [address] [address], [address]      Phone *[phone] *[phone]   Surgical Scheduling Direct Line Phone *[phone] Fax *[phone]      Arturo Pepper 1970 male    [address]  Marital Status: Single         Home Phone: [phone]      Cell Phone:    Telephone Information:   Mobile [phone]          Surgeon: Dr. Sherron Sanchez Surgery Date: 11/16/2022   Time: 7:30am    Procedure: Cystoscopy, Bladder Botox 100 units    Diagnosis: urinary frequency     Important Medical History:  In Epic    Special Inst/Equip:     CPT Codes:    11592,   Latex Allergy: No     Cardiac Device:  No    Anesthesia:  MAC          Admission Type:  Same Day                        Admit Prior to Day of Surgery: No    Case Location:  Main OR            Preadmission Testing:  Phone Call          PAT Date and Time:______________________________________________________    PAT Confirmation #: ______________________________________________________    Post Op Visit: ___________________________________________________________    Need Preop Cardiac Clearance: Yes    Does Patient have Cardiologist/physician?      Dr. Mis Suresh Confirmation #: __________________________________________________    Albertine Pennant: ________________________   Date: __________________________     [employer] Name: [employer]

## 2022-10-14 NOTE — TELEPHONE ENCOUNTER
Patient is scheduled for a Cystoscopy, Bladder Botox 100 units under MAC with Dr. Erica Last on 11/16/2022. We are requesting clearance and direction on holding Plavix from Dr. Angeline Dia. Thank you.

## 2022-10-14 NOTE — TELEPHONE ENCOUNTER
DO NOT TAKE ASPIRIN,  FISH OIL, IBUPROFEN, MOTRIN-LIKE DRUGS AND ANY MULTIVITAMINS OR OVER THE COUNTER SUPPLEMENTS 14 DAYS PRIOR TO SURGERY. HOLD PLAVIX FOR 5 DAYS PRIOR TO SURGERY. MUST HAVE AN ADULT OVER THE AGE OF 18 WITH YOU AT THE TIME OF THE PROCEDURE AND WITH YOU AT HOME AFTER THE PROCEDURE FOR Gopi Jay Matthewjohn Dumont 1970 Diagnosis:     Surgical Physician: Dr. Eli Pope have been scheduled for the procedure marked below:      Surgery: Cystoscopy, Bladder Botox 100 units         Date: 11/16/2022     Anesthesia:  MAC      Place of Service: [address] [facility] [address] Second Floor Same Day Surgery         Arrive to same day surgery by:  6:00am  (Surgery time is subject to change)      INSTRUCTIONS AS MARKED BELOW:    1.  DO NOT eat or drink anything after midnight before surgery. 2.  We prefer you shower or bathe with an antibacterial soap (Dial) the morning of surgery. 3  Please bring a current medication list, photo ID and insurance card(s) with you  4. Okay to take Tylenol  5. If you take Glucophage, Metformin or Janumet, hold 48-hours prior to surgery. HOLD INSULIN THE MORNING OF SURGERY. 6.  Take blood pressure or heart medication as directed, if taken in the morning take with a small sip of water  7. The office will call you in 1-2 days after your procedure to schedule a follow up. DATE SENSITIVE TESTING-DO ON THE DATE LISTED*WALK IN *NO APPOINTMENT    DO URINE CULTURE, CHEST XRAY AND  LABS ON 11/2/2022.         Date: 10/14/2022

## 2022-10-25 ENCOUNTER — PREP FOR PROCEDURE (OUTPATIENT)
Dept: UROLOGY | Age: 52
End: 2022-10-25

## 2022-10-28 RX ORDER — SODIUM CHLORIDE 9 MG/ML
INJECTION, SOLUTION INTRAVENOUS CONTINUOUS
Status: CANCELLED | OUTPATIENT
Start: 2022-10-28

## 2022-10-28 RX ORDER — IPRATROPIUM BROMIDE AND ALBUTEROL SULFATE 2.5; .5 MG/3ML; MG/3ML
1 SOLUTION RESPIRATORY (INHALATION) EVERY 4 HOURS PRN
Status: CANCELLED | OUTPATIENT
Start: 2022-10-28

## 2022-10-31 ENCOUNTER — OFFICE VISIT (OUTPATIENT)
Dept: CARDIOLOGY CLINIC | Age: 52
End: 2022-10-31
Payer: COMMERCIAL

## 2022-10-31 VITALS
WEIGHT: 257.8 LBS | HEIGHT: 74 IN | HEART RATE: 80 BPM | SYSTOLIC BLOOD PRESSURE: 161 MMHG | BODY MASS INDEX: 33.09 KG/M2 | DIASTOLIC BLOOD PRESSURE: 95 MMHG

## 2022-10-31 DIAGNOSIS — I25.10 CORONARY ARTERY DISEASE INVOLVING NATIVE CORONARY ARTERY OF NATIVE HEART WITHOUT ANGINA PECTORIS: Primary | ICD-10-CM

## 2022-10-31 PROCEDURE — G8417 CALC BMI ABV UP PARAM F/U: HCPCS | Performed by: INTERNAL MEDICINE

## 2022-10-31 PROCEDURE — 3078F DIAST BP <80 MM HG: CPT | Performed by: INTERNAL MEDICINE

## 2022-10-31 PROCEDURE — 3017F COLORECTAL CA SCREEN DOC REV: CPT | Performed by: INTERNAL MEDICINE

## 2022-10-31 PROCEDURE — G8484 FLU IMMUNIZE NO ADMIN: HCPCS | Performed by: INTERNAL MEDICINE

## 2022-10-31 PROCEDURE — G8427 DOCREV CUR MEDS BY ELIG CLIN: HCPCS | Performed by: INTERNAL MEDICINE

## 2022-10-31 PROCEDURE — 1036F TOBACCO NON-USER: CPT | Performed by: INTERNAL MEDICINE

## 2022-10-31 PROCEDURE — 3074F SYST BP LT 130 MM HG: CPT | Performed by: INTERNAL MEDICINE

## 2022-10-31 PROCEDURE — 99213 OFFICE O/P EST LOW 20 MIN: CPT | Performed by: INTERNAL MEDICINE

## 2022-10-31 NOTE — PROGRESS NOTES
[id number] [facility] [address] [place] [address]  Dept: [phone]  Dept Fax: [phone]  Loc: [phone]    Visit Date: 10/31/2022    Mr. Ander Hill is a 47 y.o. male  who presented for:  Chief Complaint   Patient presents with    3 Month Follow-Up    Coronary Artery Disease    Check-Up         HPI:   46 yo c hx of CAD s/p PCI, DM, HTN, HLD, MERCEDEZ on CPAP, Asthma and seizure is here for a follow up. He underwent FFR guided PCI of mid LAD. Denies any chest pain,  palpitations, lightheadedness, dizziness, orthopnea, PND or pedal edema. On DAPT doing well. He reports some episodic sob. States his Asthma is good. He had Pro BMP      Current Outpatient Medications:     PEG-KCl-NaCl-NaSulf-Na Asc-C (PLENVU) 140 g SOLR, Take 1 kit by mouth See Admin Instructions For the pharmacy: If the patient has coverage, submit the balance due to [facility] as a secondary payer using BIN [id number] and valid Other Coverage Code (OCC) 08., Disp: 1 each, Rfl: 0    ARISTADA 882 MG/3.2ML PRSY injection, , Disp: , Rfl:     ARIPiprazole (ABILIFY) 10 MG tablet, , Disp: , Rfl:     nitroGLYCERIN (NITROSTAT) 0.4 MG SL tablet, Place 1 tablet under the tongue every 5 minutes as needed for Chest pain up to max of 3 total doses.  If no relief after 1 dose, call 911., Disp: 25 tablet, Rfl: 1    atorvastatin (LIPITOR) 40 MG tablet, take 1 tablet by mouth once daily, Disp: 90 tablet, Rfl: 1    clopidogrel (PLAVIX) 75 MG tablet, Take 1 tablet by mouth daily, Disp: 90 tablet, Rfl: 1    fluticasone (FLONASE) 50 MCG/ACT nasal spray, instill 1 spray into each nostril once daily, Disp: , Rfl:     risperiDONE (RISPERDAL) 1 MG tablet, Take 1 mg by mouth 2 times daily , Disp: , Rfl:     EMGALITY 120 MG/ML SOAJ, every 30 days , Disp: , Rfl:     busPIRone (BUSPAR) 15 MG tablet, Take 15 mg by mouth 3 times daily, Disp: , Rfl:     benztropine (COGENTIN) 0.5 MG tablet, Take 0.5 mg by mouth 2 times daily, Disp: , Rfl:     ARIPiprazole Lauroxil (ARISTADA IM), Inject into the muscle every 30 days , Disp: , Rfl:     hydrOXYzine (VISTARIL) 50 MG capsule, Take 1 capsule by mouth nightly, Disp: 30 capsule, Rfl: 0    FLUoxetine (PROZAC) 20 MG capsule, Take 3 capsules by mouth daily, Disp: 90 capsule, Rfl: 0    traZODone (DESYREL) 150 MG tablet, Take 1 tablet by mouth nightly, Disp: 30 tablet, Rfl: 0    TRESIBA FLEXTOUCH 200 UNIT/ML SOPN, Inject 32 Units as directed every morning, Disp: , Rfl: 0    mometasone (ASMANEX) 220 MCG/INH inhaler, Inhale 2 puffs into the lungs as needed, Disp: , Rfl:     FREESTYLE LITE strip, , Disp: , Rfl: 1    SUMAtriptan (IMITREX) 50 MG tablet, , Disp: , Rfl: 0    insulin aspart (NOVOLOG) 100 UNIT/ML injection pen, Inject into the skin 3 times daily (before meals) Up to 50 units daily. Inject 1:12 grams carb bkfst/ lunch. 1:30 grams dinner.  1:50 mg/dL BT, Disp: , Rfl:     metoprolol succinate (TOPROL XL) 50 MG extended release tablet, Take 50 mg by mouth daily, Disp: , Rfl:     losartan (COZAAR) 100 MG tablet, take 1 tablet by mouth once daily, Disp: 30 tablet, Rfl: 5    aspirin EC 81 MG EC tablet, Take 1 tablet by mouth daily, Disp: 30 tablet, Rfl: 11    tiotropium (SPIRIVA RESPIMAT) 2.5 MCG/ACT AERS inhaler, Inhale 2 puffs into the lungs every morning (before breakfast), Disp: 1 Inhaler, Rfl: 5    budesonide-formoterol (SYMBICORT) 160-4.5 MCG/ACT AERO, Inhale 2 puffs into the lungs 2 times daily, Disp: 1 Inhaler, Rfl: 5    amLODIPine (NORVASC) 5 MG tablet, take 1 tablet by mouth once daily, Disp: 30 tablet, Rfl: 5    albuterol sulfate HFA (PROAIR HFA) 108 (90 BASE) MCG/ACT inhaler, Inhale 2 puffs into the lungs every 4 hours as needed for Wheezing Dx: 493.90, Disp: 1 Inhaler, Rfl: 5    zonisamide (ZONEGRAN) 100 MG capsule, Take 1 capsule by mouth daily (Patient taking differently: Take 100 mg by mouth 2 times daily), Disp: 30 capsule, Rfl: 5    loratadine (CLARITIN) 10 MG tablet, Take 1 tablet by mouth daily, Disp: 30 tablet, Rfl: 11    OXcarbazepine (TRILEPTAL) 300 MG tablet, Take 1,500 mg by mouth daily Pt takes 750 mg BID, Disp: , Rfl:     pantoprazole (PROTONIX) 40 MG tablet, Take 1 tablet by mouth every morning (before breakfast), Disp: 30 tablet, Rfl: 6    Past Medical History  Amelia Sifuentes  has a past medical history of ADHD (attention deficit hyperactivity disorder), Anxiety, Arthritis, Asthma, Bladder dysfunction, Depression, Diabetes mellitus ([address] Fatigue, GERD (gastroesophageal reflux disease), Headache, Hyperlipidemia, Hypertension, MDRO (multiple drug resistant organisms) resistance, OAB (overactive bladder), Seizures ([address] Sleep apnea, and Type II or unspecified type diabetes mellitus without mention of complication, not stated as uncontrolled. Social History  Amelia Sifuentes  reports that he has never smoked. He has never used smokeless tobacco. He reports current alcohol use. He reports that he does not use drugs. Family History  Amelia Sifuentes family history includes Dementia in his mother; Diabetes in his paternal aunt and paternal grandmother; High Blood Pressure in his brother, father, and sister; Hypertension in his father; Other in his father.     Past Surgical History   Past Surgical History:   Procedure Laterality Date    CATARACT REMOVAL  2010    bilateral    CERVICAL FUSION N/A 9/27/2017    ACDF C6-7 W/ATLANTIS CORNERSTONE performed by Rod Zamarripa MD at [place] [address] N/A 2/26/2018    COLONOSCOPY POLYPECTOMY HOT BIOPSY performed by Troy Johnson MD at [facility] 5/27/2020    CYSTOSCOPY WITH BLADDER BOTOX 100 UNITS performed by Kathrine Crawford MD at [facility] Right 92,96    FOOT SURGERY Bilateral 2006    hammer toes    FOOT SURGERY  1996    removal of piece of glass    HAND SURGERY Right 07/2017    OTHER SURGICAL HISTORY  11/4/15    Excision of 3 cm lipoma LUQ abdominal wall [place]    AR OFFICE/OUTPT VISIT,PROCEDURE ONLY N/A 10/16/2018    PLACEMENT OF INTERSTIM DEVICE performed by Sachin Mata MD at [facility] N/A 5/16/2019    REMOVAL INTERSSTIM DEVICE performed by Sachin Mata MD at [address]  2017    UPPER GASTROINTESTINAL ENDOSCOPY N/A 8/9/2018    EGD BIOPSY performed by Alli Bates MD at [facility]       Subjective:     REVIEW OF SYSTEMS  Constitutional: denies sweats, chills and fever  HENT: denies  congestion, sinus pressure, sneezing and sore throat. Eyes: denies  pain, discharge, redness and itching. Respiratory: denies apnea, cough  Gastrointestinal: denies blood in stool, constipation, diarrhea   Endocrine: denies cold intolerance, heat intolerance, polydipsia. Genitourinary: denies dysuria, enuresis, flank pain and hematuria. Musculoskeletal: denies arthralgias, joint swelling and neck pain. Neurological: denies numbness and headaches. Psychiatric/Behavioral: denies agitation, confusion, decreased concentration and dysphoric mood    All others reviewed and are negative. Objective:     BP (!) 154/95   Pulse 80   Ht 6' 2\" (1.88 m)   Wt 257 lb 12.8 oz (116.9 kg)   BMI 33.10 kg/m²     Wt Readings from Last 3 Encounters:   10/31/22 257 lb 12.8 oz (116.9 kg)   10/25/22 259 lb (117.5 kg)   10/11/22 255 lb (115.7 kg)     BP Readings from Last 3 Encounters:   10/31/22 (!) 154/95   10/25/22 128/77   10/11/22 112/82       PHYSICAL EXAM  Constitutional: Oriented to person, place, and time. Appears well-developed and well-nourished. HENT:   Head: Normocephalic and atraumatic. Eyes: EOM are normal. Pupils are equal, round, and reactive to light. Neck: Normal range of motion. Neck supple. No JVD present. Cardiovascular: Normal rate , normal heart sounds and intact distal pulses. Pulmonary/Chest: Effort normal and breath sounds normal. No respiratory distress. No wheezes.  No Component Value Date    INR 1.00 05/21/2021    INR 0.95 11/11/2015    INR 0.96 05/08/2015         Lab Results   Component Value Date/Time    LABA1C 8.4 12/27/2019 11:16 AM       Lab Results   Component Value Date/Time    TRIG 95 08/06/2015 12:10 PM    HDL 95 08/06/2015 12:10 PM    LDLCALC 81 08/06/2015 12:10 PM       Lab Results   Component Value Date/Time    TSH 5.510 12/23/2019 06:50 AM         Testing Reviewed:      I haveindividually reviewed the below cardiac tests    EKG:    ECHO: No results found for this or any previous visit. STRESS:    CATH:    Assessment/Plan       Diagnosis Orders   1. Coronary artery disease involving native coronary artery of native heart without angina pectoris            CAD s/p PCI  Episodic sob  DM  HTN  HLD  Asthma  MERCEDEZ on CPaP  Seizures in 2/2021    ProBNP is normal  Consider PFTs for episodic sob  Doing well since PCI of LAD  On DAPT with aspirin, and brilinta,  Due to episodic sob, on plavix  Reviewd pro BNP is 26  EKG is SR, no ST-T  Continue rest of the management  Continue Aspirin, metoporlol, statin  The patient is asked to make an attempt to improve diet and exercise patterns to aid in medical management of this problem. Advised more plant based nutrition/meditarrean diet   Advised patient to call office or seek immediate medical attention if there is any new onset of  any chest pain, sob, palpitations, lightheadedness, dizziness, orthopnea, PND or pedal edema. All medication side effects were discussed in details. Thank youfor allowing me to participate in the care of this patient. Please do not hesitate to contact me for any further questions. Return in about 1 year (around 10/31/2023), or if symptoms worsen or fail to improve, for Review testing, Regular follow up.        Electronically signed by Gabrielle Owen MD [facility]  10/31/2022 at 2:46 PM EDT

## 2022-11-07 ENCOUNTER — HOSPITAL ENCOUNTER (OUTPATIENT)
Age: 52
Discharge: HOME | End: 2022-11-07
Payer: COMMERCIAL

## 2022-11-07 ENCOUNTER — HOSPITAL ENCOUNTER (OUTPATIENT)
Dept: GENERAL RADIOLOGY | Age: 52
Discharge: HOME | End: 2022-11-07
Payer: COMMERCIAL

## 2022-11-07 DIAGNOSIS — Z01.818 PRE-OP TESTING: ICD-10-CM

## 2022-11-07 DIAGNOSIS — R35.0 URINARY FREQUENCY: ICD-10-CM

## 2022-11-07 LAB
ATYPICAL LYMPHOCYTES: ABNORMAL %
BASOPHILS # BLD: 0.7 %
BASOPHILS ABSOLUTE: 0.1 THOU/MM3 (ref 0–0.1)
DIFFERENTIAL TYPE: ABNORMAL
EOSINOPHIL # BLD: 0.7 %
EOSINOPHILS ABSOLUTE: 0.1 THOU/MM3 (ref 0–0.4)
ERYTHROCYTE [DISTWIDTH] IN BLOOD BY AUTOMATED COUNT: 13.6 % (ref 11.5–14.5)
ERYTHROCYTE [DISTWIDTH] IN BLOOD BY AUTOMATED COUNT: 39 FL (ref 35–45)
HCT VFR BLD CALC: 41.5 % (ref 42–52)
HEMOGLOBIN: 14.1 GM/DL (ref 14–18)
IMMATURE GRANS (ABS): 0.03 THOU/MM3 (ref 0–0.07)
IMMATURE GRANULOCYTES: 0.3 %
LYMPHOCYTES # BLD: 63.4 %
LYMPHOCYTES ABSOLUTE: 6.9 THOU/MM3 (ref 1–4.8)
MCH RBC QN AUTO: 27.1 PG (ref 26–33)
MCHC RBC AUTO-ENTMCNC: 34 GM/DL (ref 32.2–35.5)
MCV RBC AUTO: 79.8 FL (ref 80–94)
MONOCYTES # BLD: 4.8 %
MONOCYTES ABSOLUTE: 0.5 THOU/MM3 (ref 0.4–1.3)
NUCLEATED RED BLOOD CELLS: 0 /100 WBC
PATHOLOGIST REVIEW: ABNORMAL
PLATELET # BLD: 276 THOU/MM3 (ref 130–400)
PLATELET ESTIMATE: ADEQUATE
PMV BLD AUTO: 8.7 FL (ref 9.4–12.4)
RBC # BLD: 5.2 MILL/MM3 (ref 4.7–6.1)
SCAN OF BLOOD SMEAR: NORMAL
SEG NEUTROPHILS: 30.1 %
SEGMENTED NEUTROPHILS ABSOLUTE COUNT: 3.3 THOU/MM3 (ref 1.8–7.7)
WBC # BLD: 10.9 THOU/MM3 (ref 4.8–10.8)

## 2022-11-07 PROCEDURE — 36415 COLL VENOUS BLD VENIPUNCTURE: CPT

## 2022-11-07 PROCEDURE — 85025 COMPLETE CBC W/AUTO DIFF WBC: CPT

## 2022-11-07 PROCEDURE — 71046 X-RAY EXAM CHEST 2 VIEWS: CPT

## 2022-11-07 PROCEDURE — 87086 URINE CULTURE/COLONY COUNT: CPT

## 2022-11-08 RX ORDER — GUANFACINE 4 MG/1
4 TABLET, EXTENDED RELEASE ORAL DAILY
COMMUNITY
Start: 2022-10-31

## 2022-11-08 RX ORDER — BENZTROPINE MESYLATE 1 MG/1
1 TABLET ORAL DAILY
COMMUNITY
Start: 2022-10-31

## 2022-11-08 RX ORDER — VORTIOXETINE 10 MG/1
10 TABLET, FILM COATED ORAL DAILY
COMMUNITY
Start: 2022-10-31

## 2022-11-08 NOTE — FLOWSHEET NOTE
Follow all instructions given by your physician    NPO after midnight   Sips of water am of surgery with allowed medications  Bring insurance info and 's license  Wear comfortable clean, loose fitting clothing  No jewelry or contact lenses to be worn day of surgery  No glue on dentures morning of surgery;you will be asked to remove them for surgery. Case for glasses. Shower night before and morning of surgery with a liquid antibacterial soap, dry with fresh clean towel; no lotions, creams or powder. Clean sheets and pillow case on bed night before surgery  Bring medications in original bottles  Bring CPAP/BIPAP machine if you have one ( you may be charged if one is needed in recovery room )   needed at discharge and someone over 18 to stay with you for 24 hours overnight (surgery may be cancelled if you don't have this)  Report to [facility] on 2nd floor  If you would become ill prior to surgery, please call the surgeon  May have a visitor with you, we request that you limit to 2 visitors in pre-op area    Call [phone] for any questions  Covid questionnaire Complete; Patient negative for symptoms or exposure. See documentation.

## 2022-11-08 NOTE — PROGRESS NOTES
PAT call attempted, patient unavailable, left message to please call us back at your earliest convenience; [phone]

## 2022-11-09 LAB — URINE CULTURE, ROUTINE: NORMAL

## 2022-11-16 ENCOUNTER — HOSPITAL ENCOUNTER (OUTPATIENT)
Age: 52
Setting detail: OUTPATIENT SURGERY
Discharge: HOME | End: 2022-11-16
Attending: UROLOGY | Admitting: UROLOGY
Payer: COMMERCIAL

## 2022-11-16 ENCOUNTER — ANESTHESIA EVENT (OUTPATIENT)
Dept: OPERATING ROOM | Age: 52
End: 2022-11-16
Payer: COMMERCIAL

## 2022-11-16 ENCOUNTER — ANESTHESIA (OUTPATIENT)
Dept: OPERATING ROOM | Age: 52
End: 2022-11-16
Payer: COMMERCIAL

## 2022-11-16 VITALS
SYSTOLIC BLOOD PRESSURE: 114 MMHG | HEIGHT: 74 IN | OXYGEN SATURATION: 98 % | WEIGHT: 263.8 LBS | RESPIRATION RATE: 16 BRPM | BODY MASS INDEX: 33.86 KG/M2 | HEART RATE: 78 BPM | DIASTOLIC BLOOD PRESSURE: 62 MMHG | TEMPERATURE: 97 F

## 2022-11-16 LAB
GLUCOSE BLD-MCNC: 200 MG/DL (ref 70–108)
INR BLD: 0.89 (ref 0.85–1.13)

## 2022-11-16 PROCEDURE — 6360000002 HC RX W HCPCS: Performed by: UROLOGY

## 2022-11-16 PROCEDURE — 7100000011 HC PHASE II RECOVERY - ADDTL 15 MIN: Performed by: UROLOGY

## 2022-11-16 PROCEDURE — 3600000002 HC SURGERY LEVEL 2 BASE: Performed by: UROLOGY

## 2022-11-16 PROCEDURE — 36415 COLL VENOUS BLD VENIPUNCTURE: CPT

## 2022-11-16 PROCEDURE — 3700000000 HC ANESTHESIA ATTENDED CARE: Performed by: UROLOGY

## 2022-11-16 PROCEDURE — 2709999900 HC NON-CHARGEABLE SUPPLY: Performed by: UROLOGY

## 2022-11-16 PROCEDURE — 2500000003 HC RX 250 WO HCPCS: Performed by: REGISTERED NURSE

## 2022-11-16 PROCEDURE — 2580000003 HC RX 258: Performed by: UROLOGY

## 2022-11-16 PROCEDURE — 3700000001 HC ADD 15 MINUTES (ANESTHESIA): Performed by: UROLOGY

## 2022-11-16 PROCEDURE — 6360000002 HC RX W HCPCS: Performed by: REGISTERED NURSE

## 2022-11-16 PROCEDURE — 7100000010 HC PHASE II RECOVERY - FIRST 15 MIN: Performed by: UROLOGY

## 2022-11-16 PROCEDURE — 85610 PROTHROMBIN TIME: CPT

## 2022-11-16 PROCEDURE — 82948 REAGENT STRIP/BLOOD GLUCOSE: CPT

## 2022-11-16 PROCEDURE — 3600000012 HC SURGERY LEVEL 2 ADDTL 15MIN: Performed by: UROLOGY

## 2022-11-16 RX ORDER — DEXAMETHASONE SODIUM PHOSPHATE 10 MG/ML
INJECTION, EMULSION INTRAMUSCULAR; INTRAVENOUS PRN
Status: DISCONTINUED | OUTPATIENT
Start: 2022-11-16 | End: 2022-11-16 | Stop reason: SDUPTHER

## 2022-11-16 RX ORDER — SODIUM CHLORIDE 9 MG/ML
INJECTION, SOLUTION INTRAVENOUS CONTINUOUS
Status: DISCONTINUED | OUTPATIENT
Start: 2022-11-16 | End: 2022-11-16 | Stop reason: HOSPADM

## 2022-11-16 RX ORDER — IPRATROPIUM BROMIDE AND ALBUTEROL SULFATE 2.5; .5 MG/3ML; MG/3ML
1 SOLUTION RESPIRATORY (INHALATION) EVERY 4 HOURS PRN
Status: DISCONTINUED | OUTPATIENT
Start: 2022-11-16 | End: 2022-11-16 | Stop reason: HOSPADM

## 2022-11-16 RX ORDER — KETAMINE HCL IN NACL, ISO-OSM 100MG/10ML
SYRINGE (ML) INJECTION PRN
Status: DISCONTINUED | OUTPATIENT
Start: 2022-11-16 | End: 2022-11-16 | Stop reason: SDUPTHER

## 2022-11-16 RX ORDER — MIDAZOLAM HYDROCHLORIDE 1 MG/ML
INJECTION INTRAMUSCULAR; INTRAVENOUS PRN
Status: DISCONTINUED | OUTPATIENT
Start: 2022-11-16 | End: 2022-11-16 | Stop reason: SDUPTHER

## 2022-11-16 RX ORDER — ONDANSETRON 2 MG/ML
INJECTION INTRAMUSCULAR; INTRAVENOUS PRN
Status: DISCONTINUED | OUTPATIENT
Start: 2022-11-16 | End: 2022-11-16 | Stop reason: SDUPTHER

## 2022-11-16 RX ORDER — CIPROFLOXACIN 500 MG/1
500 TABLET, FILM COATED ORAL 2 TIMES DAILY
Qty: 10 TABLET | Refills: 0 | Status: SHIPPED | OUTPATIENT
Start: 2022-11-16 | End: 2022-11-21

## 2022-11-16 RX ADMIN — DEXAMETHASONE SODIUM PHOSPHATE 10 MG: 10 INJECTION, EMULSION INTRAMUSCULAR; INTRAVENOUS at 07:41

## 2022-11-16 RX ADMIN — Medication 50 MG: at 07:41

## 2022-11-16 RX ADMIN — CEFAZOLIN SODIUM 3000 MG: 10 INJECTION, POWDER, FOR SOLUTION INTRAVENOUS at 07:45

## 2022-11-16 RX ADMIN — MIDAZOLAM 2 MG: 1 INJECTION INTRAMUSCULAR; INTRAVENOUS at 07:41

## 2022-11-16 RX ADMIN — SODIUM CHLORIDE: 9 INJECTION, SOLUTION INTRAVENOUS at 06:32

## 2022-11-16 RX ADMIN — ONDANSETRON 4 MG: 2 INJECTION INTRAMUSCULAR; INTRAVENOUS at 07:41

## 2022-11-16 ASSESSMENT — PAIN SCALES - GENERAL
PAINLEVEL_OUTOF10: 0

## 2022-11-16 ASSESSMENT — PAIN - FUNCTIONAL ASSESSMENT: PAIN_FUNCTIONAL_ASSESSMENT: 0-10

## 2022-11-16 NOTE — H&P
Kriss Chavez MD  History and Physical    Patient:  Shellie Garza  MRN: [id number]  YOB: 1970    HISTORY OF PRESENT ILLNESS:     The patient is a 47 y.o. male who presents with oab. Here for procedure. Patient's old records, notes and chart reviewed and summarized above. Kriss Chavez MD independently reviewed the images and verified the radiology reports from:    XR CHEST (2 VW)    Result Date: 11/7/2022  PROCEDURE: XR CHEST (2 VW) CLINICAL INFORMATION: Urinary frequency, Pre-op testing COMPARISON: Chest x-ray 3/11/2020. TECHNIQUE: PA and lateral views of the chest performed. FINDINGS: Lines/tubes: None. Heart/mediastinum: The heart size is normal. The pulmonary vascularity is unremarkable. Lungs: The lungs are clear. No focal consolidation, pleural effusion, or pneumothorax is observed. Bones: The visualized skeletal structures appear intact. Postoperative changes in the lower cervical spine appear stable were visualized. No acute intrathoracic process. **This report has been created using voice recognition software. It may contain minor errors which are inherent in voice recognition technology. ** Final report electronically signed by Dr Aida Valladares on 11/7/2022 8:23 AM        Past Medical History:    Past Medical History:   Diagnosis Date    ADHD (attention deficit hyperactivity disorder)     Anxiety     Arthritis     Asthma     Bladder dysfunction     Depression     Diabetes mellitus ([address]     Fatigue     GERD (gastroesophageal reflux disease)     Headache     migraines    Hyperlipidemia     Hypertension     MDRO (multiple drug resistant organisms) resistance     yrs ago    OAB (overactive bladder) 10/27/2015    Seizures ([address]     2021    Sleep apnea     wears cpap    Type II or unspecified type diabetes mellitus without mention of complication, not stated as uncontrolled     pt states type 1       Past Surgical History:    Past Surgical History:   Procedure Laterality Date CATARACT REMOVAL  2010    bilateral    CERVICAL FUSION N/A 9/27/2017    ACDF C6-7 W/ATLANTIS CORNERSTONE performed by Sherlyn Gordon MD at [place] [address] N/A 2/26/2018    COLONOSCOPY POLYPECTOMY HOT BIOPSY performed by Bárbara Aviles MD at [address] 5/27/2020    CYSTOSCOPY WITH BLADDER BOTOX 100 UNITS performed by Giuseppe Ramirez MD at [address] Right 92,96    FOOT SURGERY Bilateral 2006    hammer toes    FOOT SURGERY  1996    removal of piece of glass    HAND SURGERY Right 07/2017    OTHER SURGICAL HISTORY  11/4/15    Excision of 3 cm lipoma LUQ abdominal wall [place]    MO OFFICE/OUTPT VISIT,PROCEDURE ONLY N/A 10/16/2018    PLACEMENT OF INTERSTIM DEVICE performed by Kit García MD at [address] 5/16/2019    REMOVAL INTERSSTIM DEVICE performed by Kit García MD at [facility]  2017    UPPER GASTROINTESTINAL ENDOSCOPY N/A 8/9/2018    EGD BIOPSY performed by Bárbara Aviles MD at [facility]       Medications Prior to Admission:    Prior to Admission medications    Medication Sig Start Date End Date Taking?  Authorizing Provider   guanFACINE (INTUNIV) 4 MG TB24 extended release tablet Take 4 mg by mouth daily 10/31/22   Historical Provider, MD   TRINTELLIX 10 MG TABS tablet Take 10 mg by mouth daily 10/31/22   Historical Provider, MD   benztropine (COGENTIN) 1 MG tablet Take 1 mg by mouth daily 10/31/22   Historical Provider, MD   PEG-KCl-NaCl-NaSulf-Na Asc-C (PLENVU) 140 g SOLR Take 1 kit by mouth See Admin Instructions For the pharmacy: If the patient has coverage, submit the balance due to [email or web address] as a secondary payer using BIN [id number] and valid Other Coverage Code (39[address] [facility]) 08. 10/25/22   YEHUDA Felix - CNP   ARISTADA 882 MG/3.2ML PRSY injection Inject 882 mg into the muscle every 30 days 9/6/22   Historical Provider, MD ARIPiprazole (ABILIFY) 10 MG tablet Take 10 mg by mouth daily 9/6/22   Historical Provider, MD   nitroGLYCERIN (NITROSTAT) 0.4 MG SL tablet Place 1 tablet under the tongue every 5 minutes as needed for Chest pain up to max of 3 total doses.  If no relief after 1 dose, call 911. 7/7/22   Sidney Soulier, MD   atorvastatin (LIPITOR) 40 MG tablet take 1 tablet by mouth once daily 7/7/22   Sidney Soulier, MD   clopidogrel (PLAVIX) 75 MG tablet Take 1 tablet by mouth daily 7/7/22   Sidney Soulier, MD   fluticasone (FLONASE) 50 MCG/ACT nasal spray instill 1 spray into each nostril once daily 4/26/21   Historical Provider, MD   risperiDONE (RISPERDAL) 1 MG tablet Take 1 mg by mouth 2 times daily  3/31/21   Historical Provider, MD   EMGALITY 120 MG/ML SOAJ every 30 days  4/22/21   Historical Provider, MD   busPIRone (BUSPAR) 15 MG tablet Take 15 mg by mouth 3 times daily    Historical Provider, MD   benztropine (COGENTIN) 0.5 MG tablet Take 0.5 mg by mouth 2 times daily    Historical Provider, MD   pantoprazole (PROTONIX) 40 MG tablet Take 1 tablet by mouth every morning (before breakfast) 6/3/20 11/16/22  YEHUDA Craven CNP   hydrOXYzine (VISTARIL) 50 MG capsule Take 1 capsule by mouth nightly 12/30/19   Logan García MD   FLUoxetine (PROZAC) 20 MG capsule Take 3 capsules by mouth daily 12/31/19   Logan García MD   traZODone (DESYREL) 150 MG tablet Take 1 tablet by mouth nightly 12/30/19   Logan García MD   TRESIBA FLEXTOUCH 200 UNIT/ML SOPN Inject 32 Units as directed every morning 5/29/19   Historical Provider, MD   mometasone [facility]) 220 MCG/INH inhaler Inhale 2 puffs into the lungs as needed    Historical Provider, MD   FREESTYLE LITE strip  5/8/19   Historical Provider, MD   SUMAtriptan (IMITREX) 50 MG tablet Take 50 mg by mouth once as needed 5/8/19   Historical Provider, MD   insulin aspart (NOVOLOG) 100 UNIT/ML injection pen Inject into the skin 3 times daily (before meals) Up to 50 units daily. Inject 1:12 grams carb bkfst/ lunch. 1:30 grams dinner.  1:50 mg/dL BT    Historical Provider, MD   metoprolol succinate (TOPROL XL) 50 MG extended release tablet Take 50 mg by mouth daily    Historical Provider, MD   losartan (COZAAR) 100 MG tablet take 1 tablet by mouth once daily 11/22/16   Abe Singleton MD   aspirin EC 81 MG EC tablet Take 1 tablet by mouth daily 11/22/16   Abe Singleton MD   tiotropium (SPIRIVA RESPIMAT) 2.5 MCG/ACT AERS inhaler Inhale 2 puffs into the lungs every morning (before breakfast) 11/22/16   Abe Singleton MD   budesonide-formoterol [facility]) 160-4.5 MCG/ACT AERO Inhale 2 puffs into the lungs 2 times daily 11/22/16   Abe Singleton MD   amLODIPine (NORVASC) 5 MG tablet take 1 tablet by mouth once daily 10/31/16   Abe Singleton MD   albuterol sulfate HFA (PROAIR HFA) 108 (90 BASE) MCG/ACT inhaler Inhale 2 puffs into the lungs every 4 hours as needed for Wheezing Dx: 493.90 9/1/16   YEHUDA Killian - CNP   zonisamide (ZONEGRAN) 100 MG capsule Take 1 capsule by mouth daily  Patient taking differently: Take 100 mg by mouth 2 times daily 5/31/16   Abe Singleton MD   loratadine (CLARITIN) 10 MG tablet Take 1 tablet by mouth daily 8/10/15   Abe Singleton MD   OXcarbazepine (TRILEPTAL) 300 MG tablet Take 1,500 mg by mouth daily Pt takes 750 mg BID    Historical Provider, MD       Allergies:  Tetracyclines & related, Fish-derived products, Flagyl [metronidazole], Latuda [lurasidone hcl], Morphine, and Vilazodone hcl    Social History:    Social History     Socioeconomic History    Marital status: Single     Spouse name: Not on file    Number of children: Not on file    Years of education: Not on file    Highest education level: Not on file   Occupational History    Occupation: Unemployed   Tobacco Use    Smoking status: Never    Smokeless tobacco: Never   Vaping Use Vaping Use: Never used   Substance and Sexual Activity    Alcohol use: Yes     Alcohol/week: 6.0 standard drinks     Types: 6 Cans of beer per week     Comment: 6 cans of beer per day    Drug use: Never    Sexual activity: Yes   Other Topics Concern    Not on file   Social History Narrative    Not on file     Social Determinants of Health     Financial Resource Strain: Not on file   Food Insecurity: Not on file   Transportation Needs: Not on file   Physical Activity: Not on file   Stress: Not on file   Social Connections: Not on file   Intimate Partner Violence: Not on file   Housing Stability: Not on file       Family History:    Family History   Problem Relation Age of Onset    Hypertension Father     Other Father         COPD    High Blood Pressure Father     Dementia Mother     High Blood Pressure Sister     High Blood Pressure Brother     Diabetes Paternal Aunt     Diabetes Paternal Grandmother     Cancer Neg Hx     High Cholesterol Neg Hx     Kidney Disease Neg Hx     Stroke Neg Hx     Colon Cancer Neg Hx     Breast Cancer Neg Hx     Colon Polyps Neg Hx        REVIEW OF SYSTEMS:  Constitutional: negative  Eyes: negative  Respiratory: negative  Cardiovascular: negative  Gastrointestinal: negative  Genitourinary: no acute issues  Musculoskeletal: negative  Skin: negative   Neurological: negative  Hematological/Lymphatic: negative  Psychological: negative    Physical Exam:      Patient Vitals for the past 24 hrs:   BP Temp Temp src Pulse Resp SpO2 Weight   11/16/22 0615 (!) 142/81 97.2 °F (36.2 °C) Tympanic 81 20 98 % 263 lb 12.8 oz (119.7 kg)     Constitutional: Patient in no acute distress; Neuro: alert and oriented to person place and time. Psych: Mood and affect normal.  Skin: Normal  Lungs: Respiratory effort normal, CTA  Cardiovascular:  Normal peripheral pulses; no murmur. Normal rhythm  Abdomen: Soft, non-tender, non-distended with no CVA, flank pain, hepatosplenomegaly or hernia.   Kidneys normal.  Bladder non-tender and not distended. LABS:   No results for input(s): WBC, HGB, HCT, MCV, PLT in the last 72 hours. No results for input(s): NA, K, CL, CO2, PHOS, BUN, CREATININE, CA in the last 72 hours. Lab Results   Component Value Date    PSA 0.25 09/26/2022    PSA 0.48 08/03/2018    PSA 0.46 10/19/2015         Urinalysis: No results for input(s): COLORU, PHUR, LABCAST, WBCUA, RBCUA, MUCUS, TRICHOMONAS, YEAST, BACTERIA, CLARITYU, SPECGRAV, LEUKOCYTESUR, UROBILINOGEN, BILIRUBINUR, BLOODU in the last 72 hours. Invalid input(s): NITRATE, GLUCOSEUKETONESUAMORPHOUS     -----------------------------------------------------------------      Assessment and Plan     Impression:    Patient Active Problem List   Diagnosis    Urgency of urination    Epilepsy ([facility]    Benign prostatic hyperplasia    Depression    Hypertensive urgency    Head ache    Asthma    Excessive sleepiness    Seizures (HCC)    Fatigue    Snoring    ADHD (attention deficit hyperactivity disorder)    HTN (hypertension)    Obstructive sleep apnea on CPAP    Weight gain    Obesity (BMI 30.0-34. 9)    Allergic rhinitis    Type 1 diabetes mellitus (HCC)    Acute suppurative OM    OE (otitis externa)    Hearing loss    Cerumen impaction    Dizziness    ETD (eustachian tube dysfunction)    Lipoma of skin and subcutaneous tissue (excluding face)    Overactive bladder    Chest pain    Foot callus    Herniation of cervical intervertebral disc with radiculopathy    Urgency-frequency syndrome    Complication of device    Psychosis (HCC)    Schizoaffective disorder, depressive type ([address]    CAD in native artery       Plan:     Consent obtained; cysto botox 100 u in OR today    Janee Tilley MD  6:42 AM 11/16/2022

## 2022-11-16 NOTE — ANESTHESIA PRE PROCEDURE
Department of Anesthesiology  Preprocedure Note       Name:  Nicola Marina   Age:  47 y.o.  :  1970                                          MRN:  [id number]         Date:  2022      Surgeon: Alexus Espinal):  Cipriano Le MD    Procedure: Procedure(s):  CYSTOSCOPY WITH BLADDER BOTOX 100 UNITS    Medications prior to admission:   Prior to Admission medications    Medication Sig Start Date End Date Taking? Authorizing Provider   guanFACINE (INTUNIV) 4 MG TB24 extended release tablet Take 4 mg by mouth daily 10/31/22   Historical Provider, MD   TRINTELLIX 10 MG TABS tablet Take 10 mg by mouth daily 10/31/22   Historical Provider, MD   benztropine (COGENTIN) 1 MG tablet Take 1 mg by mouth daily 10/31/22   Historical Provider, MD   PEG-KCl-NaCl-NaSulf-Na Asc-C (PLENVU) 140 g SOLR Take 1 kit by mouth See Admin Instructions For the pharmacy: If the patient has coverage, submit the balance due to [facility] as a secondary payer using BIN [id number] and valid Other Coverage Code (71455 [facility]) 08. 10/25/22   Shira Ross APRN - CNP   ARISTADA 882 MG/3.2ML PRSY injection Inject 882 mg into the muscle every 30 days 22   Historical Provider, MD   ARIPiprazole (ABILIFY) 10 MG tablet Take 10 mg by mouth daily 22   Historical Provider, MD   nitroGLYCERIN (NITROSTAT) 0.4 MG SL tablet Place 1 tablet under the tongue every 5 minutes as needed for Chest pain up to max of 3 total doses.  If no relief after 1 dose, call 911. 22   Rosangela Crews MD   atorvastatin (LIPITOR) 40 MG tablet take 1 tablet by mouth once daily 22   Rosangela Crews MD   clopidogrel (PLAVIX) 75 MG tablet Take 1 tablet by mouth daily 22   Rosangela Crews MD   fluticasone (FLONASE) 50 MCG/ACT nasal spray instill 1 spray into each nostril once daily 21   Historical Provider, MD   risperiDONE (RISPERDAL) 1 MG tablet Take 1 mg by mouth 2 times daily  3/31/21   Historical Provider, MD   EMGALITY 120 MG/ML SOAJ every 30 days  21 Historical Provider, MD   busPIRone (BUSPAR) 15 MG tablet Take 15 mg by mouth 3 times daily    Historical Provider, MD   benztropine (COGENTIN) 0.5 MG tablet Take 0.5 mg by mouth 2 times daily    Historical Provider, MD   pantoprazole (PROTONIX) 40 MG tablet Take 1 tablet by mouth every morning (before breakfast) 6/3/20 11/16/22  YEHUDA Hernandez - CNP   hydrOXYzine (VISTARIL) 50 MG capsule Take 1 capsule by mouth nightly 12/30/19   Jonny Ibarra MD   FLUoxetine (PROZAC) 20 MG capsule Take 3 capsules by mouth daily 12/31/19   Jonny Ibarra MD   traZODone (DESYREL) 150 MG tablet Take 1 tablet by mouth nightly 12/30/19   Jonny Ibarra MD   TRESIBA FLEXTOUCH 200 UNIT/ML SOPN Inject 32 Units as directed every morning 5/29/19   Historical Provider, MD   mometasone [facility]) 220 MCG/INH inhaler Inhale 2 puffs into the lungs as needed    Historical Provider, MD   FREESTYLE LITE strip  5/8/19   Historical Provider, MD   SUMAtriptan (IMITREX) 50 MG tablet Take 50 mg by mouth once as needed 5/8/19   Historical Provider, MD   insulin aspart (NOVOLOG) 100 UNIT/ML injection pen Inject into the skin 3 times daily (before meals) Up to 50 units daily. Inject 1:12 grams carb bkfst/ lunch. 1:30 grams dinner.  1:50 mg/dL BT    Historical Provider, MD   metoprolol succinate (TOPROL XL) 50 MG extended release tablet Take 50 mg by mouth daily    Historical Provider, MD   losartan (COZAAR) 100 MG tablet take 1 tablet by mouth once daily 11/22/16   Abe Singleton MD   aspirin EC 81 MG EC tablet Take 1 tablet by mouth daily 11/22/16   Abe Singleton MD   tiotropium (SPIRIVA RESPIMAT) 2.5 MCG/ACT AERS inhaler Inhale 2 puffs into the lungs every morning (before breakfast) 11/22/16   Abe Singleton MD   budesonide-formoterol [facility]) 160-4.5 MCG/ACT AERO Inhale 2 puffs into the lungs 2 times daily 11/22/16   Abe Singleton MD   amLODIPine (NORVASC) 5 MG tablet take 1 tablet by mouth once daily 10/31/16   Alex Norman MD   albuterol sulfate HFA (PROAIR HFA) 108 (90 BASE) MCG/ACT inhaler Inhale 2 puffs into the lungs every 4 hours as needed for Wheezing Dx: 493.90 9/1/16   YEHUDA Wallace - CNP   zonisamide (ZONEGRAN) 100 MG capsule Take 1 capsule by mouth daily  Patient taking differently: Take 100 mg by mouth 2 times daily 5/31/16   Alex Norman MD   loratadine (CLARITIN) 10 MG tablet Take 1 tablet by mouth daily 8/10/15   Alex Norman MD   OXcarbazepine (TRILEPTAL) 300 MG tablet Take 1,500 mg by mouth daily Pt takes 750 mg BID    Historical Provider, MD       Current medications:    No current facility-administered medications for this visit. No current outpatient medications on file. Facility-Administered Medications Ordered in Other Visits   Medication Dose Route Frequency Provider Last Rate Last Admin    0.9 % sodium chloride infusion   IntraVENous Continuous Kelly Gordillo  mL/hr at 11/16/22 0632 New Bag at 11/16/22 0632    ceFAZolin (ANCEF) 2000 mg in dextrose 5 % 50 mL IVPB  2,000 mg IntraVENous [facility] Kelly Gordillo MD        onabotulinumtoxin A (BOTOX) injection 100 Units  100 Units IntraMUSCular Once Kelly Gordillo MD        ipratropium-albuterol (DUONEB) nebulizer solution 1 ampule  1 ampule Inhalation Q4H PRN Kelly Gordillo MD           Allergies: Allergies   Allergen Reactions    Tetracyclines & Related Swelling     Lips and face    Fish-Derived Products Nausea And Vomiting     seafood    Flagyl [Metronidazole] Itching and Rash    Latuda [Lurasidone Hcl] Other (See Comments)     Dizziness      Morphine Itching    Vilazodone Hcl      Nausea and Vomiting       Problem List:    Patient Active Problem List   Diagnosis Code    Urgency of urination R39.15    Epilepsy ([address] G40.909    Benign prostatic hyperplasia N40.0    Depression F32. A    Hypertensive urgency I16.0    Head ache R51.9  Asthma J45.909    Excessive sleepiness G47.10    Seizures (HCC) R56.9    Fatigue R53.83    Snoring R06.83    ADHD (attention deficit hyperactivity disorder) F90.9    HTN (hypertension) I10    Obstructive sleep apnea on CPAP G47.33, Z99.89    Weight gain R63.5    Obesity (BMI 30.0-34. 9) E66.9    Allergic rhinitis J30.9    Type 1 diabetes mellitus (HCC) E10.9    Acute suppurative OM H66.009    OE (otitis externa) H60.90    Hearing loss H91.90    Cerumen impaction H61.20    Dizziness R42    ETD (eustachian tube dysfunction) H69.80    Lipoma of skin and subcutaneous tissue (excluding face) D17.30    Overactive bladder N32.81    Chest pain R07.9    Foot callus L84    Herniation of cervical intervertebral disc with radiculopathy M50.10    Urgency-frequency syndrome Y40.77    Complication of device N67. 9XXA    Psychosis ([address] F29    Schizoaffective disorder, depressive type ([address] F25.1    CAD in native artery I25.10       Past Medical History:        Diagnosis Date    ADHD (attention deficit hyperactivity disorder)     Anxiety     Arthritis     Asthma     Bladder dysfunction     Depression     Diabetes mellitus (HCC)     Fatigue     GERD (gastroesophageal reflux disease)     Headache     migraines    Hyperlipidemia     Hypertension     MDRO (multiple drug resistant organisms) resistance     yrs ago    OAB (overactive bladder) 10/27/2015    Seizures ([address]     2021    Sleep apnea     wears cpap    Type II or unspecified type diabetes mellitus without mention of complication, not stated as uncontrolled     pt states type 1       Past Surgical History:        Procedure Laterality Date    CATARACT REMOVAL  2010    bilateral    CERVICAL FUSION N/A 9/27/2017    ACDF C6-7 W/ATLANTIS CORNERSTONE performed by Arin Fragoso MD at [facility] 2/26/2018    COLONOSCOPY POLYPECTOMY HOT BIOPSY performed by Steve Peguero MD at [address] [place] [address] N/A 5/27/2020 CYSTOSCOPY WITH BLADDER BOTOX 100 UNITS performed by Yolis Lowry MD at [address] [place] [address]      [address] Right 92,96    FOOT SURGERY Bilateral 2006    hammer toes    FOOT SURGERY  1996    removal of piece of glass    HAND SURGERY Right 07/2017    OTHER SURGICAL HISTORY  11/4/15    Excision of 3 cm lipoma LUQ abdominal wall Wisser    NM OFFICE/OUTPT VISIT,PROCEDURE ONLY N/A 10/16/2018    PLACEMENT OF INTERSTIM DEVICE performed by Huber Maldonado MD at [address] N/A 5/16/2019    REMOVAL INTERSSTIM DEVICE performed by Huber Maldonado MD at [facility]  2017    UPPER GASTROINTESTINAL ENDOSCOPY N/A 8/9/2018    EGD BIOPSY performed by Sheila Ganser, MD at [address] [facility]       Social History:    Social History     Tobacco Use    Smoking status: Never    Smokeless tobacco: Never   Substance Use Topics    Alcohol use: Yes     Alcohol/week: 6.0 standard drinks     Types: 6 Cans of beer per week     Comment: 6 cans of beer per day                                Counseling given: Not Answered      Vital Signs (Current): There were no vitals filed for this visit.                                            BP Readings from Last 3 Encounters:   11/16/22 (!) 142/81   10/31/22 (!) 161/95   10/25/22 128/77       NPO Status:                                                                                 BMI:   Wt Readings from Last 3 Encounters:   11/16/22 263 lb 12.8 oz (119.7 kg)   10/31/22 257 lb 12.8 oz (116.9 kg)   10/25/22 259 lb (117.5 kg)     There is no height or weight on file to calculate BMI.    CBC:   Lab Results   Component Value Date/Time    WBC 10.9 11/07/2022 07:58 AM    RBC 5.20 11/07/2022 07:58 AM    RBC 5.35 06/23/2017 02:17 PM    HGB 14.1 11/07/2022 07:58 AM    HCT 41.5 11/07/2022 07:58 AM    MCV 79.8 11/07/2022 07:58 AM    RDW 13.9 04/07/2021 11:07 PM     11/07/2022 07:58 AM       CMP: Lab Results   Component Value Date/Time     09/26/2022 08:47 AM    K 3.7 09/26/2022 08:47 AM    K 3.5 05/21/2021 10:19 AM    CL 98 09/26/2022 08:47 AM    CO2 22 09/26/2022 08:47 AM    BUN 11 09/26/2022 08:47 AM    CREATININE 1.0 09/26/2022 08:47 AM    AGRATIO 1.8 01/25/2021 03:42 PM    LABGLOM 79 09/26/2022 08:47 AM    GLUCOSE 177 09/26/2022 08:47 AM    GLUCOSE 300 04/07/2021 11:07 PM    PROT 6.4 01/25/2021 03:42 PM    PROT 7.9 06/23/2017 02:17 PM    CALCIUM 9.0 09/26/2022 08:47 AM    BILITOT 0.5 01/25/2021 03:42 PM    ALKPHOS 124 01/25/2021 03:42 PM    AST 34 01/25/2021 03:42 PM    ALT 37 01/25/2021 03:42 PM       POC Tests:   Recent Labs     11/16/22  6863   POCGLU 200*       Coags:   Lab Results   Component Value Date/Time    INR 0.89 11/16/2022 06:08 AM    APTT 28.6 05/21/2021 10:18 AM       HCG (If Applicable): No results found for: PREGTESTUR, PREGSERUM, HCG, HCGQUANT     ABGs: No results found for: PHART, PO2ART, OCD6JSP, SPS4QFG, BEART, E7TAUILK     Type & Screen (If Applicable):  Lab Results   Component Value Date    LABRH POS 05/21/2021       Drug/Infectious Status (If Applicable):  Lab Results   Component Value Date/Time    HEPCAB NONREACTIVE 09/19/2019 04:05 PM       COVID-19 Screening (If Applicable):   Lab Results   Component Value Date/Time    COVID19 Not Detected 06/15/2020 12:00 AM         Anesthesia Evaluation  Patient summary reviewed and Nursing notes reviewed  Airway: Mallampati: II          Dental:          Pulmonary: breath sounds clear to auscultation  (+) sleep apnea:  asthma:                            Cardiovascular:    (+) hypertension:,         Rhythm: regular  Rate: normal                    Neuro/Psych:   (+) headaches:, psychiatric history:            GI/Hepatic/Renal:   (+) GERD:,           Endo/Other:    (+) DiabetesType II DM, , .          Pt had no PAT visit       Abdominal:             Vascular:           Other Findings:             Anesthesia Plan      general and MAC ASA 3       Induction: intravenous. MIPS: Postoperative opioids intended and Prophylactic antiemetics administered. Anesthetic plan and risks discussed with patient. Plan discussed with CRNA.                     Janina Westbrook MD   11/16/2022

## 2022-11-16 NOTE — PROGRESS NOTES
Pt admitted to [facility] room 5 and oriented to unit. SCD sleeves applied. Nares swabbed. Pt verbalized permission for first name, last initial and physicians name on white board. SDS board and discharge criteria explained, pt verbalized understanding. Pt denies thoughts of harming self or others. Call light in reach. Patient stated family would be back to transport patient home.

## 2022-11-16 NOTE — BRIEF OP NOTE
Brief Postoperative Note      Patient: Jocelyn Covington  YOB: 1970  MRN: [id number]    Date of Procedure: 11/16/2022    Pre-Op Diagnosis: Urinary frequency [R35.0]    Post-Op Diagnosis: Same       Procedure(s):  CYSTOSCOPY, BLADDER BOTOX 100 UNITS    Surgeon(s):  Erica Last MD    Assistant:  * No surgical staff found *    Anesthesia: Monitor Anesthesia Care    Estimated Blood Loss (mL): Minimal    Complications: None    Specimens:   * No specimens in log *    Implants:  * No implants in log *      Drains: * No LDAs found *    Findings: f/u one month with mar    Electronically signed by Chelita Ramos MD on 11/16/2022 at 7:57 AM

## 2022-11-16 NOTE — DISCHARGE INSTRUCTIONS
Pt ok to discharge home in good condition  No heavy lifting, >10 lbs for today  Pt should avoid strenuous activity for today  Pt should walk moderately at home  Pt ok to shower   Pt may resume diet as tolerated  Pt should take Rx as directed  No driving while on narcotics  Please call attending physician or hospital  with questions  Call or Present to ED if fever (> 101F), intractable nausea vomiting or pain.   Rx in chart    Pt should follow up with Joseph Mcpherson MD, in 4 weeks, call to confirm appointment

## 2022-11-16 NOTE — ANESTHESIA POSTPROCEDURE EVALUATION
Department of Anesthesiology  Postprocedure Note    Patient: Cande Jimenez  MRN: [id number]  YOB: 1970  Date of evaluation: 11/16/2022      Procedure Summary     Date: 11/16/22 [facility]    Anesthesia Start: 0730 Anesthesia Stop: 1627    Procedure: CYSTOSCOPY, BLADDER BOTOX 100 UNITS (Bladder) Diagnosis:       Urinary frequency      (Urinary frequency [R35.0])    Surgeons: Caitlin Biswas MD Responsible Provider: Ben Biggs MD    Anesthesia Type: general, MAC ASA Status: 3          Anesthesia Type: No value filed.     Kervin Phase I: Kervin Score: 10    Kervin Phase II: Kervin Score: 10      Anesthesia Post Evaluation    Patient location during evaluation: PACU  Patient participation: complete - patient participated  Level of consciousness: awake and alert  Airway patency: patent  Nausea & Vomiting: no nausea  Complications: no  Cardiovascular status: blood pressure returned to baseline and hemodynamically stable  Respiratory status: acceptable and spontaneous ventilation  Hydration status: euvolemic

## 2022-12-02 RX ORDER — SOLIFENACIN SUCCINATE 10 MG/1
TABLET, FILM COATED ORAL
Qty: 90 TABLET | Refills: 0 | OUTPATIENT
Start: 2022-12-02

## 2022-12-02 NOTE — TELEPHONE ENCOUNTER
Nasim Gutiérrez called requesting a refill on the following medications:  Requested Prescriptions     Pending Prescriptions Disp Refills    solifenacin (VESICARE) 10 MG tablet [Pharmacy Med Name: SOLIFENACIN 10 MG TABLET] 90 tablet 0     Sig: take 1 tablet by mouth once daily     Pharmacy verified:  .[email or web address]      Date of last visit: 10/11/2022  Date of next visit (if applicable): 08/09/6325    Medication was stopped at last office visit on 10/11/2022 by Dr Emy Max

## 2022-12-09 ENCOUNTER — TELEPHONE (OUTPATIENT)
Dept: UROLOGY | Age: 52
End: 2022-12-09

## 2022-12-09 NOTE — TELEPHONE ENCOUNTER
Patient underwent botox on 11/16/2022. C/o not being able to urinate and only dribbling. C/o a lot of bladder pain.  Appointment changed to today after speaking to [facility] [phone]

## 2022-12-09 NOTE — TELEPHONE ENCOUNTER
Spoke to Maximo. Patient has appointment today at 15. If he cannot make it, he has been instructed to go to the ED.        The patient was encouraged to go to the ED should they develop fever, chills, nausea, vomiting, chest pain, SOB, calf pain, feelings of incomplete emptying, or should they otherwise feel they need evaluated

## 2022-12-12 ENCOUNTER — OFFICE VISIT (OUTPATIENT)
Dept: UROLOGY | Age: 52
End: 2022-12-12

## 2022-12-12 VITALS — BODY MASS INDEX: 33.91 KG/M2 | WEIGHT: 264.2 LBS | HEIGHT: 74 IN

## 2022-12-12 DIAGNOSIS — R35.0 URINARY FREQUENCY: Primary | ICD-10-CM

## 2022-12-12 DIAGNOSIS — R39.12 BENIGN PROSTATIC HYPERPLASIA WITH WEAK URINARY STREAM: ICD-10-CM

## 2022-12-12 DIAGNOSIS — R33.8 ACUTE URINARY RETENTION: ICD-10-CM

## 2022-12-12 DIAGNOSIS — N40.1 BENIGN PROSTATIC HYPERPLASIA WITH WEAK URINARY STREAM: ICD-10-CM

## 2022-12-12 DIAGNOSIS — N32.81 OVERACTIVE BLADDER: ICD-10-CM

## 2022-12-12 LAB
BILIRUBIN URINE: NEGATIVE
BLOOD URINE, POC: NEGATIVE
CHARACTER, URINE: CLEAR
COLOR, URINE: YELLOW
GLUCOSE URINE: 100 MG/DL
KETONES, URINE: NEGATIVE
LEUKOCYTE CLUMPS, URINE: NEGATIVE
NITRITE, URINE: NEGATIVE
PH, URINE: 6.5 (ref 5–9)
POST VOID RESIDUAL (PVR): 338 ML
PROTEIN, URINE: NEGATIVE MG/DL
SPECIFIC GRAVITY, URINE: 1.02 (ref 1–1.03)
UROBILINOGEN, URINE: 1 EU/DL (ref 0–1)

## 2022-12-12 RX ORDER — TAMSULOSIN HYDROCHLORIDE 0.4 MG/1
0.4 CAPSULE ORAL 2 TIMES DAILY
Qty: 60 CAPSULE | Refills: 1 | Status: SHIPPED | OUTPATIENT
Start: 2022-12-12

## 2022-12-12 NOTE — PATIENT INSTRUCTIONS
Bladder emptied. Had 400 mL (~13 oz). Flomax twice daily. Follow-up in 1 month for another bladder scan and symptom check.

## 2022-12-12 NOTE — PROGRESS NOTES
[address] [facility] [place] [address]  [facility] [address]  Dept: [phone]  Loc: [phone]  Visit Date: 12/12/2022    EVER Covington is a 47 y.o. male that presents to the urology clinic for weak urinary stream and suspected incomplete bladder emptying. Patient underwent Bladder Botox injections by Dr. Hernesto Vera on 11/16/22 to address overactive bladder. Had previously failed Vesicare, Ditropan, and Myrbetriq as well as PTNS therapy. Interstim failed and removed in 2019. Today, patient presents with new onset difficulty emptying his bladder accompanied by 4/10 bladder pain. Pain onset and has worsened over the last week. Has had urinary frequency with onset of pain and the sensation that he is not emptying his bladder. Denies incontinent episodes or leaking. He provides that he did have some immediate relief in his OAB symptoms following Botox injections which lasted for ~3 weeks before onset of bladder pain and resumption of frequency. Small frequent voids. Denies hematuria. PVR: 338 mL -- 400 mL out with straight catheterization.   Pain Scale 4/10    UA: Negative  Lab Results   Component Value Date/Time    APPEARANCE Clear 01/25/2021 03:03 PM    COLORU Yellow 12/12/2022 11:28 AM    COLORU Yellow 01/25/2021 03:03 PM    LABSPEC 1.025 12/12/2022 11:28 AM    LABPH 6.50 12/12/2022 11:28 AM    NITRU Negative 12/12/2022 11:28 AM    GLUCOSEU 100 12/12/2022 11:28 AM    KETUA Negative 12/12/2022 11:28 AM    UROBILINOGEN 1.00 12/12/2022 11:28 AM    BILIRUBINUR Negative 12/12/2022 11:28 AM    BILIRUBINUR neg 10/19/2015 11:28 AM    BILIRUBINUR NEGATIVE 10/27/2011 12:10 PM          Last BUN and creatinine:  Lab Results   Component Value Date    BUN 11 09/26/2022     Lab Results   Component Value Date    CREATININE 1.0 09/26/2022           PAST MEDICAL, FAMILY AND SOCIAL HISTORY UPDATE:  Past Medical History:   Diagnosis Date    ADHD (attention deficit hyperactivity disorder)     Anxiety     Arthritis     Asthma     Bladder dysfunction     Depression     Diabetes mellitus (HCC)     Fatigue     GERD (gastroesophageal reflux disease)     Headache     migraines    Hyperlipidemia     Hypertension     MDRO (multiple drug resistant organisms) resistance     yrs ago    OAB (overactive bladder) 10/27/2015    Seizures ([address]     2021    Sleep apnea     wears cpap    Type II or unspecified type diabetes mellitus without mention of complication, not stated as uncontrolled     pt states type 1     Past Surgical History:   Procedure Laterality Date    CATARACT REMOVAL  2010    bilateral    CERVICAL FUSION N/A 09/27/2017    ACDF C6-7 W/ATLANTIS CORNERSTONE performed by Vaughn Fox MD at [address] [place] [address] N/A 02/26/2018    COLONOSCOPY POLYPECTOMY HOT BIOPSY performed by Mile Funes MD at [facility]    COLONOSCOPY  2022    CYSTOSCOPY N/A 05/27/2020    CYSTOSCOPY WITH BLADDER BOTOX 100 UNITS performed by Yohana Summers MD at [place] [address] 11/16/2022    CYSTOSCOPY, BLADDER BOTOX 100 UNITS performed by Yohana Summers MD at [address] [place] [address] Right 92,96    FOOT SURGERY Bilateral 2006    hammer toes    FOOT SURGERY  1996    removal of piece of glass    HAND SURGERY Right 07/2017    OTHER SURGICAL HISTORY  11/04/2015    Excision of 3 cm lipoma LUQ abdominal wall Wisser    KS OFFICE/OUTPT VISIT,PROCEDURE ONLY N/A 10/16/2018    PLACEMENT OF INTERSTIM DEVICE performed by Chloe Quintanilla MD at [facility] N/A 05/16/2019    REMOVAL INTERSSTIM DEVICE performed by Chloe Quintanilla MD at [facility]  2017    UPPER GASTROINTESTINAL ENDOSCOPY N/A 08/09/2018    EGD BIOPSY performed by Mile Funes MD at [facility]     Family History   Problem Relation Age of Onset    Hypertension Father     Other Father         COPD    High Blood Pressure Father Dementia Mother     High Blood Pressure Sister     High Blood Pressure Brother     Diabetes Paternal Aunt     Diabetes Paternal Grandmother     Cancer Neg Hx     High Cholesterol Neg Hx     Kidney Disease Neg Hx     Stroke Neg Hx     Colon Cancer Neg Hx     Breast Cancer Neg Hx     Colon Polyps Neg Hx      No outpatient medications have been marked as taking for the 12/12/22 encounter (Appointment) with Gal Martines PA-C. Tetracyclines & related, Fish-derived products, Flagyl [metronidazole], Latuda [lurasidone hcl], Morphine, and Vilazodone hcl  Social History     Tobacco Use   Smoking Status Never   Smokeless Tobacco Never       Social History     Substance and Sexual Activity   Alcohol Use Yes    Alcohol/week: 6.0 standard drinks    Types: 6 Cans of beer per week    Comment: 6 cans of beer per day       REVIEW OF SYSTEMS:  Constitutional: negative  Eyes: negative  Respiratory: negative  Cardiovascular: negative  Gastrointestinal: negative  Musculoskeletal: negative  Genitourinary: negative except for what is in HPI  Skin: negative   Neurological: negative  Hematological/Lymphatic: negative  Psychological: negative    Physical Exam:    There were no vitals filed for this visit. Patient is a 47 y.o. male in no acute distress and alert and oriented to person, place and time. Pulmonary: Non-labored respiration. Cardiovascular: Normal rate, regular rhythm, normal peripheral pulses. Skin: Warm and dry. Psych: Normal mood and affect. Genitourinary: Bladder distended and tender. 400 mL yellow urine without hematuria drained with 16 Fr straight catheter. Assessment and Plan   Overactive bladder  2. Acute urinary retention  3. Urinary Frequency  - 4 weeks S/P Botox Bladder injections (Done 11/16/22 by Dr. Uziel Conde)  - 338 mL noted on bladder scan. UA negative for infection and hematuria. - 400 mL yellow urine out with 16 Fr straight catheter. Provided patient relief.  Bladder pain resolves with draining of urine.   - No past history of urinary retention. New onset following bladder Botox. 4.   BPH w/ weak urinary stream  - Discussed Flomax. Has made frequency worse in the past. However, given the combination of Bladder Botox and new onset urinary retention, could provide benefit. After discussing with Dr. Paulina Carlos, will add Flomax BID.   - F/U in 1 month  in the office with PVR, sooner if symptoms worsen. - Patient instructed to call the office if bladder pain/frequency/incomplete bladder emptying returns/worsens.       SUNIL WilcoxC  Urology

## 2023-02-06 RX ORDER — TAMSULOSIN HYDROCHLORIDE 0.4 MG/1
0.4 CAPSULE ORAL 2 TIMES DAILY
Qty: 60 CAPSULE | Refills: 1 | Status: SHIPPED | OUTPATIENT
Start: 2023-02-06

## 2023-02-06 NOTE — TELEPHONE ENCOUNTER
Prescription sent. Emphasize upcoming appointment on 2/15/23 at 2:15 PM so that we can get a PVR and assess benefit of medication.

## 2023-02-06 NOTE — TELEPHONE ENCOUNTER
Gloria Richards called requesting a refill on the following medications:  Requested Prescriptions     Pending Prescriptions Disp Refills    tamsulosin (FLOMAX) 0.4 MG capsule [Pharmacy Med Name: TAMSULOSIN HCL 0.4 MG CAPSULE] 60 capsule 1     Sig: take 1 capsule by mouth IN THE MORNING and at bedtime     Pharmacy verified:  .[email or web address]      Date of last visit: 12/12/2022  Date of next visit (if applicable): 10/61/8540

## 2023-02-15 ENCOUNTER — OFFICE VISIT (OUTPATIENT)
Dept: UROLOGY | Age: 53
End: 2023-02-15
Payer: COMMERCIAL

## 2023-02-15 VITALS — RESPIRATION RATE: 16 BRPM | WEIGHT: 264 LBS | BODY MASS INDEX: 33.88 KG/M2 | HEIGHT: 74 IN

## 2023-02-15 DIAGNOSIS — R35.0 URINARY FREQUENCY: Primary | ICD-10-CM

## 2023-02-15 LAB
BILIRUBIN URINE: NEGATIVE
BLOOD URINE, POC: NEGATIVE
CHARACTER, URINE: CLEAR
COLOR, URINE: YELLOW
GLUCOSE URINE: 100 MG/DL
KETONES, URINE: NEGATIVE
LEUKOCYTE CLUMPS, URINE: NEGATIVE
NITRITE, URINE: NEGATIVE
PH, URINE: 7 (ref 5–9)
POST VOID RESIDUAL (PVR): 167 ML
PROTEIN, URINE: NEGATIVE MG/DL
SPECIFIC GRAVITY, URINE: 1.01 (ref 1–1.03)
UROBILINOGEN, URINE: 0.2 EU/DL (ref 0–1)

## 2023-02-15 PROCEDURE — 3017F COLORECTAL CA SCREEN DOC REV: CPT

## 2023-02-15 PROCEDURE — 81003 URINALYSIS AUTO W/O SCOPE: CPT

## 2023-02-15 PROCEDURE — 1036F TOBACCO NON-USER: CPT

## 2023-02-15 PROCEDURE — 51798 US URINE CAPACITY MEASURE: CPT

## 2023-02-15 PROCEDURE — 99213 OFFICE O/P EST LOW 20 MIN: CPT

## 2023-02-15 PROCEDURE — G8484 FLU IMMUNIZE NO ADMIN: HCPCS

## 2023-02-15 PROCEDURE — G8427 DOCREV CUR MEDS BY ELIG CLIN: HCPCS

## 2023-02-15 PROCEDURE — G8417 CALC BMI ABV UP PARAM F/U: HCPCS

## 2023-02-15 RX ORDER — CLONAZEPAM 1 MG/1
1 TABLET ORAL 2 TIMES DAILY PRN
COMMUNITY

## 2023-02-15 NOTE — PROGRESS NOTES
[address] [address] [place] [address] [place] [address]  Dept: [phone]  Loc: [phone]  Visit Date: 2/15/2023    EVER Lopez is a 47 y.o. male that presents to the urology clinic for weak urinary stream and suspected incomplete bladder emptying. Patient underwent Bladder Botox injections by Dr. Tony Cage on 11/16/22 to address overactive bladder. Had previously failed Vesicare, Ditropan, and Myrbetriq as well as PTNS therapy. Interstim failed and removed in 2019. At his last visit, Luiza Cox had a significant amount of post-void urine (338 mL) which led to him being increased on Flomax from once daily to twice daily. PVR improved to 167 mL at this visit today. Stream modestly improved. No longer complaining of the feeling of incomplete bladder emptying.      PVR: 167 mL (Previously 338 mL)  UA:   Lab Results   Component Value Date/Time    APPEARANCE Clear 01/25/2021 03:03 PM    COLORU Yellow 12/12/2022 11:28 AM    COLORU Yellow 01/25/2021 03:03 PM    LABSPEC 1.025 12/12/2022 11:28 AM    LABPH 6.50 12/12/2022 11:28 AM    NITRU Negative 12/12/2022 11:28 AM    GLUCOSEU 100 12/12/2022 11:28 AM    KETUA Negative 12/12/2022 11:28 AM    UROBILINOGEN 1.00 12/12/2022 11:28 AM    BILIRUBINUR Negative 12/12/2022 11:28 AM    BILIRUBINUR neg 10/19/2015 11:28 AM    BILIRUBINUR NEGATIVE 10/27/2011 12:10 PM        Last BUN and creatinine:  Lab Results   Component Value Date    BUN 11 09/26/2022     Lab Results   Component Value Date    CREATININE 1.0 09/26/2022           PAST MEDICAL, FAMILY AND SOCIAL HISTORY UPDATE:  Past Medical History:   Diagnosis Date    ADHD (attention deficit hyperactivity disorder)     Anxiety     Arthritis     Asthma     Bladder dysfunction     Depression     Diabetes mellitus (HCC)     Fatigue     GERD (gastroesophageal reflux disease)     Headache     migraines    Hyperlipidemia     Hypertension     MDRO (multiple drug resistant organisms) resistance     yrs ago    OAB (overactive bladder) 10/27/2015    Seizures ([address]     2021    Sleep apnea     wears cpap    Type II or unspecified type diabetes mellitus without mention of complication, not stated as uncontrolled     pt states type 1     Past Surgical History:   Procedure Laterality Date    CATARACT REMOVAL  2010    bilateral    CERVICAL FUSION N/A 09/27/2017    ACDF C6-7 W/ATLANTIS CORNERSTONE performed by Yoselin Pickering MD at [address] [place] [address] N/A 02/26/2018    COLONOSCOPY POLYPECTOMY HOT BIOPSY performed by Manuel Jurado MD at [address] [facility]    COLONOSCOPY  2022    CYSTOSCOPY N/A 05/27/2020    CYSTOSCOPY WITH BLADDER BOTOX 100 UNITS performed by Geronimo Mesa MD at [address] 11/16/2022    CYSTOSCOPY, BLADDER BOTOX 100 UNITS performed by Geronimo Mesa MD at [address] [facility] Right 92,96    FOOT SURGERY Bilateral 2006    hammer toes    FOOT SURGERY  1996    removal of piece of glass    HAND SURGERY Right 07/2017    OTHER SURGICAL HISTORY  11/04/2015    Excision of 3 cm lipoma LUQ abdominal wall Wisser    DC OFFICE/OUTPT VISIT,PROCEDURE ONLY N/A 10/16/2018    PLACEMENT OF INTERSTIM DEVICE performed by Jessica Suh MD at [address] [place] [address] N/A 05/16/2019    REMOVAL INTERSSTIM DEVICE performed by Jessica Suh MD at [place] [address]    UPPER GASTROINTESTINAL ENDOSCOPY N/A 08/09/2018    EGD BIOPSY performed by Manuel Jurado MD at [address] [facility]     Family History   Problem Relation Age of Onset    Hypertension Father     Other Father         COPD    High Blood Pressure Father     Dementia Mother     High Blood Pressure Sister     High Blood Pressure Brother     Diabetes Paternal Aunt     Diabetes Paternal Grandmother     Cancer Neg Hx     High Cholesterol Neg Hx     Kidney Disease Neg Hx     Stroke Neg Hx     Colon Cancer Neg Hx     Breast Cancer Neg Hx     Colon Polyps Neg Hx      No outpatient medications have been marked as taking for the 2/15/23 encounter (Appointment) with Eliud Mead PA-C. Tetracyclines & related, Fish-derived products, Flagyl [metronidazole], Latuda [lurasidone hcl], Morphine, and Vilazodone hcl  Social History     Tobacco Use   Smoking Status Never   Smokeless Tobacco Never       Social History     Substance and Sexual Activity   Alcohol Use Yes    Alcohol/week: 6.0 standard drinks    Types: 6 Cans of beer per week    Comment: 6 cans of beer per day       REVIEW OF SYSTEMS:  Constitutional: negative  Eyes: negative  Respiratory: negative  Cardiovascular: negative  Gastrointestinal: negative  Musculoskeletal: negative  Genitourinary: negative except for what is in HPI  Skin: negative   Neurological: negative  Hematological/Lymphatic: negative  Psychological: negative    Physical Exam:    There were no vitals filed for this visit. Patient is a 47 y.o. male in no acute distress and alert and oriented to person, place and time. Pulmonary: Non-labored respiration. Cardiovascular: Normal rate, regular rhythm, normal peripheral pulses. Skin: Warm and dry. Psych: Normal mood and affect. Genitourinary: Bladder non-distended and non-tender. Assessment and Plan   Overactive bladder  2. Acute urinary retention  3. Urinary Frequency  4. BPH w/ weak urinary stream  - S/P Botox Bladder injections on  11/16/22 by Dr. Jaky Miguel. - 338 mL on PVR at last visit which has improved to 167 mL at this visit with the increase in Flomax to BID. - Stream has improved and the patient no longer has the feeling of incomplete bladder emptying.   - Continue Flomax BID.   - Follow-up with Dr. Jaky Miguel in 4-5 months to discuss Botox. - Patient instructed to call the office if bladder pain/frequency/incomplete bladder emptying returns/worsens and he can be seen sooner.        Eliud Mead PA-C  Urology

## 2023-03-17 ENCOUNTER — TELEPHONE (OUTPATIENT)
Dept: CARDIOLOGY CLINIC | Age: 53
End: 2023-03-17

## 2023-03-17 NOTE — TELEPHONE ENCOUNTER
Pt last seen by Dr. Adryan Mclaughlin 10-31-22. Pt is needing clearance for right total hip replacement with Dr. Balaji Golden on 4/24/23. Pt on ASA and Plavix  Can pt be cleared?     Fax- [phone]

## 2023-06-19 RX ORDER — TAMSULOSIN HYDROCHLORIDE 0.4 MG/1
CAPSULE ORAL
Qty: 180 CAPSULE | Refills: 1 | Status: SHIPPED | OUTPATIENT
Start: 2023-06-19

## 2023-06-19 NOTE — TELEPHONE ENCOUNTER
Flomax refilled. Patient no-showed his scheduled visit on 6/13/23 with Dr. Anabella Chan. Ensure patient reschedules for follow-up.

## 2023-06-19 NOTE — TELEPHONE ENCOUNTER
Robles Gruber called requesting a refill on the following medications:  Requested Prescriptions     Pending Prescriptions Disp Refills    tamsulosin (FLOMAX) 0.4 MG capsule [Pharmacy Med Name: TAMSULOSIN HCL 0.4 MG CAPSULE] 60 capsule 1     Sig: take 1 capsule by mouth IN THE MORNING and 1 CAP at bedtime     Pharmacy verified:  .pv      Date of last visit:   Date of next visit (if applicable): Visit date not found

## 2023-08-12 ENCOUNTER — HOSPITAL ENCOUNTER (EMERGENCY)
Age: 53
Discharge: HOME | End: 2023-08-12
Payer: COMMERCIAL

## 2023-08-12 ENCOUNTER — APPOINTMENT (OUTPATIENT)
Dept: GENERAL RADIOLOGY | Age: 53
End: 2023-08-12
Payer: COMMERCIAL

## 2023-08-12 ENCOUNTER — APPOINTMENT (OUTPATIENT)
Dept: CT IMAGING | Age: 53
End: 2023-08-12
Payer: COMMERCIAL

## 2023-08-12 VITALS
WEIGHT: 286 LBS | OXYGEN SATURATION: 99 % | HEIGHT: 74 IN | TEMPERATURE: 98 F | RESPIRATION RATE: 18 BRPM | SYSTOLIC BLOOD PRESSURE: 135 MMHG | HEART RATE: 89 BPM | DIASTOLIC BLOOD PRESSURE: 78 MMHG | BODY MASS INDEX: 36.7 KG/M2

## 2023-08-12 DIAGNOSIS — R07.9 CHEST PAIN, UNSPECIFIED TYPE: Primary | ICD-10-CM

## 2023-08-12 LAB
ANION GAP SERPL CALC-SCNC: 13 MEQ/L (ref 8–16)
BASOPHILS ABSOLUTE: 0 THOU/MM3 (ref 0–0.1)
BASOPHILS NFR BLD AUTO: 0.4 %
BUN SERPL-MCNC: 9 MG/DL (ref 7–22)
CALCIUM SERPL-MCNC: 9.1 MG/DL (ref 8.5–10.5)
CHLORIDE SERPL-SCNC: 96 MEQ/L (ref 98–111)
CO2 SERPL-SCNC: 22 MEQ/L (ref 23–33)
CREAT SERPL-MCNC: 0.9 MG/DL (ref 0.4–1.2)
DEPRECATED RDW RBC AUTO: 43.6 FL (ref 35–45)
EKG ATRIAL RATE: 113 BPM
EKG P AXIS: 59 DEGREES
EKG P-R INTERVAL: 154 MS
EKG Q-T INTERVAL: 338 MS
EKG QRS DURATION: 84 MS
EKG QTC CALCULATION (BAZETT): 463 MS
EKG R AXIS: 24 DEGREES
EKG T AXIS: 88 DEGREES
EKG VENTRICULAR RATE: 113 BPM
EOSINOPHIL NFR BLD AUTO: 2.1 %
EOSINOPHILS ABSOLUTE: 0.2 THOU/MM3 (ref 0–0.4)
ERYTHROCYTE [DISTWIDTH] IN BLOOD BY AUTOMATED COUNT: 14.1 % (ref 11.5–14.5)
GFR SERPL CREATININE-BSD FRML MDRD: > 60 ML/MIN/1.73M2
GLUCOSE SERPL-MCNC: 303 MG/DL (ref 70–108)
HCT VFR BLD AUTO: 43.1 % (ref 42–52)
HGB BLD-MCNC: 13.3 GM/DL (ref 14–18)
IMM GRANULOCYTES # BLD AUTO: 0.02 THOU/MM3 (ref 0–0.07)
IMM GRANULOCYTES NFR BLD AUTO: 0.2 %
LYMPHOCYTES ABSOLUTE: 4.2 THOU/MM3 (ref 1–4.8)
LYMPHOCYTES NFR BLD AUTO: 43.6 %
MAGNESIUM SERPL-MCNC: 1.8 MG/DL (ref 1.6–2.4)
MCH RBC QN AUTO: 26.4 PG (ref 26–33)
MCHC RBC AUTO-ENTMCNC: 30.9 GM/DL (ref 32.2–35.5)
MCV RBC AUTO: 85.5 FL (ref 80–94)
MONOCYTES ABSOLUTE: 0.6 THOU/MM3 (ref 0.4–1.3)
MONOCYTES NFR BLD AUTO: 5.9 %
NEUTROPHILS NFR BLD AUTO: 47.8 %
NRBC BLD AUTO-RTO: 0 /100 WBC
NT-PROBNP SERPL IA-MCNC: < 36 PG/ML (ref 0–124)
OSMOLALITY SERPL CALC.SUM OF ELEC: 272.7 MOSMOL/KG (ref 275–300)
PLATELET # BLD AUTO: 253 THOU/MM3 (ref 130–400)
PMV BLD AUTO: 9.5 FL (ref 9.4–12.4)
POTASSIUM SERPL-SCNC: 4.3 MEQ/L (ref 3.5–5.2)
RBC # BLD AUTO: 5.04 MILL/MM3 (ref 4.7–6.1)
SEGMENTED NEUTROPHILS ABSOLUTE COUNT: 4.6 THOU/MM3 (ref 1.8–7.7)
SODIUM SERPL-SCNC: 131 MEQ/L (ref 135–145)
TROPONIN, HIGH SENSITIVITY: 6 NG/L (ref 0–12)
TROPONIN, HIGH SENSITIVITY: 6 NG/L (ref 0–12)
WBC # BLD AUTO: 9.7 THOU/MM3 (ref 4.8–10.8)

## 2023-08-12 PROCEDURE — 99285 EMERGENCY DEPT VISIT HI MDM: CPT

## 2023-08-12 PROCEDURE — 84484 ASSAY OF TROPONIN QUANT: CPT

## 2023-08-12 PROCEDURE — 6370000000 HC RX 637 (ALT 250 FOR IP): Performed by: PHYSICIAN ASSISTANT

## 2023-08-12 PROCEDURE — 83735 ASSAY OF MAGNESIUM: CPT

## 2023-08-12 PROCEDURE — 80048 BASIC METABOLIC PNL TOTAL CA: CPT

## 2023-08-12 PROCEDURE — 36415 COLL VENOUS BLD VENIPUNCTURE: CPT

## 2023-08-12 PROCEDURE — 93010 ELECTROCARDIOGRAM REPORT: CPT | Performed by: INTERNAL MEDICINE

## 2023-08-12 PROCEDURE — 96361 HYDRATE IV INFUSION ADD-ON: CPT

## 2023-08-12 PROCEDURE — 96360 HYDRATION IV INFUSION INIT: CPT

## 2023-08-12 PROCEDURE — 71275 CT ANGIOGRAPHY CHEST: CPT

## 2023-08-12 PROCEDURE — 83880 ASSAY OF NATRIURETIC PEPTIDE: CPT

## 2023-08-12 PROCEDURE — 85025 COMPLETE CBC W/AUTO DIFF WBC: CPT

## 2023-08-12 PROCEDURE — 6360000004 HC RX CONTRAST MEDICATION: Performed by: PHYSICIAN ASSISTANT

## 2023-08-12 PROCEDURE — 93005 ELECTROCARDIOGRAM TRACING: CPT | Performed by: EMERGENCY MEDICINE

## 2023-08-12 PROCEDURE — 2580000003 HC RX 258: Performed by: PHYSICIAN ASSISTANT

## 2023-08-12 PROCEDURE — 71045 X-RAY EXAM CHEST 1 VIEW: CPT

## 2023-08-12 RX ORDER — ASPIRIN 81 MG/1
324 TABLET, CHEWABLE ORAL ONCE
Status: COMPLETED | OUTPATIENT
Start: 2023-08-12 | End: 2023-08-12

## 2023-08-12 RX ORDER — 0.9 % SODIUM CHLORIDE 0.9 %
1000 INTRAVENOUS SOLUTION INTRAVENOUS ONCE
Status: COMPLETED | OUTPATIENT
Start: 2023-08-12 | End: 2023-08-12

## 2023-08-12 RX ADMIN — SODIUM CHLORIDE 1000 ML: 9 INJECTION, SOLUTION INTRAVENOUS at 12:05

## 2023-08-12 RX ADMIN — ASPIRIN 81 MG 324 MG: 81 TABLET ORAL at 12:06

## 2023-08-12 RX ADMIN — IOPAMIDOL 80 ML: 755 INJECTION, SOLUTION INTRAVENOUS at 12:11

## 2023-08-12 ASSESSMENT — PAIN SCALES - GENERAL: PAINLEVEL_OUTOF10: 3

## 2023-08-12 ASSESSMENT — HEART SCORE: ECG: 0

## 2023-08-12 ASSESSMENT — PAIN DESCRIPTION - LOCATION: LOCATION: CHEST

## 2023-08-12 ASSESSMENT — PAIN - FUNCTIONAL ASSESSMENT: PAIN_FUNCTIONAL_ASSESSMENT: 0-10

## 2023-08-12 NOTE — ED TRIAGE NOTES
Pt presents to the ED with complaints of chest pain. SOB, and weakness. Pt states he has had these symptoms for the past month, but states today he wants to seek treatment. Pt states chest pain is 3/10 and states its intermittent. Pt states he has been taking medications as prescribed. During RN triage, patient fell asleep and state he has been sleeping well at night but just so tired.

## 2023-08-12 NOTE — ED NOTES
Pt resting on cot watching tv. Pt respirations are even and unlabored.       Monserrat Perez RN  08/12/23 [id number]

## 2023-08-12 NOTE — ED PROVIDER NOTES
[facility] EMERGENCY DEPT      EMERGENCY MEDICINE     Pt Name: Jayashree Valdez  MRN: [id number]  [facility] 1970  Date of evaluation: 8/12/2023  Provider: NIDHI Kramer    CHIEF COMPLAINT       Chief Complaint   Patient presents with    Chest Pain     HISTORY OF PRESENT ILLNESS   Jayashree Valdez is a pleasant 47 y.o. male who presents to the emergency department from from home, by private vehicle for evaluation of pain x3 weeks. The patient states that he gets the pains predominantly when he turns to the side or bends over. He states that is not exertional.  He has had no nausea or vomiting because of shortness of breath associated with it. He is otherwise all complaints. Sykes Corrente PASTMEDICAL HISTORY     Past Medical History:   Diagnosis Date    ADHD (attention deficit hyperactivity disorder)     Anxiety     Arthritis     Asthma     Bladder dysfunction     Depression     Diabetes mellitus (HCC)     Fatigue     GERD (gastroesophageal reflux disease)     Headache     migraines    Hyperlipidemia     Hypertension     MDRO (multiple drug resistant organisms) resistance     yrs ago    OAB (overactive bladder) 10/27/2015    Seizures ([address])     2021    Sleep apnea     wears cpap    Type II or unspecified type diabetes mellitus without mention of complication, not stated as uncontrolled     pt states type 1       Patient Active Problem List   Diagnosis Code    Urgency of urination R39.15    Epilepsy ([address]) G40.909    Benign prostatic hyperplasia N40.0    Depression F32. A    Hypertensive urgency I16.0    Head ache R51.9    Asthma J45.909    Excessive sleepiness G47.10    Seizures (HCC) R56.9    Fatigue R53.83    Snoring R06.83    ADHD (attention deficit hyperactivity disorder) F90.9    HTN (hypertension) I10    Obstructive sleep apnea on CPAP G47.33, Z99.89    Weight gain R63.5    Obesity (BMI 30.0-34. 9) E66.9    Allergic rhinitis J30.9    Type 1 diabetes mellitus ([address]) E10.9    Acute suppurative OM H66.009    OE

## 2023-08-14 LAB
EKG ATRIAL RATE: 113 BPM
EKG P AXIS: 59 DEGREES
EKG P-R INTERVAL: 154 MS
EKG Q-T INTERVAL: 338 MS
EKG QRS DURATION: 84 MS
EKG QTC CALCULATION (BAZETT): 463 MS
EKG R AXIS: 24 DEGREES
EKG T AXIS: 88 DEGREES
EKG VENTRICULAR RATE: 113 BPM

## 2023-08-15 ENCOUNTER — OFFICE VISIT (OUTPATIENT)
Dept: CARDIOLOGY CLINIC | Age: 53
End: 2023-08-15
Payer: COMMERCIAL

## 2023-08-15 ENCOUNTER — HOSPITAL ENCOUNTER (OUTPATIENT)
Age: 53
Discharge: HOME | End: 2023-08-15
Payer: COMMERCIAL

## 2023-08-15 VITALS
WEIGHT: 289 LBS | DIASTOLIC BLOOD PRESSURE: 84 MMHG | BODY MASS INDEX: 37.09 KG/M2 | SYSTOLIC BLOOD PRESSURE: 120 MMHG | HEIGHT: 74 IN | HEART RATE: 64 BPM

## 2023-08-15 DIAGNOSIS — I50.32 CHRONIC HEART FAILURE WITH PRESERVED EJECTION FRACTION (HFPEF) (HCC): ICD-10-CM

## 2023-08-15 DIAGNOSIS — I25.10 CAD IN NATIVE ARTERY: ICD-10-CM

## 2023-08-15 DIAGNOSIS — I25.10 CAD IN NATIVE ARTERY: Primary | ICD-10-CM

## 2023-08-15 LAB
ANION GAP SERPL CALC-SCNC: 10 MEQ/L (ref 8–16)
BUN SERPL-MCNC: 8 MG/DL (ref 7–22)
CALCIUM SERPL-MCNC: 9.2 MG/DL (ref 8.5–10.5)
CHLORIDE SERPL-SCNC: 95 MEQ/L (ref 98–111)
CO2 SERPL-SCNC: 27 MEQ/L (ref 23–33)
CREAT SERPL-MCNC: 0.9 MG/DL (ref 0.4–1.2)
GFR SERPL CREATININE-BSD FRML MDRD: > 60 ML/MIN/1.73M2
GLUCOSE SERPL-MCNC: 219 MG/DL (ref 70–108)
POTASSIUM SERPL-SCNC: 4.4 MEQ/L (ref 3.5–5.2)
SODIUM SERPL-SCNC: 132 MEQ/L (ref 135–145)

## 2023-08-15 PROCEDURE — 36415 COLL VENOUS BLD VENIPUNCTURE: CPT

## 2023-08-15 PROCEDURE — 3074F SYST BP LT 130 MM HG: CPT | Performed by: INTERNAL MEDICINE

## 2023-08-15 PROCEDURE — G8427 DOCREV CUR MEDS BY ELIG CLIN: HCPCS | Performed by: INTERNAL MEDICINE

## 2023-08-15 PROCEDURE — 3017F COLORECTAL CA SCREEN DOC REV: CPT | Performed by: INTERNAL MEDICINE

## 2023-08-15 PROCEDURE — 99214 OFFICE O/P EST MOD 30 MIN: CPT | Performed by: INTERNAL MEDICINE

## 2023-08-15 PROCEDURE — 1036F TOBACCO NON-USER: CPT | Performed by: INTERNAL MEDICINE

## 2023-08-15 PROCEDURE — G8417 CALC BMI ABV UP PARAM F/U: HCPCS | Performed by: INTERNAL MEDICINE

## 2023-08-15 PROCEDURE — 3079F DIAST BP 80-89 MM HG: CPT | Performed by: INTERNAL MEDICINE

## 2023-08-15 PROCEDURE — 80048 BASIC METABOLIC PNL TOTAL CA: CPT

## 2023-08-15 RX ORDER — ISOSORBIDE MONONITRATE 30 MG/1
30 TABLET, EXTENDED RELEASE ORAL DAILY
Qty: 30 TABLET | Refills: 3 | Status: SHIPPED | OUTPATIENT
Start: 2023-08-15

## 2023-08-15 RX ORDER — FUROSEMIDE 20 MG/1
20 TABLET ORAL DAILY
Qty: 90 TABLET | Refills: 1 | Status: SHIPPED | OUTPATIENT
Start: 2023-08-15

## 2023-08-15 NOTE — PROGRESS NOTES
[address] [place]  Dept: [phone]  Dept Fax: [phone]  Loc: [phone]    Visit Date: 8/15/2023    Mr. Oletta Kehr is a 47 y.o. male  who presented for:  Chief Complaint   Patient presents with    Coronary Artery Disease    Chest Pain     Fu ED   CAD. PCI  Post ER visit   HPI:   EVER Yarbrough is a pleasant 47year old male patient who  has a past medical history of ADHD (attention deficit hyperactivity disorder), Anxiety, Arthritis, Asthma, Bladder dysfunction, Depression, Diabetes mellitus ([address]), Fatigue, GERD (gastroesophageal reflux disease), Headache, Hyperlipidemia, Hypertension, MDRO (multiple drug resistant organisms) resistance, OAB (overactive bladder), Seizures ([address]), Sleep apnea, and Type II or unspecified type diabetes mellitus without mention of complication, not stated as uncontrolled. The patient had prior PCI of LAD, FFR guided. Has h/o alcohol abuse. Has hip surgery in 4/2023. He was seen in ER om 8/12/2023 with chest pain, SOB, fatigue and leg swelling. Chest CTA was negative for PE. He reports some weight gain over the past few months. HS Troponin was negative. He was referred for further assessment. He has h/o alcohol abuse. Current Outpatient Medications:     tamsulosin (FLOMAX) 0.4 MG capsule, take 1 capsule by mouth IN THE MORNING and 1 CAP at bedtime, Disp: 180 capsule, Rfl: 1    clonazePAM (KLONOPIN) 1 MG tablet, Take 1 tablet by mouth 2 times daily as needed. , Disp: , Rfl:     guanFACINE (INTUNIV) 4 MG TB24 extended release tablet, Take 1 tablet by mouth daily, Disp: , Rfl:     TRINTELLIX 10 MG TABS tablet, Take 1 tablet by mouth daily, Disp: , Rfl:     benztropine (COGENTIN) 1 MG tablet, Take 1 tablet by mouth daily, Disp: , Rfl:     ARISTADA 882 MG/3.2ML PRSY injection, Inject 3.2 mLs into the muscle every 30 days, Disp: , Rfl:     ARIPiprazole (ABILIFY) 10 MG

## 2023-08-22 ENCOUNTER — OFFICE VISIT (OUTPATIENT)
Dept: UROLOGY | Age: 53
End: 2023-08-22
Payer: COMMERCIAL

## 2023-08-22 VITALS
HEIGHT: 74 IN | BODY MASS INDEX: 37.09 KG/M2 | WEIGHT: 289 LBS | DIASTOLIC BLOOD PRESSURE: 84 MMHG | SYSTOLIC BLOOD PRESSURE: 120 MMHG

## 2023-08-22 DIAGNOSIS — N52.9 ERECTILE DYSFUNCTION, UNSPECIFIED ERECTILE DYSFUNCTION TYPE: ICD-10-CM

## 2023-08-22 DIAGNOSIS — N31.9 NEUROGENIC BLADDER: Primary | ICD-10-CM

## 2023-08-22 PROCEDURE — 99214 OFFICE O/P EST MOD 30 MIN: CPT | Performed by: UROLOGY

## 2023-08-22 PROCEDURE — 1036F TOBACCO NON-USER: CPT | Performed by: UROLOGY

## 2023-08-22 PROCEDURE — G8417 CALC BMI ABV UP PARAM F/U: HCPCS | Performed by: UROLOGY

## 2023-08-22 PROCEDURE — G8427 DOCREV CUR MEDS BY ELIG CLIN: HCPCS | Performed by: UROLOGY

## 2023-08-22 PROCEDURE — 3074F SYST BP LT 130 MM HG: CPT | Performed by: UROLOGY

## 2023-08-22 PROCEDURE — 3078F DIAST BP <80 MM HG: CPT | Performed by: UROLOGY

## 2023-08-22 PROCEDURE — 3017F COLORECTAL CA SCREEN DOC REV: CPT | Performed by: UROLOGY

## 2023-08-22 RX ORDER — SILDENAFIL 100 MG/1
100 TABLET, FILM COATED ORAL DAILY PRN
Qty: 30 TABLET | Refills: 3 | Status: SHIPPED | OUTPATIENT
Start: 2023-08-22

## 2023-08-22 NOTE — PROGRESS NOTES
Pati Okeefe MD        [address] [address]  Dept: [phone]  Dept Fax: [phone]: [facility] Urology Office Note -     Patient:  Jimena Munoz  YOB: 1970    The patient is a 47 y.o. male who presents today for evaluation of the following problems: bph  Chief Complaint   Patient presents with    Benign Prostatic Hypertrophy    Follow-up     Wants to discuss ED and urinary issues        HISTORY OF PRESENT ILLNESS:       Neurogenic bladder  Has high riding bladder neck and obstructing prostate on cystoscopy  Has had PTNS and interstim (failed)  Has had botox many times with intermittent success  Concerned about retrograde ejaculation (therefore we did not proceed with outlet surgery)    ED  Worsening  Intersted in meds    Summary of Previous Records:    Jimena Munoz is a 47 y.o. male that presents to the urology clinic for weak urinary stream and suspected incomplete bladder emptying. Patient underwent Bladder Botox injections by Dr. Syed Black on 11/16/22 to address overactive bladder. Had previously failed Vesicare, Ditropan, and Myrbetriq as well as PTNS therapy. Interstim failed and removed in 2019. At his last visit, Claudia Carlton had a significant amount of post-void urine (338 mL) which led to him being increased on Flomax from once daily to twice daily. PVR improved to 167 mL at this visit today. Stream modestly improved. No longer complaining of the feeling of incomplete bladder emptying.      Requested/reviewed records from Kenzie Desir MD office and/or outside physician/EMR    (Patient's old records have been requested, reviewed and pertinent findings summarized in today's note.)    Procedures Today:         Last several PSA's:  Lab Results   Component Value Date    PSA 0.25 09/26/2022    PSA 0.48 08/03/2018    PSA 0.46 10/19/2015       Last total

## 2023-09-05 ENCOUNTER — TELEPHONE (OUTPATIENT)
Dept: CARDIOLOGY CLINIC | Age: 53
End: 2023-09-05

## 2023-09-05 NOTE — TELEPHONE ENCOUNTER
Reviewed denial in chart. I called and talked to patient. He states he is unable to walk on the treadmill due to having a total hip replacement in 4/2023 and he is still using a cane. Patient is having shortness of breath and had EKG done on 8/14/2023. Min we please upload this documentation and then we will call to do a peer to peer.

## 2023-09-05 NOTE — TELEPHONE ENCOUNTER
Received message from [facility] with [facility] Authorization Department. Echo denied. He states to call [facility] at [phone] ref #[id number]  Call to Florence Krishnamurthy, as a stress was ordered too. Both scheduled 9/15  Florence Krishnamurthy states it looks like both are denied. Would like denial letters to review reasoning. Florence Krishnamurthy is going to try to obtain denial letters and will upload/notify office.

## 2023-09-07 NOTE — TELEPHONE ENCOUNTER
Echo cancelled per Missy Beining/insurance denial; patient will now need to arrive for approved stress test at 2:00 pm, instead of 12:45 pm as previously instructed for echo/stress combo.

## 2023-09-07 NOTE — TELEPHONE ENCOUNTER
JACINDA Carr to see if documentation has been uploaded to [facility]. Then, can call for peer to peer. *update, having Lady Lang here in the office submit clinicals to [facility] online. Will call for peer to peer this afternoon.

## 2023-09-07 NOTE — TELEPHONE ENCOUNTER
Peer to Peer completed with Dr Jerad Walsh. Huma Cuevas approved, echo remains denied at this time. Want results of stress test  Auth# [id number] Valid 8/29/23-10/28/23  Scheduling notified to cancel echo. LM for patient to return call.

## 2023-09-08 DIAGNOSIS — N32.81 OVERACTIVE BLADDER: ICD-10-CM

## 2023-09-08 DIAGNOSIS — R35.0 URINARY FREQUENCY: Primary | ICD-10-CM

## 2023-09-08 DIAGNOSIS — R33.8 ACUTE URINARY RETENTION: ICD-10-CM

## 2023-09-15 ENCOUNTER — HOSPITAL ENCOUNTER (OUTPATIENT)
Dept: NON INVASIVE DIAGNOSTICS | Age: 53
Discharge: HOME | End: 2023-09-15
Attending: INTERNAL MEDICINE
Payer: COMMERCIAL

## 2023-09-15 ENCOUNTER — APPOINTMENT (OUTPATIENT)
Dept: NON INVASIVE DIAGNOSTICS | Age: 53
End: 2023-09-15
Attending: INTERNAL MEDICINE
Payer: COMMERCIAL

## 2023-09-15 DIAGNOSIS — I50.32 CHRONIC HEART FAILURE WITH PRESERVED EJECTION FRACTION (HFPEF) (HCC): ICD-10-CM

## 2023-09-15 DIAGNOSIS — I25.10 CAD IN NATIVE ARTERY: ICD-10-CM

## 2023-09-15 PROCEDURE — 93017 CV STRESS TEST TRACING ONLY: CPT | Performed by: INTERNAL MEDICINE

## 2023-09-15 PROCEDURE — 6360000002 HC RX W HCPCS

## 2023-09-15 PROCEDURE — 78452 HT MUSCLE IMAGE SPECT MULT: CPT | Performed by: INTERNAL MEDICINE

## 2023-09-15 PROCEDURE — 3430000000 HC RX DIAGNOSTIC RADIOPHARMACEUTICAL: Performed by: INTERNAL MEDICINE

## 2023-09-15 PROCEDURE — A9500 TC99M SESTAMIBI: HCPCS | Performed by: INTERNAL MEDICINE

## 2023-09-15 RX ORDER — TETRAKIS(2-METHOXYISOBUTYLISOCYANIDE)COPPER(I) TETRAFLUOROBORATE 1 MG/ML
29.3 INJECTION, POWDER, LYOPHILIZED, FOR SOLUTION INTRAVENOUS
Status: COMPLETED | OUTPATIENT
Start: 2023-09-15 | End: 2023-09-15

## 2023-09-15 RX ORDER — TETRAKIS(2-METHOXYISOBUTYLISOCYANIDE)COPPER(I) TETRAFLUOROBORATE 1 MG/ML
8.5 INJECTION, POWDER, LYOPHILIZED, FOR SOLUTION INTRAVENOUS
Status: COMPLETED | OUTPATIENT
Start: 2023-09-15 | End: 2023-09-15

## 2023-09-15 RX ADMIN — Medication 8.5 MILLICURIE: at 14:30

## 2023-09-15 RX ADMIN — Medication 29.3 MILLICURIE: at 15:25

## 2023-09-18 ENCOUNTER — TELEPHONE (OUTPATIENT)
Dept: CARDIOLOGY CLINIC | Age: 53
End: 2023-09-18

## 2023-09-18 NOTE — TELEPHONE ENCOUNTER
Dr Nasreen Ma pt. Pt saw Dr Shellie Bender for ED f/u  Stress and echo were ordered. Echo was denied  Stress completed-see results  Do you want to try to see if echo will be approved now that stress is completed or just f/u as scheduled on 11/6?

## 2023-09-19 NOTE — TELEPHONE ENCOUNTER
Stress test was negative for ischemia  Patient was seen in office post ER visit for chest pain  He may follow as scheduled with his primary cardiologist, Dr Nicholas Gilmore (scheduled on 11/2023 per chart review)

## 2023-11-09 ENCOUNTER — TELEPHONE (OUTPATIENT)
Dept: CARDIOLOGY CLINIC | Age: 53
End: 2023-11-09

## 2023-11-09 NOTE — TELEPHONE ENCOUNTER
Form filled out and placed in Dr. Toby Verde box for signature.
Low risk   Ok to hold meds as requested
Pre op Risk Assessment    Procedure EGD  Physician DR Suzan Vitale  Date of surgery/procedure 11-    Last OV 8-  Last Stress 9-16-23  Last Echo 5-21-21  Last Cath 5-21-21  Last Stent 5-21-21  Is patient on blood thinners PLAVIX AND ASA  Hold Meds/how many days 3 DAYS?     [phone]
vertigo/nausea/vomiting

## 2023-12-11 RX ORDER — TAMSULOSIN HYDROCHLORIDE 0.4 MG/1
CAPSULE ORAL
Qty: 180 CAPSULE | Refills: 1 | Status: SHIPPED | OUTPATIENT
Start: 2023-12-11

## 2023-12-11 NOTE — TELEPHONE ENCOUNTER
Barb Spear called requesting a refill on the following medications:  Requested Prescriptions     Pending Prescriptions Disp Refills    tamsulosin (FLOMAX) 0.4 MG capsule [Pharmacy Med Name: TAMSULOSIN HCL 0.4 MG CAPSULE] 180 capsule 1     Sig: take 1 capsule by mouth every morning and 1 capsule at bedtime     Pharmacy verified:  .pv      Date of last visit: 08/22/2023  Date of next visit (if applicable): attempted to call the patient to make the follow up appointment.  Voicemail left to call the office back

## 2023-12-12 ENCOUNTER — TELEPHONE (OUTPATIENT)
Dept: CARDIOLOGY CLINIC | Age: 53
End: 2023-12-12

## 2023-12-12 RX ORDER — ISOSORBIDE MONONITRATE 30 MG/1
30 TABLET, EXTENDED RELEASE ORAL DAILY
Qty: 30 TABLET | Refills: 3 | OUTPATIENT
Start: 2023-12-12

## 2024-02-05 RX ORDER — FUROSEMIDE 20 MG/1
20 TABLET ORAL DAILY
Qty: 90 TABLET | Refills: 1 | OUTPATIENT
Start: 2024-02-05

## 2024-02-22 NOTE — TELEPHONE ENCOUNTER
CLINICAL PHARMACY NOTE - Post-Discharge Transitions of Care ([facility])  Patient outreach to review discharge medications and provide medication review and management. Spoke with patient. Non-face-to-face services provided:  Assessment and support for treatment adherence and medication management- Medication Reconciliation    NOTES TO PHYSICIAN(S)     Next appointment(s):  unknown    Dr. Melvina Hunter MD   [] Please review the discrepancies noted below. [] There are other issues noted at this time:   [] Please review possible drug-drug interactions listed below   [] Please review possible renal dosing adjustments listed below      SUBJECTIVE/OBJECTIVE:     Ludivina White is a 56 y.o. male discharged from [address] [place] [address] on 9/29/2017. Primary diagnosis: cervical disc degeneration    Medication Orders  Discharge Medications (as per discharging medication list found on the AVS)  Medication Sig comments    oxyCODONE-acetaminophen (PERCOCET) 5-325 MG per tablet Take 1 tablet by mouth every 4 hours as needed for Pain . Taking as prescribed      FLUoxetine (PROZAC) 40 MG capsule Take 40 mg by mouth daily Taking as prescribed     FLUoxetine (PROZAC) 20 MG capsule Take 20 mg by mouth daily Taking as prescribed     OXcarbazepine (TRILEPTAL) 300 MG tablet Take 900 mg by mouth nightly  Taking as prescribed     traZODone (DESYREL) 150 MG tablet Take 300 mg by mouth nightly Taking as prescribed     oxyCODONE-acetaminophen (PERCOCET) 5-325 MG per tablet take 1 to 2 tablets by mouth every 4 hours if needed for pain Duplicate order    bisacodyl (DULCOLAX) 5 MG EC tablet See Prep Instructions Taking as prescribed - colonoscopy scheduled for 10/12/17    polyethylene glycol (GLYCOLAX) powder Colonoscopy Prep Dispense 255 Gram Bottle.   Use as Directed Taking as prescribed - colonoscopy scheduled for 10/12/17     pantoprazole (PROTONIX) 40 MG tablet Take 1 tablet by mouth every morning RN called [facility] and spoke with rep. He states the patient's order has  for Angella 2 sensors. [facility] requires a new prescription and OV notes from within the past 6 months be faxed to them.  Requested documents faxed to [facility].

## 2024-07-29 ENCOUNTER — HOSPITAL ENCOUNTER (INPATIENT)
Age: 54
LOS: 2 days | Discharge: HOME | End: 2024-07-31
Attending: EMERGENCY MEDICINE
Payer: COMMERCIAL

## 2024-07-29 DIAGNOSIS — F25.9 SCHIZOAFFECTIVE DISORDER, UNSPECIFIED TYPE (HCC): Primary | ICD-10-CM

## 2024-07-29 DIAGNOSIS — E87.1 HYPONATREMIA: ICD-10-CM

## 2024-07-29 LAB
ALBUMIN SERPL BCG-MCNC: 4.5 G/DL (ref 3.5–5.1)
ALP SERPL-CCNC: 149 U/L (ref 38–126)
ALT SERPL W/O P-5'-P-CCNC: 22 U/L (ref 11–66)
AMPHETAMINES UR QL SCN: NEGATIVE
ANION GAP SERPL CALC-SCNC: 17 MEQ/L (ref 8–16)
APAP SERPL-MCNC: < 5 UG/ML (ref 0–20)
AST SERPL-CCNC: 28 U/L (ref 5–40)
B-OH-BUTYR SERPL-MSCNC: 6.99 MG/DL (ref 0.2–2.81)
BARBITURATES UR QL SCN: NEGATIVE
BASOPHILS ABSOLUTE: 0 THOU/MM3 (ref 0–0.1)
BASOPHILS NFR BLD AUTO: 0.3 %
BENZODIAZ UR QL SCN: NEGATIVE
BILIRUB SERPL-MCNC: 0.6 MG/DL (ref 0.3–1.2)
BILIRUB UR QL STRIP.AUTO: NEGATIVE
BUN SERPL-MCNC: 5 MG/DL (ref 7–22)
BZE UR QL SCN: NEGATIVE
CALCIUM SERPL-MCNC: 9 MG/DL (ref 8.5–10.5)
CANNABINOIDS UR QL SCN: NEGATIVE
CHARACTER UR: CLEAR
CHLORIDE 24H UR-SRATE: < 20 MEQ/L
CHLORIDE SERPL-SCNC: 80 MEQ/L (ref 98–111)
CO2 SERPL-SCNC: 20 MEQ/L (ref 23–33)
COLOR, UA: YELLOW
CREAT SERPL-MCNC: 0.7 MG/DL (ref 0.4–1.2)
DEPRECATED RDW RBC AUTO: 34.4 FL (ref 35–45)
EKG ATRIAL RATE: 101 BPM
EKG P AXIS: 57 DEGREES
EKG P-R INTERVAL: 152 MS
EKG Q-T INTERVAL: 342 MS
EKG QRS DURATION: 78 MS
EKG QTC CALCULATION (BAZETT): 443 MS
EKG R AXIS: 13 DEGREES
EKG T AXIS: 36 DEGREES
EKG VENTRICULAR RATE: 101 BPM
EOSINOPHIL NFR BLD AUTO: 0.4 %
EOSINOPHILS ABSOLUTE: 0 THOU/MM3 (ref 0–0.4)
ERYTHROCYTE [DISTWIDTH] IN BLOOD BY AUTOMATED COUNT: 11.8 % (ref 11.5–14.5)
ETHANOL SERPL-MCNC: 0.04 % (ref 0–0.08)
FENTANYL: NEGATIVE
GFR SERPL CREATININE-BSD FRML MDRD: > 90 ML/MIN/1.73M2
GLUCOSE BLD STRIP.AUTO-MCNC: 187 MG/DL (ref 70–108)
GLUCOSE SERPL-MCNC: 162 MG/DL (ref 70–108)
GLUCOSE UR QL STRIP.AUTO: 250 MG/DL
HCT VFR BLD AUTO: 36.5 % (ref 42–52)
HGB BLD-MCNC: 12.9 GM/DL (ref 14–18)
HGB UR QL STRIP.AUTO: NEGATIVE
IMM GRANULOCYTES # BLD AUTO: 0.01 THOU/MM3 (ref 0–0.07)
IMM GRANULOCYTES NFR BLD AUTO: 0.1 %
KETONES UR QL STRIP.AUTO: ABNORMAL
LACTATE SERPL-SCNC: 1.5 MMOL/L (ref 0.5–2)
LYMPHOCYTES ABSOLUTE: 2.3 THOU/MM3 (ref 1–4.8)
LYMPHOCYTES NFR BLD AUTO: 34.5 %
MAGNESIUM SERPL-MCNC: 1.9 MG/DL (ref 1.6–2.4)
MCH RBC QN AUTO: 28.9 PG (ref 26–33)
MCHC RBC AUTO-ENTMCNC: 35.3 GM/DL (ref 32.2–35.5)
MCV RBC AUTO: 81.7 FL (ref 80–94)
MONOCYTES ABSOLUTE: 0.5 THOU/MM3 (ref 0.4–1.3)
MONOCYTES NFR BLD AUTO: 8 %
NEUTROPHILS ABSOLUTE: 3.9 THOU/MM3 (ref 1.8–7.7)
NEUTROPHILS NFR BLD AUTO: 56.7 %
NITRITE UR QL STRIP: NEGATIVE
NRBC BLD AUTO-RTO: 0 /100 WBC
OPIATES UR QL SCN: NEGATIVE
OSMOLALITY SERPL CALC.SUM OF ELEC: 237.4 MOSMOL/KG (ref 275–300)
OXYCODONE: NEGATIVE
PCP UR QL SCN: NEGATIVE
PH UR STRIP.AUTO: 6.5 [PH] (ref 5–9)
PHOSPHATE SERPL-MCNC: 3.3 MG/DL (ref 2.4–4.7)
PLATELET # BLD AUTO: 188 THOU/MM3 (ref 130–400)
PMV BLD AUTO: 9.9 FL (ref 9.4–12.4)
POTASSIUM SERPL-SCNC: 3.8 MEQ/L (ref 3.5–5.2)
POTASSIUM UR-SCNC: 4.7 MEQ/L
PROT SERPL-MCNC: 6.9 G/DL (ref 6.1–8)
PROT UR STRIP.AUTO-MCNC: NEGATIVE MG/DL
RBC # BLD AUTO: 4.47 MILL/MM3 (ref 4.7–6.1)
SALICYLATES SERPL-MCNC: < 0.3 MG/DL (ref 2–10)
SODIUM SERPL-SCNC: 117 MEQ/L (ref 135–145)
SODIUM SERPL-SCNC: 121 MEQ/L (ref 135–145)
SODIUM SERPL-SCNC: 122 MEQ/L (ref 135–145)
SODIUM UR-SCNC: < 20 MEQ/L
SP GR UR REFRACT.AUTO: 1.01 (ref 1–1.03)
TSH SERPL DL<=0.005 MIU/L-ACNC: 1.87 UIU/ML (ref 0.4–4.2)
UROBILINOGEN, URINE: 1 EU/DL (ref 0–1)
WBC # BLD AUTO: 6.8 THOU/MM3 (ref 4.8–10.8)
WBC #/AREA URNS HPF: NEGATIVE /[HPF]

## 2024-07-29 PROCEDURE — 83735 ASSAY OF MAGNESIUM: CPT

## 2024-07-29 PROCEDURE — 83605 ASSAY OF LACTIC ACID: CPT

## 2024-07-29 PROCEDURE — 93005 ELECTROCARDIOGRAM TRACING: CPT | Performed by: EMERGENCY MEDICINE

## 2024-07-29 PROCEDURE — 82436 ASSAY OF URINE CHLORIDE: CPT

## 2024-07-29 PROCEDURE — 82010 KETONE BODYS QUAN: CPT

## 2024-07-29 PROCEDURE — 83935 ASSAY OF URINE OSMOLALITY: CPT

## 2024-07-29 PROCEDURE — 6370000000 HC RX 637 (ALT 250 FOR IP)

## 2024-07-29 PROCEDURE — 36415 COLL VENOUS BLD VENIPUNCTURE: CPT

## 2024-07-29 PROCEDURE — 84295 ASSAY OF SERUM SODIUM: CPT

## 2024-07-29 PROCEDURE — 2580000003 HC RX 258

## 2024-07-29 PROCEDURE — 80053 COMPREHEN METABOLIC PANEL: CPT

## 2024-07-29 PROCEDURE — 80143 DRUG ASSAY ACETAMINOPHEN: CPT

## 2024-07-29 PROCEDURE — 93010 ELECTROCARDIOGRAM REPORT: CPT | Performed by: INTERNAL MEDICINE

## 2024-07-29 PROCEDURE — 81003 URINALYSIS AUTO W/O SCOPE: CPT

## 2024-07-29 PROCEDURE — 6360000002 HC RX W HCPCS

## 2024-07-29 PROCEDURE — 82948 REAGENT STRIP/BLOOD GLUCOSE: CPT

## 2024-07-29 PROCEDURE — 84133 ASSAY OF URINE POTASSIUM: CPT

## 2024-07-29 PROCEDURE — 84100 ASSAY OF PHOSPHORUS: CPT

## 2024-07-29 PROCEDURE — 82570 ASSAY OF URINE CREATININE: CPT

## 2024-07-29 PROCEDURE — 84300 ASSAY OF URINE SODIUM: CPT

## 2024-07-29 PROCEDURE — 84443 ASSAY THYROID STIM HORMONE: CPT

## 2024-07-29 PROCEDURE — 80179 DRUG ASSAY SALICYLATE: CPT

## 2024-07-29 PROCEDURE — 99285 EMERGENCY DEPT VISIT HI MDM: CPT

## 2024-07-29 PROCEDURE — 2140000000 HC CCU INTERMEDIATE R&B

## 2024-07-29 PROCEDURE — 82077 ASSAY SPEC XCP UR&BREATH IA: CPT

## 2024-07-29 PROCEDURE — 80307 DRUG TEST PRSMV CHEM ANLYZR: CPT

## 2024-07-29 PROCEDURE — 85025 COMPLETE CBC W/AUTO DIFF WBC: CPT

## 2024-07-29 RX ORDER — PHENOBARBITAL 32.4 MG/1
32.4 TABLET ORAL 2 TIMES DAILY
Status: COMPLETED | OUTPATIENT
Start: 2024-07-30 | End: 2024-07-31

## 2024-07-29 RX ORDER — ALBUTEROL SULFATE 90 UG/1
2 AEROSOL, METERED RESPIRATORY (INHALATION) EVERY 4 HOURS PRN
Status: DISCONTINUED | OUTPATIENT
Start: 2024-07-29 | End: 2024-07-31 | Stop reason: HOSPADM

## 2024-07-29 RX ORDER — ISOSORBIDE MONONITRATE 30 MG/1
30 TABLET, EXTENDED RELEASE ORAL DAILY
Status: DISCONTINUED | OUTPATIENT
Start: 2024-07-30 | End: 2024-07-30 | Stop reason: ALTCHOICE

## 2024-07-29 RX ORDER — ARIPIPRAZOLE 15 MG/1
15 TABLET ORAL DAILY
Status: DISCONTINUED | OUTPATIENT
Start: 2024-07-30 | End: 2024-07-31 | Stop reason: HOSPADM

## 2024-07-29 RX ORDER — POTASSIUM CHLORIDE 20 MEQ/1
40 TABLET, EXTENDED RELEASE ORAL PRN
Status: DISCONTINUED | OUTPATIENT
Start: 2024-07-29 | End: 2024-07-31 | Stop reason: HOSPADM

## 2024-07-29 RX ORDER — ENOXAPARIN SODIUM 100 MG/ML
30 INJECTION SUBCUTANEOUS 2 TIMES DAILY
Status: DISCONTINUED | OUTPATIENT
Start: 2024-07-29 | End: 2024-07-31 | Stop reason: HOSPADM

## 2024-07-29 RX ORDER — ACETAMINOPHEN 650 MG/1
650 SUPPOSITORY RECTAL EVERY 6 HOURS PRN
Status: DISCONTINUED | OUTPATIENT
Start: 2024-07-29 | End: 2024-07-31 | Stop reason: HOSPADM

## 2024-07-29 RX ORDER — LANOLIN ALCOHOL/MO/W.PET/CERES
100 CREAM (GRAM) TOPICAL DAILY
Status: DISCONTINUED | OUTPATIENT
Start: 2024-07-30 | End: 2024-07-31 | Stop reason: HOSPADM

## 2024-07-29 RX ORDER — CLONAZEPAM 0.5 MG/1
1 TABLET ORAL 2 TIMES DAILY PRN
Status: DISCONTINUED | OUTPATIENT
Start: 2024-07-29 | End: 2024-07-30 | Stop reason: ALTCHOICE

## 2024-07-29 RX ORDER — TRAZODONE HYDROCHLORIDE 50 MG/1
50 TABLET ORAL NIGHTLY
Status: DISCONTINUED | OUTPATIENT
Start: 2024-07-29 | End: 2024-07-31 | Stop reason: HOSPADM

## 2024-07-29 RX ORDER — LOSARTAN POTASSIUM 100 MG/1
100 TABLET ORAL DAILY
Status: DISCONTINUED | OUTPATIENT
Start: 2024-07-30 | End: 2024-07-31 | Stop reason: HOSPADM

## 2024-07-29 RX ORDER — OXCARBAZEPINE 300 MG/1
750 TABLET, FILM COATED ORAL 2 TIMES DAILY
Status: DISCONTINUED | OUTPATIENT
Start: 2024-07-30 | End: 2024-07-31 | Stop reason: HOSPADM

## 2024-07-29 RX ORDER — CETIRIZINE HYDROCHLORIDE 10 MG/1
10 TABLET ORAL DAILY
Status: DISCONTINUED | OUTPATIENT
Start: 2024-07-30 | End: 2024-07-31 | Stop reason: HOSPADM

## 2024-07-29 RX ORDER — AMLODIPINE BESYLATE 5 MG/1
5 TABLET ORAL DAILY
Status: DISCONTINUED | OUTPATIENT
Start: 2024-07-30 | End: 2024-07-31 | Stop reason: HOSPADM

## 2024-07-29 RX ORDER — SODIUM CHLORIDE 0.9 % (FLUSH) 0.9 %
5-40 SYRINGE (ML) INJECTION EVERY 12 HOURS SCHEDULED
Status: DISCONTINUED | OUTPATIENT
Start: 2024-07-29 | End: 2024-07-31 | Stop reason: HOSPADM

## 2024-07-29 RX ORDER — RISPERIDONE 1 MG/1
1 TABLET ORAL 2 TIMES DAILY
Status: DISCONTINUED | OUTPATIENT
Start: 2024-07-29 | End: 2024-07-30 | Stop reason: ALTCHOICE

## 2024-07-29 RX ORDER — CLOPIDOGREL BISULFATE 75 MG/1
75 TABLET ORAL DAILY
Status: DISCONTINUED | OUTPATIENT
Start: 2024-07-30 | End: 2024-07-30

## 2024-07-29 RX ORDER — MULTIVITAMIN WITH IRON
1 TABLET ORAL DAILY
Status: DISCONTINUED | OUTPATIENT
Start: 2024-07-30 | End: 2024-07-31 | Stop reason: HOSPADM

## 2024-07-29 RX ORDER — PHENOBARBITAL 32.4 MG/1
32.4 TABLET ORAL EVERY 6 HOURS PRN
Status: DISCONTINUED | OUTPATIENT
Start: 2024-07-29 | End: 2024-07-31 | Stop reason: HOSPADM

## 2024-07-29 RX ORDER — ZONISAMIDE 100 MG/1
100 CAPSULE ORAL DAILY
Status: DISCONTINUED | OUTPATIENT
Start: 2024-07-30 | End: 2024-07-30

## 2024-07-29 RX ORDER — SODIUM CHLORIDE 9 MG/ML
INJECTION, SOLUTION INTRAVENOUS CONTINUOUS
Status: DISCONTINUED | OUTPATIENT
Start: 2024-07-29 | End: 2024-07-29

## 2024-07-29 RX ORDER — HYDROXYZINE PAMOATE 25 MG/1
50 CAPSULE ORAL NIGHTLY
Status: DISCONTINUED | OUTPATIENT
Start: 2024-07-29 | End: 2024-07-31 | Stop reason: HOSPADM

## 2024-07-29 RX ORDER — PHENOBARBITAL 32.4 MG/1
16.2 TABLET ORAL EVERY 6 HOURS PRN
Status: DISCONTINUED | OUTPATIENT
Start: 2024-07-31 | End: 2024-07-31 | Stop reason: HOSPADM

## 2024-07-29 RX ORDER — PANTOPRAZOLE SODIUM 40 MG/1
40 TABLET, DELAYED RELEASE ORAL
Status: DISCONTINUED | OUTPATIENT
Start: 2024-07-30 | End: 2024-07-31 | Stop reason: HOSPADM

## 2024-07-29 RX ORDER — POLYETHYLENE GLYCOL 3350 17 G/17G
17 POWDER, FOR SOLUTION ORAL DAILY PRN
Status: DISCONTINUED | OUTPATIENT
Start: 2024-07-29 | End: 2024-07-31 | Stop reason: HOSPADM

## 2024-07-29 RX ORDER — PHENOBARBITAL 32.4 MG/1
32.4 TABLET ORAL 4 TIMES DAILY
Status: COMPLETED | OUTPATIENT
Start: 2024-07-29 | End: 2024-07-30

## 2024-07-29 RX ORDER — MAGNESIUM SULFATE IN WATER 40 MG/ML
2000 INJECTION, SOLUTION INTRAVENOUS PRN
Status: DISCONTINUED | OUTPATIENT
Start: 2024-07-29 | End: 2024-07-31 | Stop reason: HOSPADM

## 2024-07-29 RX ORDER — DEXTROSE MONOHYDRATE 50 MG/ML
INJECTION, SOLUTION INTRAVENOUS CONTINUOUS
Status: ACTIVE | OUTPATIENT
Start: 2024-07-29 | End: 2024-07-30

## 2024-07-29 RX ORDER — PHENOBARBITAL 32.4 MG/1
16.2 TABLET ORAL 2 TIMES DAILY
Status: DISCONTINUED | OUTPATIENT
Start: 2024-07-31 | End: 2024-07-31 | Stop reason: HOSPADM

## 2024-07-29 RX ORDER — ASPIRIN 81 MG/1
81 TABLET ORAL DAILY
Status: DISCONTINUED | OUTPATIENT
Start: 2024-07-30 | End: 2024-07-31 | Stop reason: HOSPADM

## 2024-07-29 RX ORDER — ONDANSETRON 4 MG/1
4 TABLET, ORALLY DISINTEGRATING ORAL EVERY 8 HOURS PRN
Status: DISCONTINUED | OUTPATIENT
Start: 2024-07-29 | End: 2024-07-31 | Stop reason: HOSPADM

## 2024-07-29 RX ORDER — ACETAMINOPHEN 325 MG/1
650 TABLET ORAL EVERY 6 HOURS PRN
Status: DISCONTINUED | OUTPATIENT
Start: 2024-07-29 | End: 2024-07-31 | Stop reason: HOSPADM

## 2024-07-29 RX ORDER — INSULIN LISPRO 100 [IU]/ML
0-4 INJECTION, SOLUTION INTRAVENOUS; SUBCUTANEOUS NIGHTLY
Status: DISCONTINUED | OUTPATIENT
Start: 2024-07-29 | End: 2024-07-31 | Stop reason: HOSPADM

## 2024-07-29 RX ORDER — FLUOXETINE HYDROCHLORIDE 20 MG/1
60 CAPSULE ORAL DAILY
Status: DISCONTINUED | OUTPATIENT
Start: 2024-07-30 | End: 2024-07-30 | Stop reason: ALTCHOICE

## 2024-07-29 RX ORDER — GLUCAGON 1 MG/ML
1 KIT INJECTION PRN
Status: DISCONTINUED | OUTPATIENT
Start: 2024-07-29 | End: 2024-07-31 | Stop reason: HOSPADM

## 2024-07-29 RX ORDER — POTASSIUM CHLORIDE 7.45 MG/ML
10 INJECTION INTRAVENOUS PRN
Status: DISCONTINUED | OUTPATIENT
Start: 2024-07-29 | End: 2024-07-31 | Stop reason: HOSPADM

## 2024-07-29 RX ORDER — TAMSULOSIN HYDROCHLORIDE 0.4 MG/1
0.4 CAPSULE ORAL DAILY
Status: DISCONTINUED | OUTPATIENT
Start: 2024-07-30 | End: 2024-07-31 | Stop reason: HOSPADM

## 2024-07-29 RX ORDER — BUDESONIDE AND FORMOTEROL FUMARATE DIHYDRATE 160; 4.5 UG/1; UG/1
2 AEROSOL RESPIRATORY (INHALATION) 2 TIMES DAILY
Status: DISCONTINUED | OUTPATIENT
Start: 2024-07-29 | End: 2024-07-30

## 2024-07-29 RX ORDER — SUMATRIPTAN 50 MG/1
50 TABLET, FILM COATED ORAL DAILY PRN
Status: DISCONTINUED | OUTPATIENT
Start: 2024-07-30 | End: 2024-07-31 | Stop reason: HOSPADM

## 2024-07-29 RX ORDER — BENZTROPINE MESYLATE 1 MG/1
0.5 TABLET ORAL 2 TIMES DAILY
Status: DISCONTINUED | OUTPATIENT
Start: 2024-07-29 | End: 2024-07-31 | Stop reason: HOSPADM

## 2024-07-29 RX ORDER — INSULIN GLARGINE 100 [IU]/ML
30 INJECTION, SOLUTION SUBCUTANEOUS DAILY
Status: DISCONTINUED | OUTPATIENT
Start: 2024-07-30 | End: 2024-07-30

## 2024-07-29 RX ORDER — SODIUM CHLORIDE 0.9 % (FLUSH) 0.9 %
5-40 SYRINGE (ML) INJECTION PRN
Status: DISCONTINUED | OUTPATIENT
Start: 2024-07-29 | End: 2024-07-31 | Stop reason: HOSPADM

## 2024-07-29 RX ORDER — ONDANSETRON 2 MG/ML
4 INJECTION INTRAMUSCULAR; INTRAVENOUS EVERY 6 HOURS PRN
Status: DISCONTINUED | OUTPATIENT
Start: 2024-07-29 | End: 2024-07-31 | Stop reason: HOSPADM

## 2024-07-29 RX ORDER — DEXTROSE MONOHYDRATE 100 MG/ML
INJECTION, SOLUTION INTRAVENOUS CONTINUOUS PRN
Status: DISCONTINUED | OUTPATIENT
Start: 2024-07-29 | End: 2024-07-31 | Stop reason: HOSPADM

## 2024-07-29 RX ORDER — FUROSEMIDE 40 MG/1
20 TABLET ORAL DAILY
Status: DISCONTINUED | OUTPATIENT
Start: 2024-07-30 | End: 2024-07-30

## 2024-07-29 RX ORDER — INSULIN LISPRO 100 [IU]/ML
0-8 INJECTION, SOLUTION INTRAVENOUS; SUBCUTANEOUS
Status: DISCONTINUED | OUTPATIENT
Start: 2024-07-30 | End: 2024-07-31 | Stop reason: HOSPADM

## 2024-07-29 RX ORDER — BUSPIRONE HYDROCHLORIDE 7.5 MG/1
15 TABLET ORAL 3 TIMES DAILY
Status: DISCONTINUED | OUTPATIENT
Start: 2024-07-30 | End: 2024-07-31 | Stop reason: HOSPADM

## 2024-07-29 RX ORDER — FLUTICASONE PROPIONATE 110 UG/1
2 AEROSOL, METERED RESPIRATORY (INHALATION)
Status: DISCONTINUED | OUTPATIENT
Start: 2024-07-29 | End: 2024-07-31 | Stop reason: HOSPADM

## 2024-07-29 RX ORDER — METOPROLOL SUCCINATE 50 MG/1
50 TABLET, EXTENDED RELEASE ORAL DAILY
Status: DISCONTINUED | OUTPATIENT
Start: 2024-07-30 | End: 2024-07-31 | Stop reason: HOSPADM

## 2024-07-29 RX ORDER — ATORVASTATIN CALCIUM 10 MG/1
10 TABLET, FILM COATED ORAL DAILY
Status: DISCONTINUED | OUTPATIENT
Start: 2024-07-30 | End: 2024-07-31 | Stop reason: HOSPADM

## 2024-07-29 RX ORDER — FOLIC ACID 1 MG/1
1 TABLET ORAL DAILY
Status: DISCONTINUED | OUTPATIENT
Start: 2024-07-30 | End: 2024-07-31 | Stop reason: HOSPADM

## 2024-07-29 RX ORDER — SODIUM CHLORIDE 9 MG/ML
INJECTION, SOLUTION INTRAVENOUS PRN
Status: DISCONTINUED | OUTPATIENT
Start: 2024-07-29 | End: 2024-07-31 | Stop reason: HOSPADM

## 2024-07-29 RX ADMIN — POTASSIUM BICARBONATE 20 MEQ: 782 TABLET, EFFERVESCENT ORAL at 23:27

## 2024-07-29 RX ADMIN — SODIUM CHLORIDE: 9 INJECTION, SOLUTION INTRAVENOUS at 19:50

## 2024-07-29 RX ADMIN — ENOXAPARIN SODIUM 30 MG: 100 INJECTION SUBCUTANEOUS at 23:27

## 2024-07-29 RX ADMIN — PHENOBARBITAL 32.4 MG: 32.4 TABLET ORAL at 23:27

## 2024-07-29 RX ADMIN — SODIUM CHLORIDE, PRESERVATIVE FREE 10 ML: 5 INJECTION INTRAVENOUS at 23:28

## 2024-07-29 RX ADMIN — DEXTROSE MONOHYDRATE: 50 INJECTION, SOLUTION INTRAVENOUS at 23:27

## 2024-07-29 ASSESSMENT — LIFESTYLE VARIABLES
HOW OFTEN DO YOU HAVE A DRINK CONTAINING ALCOHOL: 4 OR MORE TIMES A WEEK
HOW OFTEN DO YOU HAVE A DRINK CONTAINING ALCOHOL: 4 OR MORE TIMES A WEEK
HOW MANY STANDARD DRINKS CONTAINING ALCOHOL DO YOU HAVE ON A TYPICAL DAY: 5 OR 6
HOW MANY STANDARD DRINKS CONTAINING ALCOHOL DO YOU HAVE ON A TYPICAL DAY: 5 OR 6

## 2024-07-29 ASSESSMENT — PAIN - FUNCTIONAL ASSESSMENT
PAIN_FUNCTIONAL_ASSESSMENT: NONE - DENIES PAIN

## 2024-07-29 NOTE — ED NOTES
Pt in bed, eyes open, respirations even and unlabored. Vital signs reassessed, no needs voiced. SBIRT assessed and CSSRS reassessed.

## 2024-07-29 NOTE — ED TRIAGE NOTES
Pt presents to ED for mental health evaluation. Pt reports feeling increased depression and anxiety and that he \"needs his meds adjusted\" and \"I take so many meds I don't even know what I take anymore.\" Pt also notes that he drinks about 6 beers a day and admits to drinking 3 24oz cans. Pt states he goes to [facility] monthly for psych help. Pt denies suicidal ideation at this time stating, \"sometimes but not today, in the last month yes, but I don't want to do it or have a plan.\" Pt tearful during triage.

## 2024-07-29 NOTE — ED NOTES
Pt up to bathroom with standby assistance. Pt back in bed and asks for something to eat and drink. Pt is resting in bed, this RN asks pt if he has been seeing things that aren't there or hearing voices, pt states \"Yes I have been but I have been trying to figure out what it is\" pt states voices sound like mumbles. Pt is resting in bed comfortably with no signs of distress to note at this time. Call light within reach.

## 2024-07-29 NOTE — ED NOTES
Pt is resting in bed and asks for a pop. Pt is breathing regular and unlabored on room air. Pt has no signs of distress to note at this time. Call light within reach.

## 2024-07-29 NOTE — ED PROVIDER NOTES
ATTENDING NOTE:    I supervised and discussed the history, physical exam and the management of this patient with the resident. I reviewed the resident's note and agree with the documented findings and plan of care.  Please see my additional note.    I personally saw and examined the patient.  I have reviewed and agree with the resident's findings, including all diagnostic interpretations and treatment plans as written.  I was present for the key portion of any procedures performed and the inclusive time noted in any critical care statement.    Electronically verified by Zahra Pollock MD  07/29/24 2024    
  URINE DRUG SCREEN   ELECTROLYTES URINE RANDOM   URINALYSIS WITH REFLEX TO CULTURE   TSH WITH REFLEX   PHOSPHORUS     All laboratory results are individually reviewed and interpreted by me in the clinical context of this patient.  See ED course below for results interpretation if applicable.  (A negative COVID-19 test should be interpreted as COVID no longer suspected unless otherwise noted in this encounter documentation note)  (Any cultures that may have been sent were not resulted at the time of this patient ED visit)      Radiologic studies results available at the moment of this note (None if blank):  No orders to display     See ED course below for my interpretation if applicable.  All radiology images independently reviewed by me in the clinical context of this patient, in addition to interpretation provided by the radiologist.      EKG interpretation (none if blank):  Not applicable  All EKG results are individually reviewed and interpreted by me in the clinical context of this patient.  All EKGs are also interpreted by our Cardiology department, final interpretation may not be available as of the writing of this note.      PREVIOUS RECORDS  AND EXTERNAL INFORMATION REVIEWED   History obtained from: the patient.  Pertinent previous and/or external records reviewed:  Has been admitted here and at [facility] for psychosis secondary to schizoaffective disorder .  Case discussed with specialties other than Emergency Medicine: Behavioral access center/[facility]      MEDICAL DECISION MAKING   Initial plan:  CBC, CMP, EtOH, magnesium, salicylate, acetaminophen, UDS ordered.  Suicide precautions in place.  Consult to [facility]    Comorbid conditions pertinent to this ED encounter:  Anxiety, depression, schizoaffective disorder, alcohol abuse      Differential Diagnosis includes but is not limited to:  Acute psychosis, schizoaffective disorder, anxiety, depression, metabolic encephalopathy, alcohol intoxication, medication

## 2024-07-29 NOTE — ED NOTES
I can overbook her for 2:30 tomorrow. Please let her know there may be a wait. I will not send antibiotics for her without a cause of infection. Reassure her that I looked at the chart and she was given appropriate antibiotics and her x-ray yesterday showed the left upper lobe pneumonia had cleared. Left lung base showed possible atelectasis.       Zach Tomlinson-LOVE Moss  [facility]       Provider notes:   Last CXR 6/13/21 - 1. Very low lung volumes with elevation left hemidiaphragm. Opacities at the  left lung base may reflect partial atelectasis however consolidative pneumonia  could also give this appearance. Please correlate with other clinical and  biochemical indicators for the presence of pneumonia. Follow-up imaging  suggested to show resolution.  - Treated 5/28/21 with oral antibiotics (Zpack & Augmentin)  - Treated 5/31/21 when admitted with IV rocephin and azithromycin    Triage delayed due to patient needing decontaminated d/t bed bugs.

## 2024-07-30 LAB
ANION GAP SERPL CALC-SCNC: 11 MEQ/L (ref 8–16)
BUN SERPL-MCNC: 7 MG/DL (ref 7–22)
CALCIUM SERPL-MCNC: 8.7 MG/DL (ref 8.5–10.5)
CHLORIDE SERPL-SCNC: 85 MEQ/L (ref 98–111)
CO2 SERPL-SCNC: 24 MEQ/L (ref 23–33)
CREAT SERPL-MCNC: 0.8 MG/DL (ref 0.4–1.2)
CREAT UR-MCNC: 41.5 MG/DL
DEPRECATED MEAN GLUCOSE BLD GHB EST-ACNC: 189 MG/DL (ref 70–126)
DEPRECATED RDW RBC AUTO: 35.6 FL (ref 35–45)
ERYTHROCYTE [DISTWIDTH] IN BLOOD BY AUTOMATED COUNT: 11.9 % (ref 11.5–14.5)
GFR SERPL CREATININE-BSD FRML MDRD: > 90 ML/MIN/1.73M2
GLUCOSE BLD STRIP.AUTO-MCNC: 279 MG/DL (ref 70–108)
GLUCOSE BLD STRIP.AUTO-MCNC: 292 MG/DL (ref 70–108)
GLUCOSE BLD STRIP.AUTO-MCNC: 327 MG/DL (ref 70–108)
GLUCOSE BLD STRIP.AUTO-MCNC: 336 MG/DL (ref 70–108)
GLUCOSE SERPL-MCNC: 269 MG/DL (ref 70–108)
HBA1C MFR BLD HPLC: 8.3 % (ref 4.4–6.4)
HCT VFR BLD AUTO: 34.1 % (ref 42–52)
HGB BLD-MCNC: 12.1 GM/DL (ref 14–18)
MAGNESIUM SERPL-MCNC: 2 MG/DL (ref 1.6–2.4)
MCH RBC QN AUTO: 29.2 PG (ref 26–33)
MCHC RBC AUTO-ENTMCNC: 35.5 GM/DL (ref 32.2–35.5)
MCV RBC AUTO: 82.4 FL (ref 80–94)
OSMOLALITY SERPL CALC.SUM OF ELEC: 249.6 MOSMOL/KG (ref 275–300)
OSMOLALITY SERPL: 260 MOSMOL/KG (ref 275–295)
OSMOLALITY UR: 123 MOSMOL/KG (ref 250–750)
PHOSPHATE SERPL-MCNC: 3.9 MG/DL (ref 2.4–4.7)
PLATELET # BLD AUTO: 176 THOU/MM3 (ref 130–400)
PMV BLD AUTO: 9.6 FL (ref 9.4–12.4)
POTASSIUM SERPL-SCNC: 4.6 MEQ/L (ref 3.5–5.2)
RBC # BLD AUTO: 4.14 MILL/MM3 (ref 4.7–6.1)
SODIUM SERPL-SCNC: 117 MEQ/L (ref 135–145)
SODIUM SERPL-SCNC: 117 MEQ/L (ref 135–145)
SODIUM SERPL-SCNC: 119 MEQ/L (ref 135–145)
SODIUM SERPL-SCNC: 120 MEQ/L (ref 135–145)
SODIUM SERPL-SCNC: 121 MEQ/L (ref 135–145)
SODIUM SERPL-SCNC: 121 MEQ/L (ref 135–145)
SODIUM SERPL-SCNC: 122 MEQ/L (ref 135–145)
WBC # BLD AUTO: 6.2 THOU/MM3 (ref 4.8–10.8)

## 2024-07-30 PROCEDURE — 84295 ASSAY OF SERUM SODIUM: CPT

## 2024-07-30 PROCEDURE — 85027 COMPLETE CBC AUTOMATED: CPT

## 2024-07-30 PROCEDURE — 83930 ASSAY OF BLOOD OSMOLALITY: CPT

## 2024-07-30 PROCEDURE — 2580000003 HC RX 258

## 2024-07-30 PROCEDURE — 83036 HEMOGLOBIN GLYCOSYLATED A1C: CPT

## 2024-07-30 PROCEDURE — 6370000000 HC RX 637 (ALT 250 FOR IP)

## 2024-07-30 PROCEDURE — 80048 BASIC METABOLIC PNL TOTAL CA: CPT

## 2024-07-30 PROCEDURE — 94640 AIRWAY INHALATION TREATMENT: CPT

## 2024-07-30 PROCEDURE — 83735 ASSAY OF MAGNESIUM: CPT

## 2024-07-30 PROCEDURE — 36415 COLL VENOUS BLD VENIPUNCTURE: CPT

## 2024-07-30 PROCEDURE — 90792 PSYCH DIAG EVAL W/MED SRVCS: CPT | Performed by: PSYCHIATRY & NEUROLOGY

## 2024-07-30 PROCEDURE — 2140000000 HC CCU INTERMEDIATE R&B

## 2024-07-30 PROCEDURE — 84100 ASSAY OF PHOSPHORUS: CPT

## 2024-07-30 PROCEDURE — 82948 REAGENT STRIP/BLOOD GLUCOSE: CPT

## 2024-07-30 PROCEDURE — 6360000002 HC RX W HCPCS

## 2024-07-30 RX ORDER — PANTOPRAZOLE SODIUM 20 MG/1
20 TABLET, DELAYED RELEASE ORAL DAILY
COMMUNITY

## 2024-07-30 RX ORDER — BUSPIRONE HYDROCHLORIDE 30 MG/1
30 TABLET ORAL 2 TIMES DAILY
COMMUNITY

## 2024-07-30 RX ORDER — TRAZODONE HYDROCHLORIDE 50 MG/1
50 TABLET ORAL NIGHTLY
COMMUNITY

## 2024-07-30 RX ORDER — SODIUM CHLORIDE 9 MG/ML
INJECTION, SOLUTION INTRAVENOUS CONTINUOUS
Status: DISCONTINUED | OUTPATIENT
Start: 2024-07-30 | End: 2024-07-31 | Stop reason: HOSPADM

## 2024-07-30 RX ORDER — INSULIN GLARGINE 100 [IU]/ML
40 INJECTION, SOLUTION SUBCUTANEOUS DAILY
Status: DISCONTINUED | OUTPATIENT
Start: 2024-07-31 | End: 2024-07-31 | Stop reason: HOSPADM

## 2024-07-30 RX ORDER — ZONISAMIDE 100 MG/1
200 CAPSULE ORAL 2 TIMES DAILY
COMMUNITY

## 2024-07-30 RX ORDER — ZONISAMIDE 100 MG/1
200 CAPSULE ORAL 2 TIMES DAILY
Status: DISCONTINUED | OUTPATIENT
Start: 2024-07-30 | End: 2024-07-31 | Stop reason: HOSPADM

## 2024-07-30 RX ORDER — AMLODIPINE BESYLATE 10 MG/1
10 TABLET ORAL DAILY
COMMUNITY

## 2024-07-30 RX ORDER — BENZTROPINE MESYLATE 1 MG/1
1 TABLET ORAL 2 TIMES DAILY
COMMUNITY

## 2024-07-30 RX ORDER — ARIPIPRAZOLE 15 MG/1
15 TABLET ORAL DAILY
COMMUNITY

## 2024-07-30 RX ORDER — ATORVASTATIN CALCIUM 10 MG/1
10 TABLET, FILM COATED ORAL DAILY
COMMUNITY

## 2024-07-30 RX ORDER — CHLORDIAZEPOXIDE HYDROCHLORIDE 5 MG/1
5 CAPSULE, GELATIN COATED ORAL 3 TIMES DAILY PRN
COMMUNITY

## 2024-07-30 RX ORDER — HYDROXYZINE PAMOATE 25 MG/1
50 CAPSULE ORAL ONCE
Status: COMPLETED | OUTPATIENT
Start: 2024-07-30 | End: 2024-07-30

## 2024-07-30 RX ADMIN — PHENOBARBITAL 32.4 MG: 32.4 TABLET ORAL at 08:40

## 2024-07-30 RX ADMIN — PHENOBARBITAL 32.4 MG: 32.4 TABLET ORAL at 17:09

## 2024-07-30 RX ADMIN — CETIRIZINE HYDROCHLORIDE 10 MG: 10 TABLET, FILM COATED ORAL at 10:20

## 2024-07-30 RX ADMIN — INSULIN LISPRO 6 UNITS: 100 INJECTION, SOLUTION INTRAVENOUS; SUBCUTANEOUS at 11:47

## 2024-07-30 RX ADMIN — PANTOPRAZOLE SODIUM 40 MG: 40 TABLET, DELAYED RELEASE ORAL at 05:05

## 2024-07-30 RX ADMIN — ZONISAMIDE 200 MG: 100 CAPSULE ORAL at 20:05

## 2024-07-30 RX ADMIN — VORTIOXETINE 10 MG: 10 TABLET, FILM COATED ORAL at 10:20

## 2024-07-30 RX ADMIN — ENOXAPARIN SODIUM 30 MG: 100 INJECTION SUBCUTANEOUS at 20:05

## 2024-07-30 RX ADMIN — SODIUM CHLORIDE: 9 INJECTION, SOLUTION INTRAVENOUS at 17:12

## 2024-07-30 RX ADMIN — DEXTROSE MONOHYDRATE: 100 INJECTION, SOLUTION INTRAVENOUS at 10:49

## 2024-07-30 RX ADMIN — TRAZODONE HYDROCHLORIDE 50 MG: 50 TABLET ORAL at 20:05

## 2024-07-30 RX ADMIN — TAMSULOSIN HYDROCHLORIDE 0.4 MG: 0.4 CAPSULE ORAL at 14:05

## 2024-07-30 RX ADMIN — OXCARBAZEPINE 750 MG: 300 TABLET, FILM COATED ORAL at 08:43

## 2024-07-30 RX ADMIN — FOLIC ACID 1 MG: 1 TABLET ORAL at 08:40

## 2024-07-30 RX ADMIN — ATORVASTATIN CALCIUM 10 MG: 10 TABLET, FILM COATED ORAL at 14:05

## 2024-07-30 RX ADMIN — METOPROLOL SUCCINATE 50 MG: 50 TABLET, EXTENDED RELEASE ORAL at 08:40

## 2024-07-30 RX ADMIN — ARIPIPRAZOLE 15 MG: 15 TABLET ORAL at 14:05

## 2024-07-30 RX ADMIN — ENOXAPARIN SODIUM 30 MG: 100 INJECTION SUBCUTANEOUS at 08:41

## 2024-07-30 RX ADMIN — AMLODIPINE BESYLATE 5 MG: 5 TABLET ORAL at 08:39

## 2024-07-30 RX ADMIN — INSULIN GLARGINE 30 UNITS: 100 INJECTION, SOLUTION SUBCUTANEOUS at 08:51

## 2024-07-30 RX ADMIN — PHENOBARBITAL 32.4 MG: 32.4 TABLET ORAL at 11:47

## 2024-07-30 RX ADMIN — BUSPIRONE HYDROCHLORIDE 15 MG: 7.5 TABLET ORAL at 02:58

## 2024-07-30 RX ADMIN — HYDROXYZINE PAMOATE 50 MG: 25 CAPSULE ORAL at 20:05

## 2024-07-30 RX ADMIN — HYDROXYZINE PAMOATE 50 MG: 25 CAPSULE ORAL at 06:33

## 2024-07-30 RX ADMIN — Medication 100 MG: at 08:40

## 2024-07-30 RX ADMIN — SODIUM CHLORIDE, PRESERVATIVE FREE 10 ML: 5 INJECTION INTRAVENOUS at 08:39

## 2024-07-30 RX ADMIN — BUSPIRONE HYDROCHLORIDE 15 MG: 7.5 TABLET ORAL at 08:40

## 2024-07-30 RX ADMIN — INSULIN LISPRO 6 UNITS: 100 INJECTION, SOLUTION INTRAVENOUS; SUBCUTANEOUS at 17:08

## 2024-07-30 RX ADMIN — PHENOBARBITAL 32.4 MG: 32.4 TABLET ORAL at 20:05

## 2024-07-30 RX ADMIN — ASPIRIN 81 MG: 81 TABLET, COATED ORAL at 08:39

## 2024-07-30 RX ADMIN — FLUTICASONE PROPIONATE 2 PUFF: 110 AEROSOL, METERED RESPIRATORY (INHALATION) at 18:04

## 2024-07-30 RX ADMIN — BENZTROPINE MESYLATE 0.5 MG: 1 TABLET ORAL at 20:05

## 2024-07-30 RX ADMIN — INSULIN LISPRO 2 UNITS: 100 INJECTION, SOLUTION INTRAVENOUS; SUBCUTANEOUS at 08:40

## 2024-07-30 RX ADMIN — OXCARBAZEPINE 750 MG: 300 TABLET, FILM COATED ORAL at 02:58

## 2024-07-30 RX ADMIN — LOSARTAN POTASSIUM 100 MG: 100 TABLET, FILM COATED ORAL at 08:52

## 2024-07-30 RX ADMIN — Medication 1 TABLET: at 08:40

## 2024-07-30 ASSESSMENT — PATIENT HEALTH QUESTIONNAIRE - PHQ9
1. LITTLE INTEREST OR PLEASURE IN DOING THINGS: NEARLY EVERY DAY
5. POOR APPETITE OR OVEREATING: NEARLY EVERY DAY
8. MOVING OR SPEAKING SO SLOWLY THAT OTHER PEOPLE COULD HAVE NOTICED. OR THE OPPOSITE, BEING SO FIGETY OR RESTLESS THAT YOU HAVE BEEN MOVING AROUND A LOT MORE THAN USUAL: SEVERAL DAYS
2. FEELING DOWN, DEPRESSED OR HOPELESS: NEARLY EVERY DAY
9. THOUGHTS THAT YOU WOULD BE BETTER OFF DEAD, OR OF HURTING YOURSELF: NOT AT ALL
SUM OF ALL RESPONSES TO PHQ9 QUESTIONS 1 & 2: 6
SUM OF ALL RESPONSES TO PHQ QUESTIONS 1-9: 17
6. FEELING BAD ABOUT YOURSELF - OR THAT YOU ARE A FAILURE OR HAVE LET YOURSELF OR YOUR FAMILY DOWN: NOT AT ALL
3. TROUBLE FALLING OR STAYING ASLEEP: MORE THAN HALF THE DAYS
SUM OF ALL RESPONSES TO PHQ QUESTIONS 1-9: 17
4. FEELING TIRED OR HAVING LITTLE ENERGY: NEARLY EVERY DAY
7. TROUBLE CONCENTRATING ON THINGS, SUCH AS READING THE NEWSPAPER OR WATCHING TELEVISION: MORE THAN HALF THE DAYS

## 2024-07-30 ASSESSMENT — LIFESTYLE VARIABLES
HOW OFTEN DO YOU HAVE A DRINK CONTAINING ALCOHOL: 4 OR MORE TIMES A WEEK
HOW MANY STANDARD DRINKS CONTAINING ALCOHOL DO YOU HAVE ON A TYPICAL DAY: 5 OR 6

## 2024-07-30 ASSESSMENT — PAIN SCALES - GENERAL: PAINLEVEL_OUTOF10: 0

## 2024-07-30 NOTE — PLAN OF CARE
Problem: Discharge Planning  Goal: Discharge to home or other facility with appropriate resources  7/30/2024 0905 by Stephenie Le RN  Outcome: Progressing  Flowsheets (Taken 7/29/2024 2226 by Kiana Jackson, RN)  Discharge to home or other facility with appropriate resources:   Identify barriers to discharge with patient and caregiver   Identify discharge learning needs (meds, wound care, etc)  Note: He is from home alone and plans on returning there at discharge, although much of the time he stays at his girlfriend's house.    7/30/2024 0039 by Kiana Jackson, RN  Outcome: Progressing  Flowsheets  Taken 7/29/2024 2226  Discharge to home or other facility with appropriate resources:   Identify barriers to discharge with patient and caregiver   Identify discharge learning needs (meds, wound care, etc)  Taken 7/29/2024 2145  Discharge to home or other facility with appropriate resources: Identify barriers to discharge with patient and caregiver     Problem: Safety - Adult  Goal: Free from fall injury  7/30/2024 0905 by Stephenie Le RN  Outcome: Progressing  Flowsheets (Taken 7/30/2024 0039 by Kiana Jackson, RN)  Free From Fall Injury: Instruct family/caregiver on patient safety  Note: Bed locked & in low position, call light in reach, side-rails up x2, bed/chair alarm utilized, non-slip socks on when ambulating, reminded patient to use call light to call for assistance.    7/30/2024 0039 by Kiana Jackson, RN  Outcome: Progressing  Flowsheets (Taken 7/30/2024 0039)  Free From Fall Injury: Instruct family/caregiver on patient safety     Problem: ABCDS Injury Assessment  Goal: Absence of physical injury  7/30/2024 0905 by Stephenie Le RN  Outcome: Progressing  Flowsheets (Taken 7/30/2024 0039 by Kiana Jackson, RN)  Absence of Physical Injury: Implement safety measures based on patient assessment  Note: Bed locked & in low position, call light in reach, side-rails up x2, bed/chair alarm

## 2024-07-30 NOTE — CARE COORDINATION
7/30/24, 11:10 AM EDT    DISCHARGE PLANNING EVALUATION    Received Social Work consult “For consideration of Rehab”.  Addiction/PADMAJA  consulted.   met with patient, following for needs.  Full Social Consult deferred.

## 2024-07-30 NOTE — ACP (ADVANCE CARE PLANNING)
Advance Care Planning     Advance Care Planning Inpatient Note  [facility] Department    Today's Date: 7/30/2024  Unit: [facility]    Received request from HealthCare Provider.  Upon review of chart and communication with care team, patient's decision making abilities are not in question.. Patient was/were present in the room during visit.    Goals of ACP Conversation:  Discuss advance care planning documents  Facilitate a discussion related to patient's goals of care as they align with the patient's values and beliefs.    Health Care Decision Makers:     No healthcare decision makers have been documented.  Click here to complete HealthCare Decision Makers including selection of the Healthcare Decision Maker Relationship (ie \"Primary\")  Summary:  Updated Healthcare Decision Maker    Advance Care Planning Documents (Patient Wishes):  Healthcare Power of /Advance Directive Appointment of Health Care Agent     Assessment:  The patient was assisted in understanding ACP documents. The patient did not complete the documents at this time. The patient did not have any legal documents at this time and thus agents were corrected and authorities were removed until legal paperwork is provided.     Interventions:  Requested patient/family to submit existing document for our records: Healthcare Power of /Advance Directive Appointment of Health Care Agent  Provided education on documents for clarity and greater understanding  Discussed and provided education on state decision maker hierarchy  Encouraged ongoing ACP conversation with future decision makers and loved ones  Reviewed but did not complete ACP document    Care Preferences Communicated:   No    Outcomes/Plan:  ACP Discussion: Completed  Teach Back Method used to verify the patient's and/or Healthcare Decision Maker's understanding of key information in the advance directive documents    Electronically signed by Chaplain Tong on  7/30/2024 at 4:29 PM

## 2024-07-30 NOTE — CONSULTS
Department of Psychiatry   Psychiatric Assessment      Thank you very much for allowing us to participate in the care of this patient.      Reason for Consult: Auditory and visual hallucinations, passive suicidal ideation    HISTORY OF PRESENT ILLNESS:       The patient is a 54 y.o. male with significant history of schizoaffective disorder and depression who is admitted medically for severe hyponatremia secondary to alcohol abuse.     Mr. Faye reports a 13-year history of depression, for which he has trialed several medications.  He notes 2 years ago, being seen at a crisis center for new onset of auditory and visual hallucinations.  He was started on antipsychotics at the time and followed with Elia Cormier NP every 3 months.  Per patient, he is due for his next Aristada injection.  He notes not being compliant with his psychiatric meds and often skips doses.     Patient admits to at least 2-month history of poor medication compliance with concurrent gradually worsening auditory hallucinations.  He is unable to describe his hallucinations, other than feeling \"paranoid\".  He denies any suicidal ideation.  He has noted an increase in alcohol use (4-6, 24 ounce beer bottles a day) which he notes has helped the hallucinations.     PSYCHIATRIC HISTORY:      Outpatient psychiatric provider:  Elia Cormier NP  Suicide attempts: N/A  Inpatient psychiatric admissions: N/A    Past psychiatric medications includes:   -Medication reconciliation required today. Pt has had many prior medications.   -Per [facility] records, on Abilify, Buspirone, Aristada 882 mg/3.2 mL (due for next injection).    Adverse reactions from psychotropic medications:  -Vilazodone (nausea, vomiting)     Lifetime Psychiatric Review of Systems         Obsessions and Compulsions: Denies       Keli or Hypomania: Denies     Hallucinations: Denies     Panic Attacks:  Denies     Delusions:  Denies     Phobias:  Denies     Trauma: Denies    Prior to Admission  grooming  Behavior/Motor:  no abnormalities noted  Attitude toward examiner:  cooperative, attentive and good eye contact  Speech:  Spontaneous, normal rate and volume  Mood: Flat affect  Affect: mood congruent  Thought processes:  Linear, goal directed, coherent  Thought content: Denies suicidal ideations   Denies homicidal ideations    Denies hallucinations   Denies delusions  Cognition:  Oriented to self, situation, location, date  Concentration clinically adequate  Memory age appropriate  Insight & Judgment:  fair    DSM-5 DIAGNOSIS:  Schizoaffective disorder, depressive type  Alcohol withdrawal    Stressors     Severity of stressors is moderate; patient notes being worried about his general and mental health.  Source of stressors include:  Other psychosocial and environmental stressors    PLAN:      Schizoaffective disorder, depressive type  - Recommend increase home Abilify to 15 mg  - Continue Cogentin, Atarax  - Pt was not taking trazodone at home, was discontinued today.   - Per [facility] records, patient has not filled prescription for Risperdal.  Can d/c as he has not been taking this at home.  - Patient is due for his monthly Aristada.  Per pharmacy, he cannot get this inpatient due to cost.  He will need to schedule follow-up with [facility] for his next dose at discharge.    Severe hyponatremia in setting of alcohol withdrawal  - Given patient's hyponatremia, we will recommend discontinuing BuSpar, Prozac, Trintellix, Trileptal.  - Should there be a concern of withdrawal seizures, recommend neuro consult, possible Depakote if replacement of Trileptal is needed. He is currently on Zonergan.    Additional recommendations will follow the clinical course.     Thank you very much for allowing us to participate in the care of this patient.  Time spent 30 min.      Electronically signed by Salina Smith DO on 7/30/24 at 8:33 AM EDT

## 2024-07-30 NOTE — PROGRESS NOTES
Hospitalist Progress Note  Internal Medicine Resident      Patient: Maverick Cruz 54 y.o. male      Unit/Bed: 3B-21/021-A    Admit Date: 7/29/2024      ASSESSMENT AND PLAN  Problems  #Acute hyponatremia 2/2 beer potomania  Baseline -132. Initial sodium 117. Serum Osm: 260 Urine Osm 123 Urina Na<20 .  Repeat   -Continue 1.8 L fluid restriction  -Sodium every 2 hours checks  -Restart fluids, normal saline at 50 mL/h  -Strict I/os    #HAGMA  AG 17, bicarb 20, beta hydroxybutyrate 7. was likely due to starvation.    #Alcohol use disorder  No as seizure, tremors, DT history related to alcohol withdrawal. Drinks 6 x 24 ounce beers daily. Last drink 10:30 AM 7/29/2024. EtOH 0.04 7/30/2024.  Given-Multivitamin, folic acid, B1 on admission.  -on phenobarb protocol  -Continue seizure precaution  -Patient addiction services and social work consulted    #Hallucinations  #Personal history of ADHD, anxiety, depression, so affective disorder  Auditory> visual hallucinations the past 2 years but worsening the past few months. Passive suicidal ideation. No family history of schizophrenia.  -Home medications: Klonopin 1 mg twice daily, duloxetine, Trintellix 10 mg daily benzatropine 0.5 mg twice daily, Aristada, Abilify 10 mg daily, risperidone 1 mg twice daily, BuSpar 15 mg 3 times daily, Prozac 20 mg 3 times daily, trazodone 150 mg nightly, Vistaril 50 mg nightly  -Follows with Kevin leon CNP at [facility]  -Psych consulted. Patient diagnosed with schizoaffective disorder, depressive type.  Increased Abilify to 15 mg. Recommended continuation of Cogentin and Atarax.  Trazodone and risperdal discontinued as patient was not taking. Patient due for his Aristada but patient unable to get it due to cost.    #Complex partial seizures with secondary generalization  Last seizure July 2021 from low blood sugar  2 seizure-like events since January 2022 associated with hypoglycemia  On home Trileptal 300 mg twice daily,

## 2024-07-30 NOTE — CARE COORDINATION
Case Management Assessment Initial Evaluation    Date/Time of Evaluation: 7/30/2024 11:36 AM  Assessment Completed by: Liza Gallegos RN    If patient is discharged prior to next notation, then this note serves as note for discharge by case management.    Patient Name: Maverick Cruz                   YOB: 1970  Diagnosis: Hyponatremia [E87.1]  Schizoaffective disorder, unspecified type (HCC) [F25.9]                   Date / Time: 7/29/2024  5:34 PM  Location: [address] [place] [address]     Patient Admission Status: Inpatient   Readmission Risk Low 0-14, Mod 15-19), High > 20: Readmission Risk Score: 10    Current PCP: Alex Henao MD  Health Care Decision Makers:   Primary Decision Maker: MikelKaila - Brother/Sister - [phone]    Secondary Decision Maker: Dalton Loyd - Aunt/Uncle - [phone]    Supplemental (Other) Decision Maker: Isha Taylor - Girlfriend - [phone]    Additional Case Management Notes: Admitted through ED with auditory and visual hallucinations. Pt has history of depression, anxiety, ADHD and Schizoaffective disorder. Found to have sodium level of 117 upon arrival. Drinks lots of fluid daily. Sodium up to 121 today. Alcohol level of 0.04 upon arrival. Drug screen negative. Phenobarb prn. Consulted Psych and Addiction Services.     Procedures: None    Imaging: None    Patient Goals/Plan/Treatment Preferences: Spoke with pt. He lives at home alone in his own apartment, but goes back and forth from his and his girlfriends apartments. Monitor for needs.        07/30/24 0910   Service Assessment   Patient Orientation Alert and Oriented   Cognition Alert   History Provided By Patient   Primary Caregiver Self   Accompanied By/Relationship n/a   Support Systems Spouse/Significant Other;Children;Family Members   Patient's Healthcare Decision Maker is: Legal Next of Kin  (Spiritual Care consulted)   PCP Verified by CM Yes   Last Visit to PCP Within last 3 months   Prior

## 2024-07-30 NOTE — PROGRESS NOTES
Pharmacy Medication History Note      List of current medications patient is taking is complete.    [facility]    Changes made to medication list:  Medications removed (include reason, ex. therapy complete or physician discontinued):  Amlodipine 5 mg -  and wrong strength  Atorvastatin 40 mg-  and wrong strength  Budesonide-formoterol 160-4.5 -   Buspirone 15 mg - wrong strength and directions  Clonazepam 1 mg - last filled 23  Clopidogrel 75 mg -  and last fill 10/22  Fluoxetine 20 mg -  and last fill   Hydroxyzine 50 mg -  and last fill   Loratadine 10 mg -   Mometasone 220 mcg inhaler- last fill 2016  Nitroglycerin 0.4 mg SL -    Pantoprazole 40 mg - wrong strength  Tiotropium 2.5 mcg inhaler-   Trazodone 150 mg -  and wrong strength  Zonisamide 100 mg -  and wrong directions    Medications added/doses adjusted:  Added  Amlodipine 10 mg take 1 tablet daily  Atorvastatin 10 mg take 1 tablet daily  Buspirone 30 mg take 1 tablet 2 times daily  Pantoprazole 20 mg take 1 tablet daily  Trazodone 50 mg take 1 tablet nightly  Zonisamide 100 mg take 2 capsules 2 times daily  Aripiprazole 15 mg take 1 tablet daily  Chlordiazepoxide 5 mg take 1 capsule 3 times daily as needed  Benztropine 1 mg take 1 tablet 2 times daily  Adjusted  Tresiba flextouch 200 units/ml- adjusted directions to inject 40 units daily.     Other notes (ex. Recent course of antibiotics, Coumadin dosing):  Patient hasn't filled isosorbide 30 mg since   Denies use of other OTC or herbal medications.    Allergies reviewed    Electronically signed by Laurel Garay on 2024 at 10:58 AM

## 2024-07-30 NOTE — PLAN OF CARE
Problem: Discharge Planning  Goal: Discharge to home or other facility with appropriate resources  Outcome: Progressing  Flowsheets (Taken 7/29/2024 2226)  Discharge to home or other facility with appropriate resources:   Identify barriers to discharge with patient and caregiver   Identify discharge learning needs (meds, wound care, etc)     Problem: Safety - Adult  Goal: Free from fall injury  Outcome: Progressing  Flowsheets (Taken 7/30/2024 0039)  Free From Fall Injury: Instruct family/caregiver on patient safety     Problem: ABCDS Injury Assessment  Goal: Absence of physical injury  Outcome: Progressing  Flowsheets (Taken 7/30/2024 0039)  Absence of Physical Injury: Implement safety measures based on patient assessment     Problem: Risk for Elopement  Goal: Patient will not exit the unit/facility without proper excort  Outcome: Progressing  Flowsheets  Taken 7/30/2024 0039  Nursing Interventions for Elopement Risk: Assist with personal care needs such as toileting, eating, dressing, as needed to reduce the risk of wandering  Taken 7/29/2024 2226  Nursing Interventions for Elopement Risk: Communicate/escalate to /other team member the risk of elopement

## 2024-07-30 NOTE — ED NOTES
Pt is resting in bed with girlfriend at bedside, they both ask for coffee. Pt is breathing regular and unlabored on room air. Pt states he thought he saw a bug on his lap, girlfriend states there was no bug there. Pt has no signs of distress to note at this time. Call light within reach.

## 2024-07-30 NOTE — ED NOTES
ED to inpatient nurses report      Chief Complaint:  Chief Complaint   Patient presents with    Mental Health Problem     Present to ED from: home    MOA:     LOC: alert and orientated to name, place, date  Mobility: Independent  Oxygen Baseline: ra    Current needs required: ra     Code Status:   Prior    What abnormal results were found and what did you give/do to treat them? Hyponatremia  Any procedures or intervention occur? See MAR    Mental Status:  Level of Consciousness: Alert (0)    Psych Assessment:        Vitals:  Patient Vitals for the past 24 hrs:   BP Temp Temp src Pulse Resp SpO2 Height Weight   07/29/24 1952 -- -- -- (!) 104 -- 98 % -- --   07/29/24 1918 138/71 -- -- 99 -- 96 % -- --   07/29/24 1848 (!) 137/90 -- -- (!) 105 18 96 % -- --   07/29/24 1845 (!) 137/90 -- -- (!) 105 -- -- -- --   07/29/24 1839 (!) 150/85 -- -- (!) 107 16 93 % -- --   07/29/24 1756 (!) 171/90 97.7 °F (36.5 °C) Oral (!) 108 16 97 % 1.88 m (6' 2\") 120.2 kg (265 lb)        LDAs:   Peripheral IV 07/29/24 Posterior;Right Forearm (Active)   Site Assessment Clean, dry & intact 07/29/24 1950   Line Status Flushed;Blood return noted;Infusing 07/29/24 1950   Phlebitis Assessment No symptoms 07/29/24 1950   Infiltration Assessment 0 07/29/24 1950   Dressing Status Clean, dry & intact 07/29/24 1950       Ambulatory Status:  No data recorded    Diagnosis:  DISPOSITION Decision To Admit 07/29/2024 07:57:36 PM   Final diagnoses:   Schizoaffective disorder, unspecified type (HCC)   Hyponatremia        Consults:  None     Pain Score:  Pain Assessment  Pain Assessment: None - Denies Pain    C-SSRS:   Risk of Suicide: Low Risk    Sepsis Screening:       Kaleigh Fall Risk:       Swallow Screening        Preferred Language:   English      ALLERGIES     Tetracyclines & related, Fish-derived products, Flagyl [metronidazole], Latuda [lurasidone hcl], Morphine, and Vilazodone hcl    SURGICAL HISTORY       Past Surgical History:   Procedure

## 2024-07-30 NOTE — FLOWSHEET NOTE
Utilize [facility] alcohol withdrawal scale (Based on Omari Modified Alcohol Withdrawal Scale).  Tabulate score based on classifications including Tremor, Sweating, Hallucination, Orientation, and Agitation.    Tremor: 1  Sweatin  Hallucinations: 1  Orientation: 0  Agitation: 0  Total Score: 3  Action perform as described below     Tremor:  No tremor is 0 points.  Tremor on movement is 1 point.  Tremor at rest is 2 points.  Sweating: No Sweat 0 points. Moist is 1 point.  Drenching sweats is 2 points.  Hallucinations: No present 0 points. Dissuadable is 1 point. Not dissuadable is 2 points.  Orientation: Oriented 0 points. Vague/detached 1 point. Disoriented/no contact 2 points.  Agitation: Calm 0 points.  Anxious 1 point. Panicky 2 points.    Check scale every 2 hours.  Discontinue scoring with 4 consecutive scorings of 0.  Scale 0: No phenobarbital given.  Re-assess every 60 minutes as needed.   Scale 1-3: Phenobarbital 130 mg IV over 3 minutes. Re-assess every 60 minutes as needed.  May administer every 60 minutes to a maximum dose of phenobarbital 1040 mg in 24 hours!  Scale 4-8: Phenobarbital 260 mg IV over 5 minutes.  Re-assess every 60 minutes as needed. May administer every 60 minutes to a maximum dose of phenobarbital 1040mg in 24 hours!  Scale 9-10: Transfer to ICU (if not already in ICU).  Administer 10mg/kg phenobarbital IV over 60 minutes.  Maximum dose phenobarbital is 1040mg in 24 hours!

## 2024-07-30 NOTE — PROGRESS NOTES
See ACP note     07/30/24 1627   Encounter Summary   Encounter Overview/Reason Advance Care Planning   Service Provided For Patient   Referral/Consult From Nurse   Support System Unknown   Last Encounter  07/30/24   Complexity of Encounter High   Begin Time 1325   End Time  1345   Total Time Calculated 20 min   Advance Care Planning   Type ACP conversation   Assessment/Intervention/Outcome   Assessment Coping   Intervention Active listening;Empowerment   Outcome Encouraged

## 2024-07-31 VITALS
HEIGHT: 74 IN | HEART RATE: 73 BPM | DIASTOLIC BLOOD PRESSURE: 61 MMHG | SYSTOLIC BLOOD PRESSURE: 111 MMHG | BODY MASS INDEX: 31.89 KG/M2 | TEMPERATURE: 98.2 F | WEIGHT: 248.46 LBS | RESPIRATION RATE: 18 BRPM | OXYGEN SATURATION: 98 %

## 2024-07-31 LAB
ANION GAP SERPL CALC-SCNC: 8 MEQ/L (ref 8–16)
BUN SERPL-MCNC: 7 MG/DL (ref 7–22)
CALCIUM SERPL-MCNC: 8.8 MG/DL (ref 8.5–10.5)
CHLORIDE SERPL-SCNC: 89 MEQ/L (ref 98–111)
CO2 SERPL-SCNC: 27 MEQ/L (ref 23–33)
CREAT SERPL-MCNC: 0.8 MG/DL (ref 0.4–1.2)
DEPRECATED RDW RBC AUTO: 37 FL (ref 35–45)
ERYTHROCYTE [DISTWIDTH] IN BLOOD BY AUTOMATED COUNT: 12.1 % (ref 11.5–14.5)
GFR SERPL CREATININE-BSD FRML MDRD: > 90 ML/MIN/1.73M2
GLUCOSE BLD STRIP.AUTO-MCNC: 272 MG/DL (ref 70–108)
GLUCOSE BLD STRIP.AUTO-MCNC: 323 MG/DL (ref 70–108)
GLUCOSE SERPL-MCNC: 342 MG/DL (ref 70–108)
HCT VFR BLD AUTO: 38.4 % (ref 42–52)
HGB BLD-MCNC: 13 GM/DL (ref 14–18)
MAGNESIUM SERPL-MCNC: 2 MG/DL (ref 1.6–2.4)
MCH RBC QN AUTO: 28.8 PG (ref 26–33)
MCHC RBC AUTO-ENTMCNC: 33.9 GM/DL (ref 32.2–35.5)
MCV RBC AUTO: 85.1 FL (ref 80–94)
PHOSPHATE SERPL-MCNC: 3.9 MG/DL (ref 2.4–4.7)
PLATELET # BLD AUTO: 161 THOU/MM3 (ref 130–400)
PMV BLD AUTO: 9.5 FL (ref 9.4–12.4)
POTASSIUM SERPL-SCNC: 4.4 MEQ/L (ref 3.5–5.2)
RBC # BLD AUTO: 4.51 MILL/MM3 (ref 4.7–6.1)
SODIUM SERPL-SCNC: 122 MEQ/L (ref 135–145)
SODIUM SERPL-SCNC: 124 MEQ/L (ref 135–145)
WBC # BLD AUTO: 5.8 THOU/MM3 (ref 4.8–10.8)

## 2024-07-31 PROCEDURE — 83735 ASSAY OF MAGNESIUM: CPT

## 2024-07-31 PROCEDURE — 80048 BASIC METABOLIC PNL TOTAL CA: CPT

## 2024-07-31 PROCEDURE — 85027 COMPLETE CBC AUTOMATED: CPT

## 2024-07-31 PROCEDURE — 6370000000 HC RX 637 (ALT 250 FOR IP)

## 2024-07-31 PROCEDURE — 76937 US GUIDE VASCULAR ACCESS: CPT

## 2024-07-31 PROCEDURE — 99223 1ST HOSP IP/OBS HIGH 75: CPT | Performed by: NURSE PRACTITIONER

## 2024-07-31 PROCEDURE — 84100 ASSAY OF PHOSPHORUS: CPT

## 2024-07-31 PROCEDURE — 82948 REAGENT STRIP/BLOOD GLUCOSE: CPT

## 2024-07-31 PROCEDURE — C1751 CATH, INF, PER/CENT/MIDLINE: HCPCS

## 2024-07-31 PROCEDURE — 36410 VNPNXR 3YR/> PHY/QHP DX/THER: CPT

## 2024-07-31 PROCEDURE — 94761 N-INVAS EAR/PLS OXIMETRY MLT: CPT

## 2024-07-31 PROCEDURE — 05H933Z INSERTION OF INFUSION DEVICE INTO RIGHT BRACHIAL VEIN, PERCUTANEOUS APPROACH: ICD-10-PCS | Performed by: STUDENT IN AN ORGANIZED HEALTH CARE EDUCATION/TRAINING PROGRAM

## 2024-07-31 PROCEDURE — 99239 HOSP IP/OBS DSCHRG MGMT >30: CPT | Performed by: INTERNAL MEDICINE

## 2024-07-31 PROCEDURE — 36415 COLL VENOUS BLD VENIPUNCTURE: CPT

## 2024-07-31 PROCEDURE — 6360000002 HC RX W HCPCS

## 2024-07-31 PROCEDURE — 94640 AIRWAY INHALATION TREATMENT: CPT

## 2024-07-31 RX ORDER — SODIUM CHLORIDE 0.9 % (FLUSH) 0.9 %
5-40 SYRINGE (ML) INJECTION PRN
Status: DISCONTINUED | OUTPATIENT
Start: 2024-07-31 | End: 2024-07-31 | Stop reason: HOSPADM

## 2024-07-31 RX ORDER — SODIUM CHLORIDE 9 MG/ML
INJECTION, SOLUTION INTRAVENOUS PRN
Status: DISCONTINUED | OUTPATIENT
Start: 2024-07-31 | End: 2024-07-31

## 2024-07-31 RX ORDER — SODIUM CHLORIDE 0.9 % (FLUSH) 0.9 %
5-40 SYRINGE (ML) INJECTION EVERY 12 HOURS SCHEDULED
Status: DISCONTINUED | OUTPATIENT
Start: 2024-07-31 | End: 2024-07-31 | Stop reason: HOSPADM

## 2024-07-31 RX ORDER — SODIUM CHLORIDE 0.9 % (FLUSH) 0.9 %
5-40 SYRINGE (ML) INJECTION EVERY 12 HOURS SCHEDULED
Status: DISCONTINUED | OUTPATIENT
Start: 2024-07-31 | End: 2024-07-31

## 2024-07-31 RX ORDER — LEVETIRACETAM 500 MG/1
1500 TABLET ORAL 2 TIMES DAILY
Status: DISCONTINUED | OUTPATIENT
Start: 2024-07-31 | End: 2024-07-31 | Stop reason: HOSPADM

## 2024-07-31 RX ORDER — SODIUM CHLORIDE 9 MG/ML
INJECTION, SOLUTION INTRAVENOUS PRN
Status: DISCONTINUED | OUTPATIENT
Start: 2024-07-31 | End: 2024-07-31 | Stop reason: HOSPADM

## 2024-07-31 RX ADMIN — ATORVASTATIN CALCIUM 10 MG: 10 TABLET, FILM COATED ORAL at 08:23

## 2024-07-31 RX ADMIN — Medication 1 TABLET: at 08:23

## 2024-07-31 RX ADMIN — Medication 100 MG: at 08:23

## 2024-07-31 RX ADMIN — TAMSULOSIN HYDROCHLORIDE 0.4 MG: 0.4 CAPSULE ORAL at 08:23

## 2024-07-31 RX ADMIN — INSULIN LISPRO 6 UNITS: 100 INJECTION, SOLUTION INTRAVENOUS; SUBCUTANEOUS at 08:29

## 2024-07-31 RX ADMIN — METOPROLOL SUCCINATE 50 MG: 50 TABLET, EXTENDED RELEASE ORAL at 08:25

## 2024-07-31 RX ADMIN — FOLIC ACID 1 MG: 1 TABLET ORAL at 08:23

## 2024-07-31 RX ADMIN — CETIRIZINE HYDROCHLORIDE 10 MG: 10 TABLET, FILM COATED ORAL at 08:23

## 2024-07-31 RX ADMIN — TIOTROPIUM BROMIDE INHALATION SPRAY 2 PUFF: 3.12 SPRAY, METERED RESPIRATORY (INHALATION) at 08:12

## 2024-07-31 RX ADMIN — ASPIRIN 81 MG: 81 TABLET, COATED ORAL at 08:23

## 2024-07-31 RX ADMIN — ENOXAPARIN SODIUM 30 MG: 100 INJECTION SUBCUTANEOUS at 08:23

## 2024-07-31 RX ADMIN — AMLODIPINE BESYLATE 5 MG: 5 TABLET ORAL at 08:23

## 2024-07-31 RX ADMIN — LOSARTAN POTASSIUM 100 MG: 100 TABLET, FILM COATED ORAL at 08:23

## 2024-07-31 RX ADMIN — FLUTICASONE PROPIONATE 2 PUFF: 110 AEROSOL, METERED RESPIRATORY (INHALATION) at 08:12

## 2024-07-31 RX ADMIN — ZONISAMIDE 200 MG: 100 CAPSULE ORAL at 08:23

## 2024-07-31 RX ADMIN — BENZTROPINE MESYLATE 0.5 MG: 1 TABLET ORAL at 08:23

## 2024-07-31 RX ADMIN — INSULIN GLARGINE 40 UNITS: 100 INJECTION, SOLUTION SUBCUTANEOUS at 08:29

## 2024-07-31 RX ADMIN — PANTOPRAZOLE SODIUM 40 MG: 40 TABLET, DELAYED RELEASE ORAL at 09:05

## 2024-07-31 RX ADMIN — INSULIN LISPRO 4 UNITS: 100 INJECTION, SOLUTION INTRAVENOUS; SUBCUTANEOUS at 13:31

## 2024-07-31 RX ADMIN — ARIPIPRAZOLE 15 MG: 15 TABLET ORAL at 08:23

## 2024-07-31 RX ADMIN — PHENOBARBITAL 32.4 MG: 32.4 TABLET ORAL at 09:05

## 2024-07-31 ASSESSMENT — ENCOUNTER SYMPTOMS
COUGH: 1
ABDOMINAL PAIN: 0
RHINORRHEA: 0
SORE THROAT: 0
VOMITING: 0
NAUSEA: 0
SHORTNESS OF BREATH: 0

## 2024-07-31 NOTE — RT PROTOCOL NOTE
RT Inhaler-Nebulizer Bronchodilator Protocol Note    There is a bronchodilator order in the chart from a provider indicating to follow the RT Bronchodilator Protocol and there is an “Initiate RT Inhaler-Nebulizer Bronchodilator Protocol” order as well (see protocol at bottom of note).    CXR Findings:  No results found.    The findings from the last RT Protocol Assessment were as follows:   History Pulmonary Disease: Chronic pulmonary disease  Respiratory Pattern: Regular pattern and RR 12-20 bpm  Breath Sounds: Clear breath sounds  Cough: Strong, spontaneous, non-productive  Indication for Bronchodilator Therapy: On home bronchodilators  Bronchodilator Assessment Score: 2    Aerosolized bronchodilator medication orders have been revised according to the RT Inhaler-Nebulizer Bronchodilator Protocol below.    Respiratory Therapist to perform RT Therapy Protocol Assessment initially then follow the protocol.  Repeat RT Therapy Protocol Assessment PRN for score 0-3 or on second treatment, BID, and PRN for scores above 3.    No Indications - adjust the frequency to every 6 hours PRN wheezing or bronchospasm, if no treatments needed after 48 hours then discontinue using Per Protocol order mode.     If indication present, adjust the RT bronchodilator orders based on the Bronchodilator Assessment Score as indicated below.  Use Inhaler orders unless patient has one or more of the following: on home nebulizer, not able to hold breath for 10 seconds, is not alert and oriented, cannot activate and use MDI correctly, or respiratory rate 25 breaths per minute or more, then use the equivalent nebulizer order(s) with same Frequency and PRN reasons based on the score.  If a patient is on this medication at home then do not decrease Frequency below that used at home.    0-3 - enter or revise RT bronchodilator order(s) to equivalent RT Bronchodilator order with Frequency of every 4 hours PRN for wheezing or increased work of  breathing using Per Protocol order mode.        4-6 - enter or revise RT Bronchodilator order(s) to two equivalent RT bronchodilator orders with one order with BID Frequency and one order with Frequency of every 4 hours PRN wheezing or increased work of breathing using Per Protocol order mode.        7-10 - enter or revise RT Bronchodilator order(s) to two equivalent RT bronchodilator orders with one order with TID Frequency and one order with Frequency of every 4 hours PRN wheezing or increased work of breathing using Per Protocol order mode.       11-13 - enter or revise RT Bronchodilator order(s) to one equivalent RT bronchodilator order with QID Frequency and an Albuterol order with Frequency of every 4 hours PRN wheezing or increased work of breathing using Per Protocol order mode.      Greater than 13 - enter or revise RT Bronchodilator order(s) to one equivalent RT bronchodilator order with every 4 hours Frequency and an Albuterol order with Frequency of every 2 hours PRN wheezing or increased work of breathing using Per Protocol order mode.     RT to enter RT Home Evaluation for COPD & MDI Assessment order using Per Protocol order mode.    Electronically signed by Kenyetta Jewell RCP on 7/31/2024 at 8:18 AM

## 2024-07-31 NOTE — PROGRESS NOTES
Utilize [facility] alcohol withdrawal scale (Based on Omari Modified Alcohol Withdrawal Scale).  Tabulate score based on classifications including Tremor, Sweating, Hallucination, Orientation, and Agitation.    Tremor: 0  Sweatin  Hallucinations: 2  Orientation: 0  Agitation: 1  Total Score: 3  Action perform as described below     Tremor:  No tremor is 0 points.  Tremor on movement is 1 point.  Tremor at rest is 2 points.  Sweating: No Sweat 0 points. Moist is 1 point.  Drenching sweats is 2 points.  Hallucinations: No present 0 points. Dissuadable is 1 point. Not dissuadable is 2 points.  Orientation: Oriented 0 points. Vague/detached 1 point. Disoriented/no contact 2 points.  Agitation: Calm 0 points.  Anxious 1 point. Panicky 2 points.    Check scale every 2 hours.  Discontinue scoring with 4 consecutive scorings of 0.  Scale 0: No phenobarbital given.  Re-assess every 60 minutes as needed.   Scale 1-3: Phenobarbital 130 mg IV over 3 minutes. Re-assess every 60 minutes as needed.  May administer every 60 minutes to a maximum dose of phenobarbital 1040 mg in 24 hours!  Scale 4-8: Phenobarbital 260 mg IV over 5 minutes.  Re-assess every 60 minutes as needed. May administer every 60 minutes to a maximum dose of phenobarbital 1040mg in 24 hours!  Scale 9-10: Transfer to ICU (if not already in ICU).  Administer 10mg/kg phenobarbital IV over 60 minutes.  Maximum dose phenobarbital is 1040mg in 24 hours!

## 2024-07-31 NOTE — PROGRESS NOTES
Patient called out and states he doesn't feel comfortable here and is wanting to leave AMA. Patient is denying any suicidal ideations and is A&OX4. The resident team was notified and Dr. Aguilar came up and talked with the patient. Dr. Aguilar notified Dr. Arboleda and is okay to leave. AMA paper was signed. IV's were taken out and heart monitor off.

## 2024-07-31 NOTE — DISCHARGE SUMMARY
Resident Discharge Summary (Hospitalist)      Patient: Maverick Cruz 54 y.o. male  : 1970  MRN: [id number]   Account: [id number]   Patient's PCP: Alex Henao MD    Admit Date: 2024   Discharge Date: 2024      Admitting Physician: No admitting provider for patient encounter.  Discharge Physician: Gabriela Ramires DO     Patient stated that he \" felt uncomfortable in the hospital\" and stated that he wanted to leave the hospital.  Discussed risks with this decision.  Patient understood that he would be leaving AGAINST MEDICAL ADVICE and the risks with this decision. Patient still wished to leave [place] at that time.    Discharge Diagnoses:  #Acute hyponatremia 2/2 beer potomania  Baseline -132. Initial sodium 117. Serum Osm: 260 Urine Osm 123 Urina Na<20 .  Patient treated with continuous normal saline. Repeat sodium 2024 124.    #Alcohol use disorder  No as seizure, tremors, DT history related to alcohol withdrawal. Drinks 6 x 24 ounce beers daily. Last drink 10:30 AM 2024. EtOH 0.04 2024. Given-Multivitamin, folic acid, B1 on admission. Placed on phenobarb protocol.  was consulted.     #Hallucinations  #Personal history of ADHD, anxiety, depression, so affective disorder  Auditory> visual hallucinations the past 2 years but worsening the past few months. Passive suicidal ideation. No family history of schizophrenia.Home medications: Klonopin 1 mg twice daily, duloxetine, Trintellix 10 mg daily benzatropine 0.5 mg twice daily, Aristada, Abilify 10 mg daily, risperidone 1 mg twice daily, BuSpar 15 mg 3 times daily, Prozac 20 mg 3 times daily, trazodone 150 mg nightly, Vistaril 50 mg nightly. Follows with Kevin leon CNP at [facility]. Psych consulted. Patient diagnosed with schizoaffective disorder, depressive type.  Increased Abilify to 15 mg. Recommended continuation of Cogentin and Atarax. Trazodone and risperdal discontinued as patient was  50 MCG/ACT nasal spray  Commonly known as: FLONASE     FREESTYLE LITE strip  Generic drug: blood glucose test strips     guanFACINE 4 MG Tb24 extended release tablet  Commonly known as: INTUNIV     insulin aspart 100 UNIT/ML injection pen  Commonly known as: NovoLOG     isosorbide mononitrate 30 MG extended release tablet  Commonly known as: IMDUR  Take 1 tablet by mouth daily     losartan 100 MG tablet  Commonly known as: COZAAR  take 1 tablet by mouth once daily     metoprolol succinate 50 MG extended release tablet  Commonly known as: TOPROL XL     OXcarbazepine 300 MG tablet  Commonly known as: TRILEPTAL     pantoprazole 20 MG tablet  Commonly known as: PROTONIX  Ask about: Which instructions should I use?     sildenafil 100 MG tablet  Commonly known as: VIAGRA  Take 1 tablet by mouth daily as needed for Erectile Dysfunction     SUMAtriptan 50 MG tablet  Commonly known as: IMITREX     traZODone 50 MG tablet  Commonly known as: DESYREL  Ask about: Which instructions should I use?     Tresiba FlexTouch 200 UNIT/ML Sopn  Generic drug: Insulin Degludec     Trintellix 10 MG Tabs tablet  Generic drug: VORTIoxetine     zonisamide 100 MG capsule  Commonly known as: ZONEGRAN  Ask about: Which instructions should I use?                 Time Spent on discharge is 30 minutes in the examination, evaluation, counseling and review of medications and discharge plan.    Thank you Alex Henao MD for the opportunity to be involved in this patient's care.      Signed:    Electronically signed by Gabriela Ramires DO on 7/31/24 at 5:38 PM EDT     Case was discussed with Attending, Dr. Arboleda.

## 2024-07-31 NOTE — CONSULTS
Neurology Consult Note    Date:7/31/2024       Room:[facility]21/021-A  Patient Name:Maverick Cruz     YOB: 1970     Age:54 y.o.    Requesting Physician: Vikas Arboleda MD     Reason for Consult:  Evaluate for trileptal alternative       Chief Complaint:   Chief Complaint   Patient presents with    Mental Health Problem       Subjective     Maverick Cruz is a 54 y.o. male with a history of hypertension, hyperlipidemia, diabetes mellitus, struct of sleep apnea, seizures on Trileptal 750 mg twice daily, alcohol abuse, depression, anxiety who presents to [facility] on 7/29/2024 with complaints of auditory hallucinations, depression, anxiety.  Upon admission his sodium was noted to be 117.  Psychiatry was consulted and recommended discontinuing BuSpar, Prozac, Trintellix, and Trileptal.  Neurology consulted for recommendations on changing antiepileptic medication given concern of Trileptal decreasing sodium.  He reports that he has had seizures for 14 years, but is unsure what causes them.  He has not had one for the last 4 years.  He follows up with neurologist Dr. Yao in [place].  He saw them approximately 1 year ago.  He denies any smoking or drug use.  He does report daily alcohol use of approximately 5 to 624 ounce beer cans and about 2 shots of vodka daily.  He denies any recent illness.  He reports that he lives with his girlfriend currently.  He does not have a job at this time. He does report that he does occasionally miss or forget to take his medications.    Review of Systems   Review of Systems   Constitutional:  Negative for chills and fever.   HENT:  Negative for rhinorrhea and sore throat.    Eyes:  Negative for visual disturbance.   Respiratory:  Positive for cough. Negative for shortness of breath.    Cardiovascular:  Negative for chest pain and palpitations.   Gastrointestinal:  Negative for abdominal pain, nausea and vomiting.   Genitourinary:  Negative for dysuria.  °F (36.8 °C)      I/O (24Hr):    Intake/Output Summary (Last 24 hours) at 7/31/2024 0936  Last data filed at 7/31/2024 0839  Gross per 24 hour   Intake 1518.51 ml   Output 2102 ml   Net -583.49 ml         Physical Exam  Vitals reviewed.   Constitutional:       General: He is not in acute distress.     Appearance: Normal appearance. He is not ill-appearing.   HENT:      Head: Normocephalic and atraumatic.      Right Ear: External ear normal.      Left Ear: External ear normal.      Nose: Nose normal.      Mouth/Throat:      Mouth: Mucous membranes are moist.      Pharynx: No oropharyngeal exudate or posterior oropharyngeal erythema.   Eyes:      Extraocular Movements: EOM normal.      Pupils: Pupils are equal, round, and reactive to light.   Cardiovascular:      Rate and Rhythm: Normal rate.   Pulmonary:      Effort: Pulmonary effort is normal. No respiratory distress.   Abdominal:      Palpations: Abdomen is soft.      Tenderness: There is no abdominal tenderness.   Musculoskeletal:         General: Normal range of motion.      Right lower leg: No edema.      Left lower leg: No edema.   Skin:     General: Skin is warm.      Findings: No rash.   Neurological:      Mental Status: He is alert and oriented to person, place, and time.      Coordination: Finger-Nose-Finger Test and Heel to Shin Test normal.   Psychiatric:         Mood and Affect: Mood normal.         Speech: Speech normal.         Behavior: Behavior normal.       Neurologic Exam     Mental Status   Oriented to person, place, and time.   Registration: recalls 3 of 3 objects. Follows 2 step commands.   Attention: normal. Concentration: normal.   Speech: speech is normal   Level of consciousness: alert  Knowledge: good.   Able to name object. Able to repeat.     Cranial Nerves     CN II   Visual fields full to confrontation.     CN III, IV, VI   Pupils are equal, round, and reactive to light.  Extraocular motions are normal.   Right pupil: Size: 3 mm. Shape:

## 2024-07-31 NOTE — PROGRESS NOTES
PowerGlide Insertion Procedure Note  This line is NOT a central line, please do not use this for central solutions.   Line can be removed by the primary nurse or LPN, but should not be removed by the tech.  Line can be used to collect labs, but is NOT guaranteed to draw blood the entire duration of dwell.   Cathflo CANNOT be used on this device.   Patient CAN discharge home/to SNF with this device if ordered.   PowerGlide device can dwell for up to 29 days.     Maverick Cruz   Admitted- 7/29/2024  5:34 PM  Admission diagnosis- Hyponatremia [E87.1]  Schizoaffective disorder, unspecified type (HCC) [F25.9]      Attending Physician- Vikas Arboleda MD    Indication for Insertion: Poor Vascular Access    Catheter Insertion Date- 7/31/2024   Catheter Brand-DvineWave  Lot Number- [id number]  Gauge-20  Lumen-single    Insertion Site- TANO Brachial  Vein Diameter- 4.0 mm  Catheter Length- 10 cm  Internal Length- 10 cm     Midline Tip Terminates in the Axillary- Yes  Upper Arm Circumference- 32cm  Easy insertion- Yes  Able to Aspirate blood- Yes  Easy Flush- Yes    Midline insertion successful- Yes  Ultrasound- yes    Okay To Use Midline- Yes      Electronically signed by Silvana Marcos, RN, RN on 7/31/2024 at 12:21 PM

## 2024-08-02 ENCOUNTER — HOSPITAL ENCOUNTER (EMERGENCY)
Age: 54
Discharge: HOME | End: 2024-08-02
Attending: EMERGENCY MEDICINE
Payer: COMMERCIAL

## 2024-08-02 ENCOUNTER — APPOINTMENT (OUTPATIENT)
Dept: CT IMAGING | Age: 54
End: 2024-08-02
Payer: COMMERCIAL

## 2024-08-02 VITALS
SYSTOLIC BLOOD PRESSURE: 172 MMHG | TEMPERATURE: 98.4 F | RESPIRATION RATE: 20 BRPM | BODY MASS INDEX: 31.46 KG/M2 | WEIGHT: 245 LBS | OXYGEN SATURATION: 98 % | HEART RATE: 102 BPM | DIASTOLIC BLOOD PRESSURE: 81 MMHG

## 2024-08-02 DIAGNOSIS — R44.3 HALLUCINATIONS: Primary | ICD-10-CM

## 2024-08-02 DIAGNOSIS — F32.A DEPRESSION, UNSPECIFIED DEPRESSION TYPE: ICD-10-CM

## 2024-08-02 LAB
ALBUMIN SERPL BCG-MCNC: 4.3 G/DL (ref 3.5–5.1)
ALP SERPL-CCNC: 131 U/L (ref 38–126)
ALT SERPL W/O P-5'-P-CCNC: 34 U/L (ref 11–66)
AMPHETAMINES UR QL SCN: NEGATIVE
ANION GAP SERPL CALC-SCNC: 13 MEQ/L (ref 8–16)
AST SERPL-CCNC: 38 U/L (ref 5–40)
BARBITURATES UR QL SCN: POSITIVE
BASOPHILS ABSOLUTE: 0 THOU/MM3 (ref 0–0.1)
BASOPHILS NFR BLD AUTO: 0.4 %
BENZODIAZ UR QL SCN: NEGATIVE
BILIRUB SERPL-MCNC: 0.2 MG/DL (ref 0.3–1.2)
BUN SERPL-MCNC: 7 MG/DL (ref 7–22)
BZE UR QL SCN: NEGATIVE
CALCIUM SERPL-MCNC: 8.8 MG/DL (ref 8.5–10.5)
CANNABINOIDS UR QL SCN: NEGATIVE
CHLORIDE SERPL-SCNC: 101 MEQ/L (ref 98–111)
CO2 SERPL-SCNC: 24 MEQ/L (ref 23–33)
CREAT SERPL-MCNC: 0.8 MG/DL (ref 0.4–1.2)
DEPRECATED RDW RBC AUTO: 39.6 FL (ref 35–45)
EKG ATRIAL RATE: 92 BPM
EKG P AXIS: 51 DEGREES
EKG P-R INTERVAL: 150 MS
EKG Q-T INTERVAL: 346 MS
EKG QRS DURATION: 86 MS
EKG QTC CALCULATION (BAZETT): 427 MS
EKG R AXIS: 20 DEGREES
EKG T AXIS: 39 DEGREES
EKG VENTRICULAR RATE: 92 BPM
EOSINOPHIL NFR BLD AUTO: 0.6 %
EOSINOPHILS ABSOLUTE: 0 THOU/MM3 (ref 0–0.4)
ERYTHROCYTE [DISTWIDTH] IN BLOOD BY AUTOMATED COUNT: 12.6 % (ref 11.5–14.5)
ETHANOL SERPL-MCNC: 0.11 % (ref 0–0.08)
FENTANYL: NEGATIVE
GFR SERPL CREATININE-BSD FRML MDRD: > 90 ML/MIN/1.73M2
GLUCOSE BLD STRIP.AUTO-MCNC: 66 MG/DL (ref 70–108)
GLUCOSE BLD STRIP.AUTO-MCNC: 73 MG/DL (ref 70–108)
GLUCOSE SERPL-MCNC: 67 MG/DL (ref 70–108)
HCT VFR BLD AUTO: 37.9 % (ref 42–52)
HGB BLD-MCNC: 12.8 GM/DL (ref 14–18)
IMM GRANULOCYTES # BLD AUTO: 0.01 THOU/MM3 (ref 0–0.07)
IMM GRANULOCYTES NFR BLD AUTO: 0.2 %
LYMPHOCYTES ABSOLUTE: 1.5 THOU/MM3 (ref 1–4.8)
LYMPHOCYTES NFR BLD AUTO: 28.6 %
MCH RBC QN AUTO: 28.9 PG (ref 26–33)
MCHC RBC AUTO-ENTMCNC: 33.8 GM/DL (ref 32.2–35.5)
MCV RBC AUTO: 85.6 FL (ref 80–94)
MONOCYTES ABSOLUTE: 0.4 THOU/MM3 (ref 0.4–1.3)
MONOCYTES NFR BLD AUTO: 7.9 %
NEUTROPHILS ABSOLUTE: 3.3 THOU/MM3 (ref 1.8–7.7)
NEUTROPHILS NFR BLD AUTO: 62.3 %
NRBC BLD AUTO-RTO: 0 /100 WBC
OPIATES UR QL SCN: NEGATIVE
OSMOLALITY SERPL CALC.SUM OF ELEC: 271.9 MOSMOL/KG (ref 275–300)
OXYCODONE: NEGATIVE
PCP UR QL SCN: NEGATIVE
PLATELET # BLD AUTO: 238 THOU/MM3 (ref 130–400)
PMV BLD AUTO: 9.5 FL (ref 9.4–12.4)
POTASSIUM SERPL-SCNC: 4.3 MEQ/L (ref 3.5–5.2)
PROT SERPL-MCNC: 6.9 G/DL (ref 6.1–8)
RBC # BLD AUTO: 4.43 MILL/MM3 (ref 4.7–6.1)
SODIUM SERPL-SCNC: 138 MEQ/L (ref 135–145)
TSH SERPL DL<=0.005 MIU/L-ACNC: 1.39 UIU/ML (ref 0.4–4.2)
WBC # BLD AUTO: 5.3 THOU/MM3 (ref 4.8–10.8)

## 2024-08-02 PROCEDURE — 36415 COLL VENOUS BLD VENIPUNCTURE: CPT

## 2024-08-02 PROCEDURE — 70450 CT HEAD/BRAIN W/O DYE: CPT

## 2024-08-02 PROCEDURE — 84443 ASSAY THYROID STIM HORMONE: CPT

## 2024-08-02 PROCEDURE — 93005 ELECTROCARDIOGRAM TRACING: CPT | Performed by: EMERGENCY MEDICINE

## 2024-08-02 PROCEDURE — 85025 COMPLETE CBC W/AUTO DIFF WBC: CPT

## 2024-08-02 PROCEDURE — 80053 COMPREHEN METABOLIC PANEL: CPT

## 2024-08-02 PROCEDURE — 82948 REAGENT STRIP/BLOOD GLUCOSE: CPT

## 2024-08-02 PROCEDURE — 80307 DRUG TEST PRSMV CHEM ANLYZR: CPT

## 2024-08-02 PROCEDURE — 93010 ELECTROCARDIOGRAM REPORT: CPT | Performed by: INTERNAL MEDICINE

## 2024-08-02 PROCEDURE — 82077 ASSAY SPEC XCP UR&BREATH IA: CPT

## 2024-08-02 PROCEDURE — 99284 EMERGENCY DEPT VISIT MOD MDM: CPT

## 2024-08-02 ASSESSMENT — PAIN - FUNCTIONAL ASSESSMENT: PAIN_FUNCTIONAL_ASSESSMENT: 0-10

## 2024-08-02 ASSESSMENT — PAIN SCALES - GENERAL: PAINLEVEL_OUTOF10: 0

## 2024-08-02 NOTE — PROGRESS NOTES
PADMAJA CRISIS ASSESSMENT    SITUATION  Chief Complaint per ED Provider or Assigned Nurse report: Dizziness; Fatigue    Chief Complaint per Patient Report: Hallucinations    Chief Complaint per Collateral contact report (who  with pt or by phone): ChrissyienIsha smith    If collateral was not obtained why: N/A    Provisional Diagnosis (ICD or DSM approved diagnosis only): Psychosis, unspecified psychosis type (HCC) [F29]        BACKGROUND  Risk, Psychosocial and Contextual Factors (homeless, lack of social support, lack of family, unemployed, debt, legal, etc.): No Counseling; Daily alcohol use; Hallucinations    Protective Factors: Good support from girlfriend; Stable housing    Current  Treatment: [facility]; Psychiatry from [facility]    Past  Treatment or Hospitalization (Previous 6 months): Admission to  in December of 2019 for Psychosis, unspecified psychosis type (HCC) [F29]      Present Suicidal Behavior (Include specific information below):    Verbal: Denies    Attempt: Denies    Access to Weapons: Denies    Access to the Means of self-harm or harm to others identified: Medi Packs from [facility], monthly; bottles of pills in medication box    C-SSRS Current Suicide Risk: Low, Moderate or High: Low Risk      Past Suicidal Behavior (Include specific information below):    Verbal: Denies    Attempts: Denies    Homicidal: Denies    Hallucinations/Delusions: Hallucinations    Self-Injurious/Self-Mutilation: Denies    Traumatic Event Within Past 2 Weeks: Denies    Current Abuse: Denies    Legal Involvement: Denies    Violence: Denies    Housing: With girlfriend    CPAP/Oxygen/Ambulation Difficulties: Has a CPAP but hasn't used it in a couple months    Critical Lab Results: UDS positive for Barbiturates; ETOH 0.11      Assessment  Clinical Summary:    Patient is a 54 year old male presenting to the ED voluntarily with girlfrienIsha smith for complaints of hallucinations.    Patient denies suicidal and homicidal  thoughts. Patient reports history of visual hallucinations; on and off for 4 years and last saw them 3 or 4 years ago. Patient reports that he was seeing \"suff on my door, evil stuff.\" Patient reports he saw a closet door light up and a \"little man silhouette\" was standing there but did not come out.    Patient reports current auditory hallucinations. Patient reports he has been trying to figure it out; he spoke with [facility] at [facility] and he changed medications over three months ago. Patient reports hearing voices but cannot figure out what they are saying. Patient denies command hallucinations. Patient states he has heard them for the past couple years, starting after the visual hallucinations stopped; and it has gotten worse the past few months    Provider Recommendation Information  Level of Care Disposition:   1329 - Consulted with DO Cornelio and MD Yang to discuss assessment. DO Cornelio states CT is not back  1630 - Consulted with DO Cornelio; patient medically clear  1647 - Call to Dr. Sanchez; patient to be discharged to follow up with [facility]  1708 - In to discuss resources with patient; patient to contact [facility] to discuss next appointment

## 2024-08-02 NOTE — ED NOTES
Pt alert and oriented. Is sitting upright eating a lunch box and drinking ginger ale. Family at bedside updated on POC. Rmains in stable condition

## 2024-08-02 NOTE — DISCHARGE INSTRUCTIONS
Return to the ED if any new or changing symptoms such as headache, visual changes, nausea, vomiting, or you have any other concerns

## 2024-08-02 NOTE — ED NOTES
Pt arrived to ED via EMS with complaints of fatigue and dizziness. States symptoms started several days ago. Pt was admitted to this hospital with low sodium and left AMA. Pt complained of vision changes and hallucinations as well. Poc 87.  EKG completed and IV access obtained. Telemetry applied. Pt appears to be in stable condition with respirations even and unlabored. Pts call light in reach.

## 2024-08-04 NOTE — ED PROVIDER NOTES
hallucinations     ED COURSE & MEDICAL DECISION MAKING   See chart for details of medications given during the ED stay.   Reevaluation: after medications, the patients symptoms are much improved.   Vitals:    08/02/24 1437 08/02/24 1529 08/02/24 1529 08/02/24 1723   BP: (!) 142/96  (!) 169/79 (!) 172/81   Pulse: 92 100  (!) 102   Resp: 14 16  20   Temp:       TempSrc:       SpO2: 98%      Weight:            Differential Diagnosis: Depression, schizophrenia, hallucinations, intercranial mass,electrolyte abn, chronic hallucinations.   Medical Decision Making  Patient with depression and hallucinations.   No command. No SI or HI.   Appear to be longstanding.   Established with Psych  OP appointment this week.   No organic cause on exam and work-up is negative.   PADMAJA consulted. No admission at this time. Continue OP follow-up.     Amount and/or Complexity of Data Reviewed  Independent Historian: spouse  Labs: ordered.  Radiology: ordered. Decision-making details documented in ED Course.  ECG/medicine tests:  Decision-making details documented in ED Course.    Risk  Decision regarding hospitalization.  Diagnosis or treatment significantly limited by social determinants of health.       FINAL IMPRESSION   1. Hallucinations    2. Depression, unspecified depression type         PLAN   Discharge home.   Discharge with outpatient follow-up (see EMR)   (This note was completed with a voice recognition program)      Jesus Grimes DO  08/04/24 0003

## 2025-08-14 ENCOUNTER — HOSPITAL ENCOUNTER (EMERGENCY)
Age: 55
Discharge: HOME | End: 2025-08-14
Attending: EMERGENCY MEDICINE
Payer: COMMERCIAL

## 2025-08-14 VITALS
DIASTOLIC BLOOD PRESSURE: 103 MMHG | OXYGEN SATURATION: 98 % | HEIGHT: 74 IN | RESPIRATION RATE: 18 BRPM | WEIGHT: 265 LBS | SYSTOLIC BLOOD PRESSURE: 182 MMHG | HEART RATE: 72 BPM | TEMPERATURE: 97.9 F | BODY MASS INDEX: 34.01 KG/M2

## 2025-08-14 DIAGNOSIS — M54.50 ACUTE LEFT-SIDED LOW BACK PAIN WITHOUT SCIATICA: Primary | ICD-10-CM

## 2025-08-14 PROCEDURE — 96372 THER/PROPH/DIAG INJ SC/IM: CPT

## 2025-08-14 PROCEDURE — 99284 EMERGENCY DEPT VISIT MOD MDM: CPT

## 2025-08-14 PROCEDURE — 6370000000 HC RX 637 (ALT 250 FOR IP)

## 2025-08-14 PROCEDURE — 6360000002 HC RX W HCPCS

## 2025-08-14 RX ORDER — KETOROLAC TROMETHAMINE 30 MG/ML
30 INJECTION, SOLUTION INTRAMUSCULAR; INTRAVENOUS ONCE
Status: COMPLETED | OUTPATIENT
Start: 2025-08-14 | End: 2025-08-14

## 2025-08-14 RX ORDER — CYCLOBENZAPRINE HCL 10 MG
10 TABLET ORAL ONCE
Status: COMPLETED | OUTPATIENT
Start: 2025-08-14 | End: 2025-08-14

## 2025-08-14 RX ORDER — CYCLOBENZAPRINE HCL 10 MG
10 TABLET ORAL NIGHTLY PRN
Qty: 10 TABLET | Refills: 0 | Status: SHIPPED | OUTPATIENT
Start: 2025-08-14 | End: 2025-08-24

## 2025-08-14 RX ADMIN — KETOROLAC TROMETHAMINE 30 MG: 30 INJECTION, SOLUTION INTRAMUSCULAR at 13:14

## 2025-08-14 RX ADMIN — CYCLOBENZAPRINE 10 MG: 10 TABLET, FILM COATED ORAL at 13:15

## 2025-08-14 ASSESSMENT — PAIN - FUNCTIONAL ASSESSMENT
PAIN_FUNCTIONAL_ASSESSMENT: 0-10
PAIN_FUNCTIONAL_ASSESSMENT: 0-10

## 2025-08-14 ASSESSMENT — PAIN DESCRIPTION - LOCATION: LOCATION: BACK

## 2025-08-14 ASSESSMENT — PAIN SCALES - GENERAL
PAINLEVEL_OUTOF10: 8
PAINLEVEL_OUTOF10: 8

## 2025-08-14 ASSESSMENT — PAIN DESCRIPTION - ORIENTATION: ORIENTATION: LOWER

## 2025-08-14 ASSESSMENT — PAIN DESCRIPTION - DESCRIPTORS: DESCRIPTORS: ACHING

## (undated) DEVICE — INTRODUCER NEUROSTIMULATOR LD FOR URIN CTRL INTERSTIM

## (undated) DEVICE — DRAIN SURG W7MMXL20CM SIL FULL PERF HUBLESS FLAT RADPQ STRP

## (undated) DEVICE — CATHETER ETER IV 22GA L1IN POLYUR STR RADPQ INTROCAN SFTY

## (undated) DEVICE — BUR RND FLUT AGRSV 5MM

## (undated) DEVICE — SYRINGE MED 10ML LUERLOCK TIP W/O SFTY DISP

## (undated) DEVICE — GLOVE SURG SZ 65 THK91MIL LTX FREE SYN POLYISOPRENE

## (undated) DEVICE — PREP SOL PVP IODINE 4%  4 OZ/BTL

## (undated) DEVICE — GLOVE ORANGE PI 8   MSG9080

## (undated) DEVICE — SUREFIT, DUAL DISPERSIVE ELECTRODE, CONTACT QUALITY MONITOR: Brand: SUREFIT

## (undated) DEVICE — ULTRACLEAN ACCESSORY ELECTRODE 1" (2.54 CM) COATED BLADE WITH EXTENDED INSULATION: Brand: ULTRACLEAN

## (undated) DEVICE — 3M™ BAIR HUGGER® MULTI ACCESS BLANKET, PEDIATRIC, FULL BODY, 10 PER CASE 31000: Brand: BAIR HUGGER™

## (undated) DEVICE — Device

## (undated) DEVICE — HEAD POSITIONER, SURGICAL: Brand: DEROYAL

## (undated) DEVICE — PAD OP RM W20XL72XH2IN PRECIS CUT FLAT EC BIOCLINIC

## (undated) DEVICE — COVER US PRB W5XL96IN LTX W/ GEL

## (undated) DEVICE — 3M™ DURAPORE™ SURGICAL TAPE 1538-3, 3 INCH X 10 YARD (7,5CM X 9,1M), 4 ROLLS/BOX: Brand: 3M™ DURAPORE™

## (undated) DEVICE — BASIC SINGLE BASIN 1-LF: Brand: MEDLINE INDUSTRIES, INC.

## (undated) DEVICE — 3M™ TEGADERM™ TRANSPARENT FILM DRESSING FRAME STYLE, 1626W, 4 IN X 4-3/4 IN (10 CM X 12 CM), 50/CT 4CT/CASE: Brand: 3M™ TEGADERM™

## (undated) DEVICE — GLOVE ORANGE PI 7   MSG9070

## (undated) DEVICE — GAUZE,SPONGE,4"X4",12PLY,STERILE,LF,2'S: Brand: MEDLINE

## (undated) DEVICE — 3M™ TRANSPORE™ WHITE SURGICAL TAPE 1534-2, 2 INCH X 10 YARD (5CM X 9,1M), 6 ROLLS/CARTON 10 CARTONS/CASE: Brand: 3M™ TRANSPORE™

## (undated) DEVICE — BREAST HERNIA PACK: Brand: MEDLINE INDUSTRIES, INC.

## (undated) DEVICE — Device: Brand: SINGLE USE INJECTOR NM-221C-0427

## (undated) DEVICE — GAUZE,SPONGE,8"X4",12PLY,XRAY,STRL,LF: Brand: MEDLINE

## (undated) DEVICE — SUTURE VCRL SZ 2-0 L27IN ABSRB UD L36MM CP-1 1/2 CIR REV J266H

## (undated) DEVICE — GLOVE SURG SZ 7 L12IN FNGR THK94MIL TRNSLUC YEL LTX HYDRGEL

## (undated) DEVICE — DRAPE C ARM W36XL30IN RECTANG BND BG AND TAPE

## (undated) DEVICE — 3M™ STERI-STRIP™ COMPOUND BENZOIN TINCTURE 40 BAGS/CARTON 4 CARTONS/CASE C1544: Brand: 3M™ STERI-STRIP™

## (undated) DEVICE — YANKAUER,BULB TIP,W/O VENT,RIGID,STERILE: Brand: MEDLINE

## (undated) DEVICE — GOWN,SIRUS,NON REINFRCD,LARGE,SET IN SL: Brand: MEDLINE

## (undated) DEVICE — ANTENNA PT PRGMR EXT DETACH RECHRG DBS LEGEND

## (undated) DEVICE — NEUROSTIMULATOR EXT SM LTWT SGL BTTN H2O RESIST WIRELESS

## (undated) DEVICE — LINER SUCT CANSTR 1500CC SEMI RIG W/ POR HYDROPHOBIC SHUT

## (undated) DEVICE — CYSTO PACK: Brand: MEDLINE INDUSTRIES, INC.

## (undated) DEVICE — SET LNR RED GRN W/ BASE CLEANASCOPE

## (undated) DEVICE — ROYAL SILK SURGICAL GOWN, XXL: Brand: CONVERTORS

## (undated) DEVICE — Z DISCONTINUED NO SUB IDED DRAIN PENROSE L12IN 0.25IN USED TO PROMOTE DRNAGE IN OPN

## (undated) DEVICE — SOLUTION IRRIG 3000ML 0.9% SOD CHL USP UROMATIC PLAS CONT

## (undated) DEVICE — PROGRAMMER NEUROSTIMULATOR PT FOR URIN CTRL INTERSTIM ICON

## (undated) DEVICE — SKIN AFFIX SURG ADHESIVE 72/CS 0.55ML: Brand: MEDLINE

## (undated) DEVICE — SOLUTION IV 1000ML 0.45% SOD CHL PH 5 INJ USP VIAFLX PLAS

## (undated) DEVICE — ENDO KIT: Brand: MEDLINE INDUSTRIES, INC.

## (undated) DEVICE — TAPE,CLOTH/SILK,CURAD,3"X10YD,LF,40/CS: Brand: CURAD

## (undated) DEVICE — 3M™ WARMING BLANKET, UPPER BODY, 10 PER CASE, 42268: Brand: BAIR HUGGER™

## (undated) DEVICE — GLOVE SURG SZ 9 THK91MIL LTX FREE SYN POLYISOPRENE ANTI

## (undated) DEVICE — INTENDED FOR TISSUE SEPARATION, AND OTHER PROCEDURES THAT REQUIRE A SHARP SURGICAL BLADE TO PUNCTURE OR CUT.: Brand: BARD-PARKER ® CARBON RIB-BACK BLADES

## (undated) DEVICE — 3.0MM ZYPHR ROUND FLUTED: Brand: ZYPHR

## (undated) DEVICE — CONMED SCOPE SAVER BITE BLOCK, 20X27 MM: Brand: SCOPE SAVER

## (undated) DEVICE — IV START KIT: Brand: MEDLINE INDUSTRIES, INC.

## (undated) DEVICE — TUBING, SUCTION, 1/4" X 20', STRAIGHT: Brand: MEDLINE INDUSTRIES, INC.

## (undated) DEVICE — 3M™ STERI-STRIP™ BLEND TONE SKIN CLOSURES, B1557, TAN, 1/2 IN X 4 IN (12MM X 100MM), 6 STRIPS/ENVELOPE: Brand: 3M™ STERI-STRIP™

## (undated) DEVICE — CONNECTOR TBNG AUX H2O JET DISP FOR OLY 160/180 SER

## (undated) DEVICE — BUR CUT RND FLUT AGRSV 4.0MM

## (undated) DEVICE — SUTURE MCRYL SZ 4-0 L27IN ABSRB UD L19MM PS-2 1/2 CIR PRIM Y426H

## (undated) DEVICE — SUTURE VCRL SZ 3-0 L18IN ABSRB VLT SUTUPAK PRECUT W/O NDL J104T

## (undated) DEVICE — FORCEPS BX L L240CM DIA2.4MM RAD JAW 4 HOT FOR POLYP DISP

## (undated) DEVICE — SUTURE VCRL SZ 1 L27IN ABSRB UD CT-1 L36MM 1/2 CIR J261H

## (undated) DEVICE — GLOVE ORANGE PI 7 1/2   MSG9075

## (undated) DEVICE — SOLUTION IV 1000ML 0.9% SOD CHL PH 5 INJ USP VIAFLX PLAS

## (undated) DEVICE — SET ADMIN 25ML L117IN PMP MOD CK VLV RLER CLMP 2 SMRTSITE

## (undated) DEVICE — DRAPE MICSCP W132XL406CM LENS DIA68MM W VARI LENS2 FOR LEICA

## (undated) DEVICE — SUTURE ETHLN SZ 3-0 L18IN NONABSORBABLE BLK FS-1 L24MM 3/8 663H

## (undated) DEVICE — TUBING IV STOPCOCK 48 CM 3 W

## (undated) DEVICE — 1010 S-DRAPE TOWEL DRAPE 10/BX: Brand: STERI-DRAPE™

## (undated) DEVICE — BLADE CLIPPER GEN PURP NS

## (undated) DEVICE — CABLE NEUROSTIMULATOR TST STIM INTERSTIM

## (undated) DEVICE — SOLUTION SURG PREP POV IOD 7.5% 4 OZ

## (undated) DEVICE — 4-PORT MANIFOLD: Brand: NEPTUNE 2

## (undated) DEVICE — C-ARMOR C-ARM EQUIPMENT COVERS CLEAR STERILE UNIVERSAL FIT 12 PER CASE: Brand: C-ARMOR

## (undated) DEVICE — SUTURE VCRL SZ 3-0 L27IN ABSRB UD FS-2 L19MM 1/2 CIR J423H

## (undated) DEVICE — SOLUTION SCRB 4OZ 4% CHG H2O AIDED FOR PREOPERATIVE SKIN

## (undated) DEVICE — GOWN,SIRUS,NONRNF,SETINSLV,XL,20/CS: Brand: MEDLINE

## (undated) DEVICE — CRADLE POS 3X5X24IN RASPBERRY ARM PRONE FOAM DISP

## (undated) DEVICE — SOLUTION IV IRRIG POUR BRL 0.9% SODIUM CHL 2F7124

## (undated) DEVICE — PATIENT RETURN ELECTRODE, SINGLE-USE, CONTACT QUALITY MONITORING, ADULT, WITH 9FT CORD, FOR PATIENTS WEIGING OVER 33LBS. (15KG): Brand: MEGADYNE

## (undated) DEVICE — COLLAR CERV AND PD SET CAPITAL ENH

## (undated) DEVICE — Device: Brand: FABCO

## (undated) DEVICE — Z DISCONTINUED BY MEDLINE USE 2711682 TRAY SKIN PREP DRY W/ PREM GLV

## (undated) DEVICE — BLADE LARYNSCP SZ 4 ENH DIR INTUB GLIDESCOPE MCGRATH MAC

## (undated) DEVICE — CODMAN® SURGICAL PATTIES 1/2" X 1/2" (1.27CM X 1.27CM): Brand: CODMAN®

## (undated) DEVICE — SOLUTION IV IRRIG WATER 1000ML POUR BRL 2F7114